# Patient Record
Sex: FEMALE | Race: WHITE | NOT HISPANIC OR LATINO | Employment: OTHER | URBAN - METROPOLITAN AREA
[De-identification: names, ages, dates, MRNs, and addresses within clinical notes are randomized per-mention and may not be internally consistent; named-entity substitution may affect disease eponyms.]

---

## 2017-04-05 ENCOUNTER — HISTORICAL (OUTPATIENT)
Dept: RADIOLOGY | Facility: HOSPITAL | Age: 47
End: 2017-04-05

## 2017-06-13 ENCOUNTER — HISTORICAL (OUTPATIENT)
Dept: LAB | Facility: HOSPITAL | Age: 47
End: 2017-06-13

## 2017-06-13 LAB
ABS NEUT (OLG): 4.51 X10(3)/MCL (ref 2.1–9.2)
BASOPHILS # BLD AUTO: 0 X10(3)/MCL (ref 0–0.2)
BASOPHILS NFR BLD AUTO: 1 %
EOSINOPHIL # BLD AUTO: 0.2 X10(3)/MCL (ref 0–0.9)
EOSINOPHIL NFR BLD AUTO: 4 %
ERYTHROCYTE [DISTWIDTH] IN BLOOD BY AUTOMATED COUNT: 13.1 % (ref 11.5–17)
HCT VFR BLD AUTO: 38 % (ref 37–47)
HGB BLD-MCNC: 12.8 GM/DL (ref 12–16)
LYMPHOCYTES # BLD AUTO: 1.6 X10(3)/MCL (ref 0.6–4.6)
LYMPHOCYTES NFR BLD AUTO: 23 %
MCH RBC QN AUTO: 30 PG (ref 27–31)
MCHC RBC AUTO-ENTMCNC: 33.7 GM/DL (ref 33–36)
MCV RBC AUTO: 89 FL (ref 80–94)
MONOCYTES # BLD AUTO: 0.4 X10(3)/MCL (ref 0.1–1.3)
MONOCYTES NFR BLD AUTO: 6 %
NEUTROPHILS # BLD AUTO: 4.51 X10(3)/MCL (ref 2.1–9.2)
NEUTROPHILS NFR BLD AUTO: 66 %
PLATELET # BLD AUTO: 241 X10(3)/MCL (ref 130–400)
PMV BLD AUTO: 9.9 FL (ref 9.4–12.4)
RBC # BLD AUTO: 4.27 X10(6)/MCL (ref 4.2–5.4)
WBC # SPEC AUTO: 6.8 X10(3)/MCL (ref 4.5–11.5)

## 2017-06-14 ENCOUNTER — HISTORICAL (OUTPATIENT)
Dept: ADMINISTRATIVE | Facility: HOSPITAL | Age: 47
End: 2017-06-14

## 2020-07-02 ENCOUNTER — HISTORICAL (OUTPATIENT)
Dept: RADIOLOGY | Facility: HOSPITAL | Age: 50
End: 2020-07-02

## 2021-08-27 ENCOUNTER — HISTORICAL (OUTPATIENT)
Dept: LAB | Facility: HOSPITAL | Age: 51
End: 2021-08-27

## 2021-08-27 LAB
BUN SERPL-MCNC: 10 MG/DL (ref 7–18.7)
CREAT SERPL-MCNC: 0.74 MG/DL (ref 0.55–1.02)

## 2022-01-31 ENCOUNTER — HISTORICAL (OUTPATIENT)
Dept: LAB | Facility: HOSPITAL | Age: 52
End: 2022-01-31

## 2022-01-31 LAB
AMPHET UR QL SCN: NEGATIVE
BARBITURATE SCN PRESENT UR: NEGATIVE
BENZODIAZ UR QL SCN: NEGATIVE
BUN SERPL-MCNC: 13 MG/DL (ref 7–18.7)
CANNABINOIDS UR QL SCN: NEGATIVE
COCAINE UR QL SCN: NEGATIVE
CREAT SERPL-MCNC: 0.71 MG/DL (ref 0.55–1.02)
CRP SERPL-MCNC: 0.83 MG/L
DEPRECATED CALCIDIOL+CALCIFEROL SERPL-MC: 19.6 NG/ML (ref 30–80)
ERYTHROCYTE [SEDIMENTATION RATE] IN BLOOD: 18 MM/H (ref 0–20)
FENTANYL UR QL SCN: NEGATIVE
FOLATE SERPL-MCNC: 12.5 NG/ML (ref 7–31.4)
HIV 1+2 AB+HIV1 P24 AG SERPL QL IA: 0.05
HIV 1+2 AB+HIV1 P24 AG SERPL QL IA: NONREACTIVE
MDMA UR QL SCN: NEGATIVE
METHADONE UR QL SCN: NEGATIVE
OPIATES UR QL SCN: NEGATIVE
PCP UR QL: NEGATIVE
PH UR STRIP.AUTO: 6 [PH]
VIT B12 SERPL-MCNC: 362 PG/ML (ref 213–816)

## 2022-04-30 NOTE — OP NOTE
DATE OF SURGERY:    06/14/2017    SURGEON:  Soren Alvarado MD  ASSISTANT:  Dominick Forte MD    PREOPERATIVE DIAGNOSIS:  Bilateral breast hypertrophy.    POSTOPERATIVE DIAGNOSIS:  Bilateral breast hypertrophy.    PROCEDURE:  Bilateral breast reductions.    INDICATION FOR PROCEDURE:  The patient is a 47-year-old female who presents with bilateral bra grooving, rashes, intertrigo, heaviness of her breasts.  She presents for bilateral breast reductions.    ANESTHESIA:  General.    COMPLICATIONS:  None.    PROCEDURE IN DETAIL:  The patient was endotracheally intubated, prepped and draped in the usual sterile fashion.  First, a 10 cm pedicle was outlined on the bilateral breasts.  We first turned our attention to the left breast.  A breast tourniquet was used, and the pedicle was de-epithelialized using a 10 blade scalpel.  A 42 mm cookie cutter was used to cut out the nipple.  After this was completed, breast tourniquet was released.  Following the Mendieta pattern markings, a 10 blade scalpel was used to incise the skin.  We raised the superior flaps using Bovie cautery all the way down to the pectoralis major muscle.  I then removed the medial, superior, and lateral segments using Bovie cautery.  This was sent as specimen.  This weighed approximately 500 g.  After this was completed and the breast tissue was removed, we then turned our attention the right breast.  In a similar fashion, a 42 mm cookie cutter was used to cut out the nipple.  A 10 cm pedicle was de-epithelialized using a 10 blade scalpel.  A 10 blade was then used to incise the Wise pattern markings, and then Bovie electrocautery was used to raise superior flaps.  Medial, lateral, and superior segments were then removed.  This was sent as specimen.  The right side was approximately 460 g.  After this was completed, we irrigated out the operative beds with sterile saline solution on both sides and created hemostasis using Bovie cautery.  We then tacked the  breast skin back together and set the patient up.  The symmetry was excellent.  We then laid her back down.  We closed the deep tissue using interrupted 0 Vicryl pops on the left and the right.  The skin was then closed using a running 2-0 Stratafix suture.  The nipple was then brought up through the superior flaps using a 42 mm cookie cutter bilaterally using a 15 blade scalpel, and Bovie cautery was used to remove the superior flap and bring the nipple through the hole after this was completed, the 0 Vicryl pops were used to inset the nipples as well as the vertical segments.  The skin around the nipples and the vertical segments was closed using a running 3-0     Stratafix suture.  Dermabond and Prineo were applied with no complications.  I was scrubbed and present throughout the procedure.        ______________________________  MD SEJAL Field/JARROD  DD:  06/14/2017  Time:  10:55AM  DT:  06/14/2017  Time:  10:12PM  Job #:  [id number]

## 2022-10-25 DIAGNOSIS — C50.912 INVASIVE DUCTAL CARCINOMA OF BREAST, FEMALE, LEFT: Primary | ICD-10-CM

## 2022-10-28 ENCOUNTER — DOCUMENTATION ONLY (OUTPATIENT)
Dept: HEMATOLOGY/ONCOLOGY | Facility: CLINIC | Age: 52
End: 2022-10-28
Payer: COMMERCIAL

## 2022-10-30 PROBLEM — C50.112 MALIGNANT NEOPLASM OF CENTRAL PORTION OF LEFT BREAST IN FEMALE, ESTROGEN RECEPTOR POSITIVE: Status: ACTIVE | Noted: 2022-10-30

## 2022-10-30 PROBLEM — Z17.0 MALIGNANT NEOPLASM OF CENTRAL PORTION OF LEFT BREAST IN FEMALE, ESTROGEN RECEPTOR POSITIVE: Status: ACTIVE | Noted: 2022-10-30

## 2022-10-30 NOTE — PROGRESS NOTES
Referring physician: Self-referral  Reason for referral: Breast Cancer    Subjective:       Patient ID: Nita Dejesus is a 52 y.o. female.    Left Breast Cancer Stage IA(T2N0M0) Triple Positive--22  Biopsy/Surgery/pathology:   1. Excisional biopsy left breast mass/left breast cyst done 22--invasive ductal carcinoma, Grade 3, measures 3.0cm, a 0.6cm region of cauterized carcinoma is present at the inked superior-deep margin, cyst with reactive changes, suggestive of previous lumpectomy site, fluid left breast cyst with rare atypical cells present, suspicious for carcinoma, ER 95%, WV 27%, Her2 3+ by IHC, positive, Ki67 51%.   2. Left SLN biopsies done 22--3 benign lymph nodes.   Imagin. Bilateral diagnostic MMG done at [facility] 22--left inferior breast large, minimally complex cystic lesion at 6:00 measures 4.4X3.5X4.2cm, corresponding to the palpable area, appears benign. Routine screening MMG recommended.   2. MRI breasts bilateral at [facility] 22--post-surgical seroma at 12:00 position of left breast s/p recent excisional biopsy, no definite suspicious enhancing masses or areas of nonmass enhancement of left breast, and no suspicious areas in right breast, BIRADS 6.   3. CT C/A/P w/ contrast 22 at [facility]--no thoracic metastatic disease, fatty infiltration of liver, small to moderate-sized hiatal hernia, no metastatic disease.   4. NM Bone scan whole body done 22 at [facility]--activity in cervical and lumbar spines likely degenerative, no anatomic correlate seen on CT scan in lumbar spines, suggest correlation with C-spine Xrays, increase tracer w/n both feet, likely degnerative change, physiologic activity of urinary tract.  5. CT A/P w/ contrast 22 at [facility]--no acute abdominopelvic pathology or change compared to recent CT from 22, fairly large amount of stool in colon may reflect constipation, large hiatal hernia, diffuse hepatic steatosis, post-cholecystectomy, post  hysterectomy, mild thoracic and lumbar degenerative disease.     ECHO:  8/18/22--EF 55-65%.    Genetic testing: [employer] done 7/21/22 negative for any pathogenic variants.     Treatment history:  Left breast excisional biopsy done 7/13/22  Left SLN biopsies done 8/8/22.    Current treatment plan:  TCHP X 6 cycles started 8/23/22  Cycle 3 delayed due to thrombocytopenia, given on 10/25/22 dose reduction, IV fluids/Zofran/Neulasta on day 2  Cycle 4 planned for 11/15/22  2. Re-excision planned  3. Maintenance HP X 11 cycles if no residual disease  4. +/- XRT  5. Adjuvant OS + AI X 10 years (DAISY for endometriosis, ovaries remain)    Chief Complaint: Back Pain (NPO.)    HPI  51 yo female with h/o fibromyalgia, IBS, IBD, PUD, migraines, active smoker and known recently diagnosed breast cancer has self-referred for a second opinion. Her history dates back to earlier this year when she felt a palpable mass in left breast. She underwent a MMG at [facility] in 2/2022 and lesion appeared benign. But lesion continued to grow so it was recommended for excisional biopsy. Patient was referred to Dr. Kelly Jain and underwent excisional biopsy on 7/13/22. Pathology showed Grade 3 IDCA, measuring 3.0cm with margin positive, ER, GA and Her2 all positive. She then underwent SLN biopsies and returned with 3 benign lymph nodes. She was referred to medical oncology and was seen by Dr. Gonzalez at [facility]. Treatment recommended with chemotherapy TCHP with plans for repeat excision to be done at a later date. Patient received cycle 1 TCHP on 8/23/22 with neulasta. She was hospitalized from 8/25/22--8/27/22 at [facility] with N/V, dehydration and gastritis. She underwent cycle 2 TCHP on 9/28/22 and she was having problems with nose and sinus bleeding. Unsure if any dose reductions. She was due for cycle 3 on 10/18/22 but platelets noted to be 48K so treatment was delayed. According to the notes patient became very upset with the PA at [facility] and has made  decision to change doctors and transfer her care here. Patient presents for initial clinic visit. Patient did receive cycle 3 of treatment at [facility] on 10/25/22 with dose reduction, IV fluids, Zofran and neulasta given on day 2. She tolerated treatment much better. She c/o some mouth soreness and feels like she is drinking andres when she drinks water, also has dry watery eyes and is using lubricant eye drops and she has ongoing diarrhea.  She is otherwise doing okay. She has not been receiving any steroids with her treatment as she tells me that the steroids flare up her fibromyalgia and have the opposite effect on her. She is fatigued and according to her labs she has had progressive anemia. Patient still has her ovaries remaining and reports that as of a year ago, they were still functioning. But she is not aware if she had any hormone levels checked prior to receiving chemotherapy. We discussed options including Zoladex and BSO following radiation therapy but she would like to think about it. She has a plastic surgeon Dr. Alvarado who did a reduction on her breasts several years ago and he will be evaluating her after her breast surgery is done. We discussed mastectomy vs. Lumpectomy + radiation and she is leaning toward lumpectomy at this time.     GYN history with menarche age 11, 19yo first live birth, DAISY  done for endometriosis 2012, ovaries not removed, +progesterone cream X 10 years. FH with father prostate cancer, brother HL, MGM cervical ca and uterine ca, PGM with multiple myeloma, maternal cousin with liver cancer, cousin who  age 6 from leukemia.      Past Medical History:   Diagnosis Date    Allergy     Anemia     Anxiety     Clotting disorder     GERD (gastroesophageal reflux disease)     Neuromuscular disorder       Past Surgical History:   Procedure Laterality Date    CHOLECYSTECTOMY      HERNIA REPAIR      HYSTERECTOMY      lymphectomy Left     REDUCTION OF BOTH BREASTS  2018    TUBAL  "LIGATION Bilateral 1993     Family History   Problem Relation Age of Onset    Hypertension Mother     Lupus Mother     Hypoparathyroidism Mother     Diabetes Father     Heart disease Father     Prostate cancer Father     Kidney disease Father     Hodgkin's lymphoma Brother     Pancreatitis Brother      Social History     Socioeconomic History    Marital status:    Tobacco Use    Smoking status: Former     Types: Cigarettes    Smokeless tobacco: Never   Substance and Sexual Activity    Alcohol use: Not Currently    Drug use: Never       Review of patient's allergies indicates:   Allergen Reactions    Corticosteroids (glucocorticoids) Hives, Other (See Comments) and Swelling     Patient states "Severe Inflammation"      Ketorolac Diarrhea, Nausea And Vomiting, Other (See Comments) and Swelling     Migraines      Penicillins Anaphylaxis, Diarrhea, Hives, Nausea And Vomiting and Swelling    Tramadol Diarrhea, Itching, Nausea And Vomiting, Rash and Swelling    Latex Other (See Comments)     Skin blisters      Macrolide antibiotics Hives and Rash    Adhesive Other (See Comments)     Blisters, skin tears    Anticoag citrate phos dextrose     Aspartame     Estrogens     Sucralose Other (See Comments)    Amitriptyline Anxiety and Other (See Comments)     Severe      Aspirin Nausea Only and Other (See Comments)    Eszopiclone Anxiety and Itching    Heparin analogues Other (See Comments) and Palpitations    Topiramate Anxiety, Other (See Comments) and Palpitations     Shaking        Current Outpatient Medications on File Prior to Visit   Medication Sig Dispense Refill    butalbital-acetaminophen-caffeine -40 mg (FIORICET, ESGIC) -40 mg per tablet Take by mouth.      clonazePAM (KLONOPIN) 0.5 MG tablet Take 0.5 mg by mouth every 8 (eight) hours as needed.      diclofenac (VOLTAREN) 75 MG EC tablet       diphenhydrAMINE (BENADRYL) 50 MG capsule Take 50 mg by mouth every 6 (six) hours as needed.      famotidine " (PEPCID) 20 MG tablet Take 20 mg by mouth 2 (two) times daily.      gabapentin (NEURONTIN) 300 MG capsule 3 (three) times daily.      gabapentin (NEURONTIN) 600 MG tablet Take 600 mg by mouth 3 (three) times daily.      omeprazole (PRILOSEC) 40 MG capsule Take 40 mg by mouth once daily.      ondansetron (ZOFRAN-ODT) 4 MG TbDL Take 4 mg by mouth every 8 (eight) hours as needed.      tiZANidine (ZANAFLEX) 4 MG tablet Take 12 mg by mouth 3 (three) times daily.       No current facility-administered medications on file prior to visit.      Review of Systems   Constitutional:  Negative for appetite change and unexpected weight change.   HENT:  Positive for mouth sores.    Eyes:  Positive for visual disturbance.   Respiratory:  Negative for cough and shortness of breath.    Cardiovascular:  Negative for chest pain.   Gastrointestinal:  Positive for abdominal pain and diarrhea.   Genitourinary:  Positive for frequency.   Musculoskeletal:  Positive for back pain.   Integumentary:  Negative for rash.   Neurological:  Positive for headaches.   Hematological:  Negative for adenopathy.   Psychiatric/Behavioral:  The patient is not nervous/anxious.             Vitals:    10/31/22 1405   BP: 110/75   Pulse: 109   Resp: 14   Temp: 98.2 °F (36.8 °C)   SpO2: 98%   Weight: 83.7 kg (184 lb 8 oz)   Height: 5' (1.524 m)      Physical Exam  Constitutional:       Appearance: Normal appearance.   HENT:      Head: Normocephalic.      Comments: +alopecia wearing a wig     Nose: Nose normal.      Mouth/Throat:      Mouth: Mucous membranes are moist.   Eyes:      Extraocular Movements: Extraocular movements intact.      Conjunctiva/sclera: Conjunctivae normal.   Cardiovascular:      Rate and Rhythm: Normal rate and regular rhythm.   Pulmonary:      Effort: Pulmonary effort is normal.      Breath sounds: Normal breath sounds.   Chest:      Comments: Right chest wall mediport in place, left breast with inferior lumpectomy scar, well healed along  previous scar from her reduction. Left axillary incision from LN biopsy also healed well. No masses palpable  Abdominal:      General: Bowel sounds are normal. There is no distension.      Palpations: Abdomen is soft.      Tenderness: There is no abdominal tenderness.   Musculoskeletal:         General: Normal range of motion.   Skin:     General: Skin is warm.   Neurological:      General: No focal deficit present.      Mental Status: She is alert and oriented to person, place, and time.   Psychiatric:         Mood and Affect: Mood normal.         Judgment: Judgment normal.       Lab Results   Component Value Date    WBC 2.4 (L) 10/31/2022    HGB 8.5 (L) 10/31/2022    HCT 27.3 (L) 10/31/2022    MCV 97.8 (H) 10/31/2022     10/31/2022         Assessment:       1. Anemia, unspecified type    2. Malignant neoplasm of central portion of left breast in female, estrogen receptor positive    3. Invasive ductal carcinoma of breast, female, left         Plan:       Patient with Stage IA Left breast cancer triple positive s/p excisional biopsy of a cystic lesion that found cancer incidentally diagnosed 7/13/22. Tumor measured 3.0cm and SLN biopsies negative, Grade 3, strongly ER and [place] positive and Her2 positive with high Ki67. There was a positive margin on the excisional biopsy.  Treatment with chemotherapy neoadjuvant started at [facility] with Dr. Gonzalez.    Patient has completed 3 cycles as of last week on 10/25/22 and she has had multiple problems.  Tolerated cycle 3 better last week with dose reduction and IV fluids on day 2 with Zofran.  She does not have any steroids due to h/o allergy causing inflammation.   Plan is to complete another 3 cycles followed by surgery--likely completion lumpectomy then adjuvant radiation.    She had DAISY in 2012 for endometriosis but still has ovaries that remain functional according to patient though I do not have any recent Estradiol and/or FSH levels.   Plan will be for OS vs. BSO + AI  X 5 years likely followed by completion of AI to complete 10 years.     Labs today show WBC 2.4, , Hgb lower 8.5g/dL and platelets 147.  Patient has had progressive anemia, checking iron levels and B12 today.   Discussed with her she will likely need a blood transfusion at some point.  Fever precautions given, will send prescription for prophylactic antibiotics.     Schedule labs only next week.  Labs on 11/14/22, next treatment on 11/15/22, add Emend premed for nausea, with IV fluids/Zofran/neulasta on day 2 11/16/22.  F/u in 3 weeks labs and toxicity check visit.    Add Lomotil for diarrhea and add MMW for mouth soreness.  Continue eye lubricants QID.  Due for repeat ECHO, will order for next week.     All questions answered at this time.    I have reviewed records from patient's cancer diagnosis including biopsy/operative reports, pathology reports/special testing, imaging studies, clinician visit notes, previous labs, and also previous treatments and dosing.   Total time spent reviewing records, current NCCN guidelines, as well as face-to-face interaction with patient and family was >90 minutes.         Treatment Plan Information   OP BREAST DOCETAXEL CARBOPLATIN TRASTUZUMAB PERTUZUMAB (TCHP) Q3W   Flory Vargas MD   Upcoming Treatment Dates - OP BREAST DOCETAXEL CARBOPLATIN TRASTUZUMAB PERTUZUMAB (TCHP) Q3W    11/15/2022       Chemotherapy       pertuzumab (PERJETA) 420 mg in sodium chloride 0.9% 264 mL infusion       trastuzumab-dkst (OGIVRI) 502 mg in sodium chloride 0.9% 250 mL chemo infusion       DOCEtaxel (TAXOTERE) 63.75 mg/m2 = 120 mg in sodium chloride 0.9% 250 mL chemo infusion       CARBOplatin (PARAPLATIN) 665 mg in sodium chloride 0.9% 500 mL chemo infusion       Antiemetics       aprepitant (CINVANTI) injection 130 mg       palonosetron injection 0.25 mg       diphenhydrAMINE injection 12.5 mg  11/16/2022       Growth Factor       pegfilgrastim-cbqv injection 6 mg  12/6/2022        Chemotherapy       pertuzumab (PERJETA) 420 mg in sodium chloride 0.9% 264 mL infusion       trastuzumab-dkst (OGIVRI) 502 mg in sodium chloride 0.9% 250 mL chemo infusion       DOCEtaxel (TAXOTERE) 63.75 mg/m2 = 120 mg in sodium chloride 0.9% 250 mL chemo infusion       CARBOplatin (PARAPLATIN) 665 mg in sodium chloride 0.9% 500 mL chemo infusion       Antiemetics       palonosetron injection 0.25 mg       diphenhydrAMINE injection 12.5 mg       aprepitant (CINVANTI) injection 130 mg  12/7/2022       Growth Factor       pegfilgrastim-cbqv injection 6 mg

## 2022-10-31 ENCOUNTER — TELEPHONE (OUTPATIENT)
Dept: HEMATOLOGY/ONCOLOGY | Facility: CLINIC | Age: 52
End: 2022-10-31

## 2022-10-31 ENCOUNTER — OFFICE VISIT (OUTPATIENT)
Dept: HEMATOLOGY/ONCOLOGY | Facility: CLINIC | Age: 52
End: 2022-10-31
Payer: COMMERCIAL

## 2022-10-31 ENCOUNTER — CLINICAL SUPPORT (OUTPATIENT)
Dept: HEMATOLOGY/ONCOLOGY | Facility: CLINIC | Age: 52
End: 2022-10-31
Payer: COMMERCIAL

## 2022-10-31 VITALS
WEIGHT: 184.5 LBS | TEMPERATURE: 98 F | RESPIRATION RATE: 14 BRPM | DIASTOLIC BLOOD PRESSURE: 75 MMHG | HEART RATE: 109 BPM | SYSTOLIC BLOOD PRESSURE: 110 MMHG | BODY MASS INDEX: 36.22 KG/M2 | OXYGEN SATURATION: 98 % | HEIGHT: 60 IN

## 2022-10-31 DIAGNOSIS — C50.912 INVASIVE DUCTAL CARCINOMA OF BREAST, FEMALE, LEFT: ICD-10-CM

## 2022-10-31 DIAGNOSIS — C50.112 MALIGNANT NEOPLASM OF CENTRAL PORTION OF LEFT BREAST IN FEMALE, ESTROGEN RECEPTOR POSITIVE: ICD-10-CM

## 2022-10-31 DIAGNOSIS — D64.9 ANEMIA, UNSPECIFIED TYPE: Primary | ICD-10-CM

## 2022-10-31 DIAGNOSIS — Z17.0 MALIGNANT NEOPLASM OF CENTRAL PORTION OF LEFT BREAST IN FEMALE, ESTROGEN RECEPTOR POSITIVE: ICD-10-CM

## 2022-10-31 LAB
ABS NEUT CALC (OHS): 0.48 X10(3)/MCL (ref 2.1–9.2)
ALBUMIN SERPL-MCNC: 3.8 GM/DL (ref 3.5–5)
ALBUMIN/GLOB SERPL: 1.3 RATIO (ref 1.1–2)
ALP SERPL-CCNC: 121 UNIT/L (ref 40–150)
ALT SERPL-CCNC: 29 UNIT/L (ref 0–55)
AST SERPL-CCNC: 20 UNIT/L (ref 5–34)
BILIRUBIN DIRECT+TOT PNL SERPL-MCNC: 0.3 MG/DL
BUN SERPL-MCNC: 15.7 MG/DL (ref 9.8–20.1)
CALCIUM SERPL-MCNC: 9.7 MG/DL (ref 8.4–10.2)
CHLORIDE SERPL-SCNC: 107 MMOL/L (ref 98–107)
CO2 SERPL-SCNC: 26 MMOL/L (ref 22–29)
CREAT SERPL-MCNC: 0.72 MG/DL (ref 0.55–1.02)
ERYTHROCYTE [DISTWIDTH] IN BLOOD BY AUTOMATED COUNT: 17.6 % (ref 11.5–17)
FERRITIN SERPL-MCNC: 458.13 NG/ML (ref 4.63–204)
GFR SERPLBLD CREATININE-BSD FMLA CKD-EPI: >60 MLS/MIN/1.73/M2
GIANT PLATELETS # (OHS): 1 MCL
GLOBULIN SER-MCNC: 2.9 GM/DL (ref 2.4–3.5)
GLUCOSE SERPL-MCNC: 85 MG/DL (ref 74–100)
HCT VFR BLD AUTO: 27.3 % (ref 37–47)
HGB BLD-MCNC: 8.5 GM/DL (ref 12–16)
IMM GRANULOCYTES # BLD AUTO: 0.01 X10(3)/MCL (ref 0–0.04)
IMM GRANULOCYTES NFR BLD AUTO: 0.4 %
LYMPH ABN # BLD MANUAL: 4 %
LYMPHOCYTES NFR BLD MANUAL: 1.63 X10(3)/MCL
LYMPHOCYTES NFR BLD MANUAL: 68 % (ref 13–40)
MCH RBC QN AUTO: 30.5 PG (ref 27–31)
MCHC RBC AUTO-ENTMCNC: 31.1 MG/DL (ref 33–36)
MCV RBC AUTO: 97.8 FL (ref 80–94)
MONOCYTES NFR BLD MANUAL: 0.19 X10(3)/MCL (ref 0.1–1.3)
MONOCYTES NFR BLD MANUAL: 8 % (ref 2–11)
NEUTROPHILS NFR BLD MANUAL: 20 % (ref 47–80)
PLATELET # BLD AUTO: 147 X10(3)/MCL (ref 130–400)
PLATELET # BLD EST: ADEQUATE 10*3/UL
PMV BLD AUTO: 9.6 FL (ref 7.4–10.4)
POTASSIUM SERPL-SCNC: 4 MMOL/L (ref 3.5–5.1)
PROT SERPL-MCNC: 6.7 GM/DL (ref 6.4–8.3)
RBC # BLD AUTO: 2.79 X10(6)/MCL (ref 4.2–5.4)
RBC MORPH BLD: NORMAL
SMUDGE CELL (OLG): SLIGHT
SODIUM SERPL-SCNC: 140 MMOL/L (ref 136–145)
VIT B12 SERPL-MCNC: 1325 PG/ML (ref 213–816)
WBC # SPEC AUTO: 2.4 X10(3)/MCL (ref 4.5–11.5)

## 2022-10-31 PROCEDURE — 99205 PR OFFICE/OUTPT VISIT, NEW, LEVL V, 60-74 MIN: ICD-10-PCS | Mod: S$GLB,,, | Performed by: INTERNAL MEDICINE

## 2022-10-31 PROCEDURE — 80053 COMPREHEN METABOLIC PANEL: CPT | Performed by: INTERNAL MEDICINE

## 2022-10-31 PROCEDURE — 1160F RVW MEDS BY RX/DR IN RCRD: CPT | Mod: CPTII,S$GLB,, | Performed by: INTERNAL MEDICINE

## 2022-10-31 PROCEDURE — 82607 VITAMIN B-12: CPT | Performed by: INTERNAL MEDICINE

## 2022-10-31 PROCEDURE — 1159F MED LIST DOCD IN RCRD: CPT | Mod: CPTII,S$GLB,, | Performed by: INTERNAL MEDICINE

## 2022-10-31 PROCEDURE — 99999 PR PBB SHADOW E&M-EST. PATIENT-LVL V: CPT | Mod: PBBFAC,,, | Performed by: INTERNAL MEDICINE

## 2022-10-31 PROCEDURE — 85027 COMPLETE CBC AUTOMATED: CPT | Performed by: INTERNAL MEDICINE

## 2022-10-31 PROCEDURE — 99205 OFFICE O/P NEW HI 60 MIN: CPT | Mod: S$GLB,,, | Performed by: INTERNAL MEDICINE

## 2022-10-31 PROCEDURE — 99999 PR PBB SHADOW E&M-EST. PATIENT-LVL II: CPT | Mod: PBBFAC,,,

## 2022-10-31 PROCEDURE — 3074F PR MOST RECENT SYSTOLIC BLOOD PRESSURE < 130 MM HG: ICD-10-PCS | Mod: CPTII,S$GLB,, | Performed by: INTERNAL MEDICINE

## 2022-10-31 PROCEDURE — 1159F PR MEDICATION LIST DOCUMENTED IN MEDICAL RECORD: ICD-10-PCS | Mod: CPTII,S$GLB,, | Performed by: INTERNAL MEDICINE

## 2022-10-31 PROCEDURE — 99999 PR PBB SHADOW E&M-EST. PATIENT-LVL II: ICD-10-PCS | Mod: PBBFAC,,,

## 2022-10-31 PROCEDURE — 3078F PR MOST RECENT DIASTOLIC BLOOD PRESSURE < 80 MM HG: ICD-10-PCS | Mod: CPTII,S$GLB,, | Performed by: INTERNAL MEDICINE

## 2022-10-31 PROCEDURE — 3074F SYST BP LT 130 MM HG: CPT | Mod: CPTII,S$GLB,, | Performed by: INTERNAL MEDICINE

## 2022-10-31 PROCEDURE — 1160F PR REVIEW ALL MEDS BY PRESCRIBER/CLIN PHARMACIST DOCUMENTED: ICD-10-PCS | Mod: CPTII,S$GLB,, | Performed by: INTERNAL MEDICINE

## 2022-10-31 PROCEDURE — 3078F DIAST BP <80 MM HG: CPT | Mod: CPTII,S$GLB,, | Performed by: INTERNAL MEDICINE

## 2022-10-31 PROCEDURE — 99999 PR PBB SHADOW E&M-EST. PATIENT-LVL V: ICD-10-PCS | Mod: PBBFAC,,, | Performed by: INTERNAL MEDICINE

## 2022-10-31 PROCEDURE — 82728 ASSAY OF FERRITIN: CPT | Performed by: INTERNAL MEDICINE

## 2022-10-31 PROCEDURE — 36415 COLL VENOUS BLD VENIPUNCTURE: CPT | Performed by: INTERNAL MEDICINE

## 2022-10-31 RX ORDER — OMEPRAZOLE 40 MG/1
40 CAPSULE, DELAYED RELEASE ORAL DAILY
COMMUNITY
Start: 2022-06-17 | End: 2022-12-27 | Stop reason: SDUPTHER

## 2022-10-31 RX ORDER — PALONOSETRON 0.05 MG/ML
0.25 INJECTION, SOLUTION INTRAVENOUS ONCE
Status: CANCELLED
Start: 2022-11-29

## 2022-10-31 RX ORDER — HEPARIN 100 UNIT/ML
500 SYRINGE INTRAVENOUS
Status: CANCELLED | OUTPATIENT
Start: 2022-11-29

## 2022-10-31 RX ORDER — LEVOFLOXACIN 500 MG/1
500 TABLET, FILM COATED ORAL DAILY
Qty: 7 TABLET | Refills: 0 | Status: SHIPPED | OUTPATIENT
Start: 2022-10-31 | End: 2022-11-07

## 2022-10-31 RX ORDER — DIPHENHYDRAMINE HYDROCHLORIDE 50 MG/ML
50 INJECTION INTRAMUSCULAR; INTRAVENOUS ONCE AS NEEDED
Status: CANCELLED | OUTPATIENT
Start: 2022-11-29

## 2022-10-31 RX ORDER — DIPHENHYDRAMINE HYDROCHLORIDE 50 MG/ML
12.5 INJECTION INTRAMUSCULAR; INTRAVENOUS
Status: CANCELLED
Start: 2022-11-29

## 2022-10-31 RX ORDER — ONDANSETRON 4 MG/1
4 TABLET, ORALLY DISINTEGRATING ORAL EVERY 8 HOURS PRN
COMMUNITY
Start: 2022-08-04

## 2022-10-31 RX ORDER — GABAPENTIN 300 MG/1
300 CAPSULE ORAL 3 TIMES DAILY
COMMUNITY
Start: 2022-09-16

## 2022-10-31 RX ORDER — GABAPENTIN 600 MG/1
600 TABLET ORAL 3 TIMES DAILY
COMMUNITY
Start: 2022-08-15

## 2022-10-31 RX ORDER — DIPHENHYDRAMINE HCL 50 MG
50 CAPSULE ORAL DAILY PRN
COMMUNITY

## 2022-10-31 RX ORDER — DICLOFENAC SODIUM 75 MG/1
75 TABLET, DELAYED RELEASE ORAL NIGHTLY
COMMUNITY
Start: 2022-06-02

## 2022-10-31 RX ORDER — EPINEPHRINE 0.3 MG/.3ML
0.3 INJECTION SUBCUTANEOUS ONCE AS NEEDED
Status: CANCELLED | OUTPATIENT
Start: 2022-11-29

## 2022-10-31 RX ORDER — SODIUM CHLORIDE 0.9 % (FLUSH) 0.9 %
10 SYRINGE (ML) INJECTION
Status: CANCELLED | OUTPATIENT
Start: 2022-11-29

## 2022-10-31 RX ORDER — TIZANIDINE 4 MG/1
4 TABLET ORAL 3 TIMES DAILY
COMMUNITY
Start: 2022-08-14

## 2022-10-31 RX ORDER — CLONAZEPAM 0.5 MG/1
0.5 TABLET ORAL EVERY 8 HOURS PRN
COMMUNITY
Start: 2022-08-05

## 2022-10-31 RX ORDER — BUTALBITAL, ACETAMINOPHEN AND CAFFEINE 50; 325; 40 MG/1; MG/1; MG/1
1 TABLET ORAL DAILY PRN
COMMUNITY
Start: 2022-10-18

## 2022-10-31 RX ORDER — DIPHENOXYLATE HYDROCHLORIDE AND ATROPINE SULFATE 2.5; .025 MG/1; MG/1
1 TABLET ORAL 4 TIMES DAILY PRN
Qty: 30 TABLET | Refills: 1 | Status: ON HOLD | OUTPATIENT
Start: 2022-10-31 | End: 2023-09-27 | Stop reason: HOSPADM

## 2022-10-31 RX ORDER — FAMOTIDINE 20 MG/1
20 TABLET, FILM COATED ORAL 2 TIMES DAILY
COMMUNITY
Start: 2022-10-05 | End: 2022-12-27

## 2022-10-31 NOTE — PROGRESS NOTES
Patient received voice mail about Rx and will pick it up hopefully before 5:00 and will start it today.

## 2022-11-07 ENCOUNTER — LAB VISIT (OUTPATIENT)
Dept: LAB | Facility: HOSPITAL | Age: 52
End: 2022-11-07
Attending: INTERNAL MEDICINE
Payer: COMMERCIAL

## 2022-11-07 DIAGNOSIS — Z17.0 MALIGNANT NEOPLASM OF CENTRAL PORTION OF LEFT BREAST IN FEMALE, ESTROGEN RECEPTOR POSITIVE: ICD-10-CM

## 2022-11-07 DIAGNOSIS — C50.912 INVASIVE DUCTAL CARCINOMA OF BREAST, FEMALE, LEFT: ICD-10-CM

## 2022-11-07 DIAGNOSIS — C50.112 MALIGNANT NEOPLASM OF CENTRAL PORTION OF LEFT BREAST IN FEMALE, ESTROGEN RECEPTOR POSITIVE: ICD-10-CM

## 2022-11-07 LAB
ALBUMIN SERPL-MCNC: 3.6 GM/DL (ref 3.5–5)
ALBUMIN/GLOB SERPL: 1.6 RATIO (ref 1.1–2)
ALP SERPL-CCNC: 104 UNIT/L (ref 40–150)
ALT SERPL-CCNC: 21 UNIT/L (ref 0–55)
AST SERPL-CCNC: 17 UNIT/L (ref 5–34)
BASOPHILS # BLD AUTO: 0.03 X10(3)/MCL (ref 0–0.2)
BASOPHILS NFR BLD AUTO: 0.6 %
BILIRUBIN DIRECT+TOT PNL SERPL-MCNC: 0.3 MG/DL
BUN SERPL-MCNC: 13 MG/DL (ref 9.8–20.1)
CALCIUM SERPL-MCNC: 9.6 MG/DL (ref 8.4–10.2)
CHLORIDE SERPL-SCNC: 106 MMOL/L (ref 98–107)
CO2 SERPL-SCNC: 26 MMOL/L (ref 22–29)
CREAT SERPL-MCNC: 0.8 MG/DL (ref 0.55–1.02)
EOSINOPHIL # BLD AUTO: 0 X10(3)/MCL (ref 0–0.9)
EOSINOPHIL NFR BLD AUTO: 0 %
ERYTHROCYTE [DISTWIDTH] IN BLOOD BY AUTOMATED COUNT: 19 % (ref 11.5–17)
GFR SERPLBLD CREATININE-BSD FMLA CKD-EPI: >60 MLS/MIN/1.73/M2
GLOBULIN SER-MCNC: 2.2 GM/DL (ref 2.4–3.5)
GLUCOSE SERPL-MCNC: 136 MG/DL (ref 74–100)
HCT VFR BLD AUTO: 26.2 % (ref 37–47)
HGB BLD-MCNC: 8.2 GM/DL (ref 12–16)
IMM GRANULOCYTES # BLD AUTO: 0.06 X10(3)/MCL (ref 0–0.04)
IMM GRANULOCYTES NFR BLD AUTO: 1.3 %
IRON SATN MFR SERPL: 42 % (ref 20–50)
IRON SERPL-MCNC: 110 UG/DL (ref 50–170)
LYMPHOCYTES # BLD AUTO: 1.48 X10(3)/MCL (ref 0.6–4.6)
LYMPHOCYTES NFR BLD AUTO: 30.9 %
MCH RBC QN AUTO: 31.4 PG (ref 27–31)
MCHC RBC AUTO-ENTMCNC: 31.3 MG/DL (ref 33–36)
MCV RBC AUTO: 100.4 FL (ref 80–94)
MONOCYTES # BLD AUTO: 0.4 X10(3)/MCL (ref 0.1–1.3)
MONOCYTES NFR BLD AUTO: 8.4 %
NEUTROPHILS # BLD AUTO: 2.8 X10(3)/MCL (ref 2.1–9.2)
NEUTROPHILS NFR BLD AUTO: 58.8 %
PLATELET # BLD AUTO: 76 X10(3)/MCL (ref 130–400)
PMV BLD AUTO: 9.9 FL (ref 7.4–10.4)
POTASSIUM SERPL-SCNC: 4.2 MMOL/L (ref 3.5–5.1)
PROT SERPL-MCNC: 5.8 GM/DL (ref 6.4–8.3)
RBC # BLD AUTO: 2.61 X10(6)/MCL (ref 4.2–5.4)
SODIUM SERPL-SCNC: 139 MMOL/L (ref 136–145)
TIBC SERPL-MCNC: 155 UG/DL (ref 70–310)
TIBC SERPL-MCNC: 265 UG/DL (ref 250–450)
TRANSFERRIN SERPL-MCNC: 235 MG/DL (ref 180–382)
WBC # SPEC AUTO: 4.8 X10(3)/MCL (ref 4.5–11.5)

## 2022-11-07 PROCEDURE — 85025 COMPLETE CBC W/AUTO DIFF WBC: CPT

## 2022-11-07 PROCEDURE — 80053 COMPREHEN METABOLIC PANEL: CPT

## 2022-11-07 PROCEDURE — 83540 ASSAY OF IRON: CPT

## 2022-11-07 PROCEDURE — 36415 COLL VENOUS BLD VENIPUNCTURE: CPT

## 2022-11-14 ENCOUNTER — TELEPHONE (OUTPATIENT)
Dept: HEMATOLOGY/ONCOLOGY | Facility: CLINIC | Age: 52
End: 2022-11-14
Payer: COMMERCIAL

## 2022-11-14 ENCOUNTER — LAB VISIT (OUTPATIENT)
Dept: LAB | Facility: HOSPITAL | Age: 52
End: 2022-11-14
Attending: INTERNAL MEDICINE
Payer: COMMERCIAL

## 2022-11-14 DIAGNOSIS — Z17.0 MALIGNANT NEOPLASM OF CENTRAL PORTION OF LEFT BREAST IN FEMALE, ESTROGEN RECEPTOR POSITIVE: ICD-10-CM

## 2022-11-14 DIAGNOSIS — C50.912 INVASIVE DUCTAL CARCINOMA OF BREAST, FEMALE, LEFT: ICD-10-CM

## 2022-11-14 DIAGNOSIS — C50.112 MALIGNANT NEOPLASM OF CENTRAL PORTION OF LEFT BREAST IN FEMALE, ESTROGEN RECEPTOR POSITIVE: ICD-10-CM

## 2022-11-14 LAB
ALBUMIN SERPL-MCNC: 3.7 GM/DL (ref 3.5–5)
ALBUMIN/GLOB SERPL: 1.5 RATIO (ref 1.1–2)
ALP SERPL-CCNC: 118 UNIT/L (ref 40–150)
ALT SERPL-CCNC: 24 UNIT/L (ref 0–55)
AST SERPL-CCNC: 17 UNIT/L (ref 5–34)
BASOPHILS # BLD AUTO: 0.01 X10(3)/MCL (ref 0–0.2)
BASOPHILS NFR BLD AUTO: 0.2 %
BILIRUBIN DIRECT+TOT PNL SERPL-MCNC: 0.9 MG/DL
BUN SERPL-MCNC: 14.7 MG/DL (ref 9.8–20.1)
CALCIUM SERPL-MCNC: 9.7 MG/DL (ref 8.4–10.2)
CHLORIDE SERPL-SCNC: 108 MMOL/L (ref 98–107)
CO2 SERPL-SCNC: 27 MMOL/L (ref 22–29)
CREAT SERPL-MCNC: 0.83 MG/DL (ref 0.55–1.02)
EOSINOPHIL # BLD AUTO: 0 X10(3)/MCL (ref 0–0.9)
EOSINOPHIL NFR BLD AUTO: 0 %
ERYTHROCYTE [DISTWIDTH] IN BLOOD BY AUTOMATED COUNT: 20.7 % (ref 11.5–17)
GFR SERPLBLD CREATININE-BSD FMLA CKD-EPI: >60 MLS/MIN/1.73/M2
GLOBULIN SER-MCNC: 2.5 GM/DL (ref 2.4–3.5)
GLUCOSE SERPL-MCNC: 113 MG/DL (ref 74–100)
HCT VFR BLD AUTO: 26.3 % (ref 37–47)
HGB BLD-MCNC: 8.3 GM/DL (ref 12–16)
IMM GRANULOCYTES # BLD AUTO: 0.01 X10(3)/MCL (ref 0–0.04)
IMM GRANULOCYTES NFR BLD AUTO: 0.2 %
LYMPHOCYTES # BLD AUTO: 1.32 X10(3)/MCL (ref 0.6–4.6)
LYMPHOCYTES NFR BLD AUTO: 27.3 %
MCH RBC QN AUTO: 32.4 PG (ref 27–31)
MCHC RBC AUTO-ENTMCNC: 31.6 MG/DL (ref 33–36)
MCV RBC AUTO: 102.7 FL (ref 80–94)
MONOCYTES # BLD AUTO: 0.42 X10(3)/MCL (ref 0.1–1.3)
MONOCYTES NFR BLD AUTO: 8.7 %
NEUTROPHILS # BLD AUTO: 3.1 X10(3)/MCL (ref 2.1–9.2)
NEUTROPHILS NFR BLD AUTO: 63.6 %
PLATELET # BLD AUTO: 51 X10(3)/MCL (ref 130–400)
PMV BLD AUTO: 11.4 FL (ref 7.4–10.4)
POTASSIUM SERPL-SCNC: 4.3 MMOL/L (ref 3.5–5.1)
PROT SERPL-MCNC: 6.2 GM/DL (ref 6.4–8.3)
RBC # BLD AUTO: 2.56 X10(6)/MCL (ref 4.2–5.4)
SODIUM SERPL-SCNC: 140 MMOL/L (ref 136–145)
WBC # SPEC AUTO: 4.8 X10(3)/MCL (ref 4.5–11.5)

## 2022-11-14 PROCEDURE — 85025 COMPLETE CBC W/AUTO DIFF WBC: CPT

## 2022-11-14 PROCEDURE — 36415 COLL VENOUS BLD VENIPUNCTURE: CPT

## 2022-11-14 PROCEDURE — 80053 COMPREHEN METABOLIC PANEL: CPT

## 2022-11-14 NOTE — PROGRESS NOTES
Labs today revealed platelet count of 51,000. Patient without any s/s of bleeding. Unable to proceed with Cycle 4 of treatment tomorrow. This was discussed with patient today. She mentions having plans out of town next week so she will not be able to treat at that time. Cycle 4 delayed to 11/29/22. Labs will be on 11/28/22. Dr. Vargas does still want to keep her appointment and labs next week.

## 2022-11-17 ENCOUNTER — HOSPITAL ENCOUNTER (OUTPATIENT)
Dept: CARDIOLOGY | Facility: HOSPITAL | Age: 52
Discharge: HOME | End: 2022-11-17
Attending: INTERNAL MEDICINE
Payer: COMMERCIAL

## 2022-11-17 VITALS — BODY MASS INDEX: 36.22 KG/M2 | WEIGHT: 184.5 LBS | HEIGHT: 60 IN

## 2022-11-17 DIAGNOSIS — C50.912 INVASIVE DUCTAL CARCINOMA OF BREAST, FEMALE, LEFT: ICD-10-CM

## 2022-11-17 DIAGNOSIS — C50.112 MALIGNANT NEOPLASM OF CENTRAL PORTION OF LEFT BREAST IN FEMALE, ESTROGEN RECEPTOR POSITIVE: ICD-10-CM

## 2022-11-17 DIAGNOSIS — Z17.0 MALIGNANT NEOPLASM OF CENTRAL PORTION OF LEFT BREAST IN FEMALE, ESTROGEN RECEPTOR POSITIVE: ICD-10-CM

## 2022-11-17 PROCEDURE — 93306 TTE W/DOPPLER COMPLETE: CPT | Mod: 26,,, | Performed by: INTERNAL MEDICINE

## 2022-11-17 PROCEDURE — 93306 TTE W/DOPPLER COMPLETE: CPT

## 2022-11-17 PROCEDURE — 93306 ECHO (CUPID ONLY): ICD-10-PCS | Mod: 26,,, | Performed by: INTERNAL MEDICINE

## 2022-11-18 LAB
AV INDEX (PROSTH): 0.59
AV MEAN GRADIENT: 3 MMHG
AV PEAK GRADIENT: 5 MMHG
AV VALVE AREA: 1.86 CM2
AV VELOCITY RATIO: 0.61
BSA FOR ECHO PROCEDURE: 1.88 M2
CV ECHO LV RWT: 0.66 CM
DOP CALC AO PEAK VEL: 1.13 M/S
DOP CALC AO VTI: 21.1 CM
DOP CALC LVOT AREA: 3.1 CM2
DOP CALC LVOT DIAMETER: 2 CM
DOP CALC LVOT PEAK VEL: 0.69 M/S
DOP CALC LVOT STROKE VOLUME: 39.25 CM3
DOP CALC MV VTI: 16.8 CM
DOP CALCLVOT PEAK VEL VTI: 12.5 CM
E WAVE DECELERATION TIME: 172 MSEC
E/A RATIO: 0.79
E/E' RATIO: 4.64 M/S
ECHO LV POSTERIOR WALL: 1.45 CM (ref 0.6–1.1)
EJECTION FRACTION: 60 %
FRACTIONAL SHORTENING: 38 % (ref 28–44)
INTERVENTRICULAR SEPTUM: 0.92 CM (ref 0.6–1.1)
LEFT ATRIUM SIZE: 3.7 CM
LEFT ATRIUM VOLUME INDEX MOD: 9.9 ML/M2
LEFT ATRIUM VOLUME MOD: 17.8 CM3
LEFT INTERNAL DIMENSION IN SYSTOLE: 2.72 CM (ref 2.1–4)
LEFT VENTRICLE DIASTOLIC VOLUME INDEX: 49.22 ML/M2
LEFT VENTRICLE DIASTOLIC VOLUME: 88.6 ML
LEFT VENTRICLE MASS INDEX: 105 G/M2
LEFT VENTRICLE SYSTOLIC VOLUME INDEX: 15.3 ML/M2
LEFT VENTRICLE SYSTOLIC VOLUME: 27.5 ML
LEFT VENTRICULAR INTERNAL DIMENSION IN DIASTOLE: 4.42 CM (ref 3.5–6)
LEFT VENTRICULAR MASS: 189.21 G
LV LATERAL E/E' RATIO: 4.83 M/S
LV SEPTAL E/E' RATIO: 4.46 M/S
LVOT MG: 1 MMHG
LVOT MV: 0.47 CM/S
MV MEAN GRADIENT: 2 MMHG
MV PEAK A VEL: 0.73 M/S
MV PEAK E VEL: 0.58 M/S
MV PEAK GRADIENT: 3 MMHG
MV STENOSIS PRESSURE HALF TIME: 72 MS
MV VALVE AREA BY CONTINUITY EQUATION: 2.34 CM2
MV VALVE AREA P 1/2 METHOD: 3.06 CM2
PISA TR MAX VEL: 2.3 M/S
PV PEAK VELOCITY: 0.91 CM/S
RA PRESSURE: 3 MMHG
RIGHT VENTRICULAR END-DIASTOLIC DIMENSION: 2.49 CM
TDI LATERAL: 0.12 M/S
TDI SEPTAL: 0.13 M/S
TDI: 0.13 M/S
TR MAX PG: 21 MMHG
TRICUSPID ANNULAR PLANE SYSTOLIC EXCURSION: 1.92 CM
TV REST PULMONARY ARTERY PRESSURE: 24 MMHG

## 2022-11-18 NOTE — PROGRESS NOTES
Subjective:       Patient ID: Nita Dejesus is a 52 y.o. female.    Previous Oncologist: Dr. Katlyn Gonzalez at [facility]  Surgeon: Dr. Kelly Santos    Left Breast Cancer Stage IA(T2N0M0) Triple Positive--22  Biopsy/Surgery/pathology:   1. Excisional biopsy left breast mass/left breast cyst done 22--invasive ductal carcinoma, Grade 3, measures 3.0cm, a 0.6cm region of cauterized carcinoma is present at the inked superior-deep margin, cyst with reactive changes, suggestive of previous lumpectomy site, fluid left breast cyst with rare atypical cells present, suspicious for carcinoma, ER 95%, OR 27%, Her2 3+ by IHC, positive, Ki67 51%.   2. Left SLN biopsies done 22--3 benign lymph nodes.   Imagin. Bilateral diagnostic MMG done at [facility] 22--left inferior breast large, minimally complex cystic lesion at 6:00 measures 4.4X3.5X4.2cm, corresponding to the palpable area, appears benign. Routine screening MMG recommended.   2. MRI breasts bilateral at [facility] 22--post-surgical seroma at 12:00 position of left breast s/p recent excisional biopsy, no definite suspicious enhancing masses or areas of nonmass enhancement of left breast, and no suspicious areas in right breast, BIRADS 6.   3. CT C/A/P w/ contrast 22 at [facility]--no thoracic metastatic disease, fatty infiltration of liver, small to moderate-sized hiatal hernia, no metastatic disease.   4. NM Bone scan whole body done 22 at [facility]--activity in cervical and lumbar spines likely degenerative, no anatomic correlate seen on CT scan in lumbar spines, suggest correlation with C-spine Xrays, increase tracer w/n both feet, likely degnerative change, physiologic activity of urinary tract.  5. CT A/P w/ contrast 22 at [facility]--no acute abdominopelvic pathology or change compared to recent CT from 22, fairly large amount of stool in colon may reflect constipation, large hiatal hernia, diffuse hepatic steatosis, post-cholecystectomy, post  hysterectomy, mild thoracic and lumbar degenerative disease.     ECHO:  08/18/22--EF 55-65%.  11/17/22--EF 60%.    Genetic testing: [employer] done 7/21/22 negative for any pathogenic variants.     Treatment history:  Left breast excisional biopsy done 7/13/22  Left SLN biopsies done 8/8/22.    Current treatment plan:  TCHP X 6 cycles started 8/23/22  Cycle 3 delayed due to thrombocytopenia, given on 10/25/22--15% dose reduction, IV fluids/Zofran/Neulasta on day 2  Cycle 4 planned for 11/29/22--plan for another 20% dose reduction in chemotherapy (total 35%)  2. Re-excision planned  3. Maintenance HP X 11 cycles if no residual disease  4. +/- XRT  5. Adjuvant OS + AI X 10 years (DAISY for endometriosis, ovaries remain)    Chief Complaint: Diarrhea, Fatigue, and Epistaxis (Pt reports that everything that she does makes her incredibly weak and tired.)    HPI  Patient presents for follow-up of breast cancer. She was due for cycle 4 last week but was delayed due to thrombocytopenia. Platelets remain too low today to proceed with treatment but they are improved. She continues with multiple problems including ongoing fatigue, likely from anemia. She did have an episode of epistaxis that resolved with compression but did bleed for an hour. No other bleeding. She also has ongoing diarrhea with the treatment. Her  is with her today.       Past Medical History:   Diagnosis Date    Allergy     Anemia     Anxiety     Clotting disorder     GERD (gastroesophageal reflux disease)     Neuromuscular disorder       Past Surgical History:   Procedure Laterality Date    CHOLECYSTECTOMY      HERNIA REPAIR      HYSTERECTOMY      lymphectomy Left     REDUCTION OF BOTH BREASTS  2018    TUBAL LIGATION Bilateral 1993     Family History   Problem Relation Age of Onset    Hypertension Mother     Lupus Mother     Hypoparathyroidism Mother     Diabetes Father     Heart disease Father     Prostate cancer Father     Kidney disease Father      "Hodgkin's lymphoma Brother     Pancreatitis Brother      Social History     Socioeconomic History    Marital status:    Tobacco Use    Smoking status: Every Day     Packs/day: 0.50     Types: Cigarettes    Smokeless tobacco: Never   Substance and Sexual Activity    Alcohol use: Not Currently    Drug use: Never       Review of patient's allergies indicates:   Allergen Reactions    Corticosteroids (glucocorticoids) Hives, Other (See Comments) and Swelling     Patient states "Severe Inflammation"      Ketorolac Diarrhea, Nausea And Vomiting, Other (See Comments) and Swelling     Migraines      Penicillins Anaphylaxis, Diarrhea, Hives, Nausea And Vomiting and Swelling    Tramadol Diarrhea, Itching, Nausea And Vomiting, Rash and Swelling    Latex Other (See Comments)     Skin blisters      Macrolide antibiotics Hives and Rash    Adhesive Other (See Comments)     Blisters, skin tears    Anticoag citrate phos dextrose     Aspartame     Estrogens     Sucralose Other (See Comments)    Amitriptyline Anxiety and Other (See Comments)     Severe      Aspirin Nausea Only and Other (See Comments)    Eszopiclone Anxiety and Itching    Heparin analogues Other (See Comments) and Palpitations    Topiramate Anxiety, Other (See Comments) and Palpitations     Shaking        Current Outpatient Medications on File Prior to Visit   Medication Sig Dispense Refill    (Magic mouthwash) 1:1:1 diphenhydrAMINE(Benadryl) 12.5mg/5ml liq, aluminum & magnesium hydroxide-simethicone (Maalox), LIDOcaine viscous 2% Swish and spit 10 mLs every 4 (four) hours as needed. for mouth sores 500 mL 1    butalbital-acetaminophen-caffeine -40 mg (FIORICET, ESGIC) -40 mg per tablet Take by mouth.      clonazePAM (KLONOPIN) 0.5 MG tablet Take 0.5 mg by mouth every 8 (eight) hours as needed.      diclofenac (VOLTAREN) 75 MG EC tablet       diphenhydrAMINE (BENADRYL) 50 MG capsule Take 50 mg by mouth every 6 (six) hours as needed.      " diphenoxylate-atropine 2.5-0.025 mg (LOMOTIL) 2.5-0.025 mg per tablet Take 1 tablet by mouth 4 (four) times daily as needed for Diarrhea. 30 tablet 1    famotidine (PEPCID) 20 MG tablet Take 20 mg by mouth 2 (two) times daily.      gabapentin (NEURONTIN) 300 MG capsule 3 (three) times daily.      gabapentin (NEURONTIN) 600 MG tablet Take 600 mg by mouth 3 (three) times daily.      omeprazole (PRILOSEC) 40 MG capsule Take 40 mg by mouth once daily.      ondansetron (ZOFRAN-ODT) 4 MG TbDL Take 4 mg by mouth every 8 (eight) hours as needed.      tiZANidine (ZANAFLEX) 4 MG tablet Take 12 mg by mouth 3 (three) times daily.       No current facility-administered medications on file prior to visit.      Review of Systems   Constitutional:  Positive for fatigue. Negative for appetite change and unexpected weight change.   HENT:  Negative for mouth sores.    Eyes:  Negative for visual disturbance.   Respiratory:  Negative for cough and shortness of breath.    Cardiovascular:  Negative for chest pain.   Gastrointestinal:  Positive for diarrhea. Negative for abdominal pain.   Genitourinary:  Negative for frequency.   Musculoskeletal:  Positive for back pain.   Integumentary:  Negative for rash.   Neurological:  Positive for weakness. Negative for headaches.   Hematological:  Negative for adenopathy.   Psychiatric/Behavioral:  The patient is not nervous/anxious.             Vitals:    11/21/22 1003   BP: 118/79   Pulse: 88   Resp: 14   Temp: 97.9 °F (36.6 °C)   SpO2: 99%   Weight: 82.6 kg (182 lb)   Height: 5' (1.524 m)        Physical Exam  Constitutional:       Appearance: Normal appearance.   HENT:      Head: Normocephalic.      Comments: +alopecia wearing a wig     Nose: Nose normal.      Mouth/Throat:      Mouth: Mucous membranes are moist.   Eyes:      Extraocular Movements: Extraocular movements intact.      Conjunctiva/sclera: Conjunctivae normal.   Cardiovascular:      Rate and Rhythm: Normal rate and regular rhythm.    Pulmonary:      Effort: Pulmonary effort is normal.      Breath sounds: Normal breath sounds.   Chest:      Comments: Right chest wall mediport in place, left breast with inferior lumpectomy scar, well healed along previous scar from her reduction. Left axillary incision from LN biopsy also healed well. No masses palpable  Abdominal:      General: Bowel sounds are normal. There is no distension.      Palpations: Abdomen is soft.      Tenderness: There is no abdominal tenderness.   Musculoskeletal:         General: Normal range of motion.   Skin:     General: Skin is warm.   Neurological:      General: No focal deficit present.      Mental Status: She is alert and oriented to person, place, and time.   Psychiatric:         Mood and Affect: Mood normal.         Judgment: Judgment normal.       Lab Results   Component Value Date    WBC 3.6 (L) 11/21/2022    HGB 8.7 (L) 11/21/2022    HCT 27.4 (L) 11/21/2022    .4 (H) 11/21/2022    PLT 90 (L) 11/21/2022         Assessment:       1. Malignant neoplasm of central portion of left breast in female, estrogen receptor positive        Plan:       Patient with Stage IA Left breast cancer triple positive s/p excisional biopsy of a cystic lesion that found cancer incidentally diagnosed 7/13/22. Tumor measured 3.0cm and SLN biopsies negative, Grade 3, strongly ER and [place] positive and Her2 positive with high Ki67. There was a positive margin on the excisional biopsy.  Treatment with chemotherapy neoadjuvant started at [facility] with Dr. Gonzalez.    Patient received 2 cycles at [facility] then changed care to [facility]. She was having multiple side effects.   She does not have any steroids due to h/o allergy causing inflammation.     Patient is s/p cycle 3  given 10/25/22. IV fluids added on day 2 with neulasta and she tolerated okay.  But, she has had delay due to ongoing thrombocytopenia.  Platelets still too low for treatment today. Anemia improved. WBC slightly low, but ANC normal.    Check  labs next week and proceed with cycle 4.   Will reduce chemotherapy Carboplatin/Taxotere by another 20% to try to avoid further delays.   RTC 2 weeks labs and toxicity check.  Will need to continue with weekly labs. Will likely need blood transfusion when Hgb <8.  Repeat ECHO done recent with EF 60%. Next due in 2/2022.    She had DAISY in 2012 for endometriosis but still has ovaries that remain functional according to patient though I do not have any recent Estradiol and/or FSH levels.   Plan will be for OS vs. BSO + AI X 5 years likely followed by completion of AI to complete 10 years.       Continue Lomotil for diarrhea and MMW for mouth soreness.  Continue eye lubricants QID.      All questions answered at this time.    Flory Vargas MD      Treatment Plan Information   OP BREAST DOCETAXEL CARBOPLATIN TRASTUZUMAB PERTUZUMAB (TCHP) Q3W   Flory Vargas MD   Upcoming Treatment Dates - OP BREAST DOCETAXEL CARBOPLATIN TRASTUZUMAB PERTUZUMAB (TCHP) Q3W    11/29/2022       Chemotherapy       pertuzumab (PERJETA) 420 mg in sodium chloride 0.9% 264 mL infusion       trastuzumab-dkst (OGIVRI) 502 mg in sodium chloride 0.9% 250 mL chemo infusion       DOCEtaxel (TAXOTERE) 63.75 mg/m2 = 120 mg in sodium chloride 0.9% 250 mL chemo infusion       CARBOplatin (PARAPLATIN) 665 mg in sodium chloride 0.9% 500 mL chemo infusion       Antiemetics       aprepitant (CINVANTI) injection 130 mg       palonosetron injection 0.25 mg       diphenhydrAMINE injection 12.5 mg  11/30/2022       Antiemetics       ondansetron (ZOFRAN) 8 mg in sodium chloride 0.9% 50 mL IVPB       Growth Factor       pegfilgrastim-cbqv (UDENYCA) injection 6 mg  12/20/2022       Chemotherapy       pertuzumab (PERJETA) 420 mg in sodium chloride 0.9% 264 mL infusion       trastuzumab-dkst (OGIVRI) 502 mg in sodium chloride 0.9% 250 mL chemo infusion       DOCEtaxel (TAXOTERE) 63.75 mg/m2 = 120 mg in sodium chloride 0.9% 250 mL chemo infusion       CARBOplatin  (PARAPLATIN) 665 mg in sodium chloride 0.9% 500 mL chemo infusion       Antiemetics       palonosetron injection 0.25 mg       diphenhydrAMINE injection 12.5 mg       aprepitant (CINVANTI) injection 130 mg  12/21/2022       Antiemetics       ondansetron (ZOFRAN) 8 mg in sodium chloride 0.9% 50 mL IVPB       Growth Factor       pegfilgrastim-cbqv (UDENYCA) injection 6 mg

## 2022-11-21 ENCOUNTER — OFFICE VISIT (OUTPATIENT)
Dept: HEMATOLOGY/ONCOLOGY | Facility: CLINIC | Age: 52
End: 2022-11-21
Payer: COMMERCIAL

## 2022-11-21 ENCOUNTER — LAB VISIT (OUTPATIENT)
Dept: LAB | Facility: HOSPITAL | Age: 52
End: 2022-11-21
Attending: INTERNAL MEDICINE
Payer: COMMERCIAL

## 2022-11-21 VITALS
DIASTOLIC BLOOD PRESSURE: 79 MMHG | WEIGHT: 182 LBS | HEART RATE: 88 BPM | TEMPERATURE: 98 F | HEIGHT: 60 IN | RESPIRATION RATE: 14 BRPM | OXYGEN SATURATION: 99 % | SYSTOLIC BLOOD PRESSURE: 118 MMHG | BODY MASS INDEX: 35.73 KG/M2

## 2022-11-21 DIAGNOSIS — C50.112 MALIGNANT NEOPLASM OF CENTRAL PORTION OF LEFT BREAST IN FEMALE, ESTROGEN RECEPTOR POSITIVE: Primary | ICD-10-CM

## 2022-11-21 DIAGNOSIS — Z17.0 MALIGNANT NEOPLASM OF CENTRAL PORTION OF LEFT BREAST IN FEMALE, ESTROGEN RECEPTOR POSITIVE: ICD-10-CM

## 2022-11-21 DIAGNOSIS — C50.912 INVASIVE DUCTAL CARCINOMA OF BREAST, FEMALE, LEFT: ICD-10-CM

## 2022-11-21 DIAGNOSIS — Z17.0 MALIGNANT NEOPLASM OF CENTRAL PORTION OF LEFT BREAST IN FEMALE, ESTROGEN RECEPTOR POSITIVE: Primary | ICD-10-CM

## 2022-11-21 DIAGNOSIS — C50.112 MALIGNANT NEOPLASM OF CENTRAL PORTION OF LEFT BREAST IN FEMALE, ESTROGEN RECEPTOR POSITIVE: ICD-10-CM

## 2022-11-21 LAB
ALBUMIN SERPL-MCNC: 3.7 GM/DL (ref 3.5–5)
ALBUMIN/GLOB SERPL: 1.5 RATIO (ref 1.1–2)
ALP SERPL-CCNC: 117 UNIT/L (ref 40–150)
ALT SERPL-CCNC: 16 UNIT/L (ref 0–55)
AST SERPL-CCNC: 16 UNIT/L (ref 5–34)
BASOPHILS # BLD AUTO: 0.01 X10(3)/MCL (ref 0–0.2)
BASOPHILS NFR BLD AUTO: 0.3 %
BILIRUBIN DIRECT+TOT PNL SERPL-MCNC: 0.6 MG/DL
BUN SERPL-MCNC: 12.7 MG/DL (ref 9.8–20.1)
CALCIUM SERPL-MCNC: 10 MG/DL (ref 8.4–10.2)
CHLORIDE SERPL-SCNC: 107 MMOL/L (ref 98–107)
CO2 SERPL-SCNC: 28 MMOL/L (ref 22–29)
CREAT SERPL-MCNC: 0.86 MG/DL (ref 0.55–1.02)
EOSINOPHIL # BLD AUTO: 0.06 X10(3)/MCL (ref 0–0.9)
EOSINOPHIL NFR BLD AUTO: 1.7 %
ERYTHROCYTE [DISTWIDTH] IN BLOOD BY AUTOMATED COUNT: 20.9 % (ref 11.5–17)
GFR SERPLBLD CREATININE-BSD FMLA CKD-EPI: >60 MLS/MIN/1.73/M2
GLOBULIN SER-MCNC: 2.5 GM/DL (ref 2.4–3.5)
GLUCOSE SERPL-MCNC: 117 MG/DL (ref 74–100)
HCT VFR BLD AUTO: 27.4 % (ref 37–47)
HGB BLD-MCNC: 8.7 GM/DL (ref 12–16)
IMM GRANULOCYTES # BLD AUTO: 0.01 X10(3)/MCL (ref 0–0.04)
IMM GRANULOCYTES NFR BLD AUTO: 0.3 %
LYMPHOCYTES # BLD AUTO: 1.13 X10(3)/MCL (ref 0.6–4.6)
LYMPHOCYTES NFR BLD AUTO: 31.3 %
MCH RBC QN AUTO: 33.5 PG (ref 27–31)
MCHC RBC AUTO-ENTMCNC: 31.8 MG/DL (ref 33–36)
MCV RBC AUTO: 105.4 FL (ref 80–94)
MONOCYTES # BLD AUTO: 0.26 X10(3)/MCL (ref 0.1–1.3)
MONOCYTES NFR BLD AUTO: 7.2 %
NEUTROPHILS # BLD AUTO: 2.1 X10(3)/MCL (ref 2.1–9.2)
NEUTROPHILS NFR BLD AUTO: 59.2 %
PLATELET # BLD AUTO: 90 X10(3)/MCL (ref 130–400)
PMV BLD AUTO: 10.9 FL (ref 7.4–10.4)
POTASSIUM SERPL-SCNC: 4.3 MMOL/L (ref 3.5–5.1)
PROT SERPL-MCNC: 6.2 GM/DL (ref 6.4–8.3)
RBC # BLD AUTO: 2.6 X10(6)/MCL (ref 4.2–5.4)
SODIUM SERPL-SCNC: 141 MMOL/L (ref 136–145)
WBC # SPEC AUTO: 3.6 X10(3)/MCL (ref 4.5–11.5)

## 2022-11-21 PROCEDURE — 99999 PR PBB SHADOW E&M-EST. PATIENT-LVL IV: ICD-10-PCS | Mod: PBBFAC,,, | Performed by: INTERNAL MEDICINE

## 2022-11-21 PROCEDURE — 99215 OFFICE O/P EST HI 40 MIN: CPT | Mod: S$GLB,,, | Performed by: INTERNAL MEDICINE

## 2022-11-21 PROCEDURE — 1159F PR MEDICATION LIST DOCUMENTED IN MEDICAL RECORD: ICD-10-PCS | Mod: CPTII,S$GLB,, | Performed by: INTERNAL MEDICINE

## 2022-11-21 PROCEDURE — 80053 COMPREHEN METABOLIC PANEL: CPT

## 2022-11-21 PROCEDURE — 3074F PR MOST RECENT SYSTOLIC BLOOD PRESSURE < 130 MM HG: ICD-10-PCS | Mod: CPTII,S$GLB,, | Performed by: INTERNAL MEDICINE

## 2022-11-21 PROCEDURE — 99999 PR PBB SHADOW E&M-EST. PATIENT-LVL IV: CPT | Mod: PBBFAC,,, | Performed by: INTERNAL MEDICINE

## 2022-11-21 PROCEDURE — 1160F RVW MEDS BY RX/DR IN RCRD: CPT | Mod: CPTII,S$GLB,, | Performed by: INTERNAL MEDICINE

## 2022-11-21 PROCEDURE — 1160F PR REVIEW ALL MEDS BY PRESCRIBER/CLIN PHARMACIST DOCUMENTED: ICD-10-PCS | Mod: CPTII,S$GLB,, | Performed by: INTERNAL MEDICINE

## 2022-11-21 PROCEDURE — 36415 COLL VENOUS BLD VENIPUNCTURE: CPT

## 2022-11-21 PROCEDURE — 3078F PR MOST RECENT DIASTOLIC BLOOD PRESSURE < 80 MM HG: ICD-10-PCS | Mod: CPTII,S$GLB,, | Performed by: INTERNAL MEDICINE

## 2022-11-21 PROCEDURE — 3078F DIAST BP <80 MM HG: CPT | Mod: CPTII,S$GLB,, | Performed by: INTERNAL MEDICINE

## 2022-11-21 PROCEDURE — 99215 PR OFFICE/OUTPT VISIT, EST, LEVL V, 40-54 MIN: ICD-10-PCS | Mod: S$GLB,,, | Performed by: INTERNAL MEDICINE

## 2022-11-21 PROCEDURE — 1159F MED LIST DOCD IN RCRD: CPT | Mod: CPTII,S$GLB,, | Performed by: INTERNAL MEDICINE

## 2022-11-21 PROCEDURE — 3008F BODY MASS INDEX DOCD: CPT | Mod: CPTII,S$GLB,, | Performed by: INTERNAL MEDICINE

## 2022-11-21 PROCEDURE — 3008F PR BODY MASS INDEX (BMI) DOCUMENTED: ICD-10-PCS | Mod: CPTII,S$GLB,, | Performed by: INTERNAL MEDICINE

## 2022-11-21 PROCEDURE — 85025 COMPLETE CBC W/AUTO DIFF WBC: CPT

## 2022-11-21 PROCEDURE — 3074F SYST BP LT 130 MM HG: CPT | Mod: CPTII,S$GLB,, | Performed by: INTERNAL MEDICINE

## 2022-11-25 PROBLEM — L40.9 PSORIASIS: Status: ACTIVE | Noted: 2022-11-25

## 2022-11-25 PROBLEM — C50.912 HER2-POSITIVE CARCINOMA OF LEFT BREAST: Status: ACTIVE | Noted: 2022-07-28

## 2022-11-25 PROBLEM — Z80.41 FAMILY HISTORY OF OVARIAN CANCER: Status: ACTIVE | Noted: 2022-07-11

## 2022-11-25 PROBLEM — K25.9 GASTRIC ULCER: Status: ACTIVE | Noted: 2022-11-25

## 2022-11-25 PROBLEM — E66.9 OBESITY: Status: ACTIVE | Noted: 2022-11-25

## 2022-11-25 PROBLEM — M79.7 FIBROMYOSITIS: Status: ACTIVE | Noted: 2022-11-25

## 2022-11-25 PROBLEM — G43.909 MIGRAINE HEADACHE: Status: ACTIVE | Noted: 2022-11-25

## 2022-11-25 PROBLEM — Z17.31 HER2-POSITIVE CARCINOMA OF LEFT BREAST: Status: ACTIVE | Noted: 2022-07-28

## 2022-11-25 NOTE — PROGRESS NOTES
Subjective:       Patient ID: Nita Dejesus is a 52 y.o. female.    Previous Oncologist: Dr. Katlyn Gonzalez at [facility]  Surgeon: Dr. Kelly Santos    Left Breast Cancer Stage IA(T2N0M0) Triple Positive--22  Biopsy/Surgery/pathology:   1. Excisional biopsy left breast mass/left breast cyst done 22--invasive ductal carcinoma, Grade 3, measures 3.0cm, a 0.6cm region of cauterized carcinoma is present at the inked superior-deep margin, cyst with reactive changes, suggestive of previous lumpectomy site, fluid left breast cyst with rare atypical cells present, suspicious for carcinoma, ER 95%, ME 27%, Her2 3+ by IHC, positive, Ki67 51%.   2. Left SLN biopsies done 22--3 benign lymph nodes.   Imagin. Bilateral diagnostic MMG done at [facility] 22--left inferior breast large, minimally complex cystic lesion at 6:00 measures 4.4X3.5X4.2cm, corresponding to the palpable area, appears benign. Routine screening MMG recommended.   2. MRI breasts bilateral at [facility] 22--post-surgical seroma at 12:00 position of left breast s/p recent excisional biopsy, no definite suspicious enhancing masses or areas of nonmass enhancement of left breast, and no suspicious areas in right breast, BIRADS 6.   3. CT C/A/P w/ contrast 22 at [facility]--no thoracic metastatic disease, fatty infiltration of liver, small to moderate-sized hiatal hernia, no metastatic disease.   4. NM Bone scan whole body done 22 at [facility]--activity in cervical and lumbar spines likely degenerative, no anatomic correlate seen on CT scan in lumbar spines, suggest correlation with C-spine Xrays, increase tracer w/n both feet, likely degnerative change, physiologic activity of urinary tract.  5. CT A/P w/ contrast 22 at [facility]--no acute abdominopelvic pathology or change compared to recent CT from 22, fairly large amount of stool in colon may reflect constipation, large hiatal hernia, diffuse hepatic steatosis, post-cholecystectomy, post  hysterectomy, mild thoracic and lumbar degenerative disease.     ECHO:  08/18/22--EF 55-65%.  11/17/22--EF 60%.    Genetic testing: [employer] done 7/21/22 negative for any pathogenic variants.     Treatment history:  Left breast excisional biopsy done 7/13/22  Left SLN biopsies done 8/8/22.    Current treatment plan:  TCHP X 6 cycles started 8/23/22  Cycle 3 delayed due to thrombocytopenia, given on 10/25/22--15% dose reduction, IV fluids/Zofran/Neulasta on day 2.Cycle 4 also delayed due to thrombocytopenia, given on 11/29/22-- another 20% dose reduction in chemotherapy (total 35%).  Cycle 5 planned for 12/20/22. Continue IV fluids on day 2 and day 4.   2. Re-excision planned  3. Maintenance HP X 11 cycles if no residual disease  4. +/- XRT  5. Adjuvant OS + AI X 10 years (DAISY for endometriosis, ovaries remain)    Chief Complaint: Other Misc (Pt reports that she feels better since she got fluids. Also states that the pain in her legs is a 9/10 at night.) and Peripheral Neuropathy    HPI  Patient presents for follow-up of breast cancer. She is doing okay today. Cycle 4 delayed for several weeks due to thrombocytopenia. It was given last week. She called the clinic on Friday with complaints of extreme fatigue. Labs were stable and she was given IV fluids which have helped. She continues with multiple problems including ongoing fatigue, epistaxis that resolves with compression, eye twitching, ongoing diarrhea, and heartburn. Also complains of aches/pains from the Neulasta. No other problems reported today.     Past Medical History:   Diagnosis Date    Allergy     Anemia     Anxiety     Clotting disorder     GERD (gastroesophageal reflux disease)     Neuromuscular disorder       Past Surgical History:   Procedure Laterality Date    CHOLECYSTECTOMY      HERNIA REPAIR      HYSTERECTOMY      lymphectomy Left     REDUCTION OF BOTH BREASTS  2018    TUBAL LIGATION Bilateral 1993     Family History   Problem Relation Age of  "Onset    Hypertension Mother     Lupus Mother     Hypoparathyroidism Mother     Diabetes Father     Heart disease Father     Prostate cancer Father     Kidney disease Father     Hodgkin's lymphoma Brother     Pancreatitis Brother      Social History     Socioeconomic History    Marital status:    Tobacco Use    Smoking status: Former     Packs/day: 0.50     Types: Cigarettes     Quit date: 2022     Years since quittin.0    Smokeless tobacco: Never   Substance and Sexual Activity    Alcohol use: Not Currently    Drug use: Never       Review of patient's allergies indicates:   Allergen Reactions    Corticosteroids (glucocorticoids) Hives, Other (See Comments) and Swelling     Patient states "Severe Inflammation"      Ketorolac Diarrhea, Nausea And Vomiting, Other (See Comments) and Swelling     Migraines      Peanut Anaphylaxis    Penicillins Anaphylaxis, Diarrhea, Hives, Nausea And Vomiting and Swelling    Shiitake mushroom (lentinus edodes) Anaphylaxis    Tramadol Diarrhea, Itching, Nausea And Vomiting, Rash and Swelling    Latex Other (See Comments)     Skin blisters      Macrolide antibiotics Hives and Rash    Adhesive Other (See Comments)     Blisters, skin tears; CANNOT USE PAPER TAPE    Anticoag citrate phos dextrose     Aspartame     Estrogens     Sucralose Other (See Comments)    Amitriptyline Anxiety and Other (See Comments)     Severe      Aspirin Nausea Only and Other (See Comments)    Eszopiclone Anxiety and Itching    Heparin analogues Other (See Comments) and Palpitations    Topiramate Anxiety, Other (See Comments) and Palpitations     Shaking        Current Outpatient Medications on File Prior to Visit   Medication Sig Dispense Refill    (Magic mouthwash) 1:1:1 diphenhydrAMINE(Benadryl) 12.5mg/5ml liq, aluminum & magnesium hydroxide-simethicone (Maalox), LIDOcaine viscous 2% Swish and spit 10 mLs every 4 (four) hours as needed. for mouth sores 500 mL 1    " butalbital-acetaminophen-caffeine -40 mg (FIORICET, ESGIC) -40 mg per tablet Take by mouth.      clonazePAM (KLONOPIN) 0.5 MG tablet Take 0.5 mg by mouth every 8 (eight) hours as needed.      diclofenac (VOLTAREN) 75 MG EC tablet       diphenhydrAMINE (BENADRYL) 50 MG capsule Take 50 mg by mouth every 6 (six) hours as needed.      diphenoxylate-atropine 2.5-0.025 mg (LOMOTIL) 2.5-0.025 mg per tablet Take 1 tablet by mouth 4 (four) times daily as needed for Diarrhea. 30 tablet 1    famotidine (PEPCID) 20 MG tablet Take 20 mg by mouth 2 (two) times daily.      gabapentin (NEURONTIN) 300 MG capsule 3 (three) times daily.      gabapentin (NEURONTIN) 600 MG tablet Take 600 mg by mouth 3 (three) times daily.      omeprazole (PRILOSEC) 40 MG capsule Take 40 mg by mouth once daily.      ondansetron (ZOFRAN-ODT) 4 MG TbDL Take 4 mg by mouth every 8 (eight) hours as needed.      tiZANidine (ZANAFLEX) 4 MG tablet Take 12 mg by mouth 3 (three) times daily.       No current facility-administered medications on file prior to visit.      Review of Systems   Constitutional:  Positive for fatigue. Negative for appetite change and unexpected weight change.   HENT:  Negative for mouth sores.    Eyes:  Positive for visual disturbance.   Respiratory:  Positive for shortness of breath. Negative for cough.    Gastrointestinal:  Positive for abdominal pain and diarrhea.   Genitourinary:  Negative for frequency.   Musculoskeletal:  Negative for back pain.   Integumentary:  Negative for rash.   Neurological:  Positive for headaches.   Hematological:  Negative for adenopathy.   Psychiatric/Behavioral:  The patient is not nervous/anxious.        Vitals:    12/05/22 0852   BP: 111/76   Pulse: 80   Resp: 14   Temp: 98.2 °F (36.8 °C)   SpO2: 99%   Weight: 81.5 kg (179 lb 11.2 oz)   Height: 5' (1.524 m)     Physical Exam  Constitutional:       Appearance: Normal appearance.   HENT:      Head: Normocephalic.      Comments: +alopecia  wearing a wig     Nose: Nose normal.      Mouth/Throat:      Mouth: Mucous membranes are moist.   Eyes:      Extraocular Movements: Extraocular movements intact.      Conjunctiva/sclera: Conjunctivae normal.   Cardiovascular:      Rate and Rhythm: Normal rate and regular rhythm.   Pulmonary:      Effort: Pulmonary effort is normal.      Breath sounds: Normal breath sounds.   Chest:      Comments: Right chest wall mediport in place, left breast with inferior lumpectomy scar, well healed along previous scar from her reduction. Left axillary incision from LN biopsy also healed well. No masses palpable  Abdominal:      General: Bowel sounds are normal. There is no distension.      Palpations: Abdomen is soft.      Tenderness: There is no abdominal tenderness.   Musculoskeletal:         General: Normal range of motion.   Skin:     General: Skin is warm.   Neurological:      General: No focal deficit present.      Mental Status: She is alert and oriented to person, place, and time.   Psychiatric:         Mood and Affect: Mood normal.         Judgment: Judgment normal.     Lab Visit on 12/02/2022   Component Date Value    Sodium Level 12/02/2022 136     Potassium Level 12/02/2022 4.5     Chloride 12/02/2022 105     Carbon Dioxide 12/02/2022 24     Glucose Level 12/02/2022 121 (H)     Blood Urea Nitrogen 12/02/2022 20.6 (H)     Creatinine 12/02/2022 0.86     Calcium Level Total 12/02/2022 10.2     Protein Total 12/02/2022 6.6     Albumin Level 12/02/2022 3.9     Globulin 12/02/2022 2.7     Albumin/Globulin Ratio 12/02/2022 1.4     Bilirubin Total 12/02/2022 1.1     Alkaline Phosphatase 12/02/2022 141     Alanine Aminotransferase 12/02/2022 73 (H)     Aspartate Aminotransfera* 12/02/2022 61 (H)     eGFR 12/02/2022 >60     WBC 12/02/2022 5.9     RBC 12/02/2022 3.02 (L)     Hgb 12/02/2022 10.2 (L)     Hct 12/02/2022 31.9 (L)     MCV 12/02/2022 105.6 (H)     MCH 12/02/2022 33.8 (H)     MCHC 12/02/2022 32.0 (L)     RDW  12/02/2022 18.1 (H)     Platelet 12/02/2022 82 (L)     MPV 12/02/2022 10.3     Neut % 12/02/2022 73.2     Lymph % 12/02/2022 13.6     Mono % 12/02/2022 1.4     Eos % 12/02/2022 0.7     Basophil % 12/02/2022 0.0     Lymph # 12/02/2022 0.80     Neut # 12/02/2022 4.3     Mono # 12/02/2022 0.08 (L)     Eos # 12/02/2022 0.04     Baso # 12/02/2022 0.00     IG# 12/02/2022 0.65 (H)     IG% 12/02/2022 11.1           Assessment:       1. Malignant neoplasm of central portion of left breast in female, estrogen receptor positive    2. Anemia due to antineoplastic chemotherapy    3. Leukopenia due to antineoplastic chemotherapy    4. HER2-positive carcinoma of left breast        Plan:       Patient with Stage IA Left breast cancer triple positive s/p excisional biopsy of a cystic lesion that found cancer incidentally diagnosed 7/13/22. Tumor measured 3.0cm and SLN biopsies negative, Grade 3, strongly ER and NM positive and Her2 positive with high Ki67. There was a positive margin on the excisional biopsy.  Treatment with chemotherapy neoadjuvant started at [facility] with Dr. Gonzalez.    Patient received 2 cycles at [facility] then changed care to [facility]. She was having multiple side effects.   She does not have any steroids due to h/o allergy causing inflammation.     Completed cycle 3  given 10/25/22. IV fluids added on day 2 with neulasta and she tolerated okay.  But, she has had delays due to ongoing thrombocytopenia.  Cycle 4 was given on 11/29/22. Carboplatin/Taxotere reduced by another 20% to avoid further delays.   She did contact clinic with extreme fatigue on Friday. Given additional IV fluids which have helped.   CBC showed anemia and thrombocytopenia. CMP with mildly elevated LFTs, otherwise stable.   Plan to repeat labs today to get back on track with weekly labs on Mondays.   Will likely need blood transfusion if Hgb <8.  Plan to add platelet antibodies to labs drawn today.   RTC for Cycle 5 on 12/20/22. Continue IV fluids on  12/21/22 and 12/23/22.   Follow-up in 3 weeks with labs for toxicity check.   Repeat ECHO done recent with EF 60%. Next due in 2/2022.    She had DAISY in 2012 for endometriosis but still has ovaries that remain functional according to patient though I do not have any recent Estradiol and/or FSH levels.   Plan will be for OS vs. BSO + AI X 5 years likely followed by completion of AI to complete 10 years.     Continue Lomotil for diarrhea and MMW for mouth soreness.  Continue eye lubricants QID.  Will try Norco 5/325mg for bone pain from the Neulasta. Prescription sent to pharmacy.   All questions answered at this time.        MAYELA Abebe

## 2022-11-28 ENCOUNTER — LAB VISIT (OUTPATIENT)
Dept: LAB | Facility: HOSPITAL | Age: 52
End: 2022-11-28
Attending: INTERNAL MEDICINE
Payer: COMMERCIAL

## 2022-11-28 DIAGNOSIS — C50.112 MALIGNANT NEOPLASM OF CENTRAL PORTION OF LEFT BREAST IN FEMALE, ESTROGEN RECEPTOR POSITIVE: ICD-10-CM

## 2022-11-28 DIAGNOSIS — Z17.0 MALIGNANT NEOPLASM OF CENTRAL PORTION OF LEFT BREAST IN FEMALE, ESTROGEN RECEPTOR POSITIVE: ICD-10-CM

## 2022-11-28 LAB
ALBUMIN SERPL-MCNC: 3.6 GM/DL (ref 3.5–5)
ALBUMIN/GLOB SERPL: 1.5 RATIO (ref 1.1–2)
ALP SERPL-CCNC: 113 UNIT/L (ref 40–150)
ALT SERPL-CCNC: 19 UNIT/L (ref 0–55)
AST SERPL-CCNC: 18 UNIT/L (ref 5–34)
BASOPHILS # BLD AUTO: 0.01 X10(3)/MCL (ref 0–0.2)
BASOPHILS NFR BLD AUTO: 0.3 %
BILIRUBIN DIRECT+TOT PNL SERPL-MCNC: 0.5 MG/DL
BUN SERPL-MCNC: 13.8 MG/DL (ref 9.8–20.1)
CALCIUM SERPL-MCNC: 10 MG/DL (ref 8.4–10.2)
CHLORIDE SERPL-SCNC: 109 MMOL/L (ref 98–107)
CO2 SERPL-SCNC: 26 MMOL/L (ref 22–29)
CREAT SERPL-MCNC: 0.76 MG/DL (ref 0.55–1.02)
EOSINOPHIL # BLD AUTO: 0.19 X10(3)/MCL (ref 0–0.9)
EOSINOPHIL NFR BLD AUTO: 5.9 %
ERYTHROCYTE [DISTWIDTH] IN BLOOD BY AUTOMATED COUNT: 20.2 % (ref 11.5–17)
GFR SERPLBLD CREATININE-BSD FMLA CKD-EPI: >60 MLS/MIN/1.73/M2
GLOBULIN SER-MCNC: 2.4 GM/DL (ref 2.4–3.5)
GLUCOSE SERPL-MCNC: 123 MG/DL (ref 74–100)
HCT VFR BLD AUTO: 28.1 % (ref 37–47)
HGB BLD-MCNC: 8.7 GM/DL (ref 12–16)
IMM GRANULOCYTES # BLD AUTO: 0 X10(3)/MCL (ref 0–0.04)
IMM GRANULOCYTES NFR BLD AUTO: 0 %
LYMPHOCYTES # BLD AUTO: 1.17 X10(3)/MCL (ref 0.6–4.6)
LYMPHOCYTES NFR BLD AUTO: 36.4 %
MCH RBC QN AUTO: 33.5 PG (ref 27–31)
MCHC RBC AUTO-ENTMCNC: 31 MG/DL (ref 33–36)
MCV RBC AUTO: 108.1 FL (ref 80–94)
MONOCYTES # BLD AUTO: 0.2 X10(3)/MCL (ref 0.1–1.3)
MONOCYTES NFR BLD AUTO: 6.2 %
NEUTROPHILS # BLD AUTO: 1.6 X10(3)/MCL (ref 2.1–9.2)
NEUTROPHILS NFR BLD AUTO: 51.2 %
PLATELET # BLD AUTO: 102 X10(3)/MCL (ref 130–400)
PMV BLD AUTO: 9.8 FL (ref 7.4–10.4)
POTASSIUM SERPL-SCNC: 4.6 MMOL/L (ref 3.5–5.1)
PROT SERPL-MCNC: 6 GM/DL (ref 6.4–8.3)
RBC # BLD AUTO: 2.6 X10(6)/MCL (ref 4.2–5.4)
SODIUM SERPL-SCNC: 140 MMOL/L (ref 136–145)
WBC # SPEC AUTO: 3.2 X10(3)/MCL (ref 4.5–11.5)

## 2022-11-28 PROCEDURE — 36415 COLL VENOUS BLD VENIPUNCTURE: CPT

## 2022-11-28 PROCEDURE — 85025 COMPLETE CBC W/AUTO DIFF WBC: CPT

## 2022-11-28 PROCEDURE — 80053 COMPREHEN METABOLIC PANEL: CPT

## 2022-11-29 ENCOUNTER — INFUSION (OUTPATIENT)
Dept: INFUSION THERAPY | Facility: HOSPITAL | Age: 52
End: 2022-11-29
Attending: FAMILY MEDICINE
Payer: COMMERCIAL

## 2022-11-29 VITALS
WEIGHT: 181.88 LBS | DIASTOLIC BLOOD PRESSURE: 74 MMHG | SYSTOLIC BLOOD PRESSURE: 116 MMHG | BODY MASS INDEX: 35.71 KG/M2 | TEMPERATURE: 98 F | RESPIRATION RATE: 16 BRPM | OXYGEN SATURATION: 98 % | HEART RATE: 81 BPM | HEIGHT: 60 IN

## 2022-11-29 DIAGNOSIS — Z17.0 MALIGNANT NEOPLASM OF CENTRAL PORTION OF LEFT BREAST IN FEMALE, ESTROGEN RECEPTOR POSITIVE: Primary | ICD-10-CM

## 2022-11-29 DIAGNOSIS — C50.112 MALIGNANT NEOPLASM OF CENTRAL PORTION OF LEFT BREAST IN FEMALE, ESTROGEN RECEPTOR POSITIVE: Primary | ICD-10-CM

## 2022-11-29 PROCEDURE — 25000003 PHARM REV CODE 250: Performed by: INTERNAL MEDICINE

## 2022-11-29 PROCEDURE — 96417 CHEMO IV INFUS EACH ADDL SEQ: CPT

## 2022-11-29 PROCEDURE — A4216 STERILE WATER/SALINE, 10 ML: HCPCS | Performed by: INTERNAL MEDICINE

## 2022-11-29 PROCEDURE — 96413 CHEMO IV INFUSION 1 HR: CPT

## 2022-11-29 PROCEDURE — 63600175 PHARM REV CODE 636 W HCPCS: Performed by: INTERNAL MEDICINE

## 2022-11-29 PROCEDURE — 96375 TX/PRO/DX INJ NEW DRUG ADDON: CPT

## 2022-11-29 RX ORDER — SODIUM CHLORIDE 0.9 % (FLUSH) 0.9 %
10 SYRINGE (ML) INJECTION
Status: DISCONTINUED | OUTPATIENT
Start: 2022-11-29 | End: 2022-11-29 | Stop reason: HOSPADM

## 2022-11-29 RX ORDER — DIPHENHYDRAMINE HYDROCHLORIDE 50 MG/ML
50 INJECTION INTRAMUSCULAR; INTRAVENOUS ONCE AS NEEDED
Status: DISCONTINUED | OUTPATIENT
Start: 2022-11-29 | End: 2022-11-29 | Stop reason: HOSPADM

## 2022-11-29 RX ORDER — DIPHENHYDRAMINE HYDROCHLORIDE 50 MG/ML
12.5 INJECTION INTRAMUSCULAR; INTRAVENOUS
Status: COMPLETED | OUTPATIENT
Start: 2022-11-29 | End: 2022-11-29

## 2022-11-29 RX ORDER — PALONOSETRON 0.05 MG/ML
0.25 INJECTION, SOLUTION INTRAVENOUS ONCE
Status: COMPLETED | OUTPATIENT
Start: 2022-11-29 | End: 2022-11-29

## 2022-11-29 RX ORDER — HEPARIN 100 UNIT/ML
500 SYRINGE INTRAVENOUS
Status: DISCONTINUED | OUTPATIENT
Start: 2022-11-29 | End: 2022-11-29 | Stop reason: HOSPADM

## 2022-11-29 RX ORDER — EPINEPHRINE 0.3 MG/.3ML
0.3 INJECTION SUBCUTANEOUS ONCE AS NEEDED
Status: DISCONTINUED | OUTPATIENT
Start: 2022-11-29 | End: 2022-11-29 | Stop reason: HOSPADM

## 2022-11-29 RX ADMIN — Medication 10 ML: at 01:11

## 2022-11-29 RX ADMIN — HEPARIN 500 UNITS: 100 SYRINGE at 01:11

## 2022-11-29 RX ADMIN — APREPITANT 130 MG: 130 INJECTION, EMULSION INTRAVENOUS at 11:11

## 2022-11-29 RX ADMIN — DIPHENHYDRAMINE HYDROCHLORIDE 12.5 MG: 50 INJECTION, SOLUTION INTRAMUSCULAR; INTRAVENOUS at 09:11

## 2022-11-29 RX ADMIN — CARBOPLATIN 500 MG: 10 INJECTION, SOLUTION INTRAVENOUS at 12:11

## 2022-11-29 RX ADMIN — PALONOSETRON 0.25 MG: 0.25 INJECTION, SOLUTION INTRAVENOUS at 09:11

## 2022-11-29 RX ADMIN — PERTUZUMAB 420 MG: 30 INJECTION, SOLUTION, CONCENTRATE INTRAVENOUS at 09:11

## 2022-11-29 RX ADMIN — DOCETAXEL 90 MG: 20 INJECTION, SOLUTION, CONCENTRATE INTRAVENOUS at 11:11

## 2022-11-29 RX ADMIN — SODIUM CHLORIDE: 9 INJECTION, SOLUTION INTRAVENOUS at 09:11

## 2022-11-29 RX ADMIN — TRASTUZUMAB 502 MG: 420 INJECTION, POWDER, LYOPHILIZED, FOR SOLUTION INTRAVENOUS at 10:11

## 2022-11-30 ENCOUNTER — INFUSION (OUTPATIENT)
Dept: INFUSION THERAPY | Facility: HOSPITAL | Age: 52
End: 2022-11-30
Attending: FAMILY MEDICINE
Payer: COMMERCIAL

## 2022-11-30 VITALS
SYSTOLIC BLOOD PRESSURE: 134 MMHG | TEMPERATURE: 98 F | OXYGEN SATURATION: 98 % | DIASTOLIC BLOOD PRESSURE: 85 MMHG | HEART RATE: 79 BPM | RESPIRATION RATE: 18 BRPM

## 2022-11-30 DIAGNOSIS — C50.112 MALIGNANT NEOPLASM OF CENTRAL PORTION OF LEFT BREAST IN FEMALE, ESTROGEN RECEPTOR POSITIVE: Primary | ICD-10-CM

## 2022-11-30 DIAGNOSIS — Z17.0 MALIGNANT NEOPLASM OF CENTRAL PORTION OF LEFT BREAST IN FEMALE, ESTROGEN RECEPTOR POSITIVE: Primary | ICD-10-CM

## 2022-11-30 PROCEDURE — 96372 THER/PROPH/DIAG INJ SC/IM: CPT | Mod: 59

## 2022-11-30 PROCEDURE — 96360 HYDRATION IV INFUSION INIT: CPT

## 2022-11-30 PROCEDURE — 63600175 PHARM REV CODE 636 W HCPCS: Mod: TB | Performed by: INTERNAL MEDICINE

## 2022-11-30 PROCEDURE — 25000003 PHARM REV CODE 250: Performed by: INTERNAL MEDICINE

## 2022-11-30 PROCEDURE — 96375 TX/PRO/DX INJ NEW DRUG ADDON: CPT

## 2022-11-30 RX ORDER — ONDANSETRON 2 MG/ML
8 INJECTION INTRAMUSCULAR; INTRAVENOUS ONCE
Status: COMPLETED | OUTPATIENT
Start: 2022-11-30 | End: 2022-11-30

## 2022-11-30 RX ADMIN — PEGFILGRASTIM-CBQV 6 MG: 6 INJECTION, SOLUTION SUBCUTANEOUS at 01:11

## 2022-11-30 RX ADMIN — ONDANSETRON 8 MG: 2 INJECTION INTRAMUSCULAR; INTRAVENOUS at 01:11

## 2022-11-30 RX ADMIN — SODIUM CHLORIDE 1000 ML: 9 INJECTION, SOLUTION INTRAVENOUS at 01:11

## 2022-11-30 NOTE — PLAN OF CARE
Problem: Adult Inpatient Plan of Care  Goal: Plan of Care Review  Outcome: Met  Flowsheets (Taken 11/30/2022 1450)  Plan of Care Reviewed With: patient  Goal: Absence of Hospital-Acquired Illness or Injury  Outcome: Met  Intervention: Identify and Manage Fall Risk  Flowsheets (Taken 11/30/2022 1450)  Safety Promotion/Fall Prevention:   assistive device/personal item within reach   Fall Risk reviewed with patient/family   in recliner, wheels locked     Pt tolerated C4D2 well. Next appt reviewed; pt denied questions or further needs at the time of discharge.

## 2022-12-02 ENCOUNTER — INFUSION (OUTPATIENT)
Dept: INFUSION THERAPY | Facility: HOSPITAL | Age: 52
End: 2022-12-02
Attending: INTERNAL MEDICINE
Payer: COMMERCIAL

## 2022-12-02 ENCOUNTER — TELEPHONE (OUTPATIENT)
Dept: HEMATOLOGY/ONCOLOGY | Facility: CLINIC | Age: 52
End: 2022-12-02
Payer: COMMERCIAL

## 2022-12-02 ENCOUNTER — LAB VISIT (OUTPATIENT)
Dept: LAB | Facility: HOSPITAL | Age: 52
End: 2022-12-02
Attending: INTERNAL MEDICINE
Payer: COMMERCIAL

## 2022-12-02 VITALS
HEART RATE: 105 BPM | DIASTOLIC BLOOD PRESSURE: 79 MMHG | TEMPERATURE: 98 F | SYSTOLIC BLOOD PRESSURE: 118 MMHG | RESPIRATION RATE: 18 BRPM

## 2022-12-02 DIAGNOSIS — C50.112 MALIGNANT NEOPLASM OF CENTRAL PORTION OF LEFT BREAST IN FEMALE, ESTROGEN RECEPTOR POSITIVE: ICD-10-CM

## 2022-12-02 DIAGNOSIS — C50.112 MALIGNANT NEOPLASM OF CENTRAL PORTION OF LEFT BREAST IN FEMALE, ESTROGEN RECEPTOR POSITIVE: Primary | ICD-10-CM

## 2022-12-02 DIAGNOSIS — Z17.0 MALIGNANT NEOPLASM OF CENTRAL PORTION OF LEFT BREAST IN FEMALE, ESTROGEN RECEPTOR POSITIVE: ICD-10-CM

## 2022-12-02 DIAGNOSIS — Z17.0 MALIGNANT NEOPLASM OF CENTRAL PORTION OF LEFT BREAST IN FEMALE, ESTROGEN RECEPTOR POSITIVE: Primary | ICD-10-CM

## 2022-12-02 LAB
ALBUMIN SERPL-MCNC: 3.9 GM/DL (ref 3.5–5)
ALBUMIN/GLOB SERPL: 1.4 RATIO (ref 1.1–2)
ALP SERPL-CCNC: 141 UNIT/L (ref 40–150)
ALT SERPL-CCNC: 73 UNIT/L (ref 0–55)
AST SERPL-CCNC: 61 UNIT/L (ref 5–34)
BASOPHILS # BLD AUTO: 0 X10(3)/MCL (ref 0–0.2)
BASOPHILS NFR BLD AUTO: 0 %
BILIRUBIN DIRECT+TOT PNL SERPL-MCNC: 1.1 MG/DL
BUN SERPL-MCNC: 20.6 MG/DL (ref 9.8–20.1)
CALCIUM SERPL-MCNC: 10.2 MG/DL (ref 8.4–10.2)
CHLORIDE SERPL-SCNC: 105 MMOL/L (ref 98–107)
CO2 SERPL-SCNC: 24 MMOL/L (ref 22–29)
CREAT SERPL-MCNC: 0.86 MG/DL (ref 0.55–1.02)
EOSINOPHIL # BLD AUTO: 0.04 X10(3)/MCL (ref 0–0.9)
EOSINOPHIL NFR BLD AUTO: 0.7 %
ERYTHROCYTE [DISTWIDTH] IN BLOOD BY AUTOMATED COUNT: 18.1 % (ref 11.5–17)
GFR SERPLBLD CREATININE-BSD FMLA CKD-EPI: >60 MLS/MIN/1.73/M2
GLOBULIN SER-MCNC: 2.7 GM/DL (ref 2.4–3.5)
GLUCOSE SERPL-MCNC: 121 MG/DL (ref 74–100)
HCT VFR BLD AUTO: 31.9 % (ref 37–47)
HGB BLD-MCNC: 10.2 GM/DL (ref 12–16)
IMM GRANULOCYTES # BLD AUTO: 0.65 X10(3)/MCL (ref 0–0.04)
IMM GRANULOCYTES NFR BLD AUTO: 11.1 %
LYMPHOCYTES # BLD AUTO: 0.8 X10(3)/MCL (ref 0.6–4.6)
LYMPHOCYTES NFR BLD AUTO: 13.6 %
MCH RBC QN AUTO: 33.8 PG (ref 27–31)
MCHC RBC AUTO-ENTMCNC: 32 MG/DL (ref 33–36)
MCV RBC AUTO: 105.6 FL (ref 80–94)
MONOCYTES # BLD AUTO: 0.08 X10(3)/MCL (ref 0.1–1.3)
MONOCYTES NFR BLD AUTO: 1.4 %
NEUTROPHILS # BLD AUTO: 4.3 X10(3)/MCL (ref 2.1–9.2)
NEUTROPHILS NFR BLD AUTO: 73.2 %
PLATELET # BLD AUTO: 82 X10(3)/MCL (ref 130–400)
PMV BLD AUTO: 10.3 FL (ref 7.4–10.4)
POTASSIUM SERPL-SCNC: 4.5 MMOL/L (ref 3.5–5.1)
PROT SERPL-MCNC: 6.6 GM/DL (ref 6.4–8.3)
RBC # BLD AUTO: 3.02 X10(6)/MCL (ref 4.2–5.4)
SODIUM SERPL-SCNC: 136 MMOL/L (ref 136–145)
WBC # SPEC AUTO: 5.9 X10(3)/MCL (ref 4.5–11.5)

## 2022-12-02 PROCEDURE — 85025 COMPLETE CBC W/AUTO DIFF WBC: CPT

## 2022-12-02 PROCEDURE — 80053 COMPREHEN METABOLIC PANEL: CPT

## 2022-12-02 PROCEDURE — 25000003 PHARM REV CODE 250: Performed by: NURSE PRACTITIONER

## 2022-12-02 PROCEDURE — 36415 COLL VENOUS BLD VENIPUNCTURE: CPT

## 2022-12-02 PROCEDURE — 96360 HYDRATION IV INFUSION INIT: CPT

## 2022-12-02 RX ORDER — HEPARIN 100 UNIT/ML
500 SYRINGE INTRAVENOUS
Status: CANCELLED | OUTPATIENT
Start: 2022-12-02

## 2022-12-02 RX ORDER — HEPARIN 100 UNIT/ML
500 SYRINGE INTRAVENOUS
Status: CANCELLED | OUTPATIENT
Start: 2022-12-23

## 2022-12-02 RX ORDER — SODIUM CHLORIDE 0.9 % (FLUSH) 0.9 %
10 SYRINGE (ML) INJECTION
Status: DISCONTINUED | OUTPATIENT
Start: 2022-12-02 | End: 2022-12-02 | Stop reason: HOSPADM

## 2022-12-02 RX ORDER — SODIUM CHLORIDE 0.9 % (FLUSH) 0.9 %
10 SYRINGE (ML) INJECTION
Status: CANCELLED | OUTPATIENT
Start: 2022-12-02

## 2022-12-02 RX ORDER — HEPARIN 100 UNIT/ML
500 SYRINGE INTRAVENOUS
Status: DISCONTINUED | OUTPATIENT
Start: 2022-12-02 | End: 2022-12-02 | Stop reason: HOSPADM

## 2022-12-02 RX ORDER — SODIUM CHLORIDE 0.9 % (FLUSH) 0.9 %
10 SYRINGE (ML) INJECTION
Status: CANCELLED | OUTPATIENT
Start: 2022-12-23

## 2022-12-02 RX ADMIN — SODIUM CHLORIDE 1000 ML: 9 INJECTION, SOLUTION INTRAVENOUS at 12:12

## 2022-12-02 NOTE — TELEPHONE ENCOUNTER
Pt states that they will leave [facility] now and come in for labs and IV fluids. She states that she has not been able to swallow solids since Tuesday. She is drinking protein shakes, juice and water and is urinating regularly so she doesn't think she is dehydrated. Although, she did say that the protein shakes are giving her diarrhea.   
She wants to do the labs scheduled for Monday today Judith. This ok?   
no difficulties

## 2022-12-02 NOTE — TELEPHONE ENCOUNTER
If she cannot swallow then she needs to go to the ER. If she wants to come in for labs and IV fluids she will need to come now.

## 2022-12-02 NOTE — TELEPHONE ENCOUNTER
Pt called and reports that she has extreme fatigue, gets dizzy just to walk to the restroom, has more neuropathy and cannot swallow solids. Pt states that she thinks she should have labs before Monday. Please advise.

## 2022-12-05 ENCOUNTER — TELEPHONE (OUTPATIENT)
Dept: HEMATOLOGY/ONCOLOGY | Facility: CLINIC | Age: 52
End: 2022-12-05

## 2022-12-05 ENCOUNTER — OFFICE VISIT (OUTPATIENT)
Dept: HEMATOLOGY/ONCOLOGY | Facility: CLINIC | Age: 52
End: 2022-12-05
Payer: COMMERCIAL

## 2022-12-05 VITALS
SYSTOLIC BLOOD PRESSURE: 111 MMHG | RESPIRATION RATE: 14 BRPM | WEIGHT: 179.69 LBS | TEMPERATURE: 98 F | HEART RATE: 80 BPM | OXYGEN SATURATION: 99 % | BODY MASS INDEX: 35.28 KG/M2 | HEIGHT: 60 IN | DIASTOLIC BLOOD PRESSURE: 76 MMHG

## 2022-12-05 DIAGNOSIS — T45.1X5A ANEMIA DUE TO ANTINEOPLASTIC CHEMOTHERAPY: ICD-10-CM

## 2022-12-05 DIAGNOSIS — D70.1 LEUKOPENIA DUE TO ANTINEOPLASTIC CHEMOTHERAPY: ICD-10-CM

## 2022-12-05 DIAGNOSIS — T45.1X5A LEUKOPENIA DUE TO ANTINEOPLASTIC CHEMOTHERAPY: ICD-10-CM

## 2022-12-05 DIAGNOSIS — C50.912 HER2-POSITIVE CARCINOMA OF LEFT BREAST: ICD-10-CM

## 2022-12-05 DIAGNOSIS — D64.81 ANEMIA DUE TO ANTINEOPLASTIC CHEMOTHERAPY: ICD-10-CM

## 2022-12-05 DIAGNOSIS — C50.112 MALIGNANT NEOPLASM OF CENTRAL PORTION OF LEFT BREAST IN FEMALE, ESTROGEN RECEPTOR POSITIVE: Primary | ICD-10-CM

## 2022-12-05 DIAGNOSIS — G89.3 CANCER RELATED PAIN: ICD-10-CM

## 2022-12-05 DIAGNOSIS — D69.6 THROMBOCYTOPENIA: ICD-10-CM

## 2022-12-05 DIAGNOSIS — Z17.0 MALIGNANT NEOPLASM OF CENTRAL PORTION OF LEFT BREAST IN FEMALE, ESTROGEN RECEPTOR POSITIVE: Primary | ICD-10-CM

## 2022-12-05 PROBLEM — M25.50 JOINT PAIN: Status: ACTIVE | Noted: 2022-12-05

## 2022-12-05 PROBLEM — M79.7 FIBROMYALGIA: Status: ACTIVE | Noted: 2022-12-05

## 2022-12-05 PROBLEM — R52 GENERALIZED PAIN: Status: ACTIVE | Noted: 2022-12-05

## 2022-12-05 PROBLEM — R23.1 LIVEDO RETICULARIS: Status: ACTIVE | Noted: 2022-12-05

## 2022-12-05 PROBLEM — M54.30 SCIATICA: Status: ACTIVE | Noted: 2022-12-05

## 2022-12-05 PROBLEM — Z72.0 TOBACCO USE: Status: ACTIVE | Noted: 2022-12-05

## 2022-12-05 PROBLEM — G43.701 CHRONIC MIGRAINE WITHOUT AURA, WITH STATUS MIGRAINOSUS: Status: ACTIVE | Noted: 2022-12-05

## 2022-12-05 PROBLEM — M54.17 LUMBOSACRAL RADICULOPATHY: Status: ACTIVE | Noted: 2022-12-05

## 2022-12-05 PROBLEM — R76.8 ELEVATED ANTINUCLEAR ANTIBODY (ANA) LEVEL: Status: ACTIVE | Noted: 2022-12-05

## 2022-12-05 LAB
ABS NEUT CALC (OHS): 0.97 X10(3)/MCL (ref 2.1–9.2)
ALBUMIN SERPL-MCNC: 3.9 GM/DL (ref 3.5–5)
ALBUMIN/GLOB SERPL: 1.6 RATIO (ref 1.1–2)
ALP SERPL-CCNC: 121 UNIT/L (ref 40–150)
ALT SERPL-CCNC: 41 UNIT/L (ref 0–55)
ANISOCYTOSIS BLD QL SMEAR: SLIGHT
AST SERPL-CCNC: 20 UNIT/L (ref 5–34)
BILIRUBIN DIRECT+TOT PNL SERPL-MCNC: 0.9 MG/DL
BUN SERPL-MCNC: 14.2 MG/DL (ref 9.8–20.1)
CALCIUM SERPL-MCNC: 10.2 MG/DL (ref 8.4–10.2)
CHLORIDE SERPL-SCNC: 106 MMOL/L (ref 98–107)
CO2 SERPL-SCNC: 28 MMOL/L (ref 22–29)
CREAT SERPL-MCNC: 0.82 MG/DL (ref 0.55–1.02)
EOSINOPHIL NFR BLD MANUAL: 0.03 X10(3)/MCL (ref 0–0.9)
EOSINOPHIL NFR BLD MANUAL: 1 % (ref 0–8)
ERYTHROCYTE [DISTWIDTH] IN BLOOD BY AUTOMATED COUNT: 17.1 % (ref 11.5–17)
GFR SERPLBLD CREATININE-BSD FMLA CKD-EPI: >60 MLS/MIN/1.73/M2
GLOBULIN SER-MCNC: 2.4 GM/DL (ref 2.4–3.5)
GLUCOSE SERPL-MCNC: 110 MG/DL (ref 74–100)
HCT VFR BLD AUTO: 26.6 % (ref 37–47)
HGB BLD-MCNC: 8.6 GM/DL (ref 12–16)
IMM GRANULOCYTES # BLD AUTO: 0 X10(3)/MCL (ref 0–0.04)
IMM GRANULOCYTES NFR BLD AUTO: 0 %
LYMPH ABN # BLD MANUAL: 1 %
LYMPHOCYTES NFR BLD MANUAL: 1.35 X10(3)/MCL
LYMPHOCYTES NFR BLD MANUAL: 54 % (ref 13–40)
MACROCYTES BLD QL SMEAR: ABNORMAL
MCH RBC QN AUTO: 34.7 PG (ref 27–31)
MCHC RBC AUTO-ENTMCNC: 32.3 MG/DL (ref 33–36)
MCV RBC AUTO: 107.3 FL (ref 80–94)
MONOCYTES NFR BLD MANUAL: 0.12 X10(3)/MCL (ref 0.1–1.3)
MONOCYTES NFR BLD MANUAL: 5 % (ref 2–11)
NEUTROPHILS NFR BLD MANUAL: 39 % (ref 47–80)
OVALOCYTES (OLG): ABNORMAL
PLATELET # BLD AUTO: 53 X10(3)/MCL (ref 130–400)
PLATELET # BLD EST: ABNORMAL 10*3/UL
PMV BLD AUTO: 10.7 FL (ref 7.4–10.4)
POIKILOCYTOSIS BLD QL SMEAR: ABNORMAL
POTASSIUM SERPL-SCNC: 4.3 MMOL/L (ref 3.5–5.1)
PROT SERPL-MCNC: 6.3 GM/DL (ref 6.4–8.3)
RBC # BLD AUTO: 2.48 X10(6)/MCL (ref 4.2–5.4)
RBC MORPH BLD: ABNORMAL
SODIUM SERPL-SCNC: 143 MMOL/L (ref 136–145)
TEAR DROP CELL (OLG): ABNORMAL
WBC # SPEC AUTO: 2.5 X10(3)/MCL (ref 4.5–11.5)

## 2022-12-05 PROCEDURE — 3074F PR MOST RECENT SYSTOLIC BLOOD PRESSURE < 130 MM HG: ICD-10-PCS | Mod: CPTII,S$GLB,, | Performed by: NURSE PRACTITIONER

## 2022-12-05 PROCEDURE — 1160F RVW MEDS BY RX/DR IN RCRD: CPT | Mod: CPTII,S$GLB,, | Performed by: NURSE PRACTITIONER

## 2022-12-05 PROCEDURE — 1159F PR MEDICATION LIST DOCUMENTED IN MEDICAL RECORD: ICD-10-PCS | Mod: CPTII,S$GLB,, | Performed by: NURSE PRACTITIONER

## 2022-12-05 PROCEDURE — 3008F BODY MASS INDEX DOCD: CPT | Mod: CPTII,S$GLB,, | Performed by: NURSE PRACTITIONER

## 2022-12-05 PROCEDURE — 86022 PLATELET ANTIBODIES: CPT | Performed by: NURSE PRACTITIONER

## 2022-12-05 PROCEDURE — 86023 IMMUNOGLOBULIN ASSAY: CPT | Performed by: NURSE PRACTITIONER

## 2022-12-05 PROCEDURE — 1159F MED LIST DOCD IN RCRD: CPT | Mod: CPTII,S$GLB,, | Performed by: NURSE PRACTITIONER

## 2022-12-05 PROCEDURE — 36415 COLL VENOUS BLD VENIPUNCTURE: CPT | Performed by: NURSE PRACTITIONER

## 2022-12-05 PROCEDURE — 3078F PR MOST RECENT DIASTOLIC BLOOD PRESSURE < 80 MM HG: ICD-10-PCS | Mod: CPTII,S$GLB,, | Performed by: NURSE PRACTITIONER

## 2022-12-05 PROCEDURE — 3074F SYST BP LT 130 MM HG: CPT | Mod: CPTII,S$GLB,, | Performed by: NURSE PRACTITIONER

## 2022-12-05 PROCEDURE — 99999 PR PBB SHADOW E&M-EST. PATIENT-LVL V: CPT | Mod: PBBFAC,,, | Performed by: NURSE PRACTITIONER

## 2022-12-05 PROCEDURE — 1160F PR REVIEW ALL MEDS BY PRESCRIBER/CLIN PHARMACIST DOCUMENTED: ICD-10-PCS | Mod: CPTII,S$GLB,, | Performed by: NURSE PRACTITIONER

## 2022-12-05 PROCEDURE — 3078F DIAST BP <80 MM HG: CPT | Mod: CPTII,S$GLB,, | Performed by: NURSE PRACTITIONER

## 2022-12-05 PROCEDURE — 30000890 PLT. AUTOANTIBODY, CELL BOUND (DIRECT): Performed by: NURSE PRACTITIONER

## 2022-12-05 PROCEDURE — 3008F PR BODY MASS INDEX (BMI) DOCUMENTED: ICD-10-PCS | Mod: CPTII,S$GLB,, | Performed by: NURSE PRACTITIONER

## 2022-12-05 PROCEDURE — 80053 COMPREHEN METABOLIC PANEL: CPT | Performed by: NURSE PRACTITIONER

## 2022-12-05 PROCEDURE — 99215 PR OFFICE/OUTPT VISIT, EST, LEVL V, 40-54 MIN: ICD-10-PCS | Mod: S$GLB,,, | Performed by: NURSE PRACTITIONER

## 2022-12-05 PROCEDURE — 99215 OFFICE O/P EST HI 40 MIN: CPT | Mod: S$GLB,,, | Performed by: NURSE PRACTITIONER

## 2022-12-05 PROCEDURE — 85007 BL SMEAR W/DIFF WBC COUNT: CPT | Performed by: NURSE PRACTITIONER

## 2022-12-05 PROCEDURE — 99999 PR PBB SHADOW E&M-EST. PATIENT-LVL V: ICD-10-PCS | Mod: PBBFAC,,, | Performed by: NURSE PRACTITIONER

## 2022-12-05 RX ORDER — HYDROCODONE BITARTRATE AND ACETAMINOPHEN 5; 325 MG/1; MG/1
1 TABLET ORAL EVERY 6 HOURS PRN
Qty: 60 TABLET | Refills: 0 | Status: SHIPPED | OUTPATIENT
Start: 2022-12-05 | End: 2024-02-22

## 2022-12-07 LAB
DX: NORMAL
FACTORS AFFECT RESULT IMP: NO
FACTORS AFFECT RESULT IMP: NO
HEMATOLOGIST REVIEW: NORMAL
HLA-A+B+C AB SERPL QL IA: NEGATIVE
PLAT GP IA/IIA AB SER QL IA: NEGATIVE
PLAT GP IA/IIA AB SER QL IA: NEGATIVE
PLAT GP IB/IX AB SER QL IA: NEGATIVE
PLAT GP IIB/IIIA AB SER QL IA: NEGATIVE
PLAT GP IIB/IIIA AB SER QL IA: NEGATIVE
PLAT GP IV AB SERPL QL IA: NEGATIVE
PLATELET # BLD: NORMAL 10*3/UL
PLATELET AB SER QL: NEGATIVE

## 2022-12-12 ENCOUNTER — LAB VISIT (OUTPATIENT)
Dept: LAB | Facility: HOSPITAL | Age: 52
End: 2022-12-12
Attending: INTERNAL MEDICINE
Payer: COMMERCIAL

## 2022-12-12 DIAGNOSIS — C50.912 HER2-POSITIVE CARCINOMA OF LEFT BREAST: ICD-10-CM

## 2022-12-12 DIAGNOSIS — T45.1X5A LEUKOPENIA DUE TO ANTINEOPLASTIC CHEMOTHERAPY: ICD-10-CM

## 2022-12-12 DIAGNOSIS — D70.1 LEUKOPENIA DUE TO ANTINEOPLASTIC CHEMOTHERAPY: ICD-10-CM

## 2022-12-12 DIAGNOSIS — D69.6 THROMBOCYTOPENIA: ICD-10-CM

## 2022-12-12 DIAGNOSIS — Z17.0 MALIGNANT NEOPLASM OF CENTRAL PORTION OF LEFT BREAST IN FEMALE, ESTROGEN RECEPTOR POSITIVE: ICD-10-CM

## 2022-12-12 DIAGNOSIS — T45.1X5A ANEMIA DUE TO ANTINEOPLASTIC CHEMOTHERAPY: ICD-10-CM

## 2022-12-12 DIAGNOSIS — D64.81 ANEMIA DUE TO ANTINEOPLASTIC CHEMOTHERAPY: ICD-10-CM

## 2022-12-12 DIAGNOSIS — C50.112 MALIGNANT NEOPLASM OF CENTRAL PORTION OF LEFT BREAST IN FEMALE, ESTROGEN RECEPTOR POSITIVE: ICD-10-CM

## 2022-12-12 LAB
ALBUMIN SERPL-MCNC: 3.7 GM/DL (ref 3.5–5)
ALBUMIN/GLOB SERPL: 1.6 RATIO (ref 1.1–2)
ALP SERPL-CCNC: 110 UNIT/L (ref 40–150)
ALT SERPL-CCNC: 21 UNIT/L (ref 0–55)
AST SERPL-CCNC: 16 UNIT/L (ref 5–34)
BASOPHILS # BLD AUTO: 0.01 X10(3)/MCL (ref 0–0.2)
BASOPHILS NFR BLD AUTO: 0.3 %
BEAKER SEE SCANNED REPORT: NORMAL
BILIRUBIN DIRECT+TOT PNL SERPL-MCNC: 0.5 MG/DL
BUN SERPL-MCNC: 15.1 MG/DL (ref 9.8–20.1)
CALCIUM SERPL-MCNC: 9.8 MG/DL (ref 8.4–10.2)
CHLORIDE SERPL-SCNC: 109 MMOL/L (ref 98–107)
CO2 SERPL-SCNC: 29 MMOL/L (ref 22–29)
CREAT SERPL-MCNC: 0.81 MG/DL (ref 0.55–1.02)
EOSINOPHIL # BLD AUTO: 0 X10(3)/MCL (ref 0–0.9)
EOSINOPHIL NFR BLD AUTO: 0 %
ERYTHROCYTE [DISTWIDTH] IN BLOOD BY AUTOMATED COUNT: 17.4 % (ref 11.5–17)
GFR SERPLBLD CREATININE-BSD FMLA CKD-EPI: >60 MLS/MIN/1.73/M2
GLOBULIN SER-MCNC: 2.3 GM/DL (ref 2.4–3.5)
GLUCOSE SERPL-MCNC: 123 MG/DL (ref 74–100)
HCT VFR BLD AUTO: 26.3 % (ref 37–47)
HGB BLD-MCNC: 8.2 GM/DL (ref 12–16)
IMM GRANULOCYTES # BLD AUTO: 0.02 X10(3)/MCL (ref 0–0.04)
IMM GRANULOCYTES NFR BLD AUTO: 0.6 %
LYMPHOCYTES # BLD AUTO: 1.48 X10(3)/MCL (ref 0.6–4.6)
LYMPHOCYTES NFR BLD AUTO: 43.1 %
MCH RBC QN AUTO: 34.7 PG (ref 27–31)
MCHC RBC AUTO-ENTMCNC: 31.2 MG/DL (ref 33–36)
MCV RBC AUTO: 111.4 FL (ref 80–94)
MONOCYTES # BLD AUTO: 0.26 X10(3)/MCL (ref 0.1–1.3)
MONOCYTES NFR BLD AUTO: 7.6 %
NEUTROPHILS # BLD AUTO: 1.7 X10(3)/MCL (ref 2.1–9.2)
NEUTROPHILS NFR BLD AUTO: 48.4 %
PLATELET # BLD AUTO: 54 X10(3)/MCL (ref 130–400)
PMV BLD AUTO: 10 FL (ref 7.4–10.4)
POTASSIUM SERPL-SCNC: 4.2 MMOL/L (ref 3.5–5.1)
PROT SERPL-MCNC: 6 GM/DL (ref 6.4–8.3)
RBC # BLD AUTO: 2.36 X10(6)/MCL (ref 4.2–5.4)
SODIUM SERPL-SCNC: 141 MMOL/L (ref 136–145)
WBC # SPEC AUTO: 3.4 X10(3)/MCL (ref 4.5–11.5)

## 2022-12-12 PROCEDURE — 80053 COMPREHEN METABOLIC PANEL: CPT

## 2022-12-12 PROCEDURE — 36415 COLL VENOUS BLD VENIPUNCTURE: CPT

## 2022-12-12 PROCEDURE — 85025 COMPLETE CBC W/AUTO DIFF WBC: CPT

## 2022-12-19 ENCOUNTER — LAB VISIT (OUTPATIENT)
Dept: LAB | Facility: HOSPITAL | Age: 52
End: 2022-12-19
Attending: INTERNAL MEDICINE
Payer: COMMERCIAL

## 2022-12-19 ENCOUNTER — INFUSION (OUTPATIENT)
Dept: INFUSION THERAPY | Facility: HOSPITAL | Age: 52
End: 2022-12-19
Attending: INTERNAL MEDICINE
Payer: COMMERCIAL

## 2022-12-19 VITALS
HEART RATE: 86 BPM | HEIGHT: 60 IN | SYSTOLIC BLOOD PRESSURE: 117 MMHG | RESPIRATION RATE: 16 BRPM | DIASTOLIC BLOOD PRESSURE: 78 MMHG | TEMPERATURE: 98 F | BODY MASS INDEX: 36.08 KG/M2 | OXYGEN SATURATION: 98 % | WEIGHT: 183.81 LBS

## 2022-12-19 DIAGNOSIS — D64.81 ANEMIA DUE TO ANTINEOPLASTIC CHEMOTHERAPY: ICD-10-CM

## 2022-12-19 DIAGNOSIS — Z17.0 MALIGNANT NEOPLASM OF CENTRAL PORTION OF LEFT BREAST IN FEMALE, ESTROGEN RECEPTOR POSITIVE: ICD-10-CM

## 2022-12-19 DIAGNOSIS — C50.912 HER2-POSITIVE CARCINOMA OF LEFT BREAST: ICD-10-CM

## 2022-12-19 DIAGNOSIS — D70.1 LEUKOPENIA DUE TO ANTINEOPLASTIC CHEMOTHERAPY: ICD-10-CM

## 2022-12-19 DIAGNOSIS — D64.81 ANEMIA DUE TO ANTINEOPLASTIC CHEMOTHERAPY: Primary | ICD-10-CM

## 2022-12-19 DIAGNOSIS — D69.6 THROMBOCYTOPENIA: ICD-10-CM

## 2022-12-19 DIAGNOSIS — T45.1X5A LEUKOPENIA DUE TO ANTINEOPLASTIC CHEMOTHERAPY: ICD-10-CM

## 2022-12-19 DIAGNOSIS — T45.1X5A ANEMIA DUE TO ANTINEOPLASTIC CHEMOTHERAPY: Primary | ICD-10-CM

## 2022-12-19 DIAGNOSIS — T45.1X5A ANEMIA DUE TO ANTINEOPLASTIC CHEMOTHERAPY: ICD-10-CM

## 2022-12-19 DIAGNOSIS — C50.112 MALIGNANT NEOPLASM OF CENTRAL PORTION OF LEFT BREAST IN FEMALE, ESTROGEN RECEPTOR POSITIVE: ICD-10-CM

## 2022-12-19 LAB
ABO + RH BLD: NORMAL
ABO + RH BLD: NORMAL
ALBUMIN SERPL-MCNC: 3.3 G/DL (ref 3.5–5)
ALBUMIN/GLOB SERPL: 1.5 RATIO (ref 1.1–2)
ALP SERPL-CCNC: 104 UNIT/L (ref 40–150)
ALT SERPL-CCNC: 16 UNIT/L (ref 0–55)
AST SERPL-CCNC: 12 UNIT/L (ref 5–34)
BASOPHILS # BLD AUTO: 0.01 X10(3)/MCL (ref 0–0.2)
BASOPHILS NFR BLD AUTO: 0.3 %
BILIRUBIN DIRECT+TOT PNL SERPL-MCNC: 0.6 MG/DL
BLD PROD TYP BPU: NORMAL
BLD PROD TYP BPU: NORMAL
BLOOD UNIT EXPIRATION DATE: NORMAL
BLOOD UNIT EXPIRATION DATE: NORMAL
BLOOD UNIT TYPE CODE: 6200
BLOOD UNIT TYPE CODE: 6200
BUN SERPL-MCNC: 13 MG/DL (ref 9.8–20.1)
CALCIUM SERPL-MCNC: 9.5 MG/DL (ref 8.4–10.2)
CHLORIDE SERPL-SCNC: 109 MMOL/L (ref 98–107)
CO2 SERPL-SCNC: 29 MMOL/L (ref 22–29)
CREAT SERPL-MCNC: 0.76 MG/DL (ref 0.55–1.02)
CROSSMATCH INTERPRETATION: NORMAL
CROSSMATCH INTERPRETATION: NORMAL
DISPENSE STATUS: NORMAL
DISPENSE STATUS: NORMAL
EOSINOPHIL # BLD AUTO: 0 X10(3)/MCL (ref 0–0.9)
EOSINOPHIL NFR BLD AUTO: 0 %
ERYTHROCYTE [DISTWIDTH] IN BLOOD BY AUTOMATED COUNT: 17.6 % (ref 11–14.5)
GFR SERPLBLD CREATININE-BSD FMLA CKD-EPI: >60 MLS/MIN/1.73/M2
GLOBULIN SER-MCNC: 2.2 GM/DL (ref 2.4–3.5)
GLUCOSE SERPL-MCNC: 111 MG/DL (ref 74–100)
GROUP & RH: NORMAL
HCT VFR BLD AUTO: 24.5 % (ref 37–47)
HGB BLD-MCNC: 7.6 GM/DL (ref 12–16)
IMM GRANULOCYTES # BLD AUTO: 0 X10(3)/MCL (ref 0–0.04)
IMM GRANULOCYTES NFR BLD AUTO: 0 %
INDIRECT COOMBS GEL: NORMAL
LYMPHOCYTES # BLD AUTO: 1.57 X10(3)/MCL (ref 0.6–4.6)
LYMPHOCYTES NFR BLD AUTO: 49.8 %
MCH RBC QN AUTO: 34.7 PG
MCHC RBC AUTO-ENTMCNC: 31 MG/DL (ref 33–36)
MCV RBC AUTO: 111.9 FL (ref 80–94)
MONOCYTES # BLD AUTO: 0.22 X10(3)/MCL (ref 0.1–1.3)
MONOCYTES NFR BLD AUTO: 7 %
NEUTROPHILS # BLD AUTO: 1.35 X10(3)/MCL (ref 2.1–9.2)
NEUTROPHILS NFR BLD AUTO: 42.9 %
PLATELET # BLD AUTO: 50 X10(3)/MCL (ref 140–371)
PMV BLD AUTO: 10.1 FL (ref 9.4–12.4)
POTASSIUM SERPL-SCNC: 4 MMOL/L (ref 3.5–5.1)
PROT SERPL-MCNC: 5.5 GM/DL (ref 6.4–8.3)
RBC # BLD AUTO: 2.19 X10(6)/MCL (ref 4.2–5.4)
SODIUM SERPL-SCNC: 140 MMOL/L (ref 136–145)
UNIT NUMBER: NORMAL
UNIT NUMBER: NORMAL
WBC # SPEC AUTO: 3.2 X10(3)/MCL (ref 4.5–11.5)

## 2022-12-19 PROCEDURE — 86901 BLOOD TYPING SEROLOGIC RH(D): CPT | Performed by: NURSE PRACTITIONER

## 2022-12-19 PROCEDURE — 86923 COMPATIBILITY TEST ELECTRIC: CPT | Performed by: NURSE PRACTITIONER

## 2022-12-19 PROCEDURE — 63600175 PHARM REV CODE 636 W HCPCS: Performed by: NURSE PRACTITIONER

## 2022-12-19 PROCEDURE — 36415 COLL VENOUS BLD VENIPUNCTURE: CPT

## 2022-12-19 PROCEDURE — 36430 TRANSFUSION BLD/BLD COMPNT: CPT

## 2022-12-19 PROCEDURE — 80053 COMPREHEN METABOLIC PANEL: CPT

## 2022-12-19 PROCEDURE — 85025 COMPLETE CBC W/AUTO DIFF WBC: CPT

## 2022-12-19 PROCEDURE — 25000003 PHARM REV CODE 250: Performed by: NURSE PRACTITIONER

## 2022-12-19 PROCEDURE — P9016 RBC LEUKOCYTES REDUCED: HCPCS | Performed by: NURSE PRACTITIONER

## 2022-12-19 PROCEDURE — 96374 THER/PROPH/DIAG INJ IV PUSH: CPT

## 2022-12-19 RX ORDER — ACETAMINOPHEN 325 MG/1
650 TABLET ORAL
Status: COMPLETED | OUTPATIENT
Start: 2022-12-19 | End: 2022-12-19

## 2022-12-19 RX ORDER — HYDROCODONE BITARTRATE AND ACETAMINOPHEN 500; 5 MG/1; MG/1
TABLET ORAL ONCE
Status: CANCELLED | OUTPATIENT
Start: 2022-12-19 | End: 2022-12-19

## 2022-12-19 RX ORDER — DIPHENHYDRAMINE HYDROCHLORIDE 50 MG/ML
25 INJECTION INTRAMUSCULAR; INTRAVENOUS
Status: COMPLETED | OUTPATIENT
Start: 2022-12-19 | End: 2022-12-19

## 2022-12-19 RX ORDER — DIPHENHYDRAMINE HYDROCHLORIDE 50 MG/ML
25 INJECTION INTRAMUSCULAR; INTRAVENOUS
Status: CANCELLED | OUTPATIENT
Start: 2022-12-19

## 2022-12-19 RX ORDER — HYDROCODONE BITARTRATE AND ACETAMINOPHEN 500; 5 MG/1; MG/1
TABLET ORAL ONCE
Status: DISCONTINUED | OUTPATIENT
Start: 2022-12-19 | End: 2022-12-19 | Stop reason: HOSPADM

## 2022-12-19 RX ORDER — ACETAMINOPHEN 325 MG/1
650 TABLET ORAL
Status: CANCELLED | OUTPATIENT
Start: 2022-12-19

## 2022-12-19 RX ADMIN — DIPHENHYDRAMINE HYDROCHLORIDE 25 MG: 50 INJECTION, SOLUTION INTRAMUSCULAR; INTRAVENOUS at 11:12

## 2022-12-19 RX ADMIN — ACETAMINOPHEN 650 MG: 325 TABLET, FILM COATED ORAL at 11:12

## 2022-12-19 NOTE — PROGRESS NOTES
Subjective:       Patient ID: Nita Dejesus is a 52 y.o. female.    Previous Oncologist: Dr. Katlyn Gonzalez at [facility]  Surgeon: Dr. Kelly Santos    Left Breast Cancer Stage IA(T2N0M0) Triple Positive--22  Biopsy/Surgery/pathology:   1. Excisional biopsy left breast mass/left breast cyst done 22--invasive ductal carcinoma, Grade 3, measures 3.0cm, a 0.6cm region of cauterized carcinoma is present at the inked superior-deep margin, cyst with reactive changes, suggestive of previous lumpectomy site, fluid left breast cyst with rare atypical cells present, suspicious for carcinoma, ER 95%, CT 27%, Her2 3+ by IHC, positive, Ki67 51%.   2. Left SLN biopsies done 22--3 benign lymph nodes.   Imagin. Bilateral diagnostic MMG done at [facility] 22--left inferior breast large, minimally complex cystic lesion at 6:00 measures 4.4X3.5X4.2cm, corresponding to the palpable area, appears benign. Routine screening MMG recommended.   2. MRI breasts bilateral at [facility] 22--post-surgical seroma at 12:00 position of left breast s/p recent excisional biopsy, no definite suspicious enhancing masses or areas of nonmass enhancement of left breast, and no suspicious areas in right breast, BIRADS 6.   3. CT C/A/P w/ contrast 22 at [facility]--no thoracic metastatic disease, fatty infiltration of liver, small to moderate-sized hiatal hernia, no metastatic disease.   4. NM Bone scan whole body done 22 at [facility]--activity in cervical and lumbar spines likely degenerative, no anatomic correlate seen on CT scan in lumbar spines, suggest correlation with C-spine Xrays, increase tracer w/n both feet, likely degnerative change, physiologic activity of urinary tract.  5. CT A/P w/ contrast 22 at [facility]--no acute abdominopelvic pathology or change compared to recent CT from 22, fairly large amount of stool in colon may reflect constipation, large hiatal hernia, diffuse hepatic steatosis, post-cholecystectomy, post  hysterectomy, mild thoracic and lumbar degenerative disease.     ECHO:  08/18/22--EF 55-65%.  11/17/22--EF 60%.    Genetic testing: [employer] done 7/21/22 negative for any pathogenic variants.     Treatment history:  Left breast excisional biopsy done 7/13/22  Left SLN biopsies done 8/8/22.    Current treatment plan:  TCHP X 6 cycles started 8/23/22  Cycle 3 delayed due to thrombocytopenia, given on 10/25/22--15% dose reduction, IV fluids/Zofran/Neulasta on day 2.Cycle 4 also delayed due to thrombocytopenia, given on 11/29/22-- another 20% dose reduction in chemotherapy (total 35%).  Cycle 5 delayed to 1/3/23. Continue IV fluids on day 2 and day 4.   2. Re-excision planned  3. Maintenance HP X 11 cycles if no residual disease  4. +/- XRT  5. Adjuvant OS + AI X 10 years (DAISY for endometriosis, ovaries remain)    Chief Complaint: Follow-up (Hiatal hernia problems)      HPI  Patient presents for follow-up of breast cancer. She is doing okay today. Cycle 5 has been delayed due to thrombocytopenia. She received PRBC transfusion last week because of worsening anemia. CBC today shows improved anemia. Platelets are better at 81,000 but remain too low for treatment. Additional workup was drawn today that is still pending. She has family coming in from [place] and wants to delay Cycle 5 to next week. She continues with multiple problems including ongoing fatigue, epistaxis that resolves with compression, eye twitching, ongoing diarrhea, and heartburn. Also complains of aches/pains from the Neulasta. No other problems reported today.     Past Medical History:   Diagnosis Date    Allergy     Anemia     Anxiety     Clotting disorder     GERD (gastroesophageal reflux disease)     Neuromuscular disorder       Past Surgical History:   Procedure Laterality Date    CHOLECYSTECTOMY      HERNIA REPAIR      HYSTERECTOMY      lymphectomy Left     REDUCTION OF BOTH BREASTS  2018    TUBAL LIGATION Bilateral 1993     Family History   Problem  "Relation Age of Onset    Hypertension Mother     Lupus Mother     Hypoparathyroidism Mother     Diabetes Father     Heart disease Father     Prostate cancer Father     Kidney disease Father     Hodgkin's lymphoma Brother     Pancreatitis Brother      Social History     Socioeconomic History    Marital status:    Tobacco Use    Smoking status: Former     Packs/day: 0.50     Types: Cigarettes     Quit date: 2022     Years since quittin.0    Smokeless tobacco: Never   Substance and Sexual Activity    Alcohol use: Not Currently    Drug use: Never       Review of patient's allergies indicates:   Allergen Reactions    Corticosteroids (glucocorticoids) Hives, Other (See Comments) and Swelling     Patient states "Severe Inflammation"      Ketorolac Diarrhea, Nausea And Vomiting, Other (See Comments) and Swelling     Migraines      Peanut Anaphylaxis    Penicillins Anaphylaxis, Diarrhea, Hives, Nausea And Vomiting and Swelling    Shiitake mushroom (lentinus edodes) Anaphylaxis    Tramadol Diarrhea, Itching, Nausea And Vomiting, Rash and Swelling    Latex Other (See Comments)     Skin blisters      Macrolide antibiotics Hives and Rash    Adhesive Other (See Comments)     Blisters, skin tears; CANNOT USE PAPER TAPE    Anticoag citrate phos dextrose     Aspartame     Egg     Erythromycin      Other reaction(s): Unknown    Estrogens     Other omega-3s      Anticoagulant   Patient states "low blood pressure"    Pork derived (porcine)     Sucralose Other (See Comments)    Amitriptyline Anxiety and Other (See Comments)     Severe    Other reaction(s): Unknown    Aspirin Nausea Only and Other (See Comments)    Eszopiclone Anxiety and Itching    Heparin analogues Other (See Comments) and Palpitations    Topiramate Anxiety, Other (See Comments) and Palpitations     Shaking        Current Outpatient Medications on File Prior to Visit   Medication Sig Dispense Refill    (Magic mouthwash) 1:1:1 diphenhydrAMINE(Benadryl) " 12.5mg/5ml liq, aluminum & magnesium hydroxide-simethicone (Maalox), LIDOcaine viscous 2% Swish and spit 10 mLs every 4 (four) hours as needed. for mouth sores 500 mL 1    butalbital-acetaminophen-caffeine -40 mg (FIORICET, ESGIC) -40 mg per tablet Take by mouth.      clonazePAM (KLONOPIN) 0.5 MG tablet Take 0.5 mg by mouth every 8 (eight) hours as needed.      diclofenac (VOLTAREN) 75 MG EC tablet       diphenhydrAMINE (BENADRYL) 50 MG capsule Take 50 mg by mouth every 6 (six) hours as needed.      diphenoxylate-atropine 2.5-0.025 mg (LOMOTIL) 2.5-0.025 mg per tablet Take 1 tablet by mouth 4 (four) times daily as needed for Diarrhea. 30 tablet 1    gabapentin (NEURONTIN) 300 MG capsule 3 (three) times daily.      gabapentin (NEURONTIN) 600 MG tablet Take 600 mg by mouth 3 (three) times daily.      HYDROcodone-acetaminophen (NORCO) 5-325 mg per tablet Take 1 tablet by mouth every 6 (six) hours as needed for Pain. 60 tablet 0    ondansetron (ZOFRAN-ODT) 4 MG TbDL Take 4 mg by mouth every 8 (eight) hours as needed.      tiZANidine (ZANAFLEX) 4 MG tablet Take 12 mg by mouth 3 (three) times daily.      [DISCONTINUED] famotidine (PEPCID) 20 MG tablet Take 20 mg by mouth 2 (two) times daily.      [DISCONTINUED] omeprazole (PRILOSEC) 40 MG capsule Take 40 mg by mouth once daily.       No current facility-administered medications on file prior to visit.      Review of Systems   Constitutional:  Positive for fatigue. Negative for appetite change and unexpected weight change.   HENT:  Negative for mouth sores.    Eyes:  Positive for visual disturbance.   Respiratory:  Positive for shortness of breath. Negative for cough.    Gastrointestinal:  Positive for abdominal pain and diarrhea.   Genitourinary:  Negative for frequency.   Musculoskeletal:  Negative for back pain.        Bone pains/aches from Neulasta   Integumentary:  Negative for rash.   Neurological:  Positive for headaches.   Hematological:  Negative for  adenopathy.   Psychiatric/Behavioral:  The patient is not nervous/anxious.        Vitals:    12/27/22 1050   BP: (!) 133/91   Pulse: 105   Temp: 98.3 °F (36.8 °C)   SpO2: 99%   Weight: 80.7 kg (178 lb)   Height: 5' (1.524 m)     Physical Exam  Constitutional:       Appearance: Normal appearance.   HENT:      Head: Normocephalic.      Comments: +alopecia wearing a wig     Nose: Nose normal.      Mouth/Throat:      Mouth: Mucous membranes are moist.   Eyes:      Extraocular Movements: Extraocular movements intact.      Conjunctiva/sclera: Conjunctivae normal.   Cardiovascular:      Rate and Rhythm: Normal rate and regular rhythm.   Pulmonary:      Effort: Pulmonary effort is normal.      Breath sounds: Normal breath sounds.   Chest:      Comments: Right chest wall mediport in place, left breast with inferior lumpectomy scar, well healed along previous scar from her reduction. Left axillary incision from LN biopsy also healed well. No masses palpable  Abdominal:      General: Bowel sounds are normal. There is no distension.      Palpations: Abdomen is soft.      Tenderness: There is no abdominal tenderness.   Musculoskeletal:         General: Normal range of motion.   Skin:     General: Skin is warm.   Neurological:      General: No focal deficit present.      Mental Status: She is alert and oriented to person, place, and time.   Psychiatric:         Mood and Affect: Mood normal.         Judgment: Judgment normal.     Lab Visit on 12/27/2022   Component Date Value    WBC 12/27/2022 3.1 (L)     RBC 12/27/2022 3.23 (L)     Hgb 12/27/2022 11.1 (L)     Hct 12/27/2022 34.7 (L)     MCV 12/27/2022 107.4 (H)     MCH 12/27/2022 34.4     MCHC 12/27/2022 32.0 (L)     RDW 12/27/2022 16.4 (H)     Platelet 12/27/2022 81 (L)     MPV 12/27/2022 10.3     Neut % 12/27/2022 39.5     Lymph % 12/27/2022 50.7     Mono % 12/27/2022 6.9     Eos % 12/27/2022 2.6     Basophil % 12/27/2022 0.3     Lymph # 12/27/2022 1.55     Neut # 12/27/2022  1.21 (L)     Mono # 12/27/2022 0.21     Eos # 12/27/2022 0.08     Baso # 12/27/2022 0.01     IG# 12/27/2022 0.00     IG% 12/27/2022 0.0           Assessment:       1. Malignant neoplasm of central portion of left breast in female, estrogen receptor positive    2. HER2-positive carcinoma of left breast    3. Anemia due to antineoplastic chemotherapy    4. Leukopenia due to antineoplastic chemotherapy        Plan:       Patient with Stage IA Left breast cancer triple positive s/p excisional biopsy of a cystic lesion that found cancer incidentally diagnosed 7/13/22. Tumor measured 3.0cm and SLN biopsies negative, Grade 3, strongly ER and ID positive and Her2 positive with high Ki67. There was a positive margin on the excisional biopsy.  Treatment with chemotherapy neoadjuvant started at [facility] with Dr. Gonzalez.    Patient received 2 cycles at [facility] then changed care to [facility]. She was having multiple side effects.   She does not have any steroids due to h/o allergy causing inflammation.     Completed cycle 3  given 10/25/22. IV fluids added on day 2 with neulasta and she tolerated okay.  But, she has had delays due to ongoing thrombocytopenia.  Cycle 4 was given on 11/29/22. Carboplatin/Taxotere reduced by another 20% to avoid further delays.   Platelet antibodies negative.   Cycle 5 has been delayed due to thrombocytopenia.   Received 2 units of PRBC on 12/19/22 due to worsening anemia.   She has family coming in from [place] and wants to delay Cycle 5 to 1/3/23.   CBC today shows improved anemia. Platelets are 81,000. Mild neutropenia as well with ANC 1200. CMP pending. Due to the significant cytopenias, Dr. Vargas wanted additional workup done which was collected today - LDH, retic, haptoglobin, himanshu, peripheral smear, fibrinogen and PT/INR.   Dr. Vargas is concerned she will tolerate additional chemotherapy and plans to contact Dr. Santos to see if she needs to proceed with re-excision now. Will await recommendations.    RTC for labs on 1/3/23, Cycle 5 on 1/4/23. Continue IV fluids on 1/4/23 and 1/6/23.   Continue weekly labs.   Follow-up in 2 weeks with labs for toxicity check.   Repeat ECHO done recent with EF 60%. Next due in 2/2023.    She had DAISY in 2012 for endometriosis but still has ovaries that remain functional according to patient though I do not have any recent Estradiol and/or FSH levels.   Plan will be for OS vs. BSO + AI X 5 years likely followed by completion of AI to complete 10 years.     Continue Lomotil for diarrhea and MMW for mouth soreness.  Continue eye lubricants QID.  Continue Norco 5/325mg for bone pain from the Neulasta.   All questions answered at this time.        MACARIO AbebePC

## 2022-12-21 ENCOUNTER — TELEPHONE (OUTPATIENT)
Dept: HEMATOLOGY/ONCOLOGY | Facility: CLINIC | Age: 52
End: 2022-12-21
Payer: COMMERCIAL

## 2022-12-21 NOTE — TELEPHONE ENCOUNTER
Instructed patient. She voiced understanding. Stated that she started hydrating this afternoon after speaking with me and took a norco, just in case the pain would return. She has been able to push fluids. I suggested that she try eating a meal also, since she has not had anything to eat since a.m. She voiced understanding.

## 2022-12-21 NOTE — TELEPHONE ENCOUNTER
Patient c/o sharp, shooting pain that started this a.m. It originates under left rib and radiates straight down abdomen. States it is very transient and lasts only a second when it comes, but is very painful. Denies any N+V or other GI issues other than one bout of diarrhea this morning. States she has not had anything to eat since then. No relieving or exacerbating factors. Did receive 2 units of blood this past Monday. Please advise.

## 2022-12-21 NOTE — TELEPHONE ENCOUNTER
Not sure. She has not had chemo in 5 weeks. Should not be the blood transfusion that is causing this. If it is severe where she cannot eat or drink anything she will need to go to the ER.

## 2022-12-27 ENCOUNTER — OFFICE VISIT (OUTPATIENT)
Dept: HEMATOLOGY/ONCOLOGY | Facility: CLINIC | Age: 52
End: 2022-12-27
Payer: COMMERCIAL

## 2022-12-27 ENCOUNTER — TELEPHONE (OUTPATIENT)
Dept: HEMATOLOGY/ONCOLOGY | Facility: CLINIC | Age: 52
End: 2022-12-27

## 2022-12-27 VITALS
HEIGHT: 60 IN | TEMPERATURE: 98 F | OXYGEN SATURATION: 99 % | WEIGHT: 178 LBS | BODY MASS INDEX: 34.95 KG/M2 | DIASTOLIC BLOOD PRESSURE: 91 MMHG | HEART RATE: 105 BPM | SYSTOLIC BLOOD PRESSURE: 133 MMHG

## 2022-12-27 DIAGNOSIS — C50.912 HER2-POSITIVE CARCINOMA OF LEFT BREAST: ICD-10-CM

## 2022-12-27 DIAGNOSIS — T45.1X5A ANEMIA DUE TO ANTINEOPLASTIC CHEMOTHERAPY: ICD-10-CM

## 2022-12-27 DIAGNOSIS — D70.1 LEUKOPENIA DUE TO ANTINEOPLASTIC CHEMOTHERAPY: ICD-10-CM

## 2022-12-27 DIAGNOSIS — Z17.0 MALIGNANT NEOPLASM OF CENTRAL PORTION OF LEFT BREAST IN FEMALE, ESTROGEN RECEPTOR POSITIVE: Primary | ICD-10-CM

## 2022-12-27 DIAGNOSIS — D64.81 ANEMIA DUE TO ANTINEOPLASTIC CHEMOTHERAPY: ICD-10-CM

## 2022-12-27 DIAGNOSIS — C50.112 MALIGNANT NEOPLASM OF CENTRAL PORTION OF LEFT BREAST IN FEMALE, ESTROGEN RECEPTOR POSITIVE: Primary | ICD-10-CM

## 2022-12-27 DIAGNOSIS — T45.1X5A LEUKOPENIA DUE TO ANTINEOPLASTIC CHEMOTHERAPY: ICD-10-CM

## 2022-12-27 PROCEDURE — 3075F PR MOST RECENT SYSTOLIC BLOOD PRESS GE 130-139MM HG: ICD-10-PCS | Mod: CPTII,S$GLB,, | Performed by: NURSE PRACTITIONER

## 2022-12-27 PROCEDURE — 3008F PR BODY MASS INDEX (BMI) DOCUMENTED: ICD-10-PCS | Mod: CPTII,S$GLB,, | Performed by: NURSE PRACTITIONER

## 2022-12-27 PROCEDURE — 1159F PR MEDICATION LIST DOCUMENTED IN MEDICAL RECORD: ICD-10-PCS | Mod: CPTII,S$GLB,, | Performed by: NURSE PRACTITIONER

## 2022-12-27 PROCEDURE — 99999 PR PBB SHADOW E&M-EST. PATIENT-LVL IV: CPT | Mod: PBBFAC,,, | Performed by: NURSE PRACTITIONER

## 2022-12-27 PROCEDURE — 3080F DIAST BP >= 90 MM HG: CPT | Mod: CPTII,S$GLB,, | Performed by: NURSE PRACTITIONER

## 2022-12-27 PROCEDURE — 3080F PR MOST RECENT DIASTOLIC BLOOD PRESSURE >= 90 MM HG: ICD-10-PCS | Mod: CPTII,S$GLB,, | Performed by: NURSE PRACTITIONER

## 2022-12-27 PROCEDURE — 1159F MED LIST DOCD IN RCRD: CPT | Mod: CPTII,S$GLB,, | Performed by: NURSE PRACTITIONER

## 2022-12-27 PROCEDURE — 1160F PR REVIEW ALL MEDS BY PRESCRIBER/CLIN PHARMACIST DOCUMENTED: ICD-10-PCS | Mod: CPTII,S$GLB,, | Performed by: NURSE PRACTITIONER

## 2022-12-27 PROCEDURE — 3008F BODY MASS INDEX DOCD: CPT | Mod: CPTII,S$GLB,, | Performed by: NURSE PRACTITIONER

## 2022-12-27 PROCEDURE — 1160F RVW MEDS BY RX/DR IN RCRD: CPT | Mod: CPTII,S$GLB,, | Performed by: NURSE PRACTITIONER

## 2022-12-27 PROCEDURE — 3075F SYST BP GE 130 - 139MM HG: CPT | Mod: CPTII,S$GLB,, | Performed by: NURSE PRACTITIONER

## 2022-12-27 PROCEDURE — 99215 PR OFFICE/OUTPT VISIT, EST, LEVL V, 40-54 MIN: ICD-10-PCS | Mod: S$GLB,,, | Performed by: NURSE PRACTITIONER

## 2022-12-27 PROCEDURE — 99215 OFFICE O/P EST HI 40 MIN: CPT | Mod: S$GLB,,, | Performed by: NURSE PRACTITIONER

## 2022-12-27 PROCEDURE — 99999 PR PBB SHADOW E&M-EST. PATIENT-LVL IV: ICD-10-PCS | Mod: PBBFAC,,, | Performed by: NURSE PRACTITIONER

## 2022-12-27 RX ORDER — HEPARIN 100 UNIT/ML
500 SYRINGE INTRAVENOUS
Status: CANCELLED | OUTPATIENT
Start: 2023-01-03

## 2022-12-27 RX ORDER — DIPHENHYDRAMINE HYDROCHLORIDE 50 MG/ML
50 INJECTION INTRAMUSCULAR; INTRAVENOUS ONCE AS NEEDED
Status: CANCELLED | OUTPATIENT
Start: 2023-01-03

## 2022-12-27 RX ORDER — EPINEPHRINE 0.3 MG/.3ML
0.3 INJECTION SUBCUTANEOUS ONCE AS NEEDED
Status: CANCELLED | OUTPATIENT
Start: 2023-01-03

## 2022-12-27 RX ORDER — OMEPRAZOLE 40 MG/1
40 CAPSULE, DELAYED RELEASE ORAL DAILY
Qty: 30 CAPSULE | Refills: 6 | Status: SHIPPED | OUTPATIENT
Start: 2022-12-27

## 2022-12-27 RX ORDER — PALONOSETRON 0.05 MG/ML
0.25 INJECTION, SOLUTION INTRAVENOUS ONCE
Status: CANCELLED
Start: 2023-01-03 | End: 2023-01-03

## 2022-12-27 RX ORDER — SODIUM CHLORIDE 0.9 % (FLUSH) 0.9 %
10 SYRINGE (ML) INJECTION
Status: CANCELLED | OUTPATIENT
Start: 2023-01-03

## 2022-12-27 RX ORDER — DIPHENHYDRAMINE HYDROCHLORIDE 50 MG/ML
12.5 INJECTION INTRAMUSCULAR; INTRAVENOUS
Status: CANCELLED
Start: 2023-01-03

## 2023-01-03 ENCOUNTER — LAB VISIT (OUTPATIENT)
Dept: LAB | Facility: HOSPITAL | Age: 53
End: 2023-01-03
Attending: INTERNAL MEDICINE
Payer: COMMERCIAL

## 2023-01-03 DIAGNOSIS — T45.1X5A ANEMIA DUE TO ANTINEOPLASTIC CHEMOTHERAPY: ICD-10-CM

## 2023-01-03 DIAGNOSIS — D69.6 THROMBOCYTOPENIA: ICD-10-CM

## 2023-01-03 DIAGNOSIS — D64.81 ANEMIA DUE TO ANTINEOPLASTIC CHEMOTHERAPY: ICD-10-CM

## 2023-01-03 DIAGNOSIS — C50.112 MALIGNANT NEOPLASM OF CENTRAL PORTION OF LEFT BREAST IN FEMALE, ESTROGEN RECEPTOR POSITIVE: ICD-10-CM

## 2023-01-03 DIAGNOSIS — Z17.0 MALIGNANT NEOPLASM OF CENTRAL PORTION OF LEFT BREAST IN FEMALE, ESTROGEN RECEPTOR POSITIVE: ICD-10-CM

## 2023-01-03 DIAGNOSIS — C50.912 HER2-POSITIVE CARCINOMA OF LEFT BREAST: ICD-10-CM

## 2023-01-03 DIAGNOSIS — T45.1X5A LEUKOPENIA DUE TO ANTINEOPLASTIC CHEMOTHERAPY: ICD-10-CM

## 2023-01-03 DIAGNOSIS — D70.1 LEUKOPENIA DUE TO ANTINEOPLASTIC CHEMOTHERAPY: ICD-10-CM

## 2023-01-03 LAB
ALBUMIN SERPL-MCNC: 3.5 G/DL (ref 3.5–5)
ALBUMIN/GLOB SERPL: 1.3 RATIO (ref 1.1–2)
ALP SERPL-CCNC: 112 UNIT/L (ref 40–150)
ALT SERPL-CCNC: 16 UNIT/L (ref 0–55)
AST SERPL-CCNC: 13 UNIT/L (ref 5–34)
BASOPHILS # BLD AUTO: 0.01 X10(3)/MCL (ref 0–0.2)
BASOPHILS NFR BLD AUTO: 0.3 %
BILIRUBIN DIRECT+TOT PNL SERPL-MCNC: 0.5 MG/DL
BUN SERPL-MCNC: 14.9 MG/DL (ref 9.8–20.1)
CALCIUM SERPL-MCNC: 9.7 MG/DL (ref 8.4–10.2)
CHLORIDE SERPL-SCNC: 109 MMOL/L (ref 98–107)
CO2 SERPL-SCNC: 29 MMOL/L (ref 22–29)
CREAT SERPL-MCNC: 0.82 MG/DL (ref 0.55–1.02)
EOSINOPHIL # BLD AUTO: 0.15 X10(3)/MCL (ref 0–0.9)
EOSINOPHIL NFR BLD AUTO: 4.1 %
ERYTHROCYTE [DISTWIDTH] IN BLOOD BY AUTOMATED COUNT: 16.4 % (ref 11–14.5)
GFR SERPLBLD CREATININE-BSD FMLA CKD-EPI: 87 MLS/MIN/1.73/M2
GLOBULIN SER-MCNC: 2.6 GM/DL (ref 2.4–3.5)
GLUCOSE SERPL-MCNC: 115 MG/DL (ref 74–100)
HCT VFR BLD AUTO: 33.4 % (ref 37–47)
HGB BLD-MCNC: 10.4 GM/DL (ref 12–16)
IMM GRANULOCYTES # BLD AUTO: 0.01 X10(3)/MCL (ref 0–0.04)
IMM GRANULOCYTES NFR BLD AUTO: 0.3 %
LYMPHOCYTES # BLD AUTO: 1.81 X10(3)/MCL (ref 0.6–4.6)
LYMPHOCYTES NFR BLD AUTO: 48.9 %
MCH RBC QN AUTO: 34 PG
MCHC RBC AUTO-ENTMCNC: 31.1 MG/DL (ref 33–36)
MCV RBC AUTO: 109.2 FL (ref 80–94)
MONOCYTES # BLD AUTO: 0.29 X10(3)/MCL (ref 0.1–1.3)
MONOCYTES NFR BLD AUTO: 7.8 %
NEUTROPHILS # BLD AUTO: 1.43 X10(3)/MCL (ref 2.1–9.2)
NEUTROPHILS NFR BLD AUTO: 38.6 %
PLATELET # BLD AUTO: 132 X10(3)/MCL (ref 140–371)
PMV BLD AUTO: 8.9 FL (ref 9.4–12.4)
POTASSIUM SERPL-SCNC: 4.4 MMOL/L (ref 3.5–5.1)
PROT SERPL-MCNC: 6.1 GM/DL (ref 6.4–8.3)
RBC # BLD AUTO: 3.06 X10(6)/MCL (ref 4.2–5.4)
SODIUM SERPL-SCNC: 140 MMOL/L (ref 136–145)
WBC # SPEC AUTO: 3.7 X10(3)/MCL (ref 4.5–11.5)

## 2023-01-03 PROCEDURE — 85025 COMPLETE CBC W/AUTO DIFF WBC: CPT

## 2023-01-03 PROCEDURE — 36415 COLL VENOUS BLD VENIPUNCTURE: CPT

## 2023-01-03 PROCEDURE — 80053 COMPREHEN METABOLIC PANEL: CPT

## 2023-01-04 ENCOUNTER — INFUSION (OUTPATIENT)
Dept: INFUSION THERAPY | Facility: HOSPITAL | Age: 53
End: 2023-01-04
Attending: FAMILY MEDICINE
Payer: COMMERCIAL

## 2023-01-04 VITALS
TEMPERATURE: 98 F | RESPIRATION RATE: 18 BRPM | SYSTOLIC BLOOD PRESSURE: 122 MMHG | DIASTOLIC BLOOD PRESSURE: 84 MMHG | HEART RATE: 77 BPM | OXYGEN SATURATION: 98 %

## 2023-01-04 DIAGNOSIS — C50.112 MALIGNANT NEOPLASM OF CENTRAL PORTION OF LEFT BREAST IN FEMALE, ESTROGEN RECEPTOR POSITIVE: Primary | ICD-10-CM

## 2023-01-04 DIAGNOSIS — Z17.0 MALIGNANT NEOPLASM OF CENTRAL PORTION OF LEFT BREAST IN FEMALE, ESTROGEN RECEPTOR POSITIVE: Primary | ICD-10-CM

## 2023-01-04 PROCEDURE — 96413 CHEMO IV INFUSION 1 HR: CPT

## 2023-01-04 PROCEDURE — 96375 TX/PRO/DX INJ NEW DRUG ADDON: CPT

## 2023-01-04 PROCEDURE — 63600175 PHARM REV CODE 636 W HCPCS: Mod: TB | Performed by: NURSE PRACTITIONER

## 2023-01-04 PROCEDURE — 25000003 PHARM REV CODE 250: Performed by: NURSE PRACTITIONER

## 2023-01-04 PROCEDURE — 96415 CHEMO IV INFUSION ADDL HR: CPT

## 2023-01-04 RX ORDER — DIPHENHYDRAMINE HYDROCHLORIDE 50 MG/ML
12.5 INJECTION INTRAMUSCULAR; INTRAVENOUS
Status: COMPLETED | OUTPATIENT
Start: 2023-01-04 | End: 2023-01-04

## 2023-01-04 RX ORDER — ONDANSETRON 2 MG/ML
8 INJECTION INTRAMUSCULAR; INTRAVENOUS
Status: CANCELLED
Start: 2023-01-05

## 2023-01-04 RX ORDER — SODIUM CHLORIDE 0.9 % (FLUSH) 0.9 %
10 SYRINGE (ML) INJECTION
Status: DISCONTINUED | OUTPATIENT
Start: 2023-01-04 | End: 2023-01-04 | Stop reason: HOSPADM

## 2023-01-04 RX ORDER — HEPARIN 100 UNIT/ML
500 SYRINGE INTRAVENOUS
Status: DISCONTINUED | OUTPATIENT
Start: 2023-01-04 | End: 2023-01-04 | Stop reason: HOSPADM

## 2023-01-04 RX ORDER — DIPHENHYDRAMINE HYDROCHLORIDE 50 MG/ML
50 INJECTION INTRAMUSCULAR; INTRAVENOUS ONCE AS NEEDED
Status: DISCONTINUED | OUTPATIENT
Start: 2023-01-04 | End: 2023-01-04 | Stop reason: HOSPADM

## 2023-01-04 RX ORDER — PALONOSETRON 0.05 MG/ML
0.25 INJECTION, SOLUTION INTRAVENOUS ONCE
Status: COMPLETED | OUTPATIENT
Start: 2023-01-04 | End: 2023-01-04

## 2023-01-04 RX ORDER — EPINEPHRINE 0.3 MG/.3ML
0.3 INJECTION SUBCUTANEOUS ONCE AS NEEDED
Status: DISCONTINUED | OUTPATIENT
Start: 2023-01-04 | End: 2023-01-04 | Stop reason: HOSPADM

## 2023-01-04 RX ADMIN — APREPITANT 130 MG: 130 INJECTION, EMULSION INTRAVENOUS at 11:01

## 2023-01-04 RX ADMIN — TRASTUZUMAB 484 MG: 420 INJECTION, POWDER, LYOPHILIZED, FOR SOLUTION INTRAVENOUS at 11:01

## 2023-01-04 RX ADMIN — PALONOSETRON HYDROCHLORIDE 0.25 MG: 0.25 INJECTION, SOLUTION INTRAVENOUS at 11:01

## 2023-01-04 RX ADMIN — DIPHENHYDRAMINE HYDROCHLORIDE 12.5 MG: 50 INJECTION, SOLUTION INTRAMUSCULAR; INTRAVENOUS at 11:01

## 2023-01-04 RX ADMIN — DOCETAXEL 90 MG: 20 INJECTION, SOLUTION, CONCENTRATE INTRAVENOUS at 01:01

## 2023-01-04 RX ADMIN — PERTUZUMAB 420 MG: 30 INJECTION, SOLUTION, CONCENTRATE INTRAVENOUS at 12:01

## 2023-01-04 RX ADMIN — CARBOPLATIN 500 MG: 10 INJECTION, SOLUTION INTRAVENOUS at 02:01

## 2023-01-04 NOTE — PROGRESS NOTES
Subjective:       Patient ID: Nita Dejesus is a 52 y.o. female.    Previous Oncologist: Dr. Katlyn Gonzalez at [facility]  Surgeon: Dr. Kelly Santos    Left Breast Cancer Stage IA(T2N0M0) Triple Positive--22  Biopsy/Surgery/pathology:   1. Excisional biopsy left breast mass/left breast cyst done 22--invasive ductal carcinoma, Grade 3, measures 3.0cm, a 0.6cm region of cauterized carcinoma is present at the inked superior-deep margin, cyst with reactive changes, suggestive of previous lumpectomy site, fluid left breast cyst with rare atypical cells present, suspicious for carcinoma, ER 95%, HI 27%, Her2 3+ by IHC, positive, Ki67 51%.   2. Left SLN biopsies done 22--3 benign lymph nodes.   Imagin. Bilateral diagnostic MMG done at [facility] 22--left inferior breast large, minimally complex cystic lesion at 6:00 measures 4.4X3.5X4.2cm, corresponding to the palpable area, appears benign. Routine screening MMG recommended.   2. MRI breasts bilateral at [facility] 22--post-surgical seroma at 12:00 position of left breast s/p recent excisional biopsy, no definite suspicious enhancing masses or areas of nonmass enhancement of left breast, and no suspicious areas in right breast, BIRADS 6.   3. CT C/A/P w/ contrast 22 at [facility]--no thoracic metastatic disease, fatty infiltration of liver, small to moderate-sized hiatal hernia, no metastatic disease.   4. NM Bone scan whole body done 22 at [facility]--activity in cervical and lumbar spines likely degenerative, no anatomic correlate seen on CT scan in lumbar spines, suggest correlation with C-spine Xrays, increase tracer w/n both feet, likely degnerative change, physiologic activity of urinary tract.  5. CT A/P w/ contrast 22 at [facility]--no acute abdominopelvic pathology or change compared to recent CT from 22, fairly large amount of stool in colon may reflect constipation, large hiatal hernia, diffuse hepatic steatosis, post-cholecystectomy, post  hysterectomy, mild thoracic and lumbar degenerative disease.     ECHO:  08/18/22--EF 55-65%.  11/17/22--EF 60%.    Genetic testing: [employer] done 7/21/22 negative for any pathogenic variants.     Treatment history:  Left breast excisional biopsy done 7/13/22  Left SLN biopsies done 8/8/22.  2 units PRBC 12/19/22.  TCHP X 5 cycles only 8/23/22--1/4/23 (multiple treatment delays for thrombocytopenia, despite dose reductions, multiple side effects).    Current treatment plan:  1. Cycle 6 potentially planned for 1/25/23 but if more delays, plan to proceed with surgery  2. Maintenance HP X 11-13 cycles if no residual disease  3. +/- XRT  4. Adjuvant OS + AI X 10 years (DAISY for endometriosis, ovaries remain)    Chief Complaint: Fatigue, Diarrhea, Peripheral Neuropathy, Joint Pain, Bone Pain, and Insomnia      HPI  Patient presents for follow-up of breast cancer. She finally was able to receive cycle 5 of chemotherapy last week after delays due to thrombocytopenia. She reports multiple side effects again including nausea, diarrhea, fatigue, neuropathy, joint pain, bone pain and insomnia. She is otherwise doing okay. She has an upcoming appointment with Dr. Kelly Santos on 1/25/23. Patient did receive a blood transfusion on 12/19/22 with improvement in anemia.     Past Medical History:   Diagnosis Date    Allergy     Anemia     Anxiety     Clotting disorder     GERD (gastroesophageal reflux disease)     Neuromuscular disorder       Past Surgical History:   Procedure Laterality Date    CHOLECYSTECTOMY      HERNIA REPAIR      HYSTERECTOMY      lymphectomy Left     REDUCTION OF BOTH BREASTS  2018    TUBAL LIGATION Bilateral 1993     Family History   Problem Relation Age of Onset    Hypertension Mother     Lupus Mother     Hypoparathyroidism Mother     Diabetes Father     Heart disease Father     Prostate cancer Father     Kidney disease Father     Hodgkin's lymphoma Brother     Pancreatitis Brother      Social History  "    Socioeconomic History    Marital status:    Tobacco Use    Smoking status: Former     Packs/day: 0.50     Types: Cigarettes     Quit date: 2022     Years since quittin.1    Smokeless tobacco: Never   Substance and Sexual Activity    Alcohol use: Not Currently    Drug use: Never       Review of patient's allergies indicates:   Allergen Reactions    Corticosteroids (glucocorticoids) Hives, Other (See Comments) and Swelling     Patient states "Severe Inflammation"      Ketorolac Diarrhea, Nausea And Vomiting, Other (See Comments) and Swelling     Migraines      Peanut Anaphylaxis    Penicillins Anaphylaxis, Diarrhea, Hives, Nausea And Vomiting and Swelling    Shiitake mushroom (lentinus edodes) Anaphylaxis    Tramadol Diarrhea, Itching, Nausea And Vomiting, Rash and Swelling    Latex Other (See Comments)     Skin blisters      Macrolide antibiotics Hives and Rash    Adhesive Other (See Comments)     Blisters, skin tears; CANNOT USE PAPER TAPE    Anticoag citrate phos dextrose     Aspartame     Egg     Erythromycin      Other reaction(s): Unknown    Estrogens     Other omega-3s      Anticoagulant   Patient states "low blood pressure"    Pork derived (porcine)     Sucralose Other (See Comments)    Amitriptyline Anxiety and Other (See Comments)     Severe    Other reaction(s): Unknown    Aspirin Nausea Only and Other (See Comments)    Eszopiclone Anxiety and Itching    Heparin analogues Other (See Comments) and Palpitations    Topiramate Anxiety, Other (See Comments) and Palpitations     Shaking        Current Outpatient Medications on File Prior to Visit   Medication Sig Dispense Refill    (Magic mouthwash) 1:1:1 diphenhydrAMINE(Benadryl) 12.5mg/5ml liq, aluminum & magnesium hydroxide-simethicone (Maalox), LIDOcaine viscous 2% Swish and spit 10 mLs every 4 (four) hours as needed. for mouth sores 500 mL 1    butalbital-acetaminophen-caffeine -40 mg (FIORICET, ESGIC) -40 mg per tablet Take " by mouth.      clonazePAM (KLONOPIN) 0.5 MG tablet Take 0.5 mg by mouth every 8 (eight) hours as needed.      diclofenac (VOLTAREN) 75 MG EC tablet       diphenhydrAMINE (BENADRYL) 50 MG capsule Take 50 mg by mouth every 6 (six) hours as needed.      diphenoxylate-atropine 2.5-0.025 mg (LOMOTIL) 2.5-0.025 mg per tablet Take 1 tablet by mouth 4 (four) times daily as needed for Diarrhea. 30 tablet 1    gabapentin (NEURONTIN) 300 MG capsule 3 (three) times daily.      gabapentin (NEURONTIN) 600 MG tablet Take 600 mg by mouth 3 (three) times daily.      HYDROcodone-acetaminophen (NORCO) 5-325 mg per tablet Take 1 tablet by mouth every 6 (six) hours as needed for Pain. 60 tablet 0    omeprazole (PRILOSEC) 40 MG capsule Take 1 capsule (40 mg total) by mouth once daily. 30 capsule 6    tiZANidine (ZANAFLEX) 4 MG tablet Take 12 mg by mouth 3 (three) times daily.      ondansetron (ZOFRAN-ODT) 4 MG TbDL Take 4 mg by mouth every 8 (eight) hours as needed.       No current facility-administered medications on file prior to visit.      Review of Systems   Constitutional:  Positive for fatigue. Negative for appetite change and unexpected weight change.   HENT:  Negative for mouth sores.    Respiratory:  Negative for cough.    Gastrointestinal:  Positive for abdominal pain, diarrhea and nausea.   Genitourinary:  Negative for frequency.   Musculoskeletal:  Positive for arthralgias. Negative for back pain.        Bone pains/aches from Neulasta   Integumentary:  Negative for rash.   Neurological:  Positive for headaches.   Hematological:  Negative for adenopathy.   Psychiatric/Behavioral:  The patient is not nervous/anxious.        Vitals:    01/09/23 1042   BP: 124/86   Pulse: 105   Resp: 14   Temp: 98.4 °F (36.9 °C)   SpO2: 97%   Weight: 81 kg (178 lb 9.6 oz)   Height: 5' (1.524 m)       Physical Exam  Constitutional:       Appearance: Normal appearance.   HENT:      Head: Normocephalic.      Comments: +alopecia wearing a wig      Nose: Nose normal.      Mouth/Throat:      Mouth: Mucous membranes are moist.   Eyes:      Extraocular Movements: Extraocular movements intact.      Conjunctiva/sclera: Conjunctivae normal.   Cardiovascular:      Rate and Rhythm: Normal rate and regular rhythm.   Pulmonary:      Effort: Pulmonary effort is normal.      Breath sounds: Normal breath sounds.   Chest:      Comments: Right chest wall mediport in place, left breast with inferior lumpectomy scar, well healed along previous scar from her reduction. Left axillary incision from LN biopsy also healed well. No masses palpable  Abdominal:      General: Bowel sounds are normal. There is no distension.      Palpations: Abdomen is soft.      Tenderness: There is no abdominal tenderness.   Musculoskeletal:         General: Normal range of motion.   Skin:     General: Skin is warm.   Neurological:      General: No focal deficit present.      Mental Status: She is alert and oriented to person, place, and time.   Psychiatric:         Mood and Affect: Mood normal.         Judgment: Judgment normal.     Lab Visit on 01/09/2023   Component Date Value    Sodium Level 01/09/2023 140     Potassium Level 01/09/2023 4.3     Chloride 01/09/2023 107     Carbon Dioxide 01/09/2023 28     Glucose Level 01/09/2023 136 (H)     Blood Urea Nitrogen 01/09/2023 17.5     Creatinine 01/09/2023 0.85     Calcium Level Total 01/09/2023 10.2     Protein Total 01/09/2023 6.5     Albumin Level 01/09/2023 3.8     Globulin 01/09/2023 2.7     Albumin/Globulin Ratio 01/09/2023 1.4     Bilirubin Total 01/09/2023 0.8     Alkaline Phosphatase 01/09/2023 135     Alanine Aminotransferase 01/09/2023 42     Aspartate Aminotransfera* 01/09/2023 26     eGFR 01/09/2023 84     WBC 01/09/2023 3.8 (L)     RBC 01/09/2023 3.09 (L)     Hgb 01/09/2023 10.9 (L)     Hct 01/09/2023 33.1 (L)     MCV 01/09/2023 107.1 (H)     MCH 01/09/2023 35.3     MCHC 01/09/2023 32.9 (L)     RDW 01/09/2023 15.7 (H)     Platelet  01/09/2023 117 (L)     MPV 01/09/2023 9.5     Neut % 01/09/2023 60.7     Lymph % 01/09/2023 31.4     Mono % 01/09/2023 4.7     Eos % 01/09/2023 1.1     Basophil % 01/09/2023 0.3     Lymph # 01/09/2023 1.19     Neut # 01/09/2023 2.30     Mono # 01/09/2023 0.18     Eos # 01/09/2023 0.04     Baso # 01/09/2023 0.01     IG# 01/09/2023 0.07 (H)     IG% 01/09/2023 1.8    Lab Visit on 01/03/2023   Component Date Value    Sodium Level 01/03/2023 140     Potassium Level 01/03/2023 4.4     Chloride 01/03/2023 109 (H)     Carbon Dioxide 01/03/2023 29     Glucose Level 01/03/2023 115 (H)     Blood Urea Nitrogen 01/03/2023 14.9     Creatinine 01/03/2023 0.82     Calcium Level Total 01/03/2023 9.7     Protein Total 01/03/2023 6.1 (L)     Albumin Level 01/03/2023 3.5     Globulin 01/03/2023 2.6     Albumin/Globulin Ratio 01/03/2023 1.3     Bilirubin Total 01/03/2023 0.5     Alkaline Phosphatase 01/03/2023 112     Alanine Aminotransferase 01/03/2023 16     Aspartate Aminotransfera* 01/03/2023 13     eGFR 01/03/2023 87     WBC 01/03/2023 3.7 (L)     RBC 01/03/2023 3.06 (L)     Hgb 01/03/2023 10.4 (L)     Hct 01/03/2023 33.4 (L)     MCV 01/03/2023 109.2 (H)     MCH 01/03/2023 34.0     MCHC 01/03/2023 31.1 (L)     RDW 01/03/2023 16.4 (H)     Platelet 01/03/2023 132 (L)     MPV 01/03/2023 8.9 (L)     Neut % 01/03/2023 38.6     Lymph % 01/03/2023 48.9     Mono % 01/03/2023 7.8     Eos % 01/03/2023 4.1     Basophil % 01/03/2023 0.3     Lymph # 01/03/2023 1.81     Neut # 01/03/2023 1.43 (L)     Mono # 01/03/2023 0.29     Eos # 01/03/2023 0.15     Baso # 01/03/2023 0.01     IG# 01/03/2023 0.01     IG% 01/03/2023 0.3           Assessment:       1. Malignant neoplasm of central portion of left breast in female, estrogen receptor positive      Plan:       Patient with Stage IA Left breast cancer triple positive s/p excisional biopsy of a cystic lesion that found cancer incidentally diagnosed 7/13/22. Tumor measured 3.0cm and SLN biopsies  negative, Grade 3, strongly ER and AR positive and Her2 positive with high Ki67. There was a positive margin on the excisional biopsy.  Treatment with chemotherapy neoadjuvant started at [facility] with Dr. Gonzalez.    Patient received 2 cycles at [facility] then changed care to [facility]. She was having multiple side effects.   She does not have any steroids due to h/o allergy causing inflammation.     Completed cycle 3  given 10/25/22. IV fluids added on day 2 with neulasta and she tolerated okay.  But, she has had delays due to ongoing thrombocytopenia.  Cycle 4 was given on 11/29/22. Carboplatin/Taxotere reduced by another 20% to avoid further delays.   Platelet antibodies negative.   Received 2 units of PRBC on 12/19/22 due to worsening anemia.   Cycle 5 has delayed due to thrombocytopenia, finally given on 1/4/23.    Patient continues to tolerate treatment poorly with multiple side effects.  Labs today show mild thrombocytopenia platelets 117K, mild anemia stable and mild leukopenia. Will follow-up CMP.   Continue weekly labs.  Plan will likely be to proceed with surgery and to hold cycle 6 if she has significant thrombocytopenia again with further delays.  Will set up treatment cycle 6 on 1/25/23 pending her labs done on 1/24/23. If she is not able to have treatment, plan to go ahead with surgery.  She is meeting with Dr. Santos on 1/24/23.    She had DAISY in 2012 for endometriosis but still has ovaries that remain functional.   8/4/22 estradiol level 11, FSH 44, LH 38. Plan will be for OS vs. BSO + AI X 5 years likely followed by completion of AI to complete 10 years.     Continue Lomotil for diarrhea and MMW for mouth soreness.  Continue eye lubricants QID.  Continue Norco 5/325mg for bone pain from the Neulasta.   All questions answered at this time.        Flory Vargas MD

## 2023-01-04 NOTE — PLAN OF CARE
Infusions given, tolerated well, discharged home. Mediport left accessed per pts request for tomorrows treatment.

## 2023-01-05 ENCOUNTER — INFUSION (OUTPATIENT)
Dept: INFUSION THERAPY | Facility: HOSPITAL | Age: 53
End: 2023-01-05
Attending: FAMILY MEDICINE
Payer: COMMERCIAL

## 2023-01-05 VITALS
DIASTOLIC BLOOD PRESSURE: 77 MMHG | OXYGEN SATURATION: 97 % | SYSTOLIC BLOOD PRESSURE: 121 MMHG | TEMPERATURE: 98 F | RESPIRATION RATE: 18 BRPM | HEART RATE: 87 BPM

## 2023-01-05 DIAGNOSIS — Z17.0 MALIGNANT NEOPLASM OF CENTRAL PORTION OF LEFT BREAST IN FEMALE, ESTROGEN RECEPTOR POSITIVE: Primary | ICD-10-CM

## 2023-01-05 DIAGNOSIS — C50.112 MALIGNANT NEOPLASM OF CENTRAL PORTION OF LEFT BREAST IN FEMALE, ESTROGEN RECEPTOR POSITIVE: Primary | ICD-10-CM

## 2023-01-05 PROCEDURE — 25000003 PHARM REV CODE 250: Performed by: NURSE PRACTITIONER

## 2023-01-05 PROCEDURE — 63600175 PHARM REV CODE 636 W HCPCS: Performed by: INTERNAL MEDICINE

## 2023-01-05 PROCEDURE — 63600175 PHARM REV CODE 636 W HCPCS: Mod: TB | Performed by: NURSE PRACTITIONER

## 2023-01-05 PROCEDURE — 96376 TX/PRO/DX INJ SAME DRUG ADON: CPT

## 2023-01-05 PROCEDURE — 96360 HYDRATION IV INFUSION INIT: CPT

## 2023-01-05 PROCEDURE — 96372 THER/PROPH/DIAG INJ SC/IM: CPT | Mod: 59

## 2023-01-05 RX ORDER — ONDANSETRON 2 MG/ML
8 INJECTION INTRAMUSCULAR; INTRAVENOUS
Status: COMPLETED | OUTPATIENT
Start: 2023-01-05 | End: 2023-01-05

## 2023-01-05 RX ADMIN — SODIUM CHLORIDE 1000 ML: 9 INJECTION, SOLUTION INTRAVENOUS at 01:01

## 2023-01-05 RX ADMIN — PEGFILGRASTIM-CBQV 6 MG: 6 INJECTION, SOLUTION SUBCUTANEOUS at 02:01

## 2023-01-05 RX ADMIN — ONDANSETRON 8 MG: 2 INJECTION INTRAMUSCULAR; INTRAVENOUS at 02:01

## 2023-01-06 ENCOUNTER — INFUSION (OUTPATIENT)
Dept: INFUSION THERAPY | Facility: HOSPITAL | Age: 53
End: 2023-01-06
Attending: FAMILY MEDICINE
Payer: COMMERCIAL

## 2023-01-06 DIAGNOSIS — C50.112 MALIGNANT NEOPLASM OF CENTRAL PORTION OF LEFT BREAST IN FEMALE, ESTROGEN RECEPTOR POSITIVE: Primary | ICD-10-CM

## 2023-01-06 DIAGNOSIS — Z17.0 MALIGNANT NEOPLASM OF CENTRAL PORTION OF LEFT BREAST IN FEMALE, ESTROGEN RECEPTOR POSITIVE: Primary | ICD-10-CM

## 2023-01-06 PROCEDURE — 96361 HYDRATE IV INFUSION ADD-ON: CPT

## 2023-01-06 PROCEDURE — 96360 HYDRATION IV INFUSION INIT: CPT

## 2023-01-06 PROCEDURE — 63600175 PHARM REV CODE 636 W HCPCS: Performed by: NURSE PRACTITIONER

## 2023-01-06 PROCEDURE — 25000003 PHARM REV CODE 250: Performed by: NURSE PRACTITIONER

## 2023-01-06 RX ORDER — HEPARIN 100 UNIT/ML
500 SYRINGE INTRAVENOUS
Status: CANCELLED | OUTPATIENT
Start: 2023-01-13

## 2023-01-06 RX ORDER — SODIUM CHLORIDE 0.9 % (FLUSH) 0.9 %
10 SYRINGE (ML) INJECTION
Status: CANCELLED | OUTPATIENT
Start: 2023-01-13

## 2023-01-06 RX ORDER — HEPARIN 100 UNIT/ML
500 SYRINGE INTRAVENOUS
Status: DISCONTINUED | OUTPATIENT
Start: 2023-01-06 | End: 2023-01-06 | Stop reason: HOSPADM

## 2023-01-06 RX ORDER — ONDANSETRON 2 MG/ML
8 INJECTION INTRAMUSCULAR; INTRAVENOUS
Status: COMPLETED | OUTPATIENT
Start: 2023-01-06 | End: 2023-01-06

## 2023-01-06 RX ORDER — SODIUM CHLORIDE 0.9 % (FLUSH) 0.9 %
10 SYRINGE (ML) INJECTION
Status: DISCONTINUED | OUTPATIENT
Start: 2023-01-06 | End: 2023-01-06 | Stop reason: HOSPADM

## 2023-01-06 RX ADMIN — SODIUM CHLORIDE 1000 ML: 9 INJECTION, SOLUTION INTRAVENOUS at 10:01

## 2023-01-06 RX ADMIN — ONDANSETRON 8 MG: 2 INJECTION INTRAMUSCULAR; INTRAVENOUS at 10:01

## 2023-01-06 NOTE — PLAN OF CARE
Have You Had Botox Before?: has had botox Infusion given, tolerated well, discharged home   When Was Your Last Botox Treatment?: 02/22/22.

## 2023-01-09 ENCOUNTER — LAB VISIT (OUTPATIENT)
Dept: LAB | Facility: HOSPITAL | Age: 53
End: 2023-01-09
Attending: INTERNAL MEDICINE
Payer: COMMERCIAL

## 2023-01-09 ENCOUNTER — TELEPHONE (OUTPATIENT)
Dept: HEMATOLOGY/ONCOLOGY | Facility: CLINIC | Age: 53
End: 2023-01-09

## 2023-01-09 ENCOUNTER — OFFICE VISIT (OUTPATIENT)
Dept: HEMATOLOGY/ONCOLOGY | Facility: CLINIC | Age: 53
End: 2023-01-09
Payer: COMMERCIAL

## 2023-01-09 VITALS
DIASTOLIC BLOOD PRESSURE: 86 MMHG | RESPIRATION RATE: 14 BRPM | HEIGHT: 60 IN | SYSTOLIC BLOOD PRESSURE: 124 MMHG | TEMPERATURE: 98 F | WEIGHT: 178.63 LBS | HEART RATE: 105 BPM | BODY MASS INDEX: 35.07 KG/M2 | OXYGEN SATURATION: 97 %

## 2023-01-09 DIAGNOSIS — D69.6 THROMBOCYTOPENIA: ICD-10-CM

## 2023-01-09 DIAGNOSIS — Z17.0 MALIGNANT NEOPLASM OF CENTRAL PORTION OF LEFT BREAST IN FEMALE, ESTROGEN RECEPTOR POSITIVE: ICD-10-CM

## 2023-01-09 DIAGNOSIS — D64.81 ANEMIA DUE TO ANTINEOPLASTIC CHEMOTHERAPY: ICD-10-CM

## 2023-01-09 DIAGNOSIS — C50.912 HER2-POSITIVE CARCINOMA OF LEFT BREAST: ICD-10-CM

## 2023-01-09 DIAGNOSIS — C50.112 MALIGNANT NEOPLASM OF CENTRAL PORTION OF LEFT BREAST IN FEMALE, ESTROGEN RECEPTOR POSITIVE: ICD-10-CM

## 2023-01-09 DIAGNOSIS — Z17.0 MALIGNANT NEOPLASM OF CENTRAL PORTION OF LEFT BREAST IN FEMALE, ESTROGEN RECEPTOR POSITIVE: Primary | ICD-10-CM

## 2023-01-09 DIAGNOSIS — T45.1X5A ANEMIA DUE TO ANTINEOPLASTIC CHEMOTHERAPY: ICD-10-CM

## 2023-01-09 DIAGNOSIS — D70.1 LEUKOPENIA DUE TO ANTINEOPLASTIC CHEMOTHERAPY: ICD-10-CM

## 2023-01-09 DIAGNOSIS — C50.112 MALIGNANT NEOPLASM OF CENTRAL PORTION OF LEFT BREAST IN FEMALE, ESTROGEN RECEPTOR POSITIVE: Primary | ICD-10-CM

## 2023-01-09 DIAGNOSIS — T45.1X5A LEUKOPENIA DUE TO ANTINEOPLASTIC CHEMOTHERAPY: ICD-10-CM

## 2023-01-09 LAB
ALBUMIN SERPL-MCNC: 3.8 G/DL (ref 3.5–5)
ALBUMIN/GLOB SERPL: 1.4 RATIO (ref 1.1–2)
ALP SERPL-CCNC: 135 UNIT/L (ref 40–150)
ALT SERPL-CCNC: 42 UNIT/L (ref 0–55)
AST SERPL-CCNC: 26 UNIT/L (ref 5–34)
BASOPHILS # BLD AUTO: 0.01 X10(3)/MCL (ref 0–0.2)
BASOPHILS NFR BLD AUTO: 0.3 %
BILIRUBIN DIRECT+TOT PNL SERPL-MCNC: 0.8 MG/DL
BUN SERPL-MCNC: 17.5 MG/DL (ref 9.8–20.1)
CALCIUM SERPL-MCNC: 10.2 MG/DL (ref 8.4–10.2)
CHLORIDE SERPL-SCNC: 107 MMOL/L (ref 98–107)
CO2 SERPL-SCNC: 28 MMOL/L (ref 22–29)
CREAT SERPL-MCNC: 0.85 MG/DL (ref 0.55–1.02)
EOSINOPHIL # BLD AUTO: 0.04 X10(3)/MCL (ref 0–0.9)
EOSINOPHIL NFR BLD AUTO: 1.1 %
ERYTHROCYTE [DISTWIDTH] IN BLOOD BY AUTOMATED COUNT: 15.7 % (ref 11–14.5)
GFR SERPLBLD CREATININE-BSD FMLA CKD-EPI: 84 MLS/MIN/1.73/M2
GLOBULIN SER-MCNC: 2.7 GM/DL (ref 2.4–3.5)
GLUCOSE SERPL-MCNC: 136 MG/DL (ref 74–100)
HCT VFR BLD AUTO: 33.1 % (ref 37–47)
HGB BLD-MCNC: 10.9 GM/DL (ref 12–16)
IMM GRANULOCYTES # BLD AUTO: 0.07 X10(3)/MCL (ref 0–0.04)
IMM GRANULOCYTES NFR BLD AUTO: 1.8 %
LYMPHOCYTES # BLD AUTO: 1.19 X10(3)/MCL (ref 0.6–4.6)
LYMPHOCYTES NFR BLD AUTO: 31.4 %
MCH RBC QN AUTO: 35.3 PG
MCHC RBC AUTO-ENTMCNC: 32.9 MG/DL (ref 33–36)
MCV RBC AUTO: 107.1 FL (ref 80–94)
MONOCYTES # BLD AUTO: 0.18 X10(3)/MCL (ref 0.1–1.3)
MONOCYTES NFR BLD AUTO: 4.7 %
NEUTROPHILS # BLD AUTO: 2.3 X10(3)/MCL (ref 2.1–9.2)
NEUTROPHILS NFR BLD AUTO: 60.7 %
PLATELET # BLD AUTO: 117 X10(3)/MCL (ref 140–371)
PMV BLD AUTO: 9.5 FL (ref 9.4–12.4)
POTASSIUM SERPL-SCNC: 4.3 MMOL/L (ref 3.5–5.1)
PROT SERPL-MCNC: 6.5 GM/DL (ref 6.4–8.3)
RBC # BLD AUTO: 3.09 X10(6)/MCL (ref 4.2–5.4)
SODIUM SERPL-SCNC: 140 MMOL/L (ref 136–145)
WBC # SPEC AUTO: 3.8 X10(3)/MCL (ref 4.5–11.5)

## 2023-01-09 PROCEDURE — 1160F PR REVIEW ALL MEDS BY PRESCRIBER/CLIN PHARMACIST DOCUMENTED: ICD-10-PCS | Mod: CPTII,S$GLB,, | Performed by: INTERNAL MEDICINE

## 2023-01-09 PROCEDURE — 99215 PR OFFICE/OUTPT VISIT, EST, LEVL V, 40-54 MIN: ICD-10-PCS | Mod: S$GLB,,, | Performed by: INTERNAL MEDICINE

## 2023-01-09 PROCEDURE — 3008F BODY MASS INDEX DOCD: CPT | Mod: CPTII,S$GLB,, | Performed by: INTERNAL MEDICINE

## 2023-01-09 PROCEDURE — 36415 COLL VENOUS BLD VENIPUNCTURE: CPT

## 2023-01-09 PROCEDURE — 3079F PR MOST RECENT DIASTOLIC BLOOD PRESSURE 80-89 MM HG: ICD-10-PCS | Mod: CPTII,S$GLB,, | Performed by: INTERNAL MEDICINE

## 2023-01-09 PROCEDURE — 99999 PR PBB SHADOW E&M-EST. PATIENT-LVL IV: CPT | Mod: PBBFAC,,, | Performed by: INTERNAL MEDICINE

## 2023-01-09 PROCEDURE — 99215 OFFICE O/P EST HI 40 MIN: CPT | Mod: S$GLB,,, | Performed by: INTERNAL MEDICINE

## 2023-01-09 PROCEDURE — 3074F SYST BP LT 130 MM HG: CPT | Mod: CPTII,S$GLB,, | Performed by: INTERNAL MEDICINE

## 2023-01-09 PROCEDURE — 3079F DIAST BP 80-89 MM HG: CPT | Mod: CPTII,S$GLB,, | Performed by: INTERNAL MEDICINE

## 2023-01-09 PROCEDURE — 3074F PR MOST RECENT SYSTOLIC BLOOD PRESSURE < 130 MM HG: ICD-10-PCS | Mod: CPTII,S$GLB,, | Performed by: INTERNAL MEDICINE

## 2023-01-09 PROCEDURE — 99999 PR PBB SHADOW E&M-EST. PATIENT-LVL IV: ICD-10-PCS | Mod: PBBFAC,,, | Performed by: INTERNAL MEDICINE

## 2023-01-09 PROCEDURE — 1159F PR MEDICATION LIST DOCUMENTED IN MEDICAL RECORD: ICD-10-PCS | Mod: CPTII,S$GLB,, | Performed by: INTERNAL MEDICINE

## 2023-01-09 PROCEDURE — 1159F MED LIST DOCD IN RCRD: CPT | Mod: CPTII,S$GLB,, | Performed by: INTERNAL MEDICINE

## 2023-01-09 PROCEDURE — 3008F PR BODY MASS INDEX (BMI) DOCUMENTED: ICD-10-PCS | Mod: CPTII,S$GLB,, | Performed by: INTERNAL MEDICINE

## 2023-01-09 PROCEDURE — 80053 COMPREHEN METABOLIC PANEL: CPT

## 2023-01-09 PROCEDURE — 85025 COMPLETE CBC W/AUTO DIFF WBC: CPT

## 2023-01-09 PROCEDURE — 1160F RVW MEDS BY RX/DR IN RCRD: CPT | Mod: CPTII,S$GLB,, | Performed by: INTERNAL MEDICINE

## 2023-01-13 ENCOUNTER — TELEPHONE (OUTPATIENT)
Dept: HEMATOLOGY/ONCOLOGY | Facility: CLINIC | Age: 53
End: 2023-01-13
Payer: COMMERCIAL

## 2023-01-13 NOTE — TELEPHONE ENCOUNTER
They will be able to see them but I don't know how it works with releasing them. She will probably need to call for a lab appointment and ask about the specifics.

## 2023-01-13 NOTE — TELEPHONE ENCOUNTER
I called pt to let her know that she can do labs in [place] and that [facility] can see them. Pt voiced understanding.

## 2023-01-13 NOTE — TELEPHONE ENCOUNTER
Pt called and states that she will not be in [place] next week and would like to do her labs at [facility]. Do you know if they are able to see her lab orders?

## 2023-01-17 ENCOUNTER — LAB VISIT (OUTPATIENT)
Dept: LAB | Facility: HOSPITAL | Age: 53
End: 2023-01-17
Attending: INTERNAL MEDICINE
Payer: COMMERCIAL

## 2023-01-17 DIAGNOSIS — Z17.0 MALIGNANT NEOPLASM OF CENTRAL PORTION OF LEFT BREAST IN FEMALE, ESTROGEN RECEPTOR POSITIVE: ICD-10-CM

## 2023-01-17 DIAGNOSIS — C50.112 MALIGNANT NEOPLASM OF CENTRAL PORTION OF LEFT BREAST IN FEMALE, ESTROGEN RECEPTOR POSITIVE: ICD-10-CM

## 2023-01-17 LAB
ALBUMIN SERPL BCP-MCNC: 3.9 G/DL (ref 3.5–5.2)
ALP SERPL-CCNC: 99 U/L (ref 55–135)
ALT SERPL W/O P-5'-P-CCNC: 24 U/L (ref 10–44)
ANION GAP SERPL CALC-SCNC: 6 MMOL/L (ref 8–16)
AST SERPL-CCNC: 19 U/L (ref 10–40)
BASOPHILS # BLD AUTO: 0.01 K/UL (ref 0–0.2)
BASOPHILS NFR BLD: 0.2 % (ref 0–1.9)
BILIRUB SERPL-MCNC: 0.3 MG/DL (ref 0.1–1)
BUN SERPL-MCNC: 20 MG/DL (ref 6–20)
CALCIUM SERPL-MCNC: 9.8 MG/DL (ref 8.7–10.5)
CHLORIDE SERPL-SCNC: 107 MMOL/L (ref 95–110)
CO2 SERPL-SCNC: 27 MMOL/L (ref 23–29)
CREAT SERPL-MCNC: 0.6 MG/DL (ref 0.5–1.4)
DIFFERENTIAL METHOD: ABNORMAL
EOSINOPHIL # BLD AUTO: 0 K/UL (ref 0–0.5)
EOSINOPHIL NFR BLD: 0 % (ref 0–8)
ERYTHROCYTE [DISTWIDTH] IN BLOOD BY AUTOMATED COUNT: 16.7 % (ref 11.5–14.5)
EST. GFR  (NO RACE VARIABLE): >60 ML/MIN/1.73 M^2
GLUCOSE SERPL-MCNC: 114 MG/DL (ref 70–110)
HCT VFR BLD AUTO: 30.7 % (ref 37–48.5)
HGB BLD-MCNC: 9.9 G/DL (ref 12–16)
IMM GRANULOCYTES # BLD AUTO: 0.07 K/UL (ref 0–0.04)
IMM GRANULOCYTES NFR BLD AUTO: 1.2 % (ref 0–0.5)
LYMPHOCYTES # BLD AUTO: 1.7 K/UL (ref 1–4.8)
LYMPHOCYTES NFR BLD: 29.5 % (ref 18–48)
MCH RBC QN AUTO: 34.7 PG (ref 27–31)
MCHC RBC AUTO-ENTMCNC: 32.2 G/DL (ref 32–36)
MCV RBC AUTO: 108 FL (ref 82–98)
MONOCYTES # BLD AUTO: 0.5 K/UL (ref 0.3–1)
MONOCYTES NFR BLD: 7.8 % (ref 4–15)
NEUTROPHILS # BLD AUTO: 3.6 K/UL (ref 1.8–7.7)
NEUTROPHILS NFR BLD: 61.3 % (ref 38–73)
NRBC BLD-RTO: 0 /100 WBC
PLATELET # BLD AUTO: 103 K/UL (ref 150–450)
PMV BLD AUTO: 10.5 FL (ref 9.2–12.9)
POTASSIUM SERPL-SCNC: 4 MMOL/L (ref 3.5–5.1)
PROT SERPL-MCNC: 7 G/DL (ref 6–8.4)
RBC # BLD AUTO: 2.85 M/UL (ref 4–5.4)
SODIUM SERPL-SCNC: 140 MMOL/L (ref 136–145)
WBC # BLD AUTO: 5.79 K/UL (ref 3.9–12.7)

## 2023-01-17 PROCEDURE — 85025 COMPLETE CBC W/AUTO DIFF WBC: CPT | Performed by: INTERNAL MEDICINE

## 2023-01-17 PROCEDURE — 80053 COMPREHEN METABOLIC PANEL: CPT | Performed by: INTERNAL MEDICINE

## 2023-01-17 PROCEDURE — 36415 COLL VENOUS BLD VENIPUNCTURE: CPT | Performed by: INTERNAL MEDICINE

## 2023-01-24 ENCOUNTER — LAB VISIT (OUTPATIENT)
Dept: LAB | Facility: HOSPITAL | Age: 53
End: 2023-01-24
Attending: INTERNAL MEDICINE
Payer: COMMERCIAL

## 2023-01-24 DIAGNOSIS — Z17.0 MALIGNANT NEOPLASM OF CENTRAL PORTION OF LEFT BREAST IN FEMALE, ESTROGEN RECEPTOR POSITIVE: ICD-10-CM

## 2023-01-24 DIAGNOSIS — C50.112 MALIGNANT NEOPLASM OF CENTRAL PORTION OF LEFT BREAST IN FEMALE, ESTROGEN RECEPTOR POSITIVE: ICD-10-CM

## 2023-01-24 LAB
ALBUMIN SERPL-MCNC: 3.8 G/DL (ref 3.5–5)
ALBUMIN/GLOB SERPL: 1.5 RATIO (ref 1.1–2)
ALP SERPL-CCNC: 122 UNIT/L (ref 40–150)
ALT SERPL-CCNC: 21 UNIT/L (ref 0–55)
AST SERPL-CCNC: 16 UNIT/L (ref 5–34)
BASOPHILS # BLD AUTO: 0.01 X10(3)/MCL (ref 0–0.2)
BASOPHILS NFR BLD AUTO: 0.2 %
BILIRUBIN DIRECT+TOT PNL SERPL-MCNC: 0.5 MG/DL
BUN SERPL-MCNC: 16.4 MG/DL (ref 9.8–20.1)
CALCIUM SERPL-MCNC: 10.3 MG/DL (ref 8.4–10.2)
CHLORIDE SERPL-SCNC: 108 MMOL/L (ref 98–107)
CO2 SERPL-SCNC: 25 MMOL/L (ref 22–29)
CREAT SERPL-MCNC: 0.83 MG/DL (ref 0.55–1.02)
EOSINOPHIL # BLD AUTO: 0 X10(3)/MCL (ref 0–0.9)
EOSINOPHIL NFR BLD AUTO: 0 %
ERYTHROCYTE [DISTWIDTH] IN BLOOD BY AUTOMATED COUNT: 17 % (ref 11.5–17)
GFR SERPLBLD CREATININE-BSD FMLA CKD-EPI: >60 MLS/MIN/1.73/M2
GLOBULIN SER-MCNC: 2.6 GM/DL (ref 2.4–3.5)
GLUCOSE SERPL-MCNC: 153 MG/DL (ref 74–100)
HCT VFR BLD AUTO: 32.5 % (ref 37–47)
HGB BLD-MCNC: 10.3 GM/DL (ref 12–16)
IMM GRANULOCYTES # BLD AUTO: 0.01 X10(3)/MCL (ref 0–0.04)
IMM GRANULOCYTES NFR BLD AUTO: 0.2 %
LYMPHOCYTES # BLD AUTO: 1.72 X10(3)/MCL (ref 0.6–4.6)
LYMPHOCYTES NFR BLD AUTO: 29.5 %
MCH RBC QN AUTO: 34.4 PG
MCHC RBC AUTO-ENTMCNC: 31.7 MG/DL (ref 33–36)
MCV RBC AUTO: 108.7 FL (ref 80–94)
MONOCYTES # BLD AUTO: 0.47 X10(3)/MCL (ref 0.1–1.3)
MONOCYTES NFR BLD AUTO: 8 %
NEUTROPHILS # BLD AUTO: 3.63 X10(3)/MCL (ref 2.1–9.2)
NEUTROPHILS NFR BLD AUTO: 62.1 %
PLATELET # BLD AUTO: 93 X10(3)/MCL (ref 130–400)
PMV BLD AUTO: 9.6 FL (ref 7.4–10.4)
POTASSIUM SERPL-SCNC: 5 MMOL/L (ref 3.5–5.1)
PROT SERPL-MCNC: 6.4 GM/DL (ref 6.4–8.3)
RBC # BLD AUTO: 2.99 X10(6)/MCL (ref 4.2–5.4)
SODIUM SERPL-SCNC: 141 MMOL/L (ref 136–145)
WBC # SPEC AUTO: 5.8 X10(3)/MCL (ref 4.5–11.5)

## 2023-01-24 PROCEDURE — 85025 COMPLETE CBC W/AUTO DIFF WBC: CPT

## 2023-01-24 PROCEDURE — 36415 COLL VENOUS BLD VENIPUNCTURE: CPT

## 2023-01-24 PROCEDURE — 80053 COMPREHEN METABOLIC PANEL: CPT

## 2023-01-24 RX ORDER — DIPHENHYDRAMINE HYDROCHLORIDE 50 MG/ML
12.5 INJECTION INTRAMUSCULAR; INTRAVENOUS
Status: CANCELLED
Start: 2023-01-25

## 2023-01-24 RX ORDER — EPINEPHRINE 0.3 MG/.3ML
0.3 INJECTION SUBCUTANEOUS ONCE AS NEEDED
Status: CANCELLED | OUTPATIENT
Start: 2023-01-25

## 2023-01-24 RX ORDER — ONDANSETRON 2 MG/ML
8 INJECTION INTRAMUSCULAR; INTRAVENOUS
Status: CANCELLED
Start: 2023-01-26

## 2023-01-24 RX ORDER — PALONOSETRON 0.05 MG/ML
0.25 INJECTION, SOLUTION INTRAVENOUS ONCE
Status: CANCELLED
Start: 2023-01-25 | End: 2023-01-25

## 2023-01-24 RX ORDER — HEPARIN 100 UNIT/ML
500 SYRINGE INTRAVENOUS
Status: CANCELLED | OUTPATIENT
Start: 2023-01-25

## 2023-01-24 RX ORDER — SODIUM CHLORIDE 0.9 % (FLUSH) 0.9 %
10 SYRINGE (ML) INJECTION
Status: CANCELLED | OUTPATIENT
Start: 2023-01-25

## 2023-01-24 RX ORDER — DIPHENHYDRAMINE HYDROCHLORIDE 50 MG/ML
50 INJECTION INTRAMUSCULAR; INTRAVENOUS ONCE AS NEEDED
Status: CANCELLED | OUTPATIENT
Start: 2023-01-25

## 2023-01-25 ENCOUNTER — HOSPITAL ENCOUNTER (EMERGENCY)
Facility: HOSPITAL | Age: 53
Discharge: LEFT BEFORE DISCHARGE | End: 2023-01-25
Payer: COMMERCIAL

## 2023-01-25 ENCOUNTER — INFUSION (OUTPATIENT)
Dept: INFUSION THERAPY | Facility: HOSPITAL | Age: 53
End: 2023-01-25
Attending: FAMILY MEDICINE
Payer: COMMERCIAL

## 2023-01-25 VITALS
SYSTOLIC BLOOD PRESSURE: 135 MMHG | TEMPERATURE: 98 F | DIASTOLIC BLOOD PRESSURE: 88 MMHG | OXYGEN SATURATION: 98 % | HEART RATE: 77 BPM | RESPIRATION RATE: 18 BRPM

## 2023-01-25 VITALS
RESPIRATION RATE: 18 BRPM | HEART RATE: 87 BPM | SYSTOLIC BLOOD PRESSURE: 121 MMHG | DIASTOLIC BLOOD PRESSURE: 81 MMHG | TEMPERATURE: 98 F | OXYGEN SATURATION: 97 %

## 2023-01-25 DIAGNOSIS — T45.1X5A ANEMIA DUE TO ANTINEOPLASTIC CHEMOTHERAPY: ICD-10-CM

## 2023-01-25 DIAGNOSIS — R07.9 CHEST PAIN: ICD-10-CM

## 2023-01-25 DIAGNOSIS — Z17.0 MALIGNANT NEOPLASM OF CENTRAL PORTION OF LEFT BREAST IN FEMALE, ESTROGEN RECEPTOR POSITIVE: Primary | ICD-10-CM

## 2023-01-25 DIAGNOSIS — C50.112 MALIGNANT NEOPLASM OF CENTRAL PORTION OF LEFT BREAST IN FEMALE, ESTROGEN RECEPTOR POSITIVE: Primary | ICD-10-CM

## 2023-01-25 DIAGNOSIS — D64.81 ANEMIA DUE TO ANTINEOPLASTIC CHEMOTHERAPY: ICD-10-CM

## 2023-01-25 LAB
ALBUMIN SERPL-MCNC: 3.3 G/DL (ref 3.5–5)
ALBUMIN/GLOB SERPL: 1.1 RATIO (ref 1.1–2)
ALP SERPL-CCNC: 113 UNIT/L (ref 40–150)
ALT SERPL-CCNC: 20 UNIT/L (ref 0–55)
AST SERPL-CCNC: 15 UNIT/L (ref 5–34)
BASOPHILS # BLD AUTO: 0 X10(3)/MCL (ref 0–0.2)
BASOPHILS NFR BLD AUTO: 0 %
BILIRUBIN DIRECT+TOT PNL SERPL-MCNC: 0.4 MG/DL
BUN SERPL-MCNC: 13.3 MG/DL (ref 9.8–20.1)
CALCIUM SERPL-MCNC: 9.1 MG/DL (ref 8.4–10.2)
CHLORIDE SERPL-SCNC: 112 MMOL/L (ref 98–107)
CO2 SERPL-SCNC: 22 MMOL/L (ref 22–29)
CREAT SERPL-MCNC: 0.86 MG/DL (ref 0.55–1.02)
EOSINOPHIL # BLD AUTO: 0 X10(3)/MCL (ref 0–0.9)
EOSINOPHIL NFR BLD AUTO: 0 %
ERYTHROCYTE [DISTWIDTH] IN BLOOD BY AUTOMATED COUNT: 17.2 % (ref 11.5–17)
GFR SERPLBLD CREATININE-BSD FMLA CKD-EPI: >60 MLS/MIN/1.73/M2
GLOBULIN SER-MCNC: 3 GM/DL (ref 2.4–3.5)
GLUCOSE SERPL-MCNC: 84 MG/DL (ref 74–100)
HCT VFR BLD AUTO: 29.2 % (ref 37–47)
HGB BLD-MCNC: 9.6 GM/DL (ref 12–16)
IMM GRANULOCYTES # BLD AUTO: 0.01 X10(3)/MCL (ref 0–0.04)
IMM GRANULOCYTES NFR BLD AUTO: 0.3 %
LIPASE SERPL-CCNC: 39 U/L
LYMPHOCYTES # BLD AUTO: 1.48 X10(3)/MCL (ref 0.6–4.6)
LYMPHOCYTES NFR BLD AUTO: 46.4 %
MCH RBC QN AUTO: 35.2 PG
MCHC RBC AUTO-ENTMCNC: 32.9 MG/DL (ref 33–36)
MCV RBC AUTO: 107 FL (ref 80–94)
MONOCYTES # BLD AUTO: 0.22 X10(3)/MCL (ref 0.1–1.3)
MONOCYTES NFR BLD AUTO: 6.9 %
NEUTROPHILS # BLD AUTO: 1.48 X10(3)/MCL (ref 2.1–9.2)
NEUTROPHILS NFR BLD AUTO: 46.4 %
NRBC BLD AUTO-RTO: 0 %
PLATELET # BLD AUTO: 81 X10(3)/MCL (ref 130–400)
PMV BLD AUTO: 9.4 FL (ref 7.4–10.4)
POTASSIUM SERPL-SCNC: 4.4 MMOL/L (ref 3.5–5.1)
PROT SERPL-MCNC: 6.3 GM/DL (ref 6.4–8.3)
RBC # BLD AUTO: 2.73 X10(6)/MCL (ref 4.2–5.4)
SODIUM SERPL-SCNC: 139 MMOL/L (ref 136–145)
TROPONIN I SERPL-MCNC: <0.01 NG/ML (ref 0–0.04)
WBC # SPEC AUTO: 3.2 X10(3)/MCL (ref 4.5–11.5)

## 2023-01-25 PROCEDURE — 84484 ASSAY OF TROPONIN QUANT: CPT | Performed by: NURSE PRACTITIONER

## 2023-01-25 PROCEDURE — 80053 COMPREHEN METABOLIC PANEL: CPT | Performed by: NURSE PRACTITIONER

## 2023-01-25 PROCEDURE — 85025 COMPLETE CBC W/AUTO DIFF WBC: CPT | Performed by: NURSE PRACTITIONER

## 2023-01-25 PROCEDURE — 63600175 PHARM REV CODE 636 W HCPCS: Performed by: NURSE PRACTITIONER

## 2023-01-25 PROCEDURE — 99285 EMERGENCY DEPT VISIT HI MDM: CPT | Mod: 25

## 2023-01-25 PROCEDURE — 93010 ELECTROCARDIOGRAM REPORT: CPT | Mod: ,,, | Performed by: INTERNAL MEDICINE

## 2023-01-25 PROCEDURE — 93010 EKG 12-LEAD: ICD-10-PCS | Mod: ,,, | Performed by: INTERNAL MEDICINE

## 2023-01-25 PROCEDURE — 93005 ELECTROCARDIOGRAM TRACING: CPT

## 2023-01-25 PROCEDURE — 25000003 PHARM REV CODE 250: Performed by: NURSE PRACTITIONER

## 2023-01-25 PROCEDURE — 96413 CHEMO IV INFUSION 1 HR: CPT

## 2023-01-25 PROCEDURE — 83690 ASSAY OF LIPASE: CPT | Performed by: NURSE PRACTITIONER

## 2023-01-25 PROCEDURE — 96415 CHEMO IV INFUSION ADDL HR: CPT

## 2023-01-25 PROCEDURE — 96375 TX/PRO/DX INJ NEW DRUG ADDON: CPT

## 2023-01-25 RX ORDER — KETOROLAC TROMETHAMINE 30 MG/ML
30 INJECTION, SOLUTION INTRAMUSCULAR; INTRAVENOUS ONCE
Status: COMPLETED | OUTPATIENT
Start: 2023-01-25 | End: 2023-01-25

## 2023-01-25 RX ORDER — DIPHENHYDRAMINE HYDROCHLORIDE 50 MG/ML
50 INJECTION INTRAMUSCULAR; INTRAVENOUS ONCE AS NEEDED
Status: DISCONTINUED | OUTPATIENT
Start: 2023-01-25 | End: 2023-01-25 | Stop reason: HOSPADM

## 2023-01-25 RX ORDER — PALONOSETRON 0.05 MG/ML
0.25 INJECTION, SOLUTION INTRAVENOUS ONCE
Status: COMPLETED | OUTPATIENT
Start: 2023-01-25 | End: 2023-01-25

## 2023-01-25 RX ORDER — DIPHENHYDRAMINE HYDROCHLORIDE 50 MG/ML
12.5 INJECTION INTRAMUSCULAR; INTRAVENOUS
Status: COMPLETED | OUTPATIENT
Start: 2023-01-25 | End: 2023-01-25

## 2023-01-25 RX ORDER — HEPARIN 100 UNIT/ML
500 SYRINGE INTRAVENOUS
Status: DISCONTINUED | OUTPATIENT
Start: 2023-01-25 | End: 2023-01-25 | Stop reason: HOSPADM

## 2023-01-25 RX ORDER — SODIUM CHLORIDE 9 MG/ML
500 INJECTION, SOLUTION INTRAVENOUS ONCE
Status: COMPLETED | OUTPATIENT
Start: 2023-01-25 | End: 2023-01-25

## 2023-01-25 RX ORDER — SODIUM CHLORIDE 0.9 % (FLUSH) 0.9 %
10 SYRINGE (ML) INJECTION
Status: DISCONTINUED | OUTPATIENT
Start: 2023-01-25 | End: 2023-01-25 | Stop reason: HOSPADM

## 2023-01-25 RX ORDER — EPINEPHRINE 0.3 MG/.3ML
0.3 INJECTION SUBCUTANEOUS ONCE AS NEEDED
Status: DISCONTINUED | OUTPATIENT
Start: 2023-01-25 | End: 2023-01-25 | Stop reason: HOSPADM

## 2023-01-25 RX ADMIN — KETOROLAC TROMETHAMINE 30 MG: 30 INJECTION, SOLUTION INTRAMUSCULAR; INTRAVENOUS at 12:01

## 2023-01-25 RX ADMIN — PERTUZUMAB 420 MG: 30 INJECTION, SOLUTION, CONCENTRATE INTRAVENOUS at 09:01

## 2023-01-25 RX ADMIN — PALONOSETRON 0.25 MG: 0.25 INJECTION, SOLUTION INTRAVENOUS at 09:01

## 2023-01-25 RX ADMIN — DIPHENHYDRAMINE HYDROCHLORIDE 12.5 MG: 50 INJECTION, SOLUTION INTRAMUSCULAR; INTRAVENOUS at 09:01

## 2023-01-25 RX ADMIN — CARBOPLATIN 500 MG: 10 INJECTION, SOLUTION INTRAVENOUS at 11:01

## 2023-01-25 RX ADMIN — TRASTUZUMAB 484 MG: 420 INJECTION, POWDER, LYOPHILIZED, FOR SOLUTION INTRAVENOUS at 10:01

## 2023-01-25 RX ADMIN — APREPITANT 130 MG: 130 INJECTION, EMULSION INTRAVENOUS at 09:01

## 2023-01-25 RX ADMIN — SODIUM CHLORIDE 500 ML: 9 INJECTION, SOLUTION INTRAVENOUS at 12:01

## 2023-01-25 RX ADMIN — DOCETAXEL 90 MG: 20 INJECTION INTRAVENOUS at 10:01

## 2023-01-25 NOTE — FIRST PROVIDER EVALUATION
Medical screening examination initiated.  I have conducted a focused provider triage encounter, findings are as follows:    Brief history of present illness:  Patient states that she was finishing her chemotherapy infusion this morning when she began with chest pain. Hx. Of breast CA.     There were no vitals filed for this visit.    Pertinent physical exam:  Awake, alert, ambulatory    Brief workup plan:  labs, EKG    Preliminary workup initiated; this workup will be continued and followed by the physician or advanced practice provider that is assigned to the patient when roomed.

## 2023-01-25 NOTE — PLAN OF CARE
Problem: Adult Inpatient Plan of Care  Goal: Plan of Care Review  Outcome: Met  Flowsheets (Taken 1/25/2023 1319)  Plan of Care Reviewed With: patient  Goal: Absence of Hospital-Acquired Illness or Injury  Outcome: Met  Intervention: Identify and Manage Fall Risk  Flowsheets (Taken 1/25/2023 1319)  Safety Promotion/Fall Prevention:   assistive device/personal item within reach   Fall Risk reviewed with patient/family   in recliner, wheels locked     Pt tolerated C6D1 moderately well; complaint of chest tightness/pain began during post-hydration administration. Pt instructed to go directly to ED for evaluation of chest pain by Dr. Vargas. Next appt reviewed. Pt denied questions or further needs at the time of discharge.

## 2023-01-26 ENCOUNTER — INFUSION (OUTPATIENT)
Dept: INFUSION THERAPY | Facility: HOSPITAL | Age: 53
End: 2023-01-26
Attending: FAMILY MEDICINE
Payer: COMMERCIAL

## 2023-01-26 VITALS
HEART RATE: 99 BPM | SYSTOLIC BLOOD PRESSURE: 110 MMHG | TEMPERATURE: 99 F | RESPIRATION RATE: 18 BRPM | OXYGEN SATURATION: 98 % | DIASTOLIC BLOOD PRESSURE: 72 MMHG

## 2023-01-26 DIAGNOSIS — Z17.0 MALIGNANT NEOPLASM OF CENTRAL PORTION OF LEFT BREAST IN FEMALE, ESTROGEN RECEPTOR POSITIVE: Primary | ICD-10-CM

## 2023-01-26 DIAGNOSIS — C50.112 MALIGNANT NEOPLASM OF CENTRAL PORTION OF LEFT BREAST IN FEMALE, ESTROGEN RECEPTOR POSITIVE: Primary | ICD-10-CM

## 2023-01-26 PROCEDURE — 96375 TX/PRO/DX INJ NEW DRUG ADDON: CPT

## 2023-01-26 PROCEDURE — 25000003 PHARM REV CODE 250: Performed by: NURSE PRACTITIONER

## 2023-01-26 PROCEDURE — 63600175 PHARM REV CODE 636 W HCPCS: Performed by: NURSE PRACTITIONER

## 2023-01-26 PROCEDURE — 96360 HYDRATION IV INFUSION INIT: CPT

## 2023-01-26 PROCEDURE — 96372 THER/PROPH/DIAG INJ SC/IM: CPT | Mod: 59

## 2023-01-26 RX ORDER — ONDANSETRON 2 MG/ML
8 INJECTION INTRAMUSCULAR; INTRAVENOUS
Status: COMPLETED | OUTPATIENT
Start: 2023-01-26 | End: 2023-01-26

## 2023-01-26 RX ADMIN — SODIUM CHLORIDE 1000 ML: 9 INJECTION, SOLUTION INTRAVENOUS at 12:01

## 2023-01-26 RX ADMIN — PEGFILGRASTIM-CBQV 6 MG: 6 INJECTION, SOLUTION SUBCUTANEOUS at 12:01

## 2023-01-26 RX ADMIN — ONDANSETRON 8 MG: 2 INJECTION INTRAMUSCULAR; INTRAVENOUS at 12:01

## 2023-01-26 NOTE — NURSING
Hollis needle removed from mediport. Site without signs and symptoms of complications. Dressing applied.

## 2023-01-27 ENCOUNTER — INFUSION (OUTPATIENT)
Dept: INFUSION THERAPY | Facility: HOSPITAL | Age: 53
End: 2023-01-27
Attending: FAMILY MEDICINE
Payer: COMMERCIAL

## 2023-01-27 DIAGNOSIS — Z17.0 MALIGNANT NEOPLASM OF CENTRAL PORTION OF LEFT BREAST IN FEMALE, ESTROGEN RECEPTOR POSITIVE: Primary | ICD-10-CM

## 2023-01-27 DIAGNOSIS — C50.112 MALIGNANT NEOPLASM OF CENTRAL PORTION OF LEFT BREAST IN FEMALE, ESTROGEN RECEPTOR POSITIVE: Primary | ICD-10-CM

## 2023-01-27 PROCEDURE — 96375 TX/PRO/DX INJ NEW DRUG ADDON: CPT

## 2023-01-27 PROCEDURE — 96360 HYDRATION IV INFUSION INIT: CPT

## 2023-01-27 PROCEDURE — 63600175 PHARM REV CODE 636 W HCPCS: Performed by: NURSE PRACTITIONER

## 2023-01-27 PROCEDURE — 96361 HYDRATE IV INFUSION ADD-ON: CPT

## 2023-01-27 PROCEDURE — 25000003 PHARM REV CODE 250: Performed by: NURSE PRACTITIONER

## 2023-01-27 RX ORDER — SODIUM CHLORIDE 0.9 % (FLUSH) 0.9 %
10 SYRINGE (ML) INJECTION
Status: CANCELLED | OUTPATIENT
Start: 2023-02-17

## 2023-01-27 RX ORDER — HEPARIN 100 UNIT/ML
500 SYRINGE INTRAVENOUS
Status: CANCELLED | OUTPATIENT
Start: 2023-02-17

## 2023-01-27 RX ORDER — ONDANSETRON 2 MG/ML
8 INJECTION INTRAMUSCULAR; INTRAVENOUS ONCE
Status: COMPLETED | OUTPATIENT
Start: 2023-01-27 | End: 2023-01-27

## 2023-01-27 RX ORDER — SODIUM CHLORIDE 0.9 % (FLUSH) 0.9 %
10 SYRINGE (ML) INJECTION
Status: DISCONTINUED | OUTPATIENT
Start: 2023-01-27 | End: 2023-01-27 | Stop reason: HOSPADM

## 2023-01-27 RX ORDER — HEPARIN 100 UNIT/ML
500 SYRINGE INTRAVENOUS
Status: DISCONTINUED | OUTPATIENT
Start: 2023-01-27 | End: 2023-01-27 | Stop reason: HOSPADM

## 2023-01-27 RX ADMIN — SODIUM CHLORIDE 1000 ML: 9 INJECTION, SOLUTION INTRAVENOUS at 10:01

## 2023-01-27 RX ADMIN — ONDANSETRON 8 MG: 2 INJECTION INTRAMUSCULAR; INTRAVENOUS at 10:01

## 2023-01-31 ENCOUNTER — TELEPHONE (OUTPATIENT)
Dept: HEMATOLOGY/ONCOLOGY | Facility: CLINIC | Age: 53
End: 2023-01-31
Payer: COMMERCIAL

## 2023-01-31 NOTE — TELEPHONE ENCOUNTER
Patient requesting referral to Dr. Josh Lopez for hiatal hernia repair. States that Dr. Kurtz found it when she was hospitalized back in August, but she has not seen him since. Please advise if okay.

## 2023-01-31 NOTE — TELEPHONE ENCOUNTER
I explained to patient that she could have this done after her breast surgery. She stated that the soonest Dr. Lopez could see her would be May. She is requesting that she be referred now that way she could see him at that time.

## 2023-01-31 NOTE — TELEPHONE ENCOUNTER
Can she wait until after her breast surgery? She has upcoming surgery with Dr. Kelly Santos in March. He will not do any surgery in the near future for this reason. We can always make the referral at a later time.

## 2023-02-02 ENCOUNTER — TELEPHONE (OUTPATIENT)
Dept: HEMATOLOGY/ONCOLOGY | Facility: CLINIC | Age: 53
End: 2023-02-02

## 2023-02-02 ENCOUNTER — LAB VISIT (OUTPATIENT)
Dept: LAB | Facility: HOSPITAL | Age: 53
End: 2023-02-02
Attending: INTERNAL MEDICINE
Payer: COMMERCIAL

## 2023-02-02 ENCOUNTER — OFFICE VISIT (OUTPATIENT)
Dept: HEMATOLOGY/ONCOLOGY | Facility: CLINIC | Age: 53
End: 2023-02-02
Payer: COMMERCIAL

## 2023-02-02 VITALS
TEMPERATURE: 98 F | DIASTOLIC BLOOD PRESSURE: 86 MMHG | HEART RATE: 100 BPM | BODY MASS INDEX: 34.75 KG/M2 | WEIGHT: 177 LBS | HEIGHT: 60 IN | SYSTOLIC BLOOD PRESSURE: 119 MMHG | OXYGEN SATURATION: 99 % | RESPIRATION RATE: 14 BRPM

## 2023-02-02 DIAGNOSIS — C50.112 MALIGNANT NEOPLASM OF CENTRAL PORTION OF LEFT BREAST IN FEMALE, ESTROGEN RECEPTOR POSITIVE: ICD-10-CM

## 2023-02-02 DIAGNOSIS — Z17.0 MALIGNANT NEOPLASM OF CENTRAL PORTION OF LEFT BREAST IN FEMALE, ESTROGEN RECEPTOR POSITIVE: ICD-10-CM

## 2023-02-02 DIAGNOSIS — Z17.0 MALIGNANT NEOPLASM OF CENTRAL PORTION OF LEFT BREAST IN FEMALE, ESTROGEN RECEPTOR POSITIVE: Primary | ICD-10-CM

## 2023-02-02 DIAGNOSIS — C50.112 MALIGNANT NEOPLASM OF CENTRAL PORTION OF LEFT BREAST IN FEMALE, ESTROGEN RECEPTOR POSITIVE: Primary | ICD-10-CM

## 2023-02-02 LAB
ALBUMIN SERPL-MCNC: 3.7 G/DL (ref 3.5–5)
ALBUMIN/GLOB SERPL: 1.5 RATIO (ref 1.1–2)
ALP SERPL-CCNC: 128 UNIT/L (ref 40–150)
ALT SERPL-CCNC: 19 UNIT/L (ref 0–55)
AST SERPL-CCNC: 12 UNIT/L (ref 5–34)
BASOPHILS # BLD AUTO: 0.01 X10(3)/MCL (ref 0–0.2)
BASOPHILS NFR BLD AUTO: 0.2 %
BILIRUBIN DIRECT+TOT PNL SERPL-MCNC: 0.5 MG/DL
BUN SERPL-MCNC: 14 MG/DL (ref 9.8–20.1)
CALCIUM SERPL-MCNC: 10.1 MG/DL (ref 8.4–10.2)
CHLORIDE SERPL-SCNC: 105 MMOL/L (ref 98–107)
CO2 SERPL-SCNC: 28 MMOL/L (ref 22–29)
CREAT SERPL-MCNC: 0.77 MG/DL (ref 0.55–1.02)
EOSINOPHIL # BLD AUTO: 0.02 X10(3)/MCL (ref 0–0.9)
EOSINOPHIL NFR BLD AUTO: 0.3 %
ERYTHROCYTE [DISTWIDTH] IN BLOOD BY AUTOMATED COUNT: 15.8 % (ref 11.5–17)
GFR SERPLBLD CREATININE-BSD FMLA CKD-EPI: >60 MLS/MIN/1.73/M2
GLOBULIN SER-MCNC: 2.5 GM/DL (ref 2.4–3.5)
GLUCOSE SERPL-MCNC: 131 MG/DL (ref 74–100)
HCT VFR BLD AUTO: 27.7 % (ref 37–47)
HGB BLD-MCNC: 8.9 GM/DL (ref 12–16)
IMM GRANULOCYTES # BLD AUTO: 0.05 X10(3)/MCL (ref 0–0.04)
IMM GRANULOCYTES NFR BLD AUTO: 0.8 %
LYMPHOCYTES # BLD AUTO: 1.88 X10(3)/MCL (ref 0.6–4.6)
LYMPHOCYTES NFR BLD AUTO: 31.8 %
MCH RBC QN AUTO: 35.2 PG
MCHC RBC AUTO-ENTMCNC: 32.1 MG/DL (ref 33–36)
MCV RBC AUTO: 109.5 FL (ref 80–94)
MONOCYTES # BLD AUTO: 0.77 X10(3)/MCL (ref 0.1–1.3)
MONOCYTES NFR BLD AUTO: 13 %
NEUTROPHILS # BLD AUTO: 3.18 X10(3)/MCL (ref 2.1–9.2)
NEUTROPHILS NFR BLD AUTO: 53.9 %
PLATELET # BLD AUTO: 39 X10(3)/MCL (ref 130–400)
PMV BLD AUTO: 11.5 FL (ref 7.4–10.4)
POTASSIUM SERPL-SCNC: 4.2 MMOL/L (ref 3.5–5.1)
PROT SERPL-MCNC: 6.2 GM/DL (ref 6.4–8.3)
RBC # BLD AUTO: 2.53 X10(6)/MCL (ref 4.2–5.4)
SODIUM SERPL-SCNC: 141 MMOL/L (ref 136–145)
WBC # SPEC AUTO: 5.9 X10(3)/MCL (ref 4.5–11.5)

## 2023-02-02 PROCEDURE — 3079F DIAST BP 80-89 MM HG: CPT | Mod: CPTII,S$GLB,, | Performed by: INTERNAL MEDICINE

## 2023-02-02 PROCEDURE — 1159F PR MEDICATION LIST DOCUMENTED IN MEDICAL RECORD: ICD-10-PCS | Mod: CPTII,S$GLB,, | Performed by: INTERNAL MEDICINE

## 2023-02-02 PROCEDURE — 99215 OFFICE O/P EST HI 40 MIN: CPT | Mod: S$GLB,,, | Performed by: INTERNAL MEDICINE

## 2023-02-02 PROCEDURE — 3074F SYST BP LT 130 MM HG: CPT | Mod: CPTII,S$GLB,, | Performed by: INTERNAL MEDICINE

## 2023-02-02 PROCEDURE — 80053 COMPREHEN METABOLIC PANEL: CPT

## 2023-02-02 PROCEDURE — 85025 COMPLETE CBC W/AUTO DIFF WBC: CPT

## 2023-02-02 PROCEDURE — 1159F MED LIST DOCD IN RCRD: CPT | Mod: CPTII,S$GLB,, | Performed by: INTERNAL MEDICINE

## 2023-02-02 PROCEDURE — 99215 PR OFFICE/OUTPT VISIT, EST, LEVL V, 40-54 MIN: ICD-10-PCS | Mod: S$GLB,,, | Performed by: INTERNAL MEDICINE

## 2023-02-02 PROCEDURE — 3008F BODY MASS INDEX DOCD: CPT | Mod: CPTII,S$GLB,, | Performed by: INTERNAL MEDICINE

## 2023-02-02 PROCEDURE — 3079F PR MOST RECENT DIASTOLIC BLOOD PRESSURE 80-89 MM HG: ICD-10-PCS | Mod: CPTII,S$GLB,, | Performed by: INTERNAL MEDICINE

## 2023-02-02 PROCEDURE — 1160F RVW MEDS BY RX/DR IN RCRD: CPT | Mod: CPTII,S$GLB,, | Performed by: INTERNAL MEDICINE

## 2023-02-02 PROCEDURE — 99999 PR PBB SHADOW E&M-EST. PATIENT-LVL IV: CPT | Mod: PBBFAC,,, | Performed by: INTERNAL MEDICINE

## 2023-02-02 PROCEDURE — 99999 PR PBB SHADOW E&M-EST. PATIENT-LVL IV: ICD-10-PCS | Mod: PBBFAC,,, | Performed by: INTERNAL MEDICINE

## 2023-02-02 PROCEDURE — 3074F PR MOST RECENT SYSTOLIC BLOOD PRESSURE < 130 MM HG: ICD-10-PCS | Mod: CPTII,S$GLB,, | Performed by: INTERNAL MEDICINE

## 2023-02-02 PROCEDURE — 3008F PR BODY MASS INDEX (BMI) DOCUMENTED: ICD-10-PCS | Mod: CPTII,S$GLB,, | Performed by: INTERNAL MEDICINE

## 2023-02-02 PROCEDURE — 1160F PR REVIEW ALL MEDS BY PRESCRIBER/CLIN PHARMACIST DOCUMENTED: ICD-10-PCS | Mod: CPTII,S$GLB,, | Performed by: INTERNAL MEDICINE

## 2023-02-02 PROCEDURE — 36415 COLL VENOUS BLD VENIPUNCTURE: CPT

## 2023-02-02 NOTE — PROGRESS NOTES
Subjective:       Patient ID: Nita Dejesus is a 52 y.o. female.    Previous Oncologist: Dr. Katlyn Gonzalez at [facility]  Surgeon: Dr. Kelly Santos    Left Breast Cancer Stage IA(T2N0M0) Triple Positive--22  Biopsy/Surgery/pathology:   1. Excisional biopsy left breast mass/left breast cyst done 22--invasive ductal carcinoma, Grade 3, measures 3.0cm, a 0.6cm region of cauterized carcinoma is present at the inked superior-deep margin, cyst with reactive changes, suggestive of previous lumpectomy site, fluid left breast cyst with rare atypical cells present, suspicious for carcinoma, ER 95%, NY 27%, Her2 3+ by IHC, positive, Ki67 51%.   2. Left SLN biopsies done 22--3 benign lymph nodes.   Im[place]. [place] [facility] [place] inferior breast large, minimally complex cystic lesion at 6:00 measures 4.4X3.5X4.2cm, corresponding to the palpable area, appears benign. Routine screening MMG recommended.   2. MRI breasts bilateral at [facility] 22--post-surgical seroma at 12:00 position of left breast s/p recent excisional biopsy, no definite suspicious enhancing masses or areas of nonmass enhancement of left breast, and no suspicious areas in right breast, BIRADS 6.   3. CT C/A/P w/ contrast 22 at [facility]--no thoracic metastatic disease, fatty infiltration of liver, small to moderate-sized hiatal hernia, no metastatic disease.   4. NM Bone scan whole body done 22 at [facility]--activity in cervical and lumbar spines likely degenerative, no anatomic correlate seen on CT scan in lumbar spines, suggest correlation with C-spine Xrays, increase tracer w/n both feet, likely degnerative change, physiologic activity of urinary tract.  5. CT A/P w/ contrast 22 at [facility]--no acute abdominopelvic pathology or change compared to recent CT from 22, fairly large amount of stool in colon may reflect constipation, large hiatal hernia, diffuse hepatic steatosis, post-cholecystectomy, post  hysterectomy, mild thoracic and lumbar degenerative disease.     ECHO:  08/18/22--EF 55-65%.  11/17/22--EF 60%.    Genetic testing: [employer] done 7/21/22 negative for any pathogenic variants.     Treatment history:  Left breast excisional biopsy done 7/13/22  Left SLN biopsies done 8/8/22.  2 units PRBC 12/19/22.  TCHP X 6 cycles completed only 8/23/22--1/25/23 (multiple treatment delays for thrombocytopenia, despite dose reductions, multiple side effects).    Current treatment plan:  Surgery  Maintenance HP X 11 cycles if no residual disease  3. +/- XRT  4. Adjuvant OS + AI X 10 years (DAISY for endometriosis, ovaries remain)    Chief Complaint: Other Misc (Pt reports that she has a rash under her abdomen that burns, itches and smells infected. She states that she is putting Bactroban spray on it and it has helped./ Duration of rash is 4 days. Pt states that she also has a migraine.), Constipation, and Diarrhea      HPI  Patient presents for follow-up of breast cancer. She received cycle 6 of chemotherapy last week. Reports ongoing diarrhea, which she is controlling with imodium. She has a rash on lower abdomen which has some skin breakdown, and she has been putting Aquaphor which did not help and Bactroban spray and it is better. She is otherwise doing well. Her surgery is scheduled for 3/1/23 with Dr. Santos. She is planning a lumpectomy but then discussed bilateral mastectomies and reconstruction down the road. Her platelets are low today 39K.    Past Medical History:   Diagnosis Date    Allergy     Anemia     Anxiety     Cancer     Clotting disorder     GERD (gastroesophageal reflux disease)     Neuromuscular disorder       Past Surgical History:   Procedure Laterality Date    CHOLECYSTECTOMY      HERNIA REPAIR      HYSTERECTOMY      lymphectomy Left     REDUCTION OF BOTH BREASTS  2018    TUBAL LIGATION Bilateral 1993     Family History   Problem Relation Age of Onset    Hypertension Mother     Lupus Mother      "Hypoparathyroidism Mother     Diabetes Father     Heart disease Father     Prostate cancer Father     Kidney disease Father     Hodgkin's lymphoma Brother     Pancreatitis Brother      Social History     Socioeconomic History    Marital status:    Tobacco Use    Smoking status: Former     Packs/day: 0.50     Types: Cigarettes     Quit date: 2022     Years since quittin.1    Smokeless tobacco: Never   Substance and Sexual Activity    Alcohol use: Not Currently    Drug use: Never       Review of patient's allergies indicates:   Allergen Reactions    Corticosteroids (glucocorticoids) Hives, Other (See Comments) and Swelling     Patient states "Severe Inflammation"      Ketorolac Diarrhea, Nausea And Vomiting, Other (See Comments) and Swelling     Migraines      Peanut Anaphylaxis    Penicillins Anaphylaxis, Diarrhea, Hives, Nausea And Vomiting and Swelling    Shiitake mushroom (lentinus edodes) Anaphylaxis    Tramadol Diarrhea, Itching, Nausea And Vomiting, Rash and Swelling    Latex Other (See Comments)     Skin blisters      Macrolide antibiotics Hives and Rash    Adhesive Other (See Comments)     Blisters, skin tears; CANNOT USE PAPER TAPE    Anticoag citrate phos dextrose     Aspartame     Egg     Erythromycin      Other reaction(s): Unknown    Estrogens     Other omega-3s      Anticoagulant   Patient states "low blood pressure"    Pork derived (porcine)     Sucralose Other (See Comments)    Amitriptyline Anxiety and Other (See Comments)     Severe    Other reaction(s): Unknown    Aspirin Nausea Only and Other (See Comments)    Eszopiclone Anxiety and Itching    Heparin analogues Other (See Comments) and Palpitations    Topiramate Anxiety, Other (See Comments) and Palpitations     Shaking        Current Outpatient Medications on File Prior to Visit   Medication Sig Dispense Refill    (Magic mouthwash) 1:1:1 diphenhydrAMINE(Benadryl) 12.5mg/5ml liq, aluminum & magnesium hydroxide-simethicone " (Maalox), LIDOcaine viscous 2% Swish and spit 10 mLs every 4 (four) hours as needed. for mouth sores 500 mL 1    butalbital-acetaminophen-caffeine -40 mg (FIORICET, ESGIC) -40 mg per tablet Take by mouth.      clonazePAM (KLONOPIN) 0.5 MG tablet Take 0.5 mg by mouth every 8 (eight) hours as needed.      diclofenac (VOLTAREN) 75 MG EC tablet       diphenhydrAMINE (BENADRYL) 50 MG capsule Take 50 mg by mouth every 6 (six) hours as needed.      gabapentin (NEURONTIN) 300 MG capsule 3 (three) times daily.      gabapentin (NEURONTIN) 600 MG tablet Take 600 mg by mouth 3 (three) times daily.      HYDROcodone-acetaminophen (NORCO) 5-325 mg per tablet Take 1 tablet by mouth every 6 (six) hours as needed for Pain. 60 tablet 0    omeprazole (PRILOSEC) 40 MG capsule Take 1 capsule (40 mg total) by mouth once daily. 30 capsule 6    tiZANidine (ZANAFLEX) 4 MG tablet Take 12 mg by mouth 3 (three) times daily.      diphenoxylate-atropine 2.5-0.025 mg (LOMOTIL) 2.5-0.025 mg per tablet Take 1 tablet by mouth 4 (four) times daily as needed for Diarrhea. (Patient not taking: Reported on 2/2/2023) 30 tablet 1    ondansetron (ZOFRAN-ODT) 4 MG TbDL Take 4 mg by mouth every 8 (eight) hours as needed.       No current facility-administered medications on file prior to visit.      Review of Systems   Constitutional:  Positive for fatigue. Negative for appetite change and unexpected weight change.   HENT:  Negative for mouth sores.    Respiratory:  Negative for cough.    Gastrointestinal:  Positive for abdominal pain, diarrhea and nausea.   Genitourinary:  Negative for frequency.   Musculoskeletal:  Positive for arthralgias. Negative for back pain.        Bone pains/aches from Neulasta   Integumentary:  Negative for rash.   Neurological:  Positive for headaches.   Hematological:  Negative for adenopathy.   Psychiatric/Behavioral:  The patient is not nervous/anxious.        Vitals:    02/02/23 0850   BP: 119/86   Pulse: 100    Resp: 14   Temp: 98.4 °F (36.9 °C)   SpO2: 99%   Weight: 80.3 kg (177 lb)   Height: 5' (1.524 m)       Physical Exam  Constitutional:       Appearance: Normal appearance.   HENT:      Head: Normocephalic.      Comments: +alopecia wearing a wig     Nose: Nose normal.      Mouth/Throat:      Mouth: Mucous membranes are moist.   Eyes:      Extraocular Movements: Extraocular movements intact.      Conjunctiva/sclera: Conjunctivae normal.   Cardiovascular:      Rate and Rhythm: Normal rate and regular rhythm.   Pulmonary:      Effort: Pulmonary effort is normal.      Breath sounds: Normal breath sounds.   Chest:      Comments: Right chest wall mediport in place, left breast with inferior lumpectomy scar, well healed along previous scar from her reduction. Left axillary incision from LN biopsy also healed well. No masses palpable  Abdominal:      General: Bowel sounds are normal. There is no distension.      Palpations: Abdomen is soft.      Tenderness: There is no abdominal tenderness.   Musculoskeletal:         General: Normal range of motion.   Skin:     General: Skin is warm.      Comments: Very mild rash to lower abdomen with minimal skin breakdown on the left   Neurological:      General: No focal deficit present.      Mental Status: She is alert and oriented to person, place, and time.   Psychiatric:         Mood and Affect: Mood normal.         Judgment: Judgment normal.     Lab Visit on 02/02/2023   Component Date Value    WBC 02/02/2023 5.9     RBC 02/02/2023 2.53 (L)     Hgb 02/02/2023 8.9 (L)     Hct 02/02/2023 27.7 (L)     MCV 02/02/2023 109.5 (H)     MCH 02/02/2023 35.2     MCHC 02/02/2023 32.1 (L)     RDW 02/02/2023 15.8     Platelet 02/02/2023 39 (LL)     MPV 02/02/2023 11.5 (H)     Neut % 02/02/2023 53.9     Lymph % 02/02/2023 31.8     Mono % 02/02/2023 13.0     Eos % 02/02/2023 0.3     Basophil % 02/02/2023 0.2     Lymph # 02/02/2023 1.88     Neut # 02/02/2023 3.18     Mono # 02/02/2023 0.77     Eos #  02/02/2023 0.02     Baso # 02/02/2023 0.01     IG# 02/02/2023 0.05 (H)     IG% 02/02/2023 0.8           Assessment:       1. Malignant neoplasm of central portion of left breast in female, estrogen receptor positive        Plan:       Patient with Stage IA Left breast cancer triple positive s/p excisional biopsy of a cystic lesion that found cancer incidentally diagnosed 7/13/22. Tumor measured 3.0cm and SLN biopsies negative, Grade 3, strongly ER and WI positive and Her2 positive with high Ki67. There was a positive margin on the excisional biopsy.  Treatment with chemotherapy neoadjuvant started at [facility] with Dr. Gonzlaez.    Patient received 2 cycles at [facility] then changed care to [facility]. She was having multiple side effects.   She does not have any steroids due to h/o allergy causing inflammation.     Completed cycle 3  given 10/25/22. IV fluids added on day 2 with neulasta and she tolerated okay.  But, she has had delays due to ongoing thrombocytopenia.  Cycle 4 was given on 11/29/22. Carboplatin/Taxotere reduced by another 20% to avoid further delays.   Platelet antibodies negative.   Received 2 units of PRBC on 12/19/22 due to worsening anemia.   Cycle 5 was delayed due to thrombocytopenia, finally given on 1/4/23.  She was able to receive final cycle 6 on 1/25/23.    She continues with multiple side effects. She has ongoing diarrhea.  Labs today with Grade 3 thrombocytopenia platelets 39K. Anemia also worse, but WBC is ok.       Surgery is planned for 3/1/23.  Will discuss with her surgeon since her ultimate plan is for bilateral mastectomies, I would recommend that she have that now, as we could be able to omit radiation, since she was node negative.   Will check labs weekly X 3.  RTC 3 weeks after her surgery for EOV.    Will plan to complete maintenance HP X 11 cycles pending final pathology from surgery.     She had DAISY in 2012 for endometriosis but still has ovaries that remain functional.   8/4/22 estradiol  level 11, FSH 44, LH 38.   Plan will be for OS vs. BSO + AI X 5 years likely followed by completion of AI to complete 10 years.     Continue Imodium for diarrhea.   Continue eye lubricants QID.  Continue Norco 5/325mg for bone pain from the Neulasta.   All questions answered at this time.        Flory Vargas MD

## 2023-02-06 ENCOUNTER — LAB VISIT (OUTPATIENT)
Dept: LAB | Facility: HOSPITAL | Age: 53
End: 2023-02-06
Attending: INTERNAL MEDICINE
Payer: COMMERCIAL

## 2023-02-06 DIAGNOSIS — C50.112 MALIGNANT NEOPLASM OF CENTRAL PORTION OF LEFT BREAST IN FEMALE, ESTROGEN RECEPTOR POSITIVE: ICD-10-CM

## 2023-02-06 DIAGNOSIS — C50.912 HER2-POSITIVE CARCINOMA OF LEFT BREAST: ICD-10-CM

## 2023-02-06 DIAGNOSIS — D70.1 LEUKOPENIA DUE TO ANTINEOPLASTIC CHEMOTHERAPY: ICD-10-CM

## 2023-02-06 DIAGNOSIS — D64.81 ANEMIA DUE TO ANTINEOPLASTIC CHEMOTHERAPY: ICD-10-CM

## 2023-02-06 DIAGNOSIS — D69.6 THROMBOCYTOPENIA: ICD-10-CM

## 2023-02-06 DIAGNOSIS — Z17.0 MALIGNANT NEOPLASM OF CENTRAL PORTION OF LEFT BREAST IN FEMALE, ESTROGEN RECEPTOR POSITIVE: ICD-10-CM

## 2023-02-06 DIAGNOSIS — T45.1X5A ANEMIA DUE TO ANTINEOPLASTIC CHEMOTHERAPY: ICD-10-CM

## 2023-02-06 DIAGNOSIS — T45.1X5A LEUKOPENIA DUE TO ANTINEOPLASTIC CHEMOTHERAPY: ICD-10-CM

## 2023-02-06 LAB
ALBUMIN SERPL-MCNC: 3.5 G/DL (ref 3.5–5)
ALBUMIN/GLOB SERPL: 1.3 RATIO (ref 1.1–2)
ALP SERPL-CCNC: 118 UNIT/L (ref 40–150)
ALT SERPL-CCNC: 24 UNIT/L (ref 0–55)
AST SERPL-CCNC: 16 UNIT/L (ref 5–34)
BASOPHILS # BLD AUTO: 0.01 X10(3)/MCL (ref 0–0.2)
BASOPHILS NFR BLD AUTO: 0.2 %
BILIRUBIN DIRECT+TOT PNL SERPL-MCNC: 0.4 MG/DL
BUN SERPL-MCNC: 14.4 MG/DL (ref 9.8–20.1)
CALCIUM SERPL-MCNC: 9.4 MG/DL (ref 8.4–10.2)
CHLORIDE SERPL-SCNC: 107 MMOL/L (ref 98–107)
CO2 SERPL-SCNC: 28 MMOL/L (ref 22–29)
CREAT SERPL-MCNC: 0.76 MG/DL (ref 0.55–1.02)
EOSINOPHIL # BLD AUTO: 0 X10(3)/MCL (ref 0–0.9)
EOSINOPHIL NFR BLD AUTO: 0 %
ERYTHROCYTE [DISTWIDTH] IN BLOOD BY AUTOMATED COUNT: 16.4 % (ref 11.5–17)
GFR SERPLBLD CREATININE-BSD FMLA CKD-EPI: >60 MLS/MIN/1.73/M2
GLOBULIN SER-MCNC: 2.6 GM/DL (ref 2.4–3.5)
GLUCOSE SERPL-MCNC: 115 MG/DL (ref 74–100)
HCT VFR BLD AUTO: 27.5 % (ref 37–47)
HGB BLD-MCNC: 8.8 GM/DL (ref 12–16)
IMM GRANULOCYTES # BLD AUTO: 0.02 X10(3)/MCL (ref 0–0.04)
IMM GRANULOCYTES NFR BLD AUTO: 0.4 %
LYMPHOCYTES # BLD AUTO: 1.37 X10(3)/MCL (ref 0.6–4.6)
LYMPHOCYTES NFR BLD AUTO: 30.6 %
MCH RBC QN AUTO: 35.3 PG
MCHC RBC AUTO-ENTMCNC: 32 MG/DL (ref 33–36)
MCV RBC AUTO: 110.4 FL (ref 80–94)
MONOCYTES # BLD AUTO: 0.3 X10(3)/MCL (ref 0.1–1.3)
MONOCYTES NFR BLD AUTO: 6.7 %
NEUTROPHILS # BLD AUTO: 2.77 X10(3)/MCL (ref 2.1–9.2)
NEUTROPHILS NFR BLD AUTO: 62.1 %
PLATELET # BLD AUTO: 38 X10(3)/MCL (ref 130–400)
PMV BLD AUTO: 10.7 FL (ref 7.4–10.4)
POTASSIUM SERPL-SCNC: 4.1 MMOL/L (ref 3.5–5.1)
PROT SERPL-MCNC: 6.1 GM/DL (ref 6.4–8.3)
RBC # BLD AUTO: 2.49 X10(6)/MCL (ref 4.2–5.4)
SODIUM SERPL-SCNC: 141 MMOL/L (ref 136–145)
WBC # SPEC AUTO: 4.5 X10(3)/MCL (ref 4.5–11.5)

## 2023-02-06 PROCEDURE — 36415 COLL VENOUS BLD VENIPUNCTURE: CPT

## 2023-02-06 PROCEDURE — 80053 COMPREHEN METABOLIC PANEL: CPT

## 2023-02-06 PROCEDURE — 85025 COMPLETE CBC W/AUTO DIFF WBC: CPT

## 2023-02-13 ENCOUNTER — LAB VISIT (OUTPATIENT)
Dept: LAB | Facility: HOSPITAL | Age: 53
End: 2023-02-13
Attending: FAMILY MEDICINE
Payer: COMMERCIAL

## 2023-02-13 ENCOUNTER — TELEPHONE (OUTPATIENT)
Dept: HEMATOLOGY/ONCOLOGY | Facility: CLINIC | Age: 53
End: 2023-02-13
Payer: COMMERCIAL

## 2023-02-13 DIAGNOSIS — C50.112 MALIGNANT NEOPLASM OF CENTRAL PORTION OF LEFT BREAST IN FEMALE, ESTROGEN RECEPTOR POSITIVE: ICD-10-CM

## 2023-02-13 DIAGNOSIS — C50.112 MALIGNANT NEOPLASM OF CENTRAL PORTION OF LEFT BREAST IN FEMALE, ESTROGEN RECEPTOR POSITIVE: Primary | ICD-10-CM

## 2023-02-13 DIAGNOSIS — Z17.0 MALIGNANT NEOPLASM OF CENTRAL PORTION OF LEFT BREAST IN FEMALE, ESTROGEN RECEPTOR POSITIVE: ICD-10-CM

## 2023-02-13 DIAGNOSIS — Z17.0 MALIGNANT NEOPLASM OF CENTRAL PORTION OF LEFT BREAST IN FEMALE, ESTROGEN RECEPTOR POSITIVE: Primary | ICD-10-CM

## 2023-02-13 LAB
ALBUMIN SERPL-MCNC: 3.5 G/DL (ref 3.5–5)
ALBUMIN/GLOB SERPL: 1.5 RATIO (ref 1.1–2)
ALP SERPL-CCNC: 109 UNIT/L (ref 40–150)
ALT SERPL-CCNC: 17 UNIT/L (ref 0–55)
AST SERPL-CCNC: 11 UNIT/L (ref 5–34)
BASOPHILS # BLD AUTO: 0 X10(3)/MCL (ref 0–0.2)
BASOPHILS NFR BLD AUTO: 0 %
BILIRUBIN DIRECT+TOT PNL SERPL-MCNC: 0.9 MG/DL
BUN SERPL-MCNC: 15.4 MG/DL (ref 9.8–20.1)
CALCIUM SERPL-MCNC: 9.7 MG/DL (ref 8.4–10.2)
CHLORIDE SERPL-SCNC: 106 MMOL/L (ref 98–107)
CO2 SERPL-SCNC: 26 MMOL/L (ref 22–29)
CREAT SERPL-MCNC: 0.75 MG/DL (ref 0.55–1.02)
EOSINOPHIL # BLD AUTO: 0.01 X10(3)/MCL (ref 0–0.9)
EOSINOPHIL NFR BLD AUTO: 0.3 %
ERYTHROCYTE [DISTWIDTH] IN BLOOD BY AUTOMATED COUNT: 16.6 % (ref 11.5–17)
GFR SERPLBLD CREATININE-BSD FMLA CKD-EPI: >60 MLS/MIN/1.73/M2
GLOBULIN SER-MCNC: 2.4 GM/DL (ref 2.4–3.5)
GLUCOSE SERPL-MCNC: 105 MG/DL (ref 74–100)
HCT VFR BLD AUTO: 25.4 % (ref 37–47)
HGB BLD-MCNC: 8.1 GM/DL (ref 12–16)
IMM GRANULOCYTES # BLD AUTO: 0 X10(3)/MCL (ref 0–0.04)
IMM GRANULOCYTES NFR BLD AUTO: 0 %
LYMPHOCYTES # BLD AUTO: 1.46 X10(3)/MCL (ref 0.6–4.6)
LYMPHOCYTES NFR BLD AUTO: 42.1 %
MCH RBC QN AUTO: 34.9 PG
MCHC RBC AUTO-ENTMCNC: 31.9 MG/DL (ref 33–36)
MCV RBC AUTO: 109.5 FL (ref 80–94)
MONOCYTES # BLD AUTO: 0.21 X10(3)/MCL (ref 0.1–1.3)
MONOCYTES NFR BLD AUTO: 6.1 %
NEUTROPHILS # BLD AUTO: 1.79 X10(3)/MCL (ref 2.1–9.2)
NEUTROPHILS NFR BLD AUTO: 51.5 %
PLATELET # BLD AUTO: 28 X10(3)/MCL (ref 130–400)
PMV BLD AUTO: 11.9 FL (ref 7.4–10.4)
POTASSIUM SERPL-SCNC: 4 MMOL/L (ref 3.5–5.1)
PROT SERPL-MCNC: 5.9 GM/DL (ref 6.4–8.3)
RBC # BLD AUTO: 2.32 X10(6)/MCL (ref 4.2–5.4)
SODIUM SERPL-SCNC: 141 MMOL/L (ref 136–145)
WBC # SPEC AUTO: 3.5 X10(3)/MCL (ref 4.5–11.5)

## 2023-02-13 PROCEDURE — 36415 COLL VENOUS BLD VENIPUNCTURE: CPT

## 2023-02-13 PROCEDURE — 85025 COMPLETE CBC W/AUTO DIFF WBC: CPT

## 2023-02-13 PROCEDURE — 80053 COMPREHEN METABOLIC PANEL: CPT

## 2023-02-16 ENCOUNTER — LAB VISIT (OUTPATIENT)
Dept: LAB | Facility: HOSPITAL | Age: 53
End: 2023-02-16
Attending: INTERNAL MEDICINE
Payer: COMMERCIAL

## 2023-02-16 ENCOUNTER — INFUSION (OUTPATIENT)
Dept: INFUSION THERAPY | Facility: HOSPITAL | Age: 53
End: 2023-02-16
Attending: INTERNAL MEDICINE
Payer: COMMERCIAL

## 2023-02-16 VITALS
HEART RATE: 83 BPM | WEIGHT: 177 LBS | BODY MASS INDEX: 34.75 KG/M2 | HEIGHT: 60 IN | OXYGEN SATURATION: 98 % | TEMPERATURE: 98 F | SYSTOLIC BLOOD PRESSURE: 139 MMHG | DIASTOLIC BLOOD PRESSURE: 78 MMHG | RESPIRATION RATE: 18 BRPM

## 2023-02-16 DIAGNOSIS — Z17.0 MALIGNANT NEOPLASM OF CENTRAL PORTION OF LEFT BREAST IN FEMALE, ESTROGEN RECEPTOR POSITIVE: Primary | ICD-10-CM

## 2023-02-16 DIAGNOSIS — C50.112 MALIGNANT NEOPLASM OF CENTRAL PORTION OF LEFT BREAST IN FEMALE, ESTROGEN RECEPTOR POSITIVE: Primary | ICD-10-CM

## 2023-02-16 DIAGNOSIS — Z17.0 MALIGNANT NEOPLASM OF CENTRAL PORTION OF LEFT BREAST IN FEMALE, ESTROGEN RECEPTOR POSITIVE: ICD-10-CM

## 2023-02-16 DIAGNOSIS — C50.112 MALIGNANT NEOPLASM OF CENTRAL PORTION OF LEFT BREAST IN FEMALE, ESTROGEN RECEPTOR POSITIVE: ICD-10-CM

## 2023-02-16 LAB
ABO + RH BLD: NORMAL
ABO + RH BLD: NORMAL
BASOPHILS # BLD AUTO: 0.01 X10(3)/MCL (ref 0–0.2)
BASOPHILS NFR BLD AUTO: 0.3 %
BLD PROD TYP BPU: NORMAL
BLD PROD TYP BPU: NORMAL
BLOOD UNIT EXPIRATION DATE: NORMAL
BLOOD UNIT EXPIRATION DATE: NORMAL
BLOOD UNIT TYPE CODE: 6200
BLOOD UNIT TYPE CODE: 6200
CROSSMATCH INTERPRETATION: NORMAL
CROSSMATCH INTERPRETATION: NORMAL
DISPENSE STATUS: NORMAL
DISPENSE STATUS: NORMAL
EOSINOPHIL # BLD AUTO: 0.02 X10(3)/MCL (ref 0–0.9)
EOSINOPHIL NFR BLD AUTO: 0.6 %
ERYTHROCYTE [DISTWIDTH] IN BLOOD BY AUTOMATED COUNT: 16.5 % (ref 11.5–17)
GROUP & RH: NORMAL
HCT VFR BLD AUTO: 23.8 % (ref 37–47)
HGB BLD-MCNC: 7.6 GM/DL (ref 12–16)
IMM GRANULOCYTES # BLD AUTO: 0 X10(3)/MCL (ref 0–0.04)
IMM GRANULOCYTES NFR BLD AUTO: 0 %
INDIRECT COOMBS GEL: NORMAL
LYMPHOCYTES # BLD AUTO: 1.46 X10(3)/MCL (ref 0.6–4.6)
LYMPHOCYTES NFR BLD AUTO: 42.2 %
MCH RBC QN AUTO: 35.3 PG
MCHC RBC AUTO-ENTMCNC: 31.9 MG/DL (ref 33–36)
MCV RBC AUTO: 110.7 FL (ref 80–94)
MONOCYTES # BLD AUTO: 0.19 X10(3)/MCL (ref 0.1–1.3)
MONOCYTES NFR BLD AUTO: 5.5 %
NEUTROPHILS # BLD AUTO: 1.78 X10(3)/MCL (ref 2.1–9.2)
NEUTROPHILS NFR BLD AUTO: 51.4 %
PLATELET # BLD AUTO: 27 X10(3)/MCL (ref 130–400)
PMV BLD AUTO: 12.7 FL (ref 7.4–10.4)
RBC # BLD AUTO: 2.15 X10(6)/MCL (ref 4.2–5.4)
UNIT NUMBER: NORMAL
UNIT NUMBER: NORMAL
WBC # SPEC AUTO: 3.5 X10(3)/MCL (ref 4.5–11.5)

## 2023-02-16 PROCEDURE — 36430 TRANSFUSION BLD/BLD COMPNT: CPT

## 2023-02-16 PROCEDURE — 96374 THER/PROPH/DIAG INJ IV PUSH: CPT

## 2023-02-16 PROCEDURE — 36415 COLL VENOUS BLD VENIPUNCTURE: CPT | Performed by: NURSE PRACTITIONER

## 2023-02-16 PROCEDURE — 63600175 PHARM REV CODE 636 W HCPCS: Performed by: NURSE PRACTITIONER

## 2023-02-16 PROCEDURE — P9016 RBC LEUKOCYTES REDUCED: HCPCS | Performed by: NURSE PRACTITIONER

## 2023-02-16 PROCEDURE — 85025 COMPLETE CBC W/AUTO DIFF WBC: CPT

## 2023-02-16 PROCEDURE — 86900 BLOOD TYPING SEROLOGIC ABO: CPT | Performed by: NURSE PRACTITIONER

## 2023-02-16 PROCEDURE — 36415 COLL VENOUS BLD VENIPUNCTURE: CPT

## 2023-02-16 PROCEDURE — 86923 COMPATIBILITY TEST ELECTRIC: CPT | Performed by: NURSE PRACTITIONER

## 2023-02-16 RX ORDER — DIPHENHYDRAMINE HYDROCHLORIDE 50 MG/ML
25 INJECTION INTRAMUSCULAR; INTRAVENOUS
Status: CANCELLED | OUTPATIENT
Start: 2023-02-16

## 2023-02-16 RX ORDER — HYDROCODONE BITARTRATE AND ACETAMINOPHEN 500; 5 MG/1; MG/1
TABLET ORAL ONCE
Status: DISCONTINUED | OUTPATIENT
Start: 2023-02-16 | End: 2023-02-16 | Stop reason: HOSPADM

## 2023-02-16 RX ORDER — ACETAMINOPHEN 325 MG/1
650 TABLET ORAL
Status: CANCELLED | OUTPATIENT
Start: 2023-02-16

## 2023-02-16 RX ORDER — ACETAMINOPHEN 325 MG/1
650 TABLET ORAL
Status: DISCONTINUED | OUTPATIENT
Start: 2023-02-16 | End: 2023-02-16 | Stop reason: HOSPADM

## 2023-02-16 RX ORDER — DIPHENHYDRAMINE HYDROCHLORIDE 50 MG/ML
25 INJECTION INTRAMUSCULAR; INTRAVENOUS
Status: COMPLETED | OUTPATIENT
Start: 2023-02-16 | End: 2023-02-16

## 2023-02-16 RX ORDER — HYDROCODONE BITARTRATE AND ACETAMINOPHEN 500; 5 MG/1; MG/1
TABLET ORAL ONCE
Status: CANCELLED | OUTPATIENT
Start: 2023-02-16 | End: 2023-02-16

## 2023-02-16 RX ADMIN — DIPHENHYDRAMINE HYDROCHLORIDE 25 MG: 50 INJECTION INTRAMUSCULAR; INTRAVENOUS at 09:02

## 2023-02-17 ENCOUNTER — INFUSION (OUTPATIENT)
Dept: INFUSION THERAPY | Facility: HOSPITAL | Age: 53
End: 2023-02-17
Attending: FAMILY MEDICINE
Payer: COMMERCIAL

## 2023-02-17 VITALS
RESPIRATION RATE: 20 BRPM | DIASTOLIC BLOOD PRESSURE: 92 MMHG | WEIGHT: 177.94 LBS | BODY MASS INDEX: 34.93 KG/M2 | SYSTOLIC BLOOD PRESSURE: 138 MMHG | HEIGHT: 60 IN | HEART RATE: 98 BPM

## 2023-02-17 DIAGNOSIS — C50.112 MALIGNANT NEOPLASM OF CENTRAL PORTION OF LEFT BREAST IN FEMALE, ESTROGEN RECEPTOR POSITIVE: Primary | ICD-10-CM

## 2023-02-17 DIAGNOSIS — Z17.0 MALIGNANT NEOPLASM OF CENTRAL PORTION OF LEFT BREAST IN FEMALE, ESTROGEN RECEPTOR POSITIVE: Primary | ICD-10-CM

## 2023-02-17 PROCEDURE — 25000003 PHARM REV CODE 250: Performed by: NURSE PRACTITIONER

## 2023-02-17 PROCEDURE — 96360 HYDRATION IV INFUSION INIT: CPT

## 2023-02-17 PROCEDURE — 96361 HYDRATE IV INFUSION ADD-ON: CPT

## 2023-02-17 RX ORDER — SODIUM CHLORIDE 0.9 % (FLUSH) 0.9 %
10 SYRINGE (ML) INJECTION
Status: DISCONTINUED | OUTPATIENT
Start: 2023-02-17 | End: 2023-02-17 | Stop reason: HOSPADM

## 2023-02-17 RX ORDER — HEPARIN 100 UNIT/ML
500 SYRINGE INTRAVENOUS
Status: CANCELLED | OUTPATIENT
Start: 2023-03-10

## 2023-02-17 RX ORDER — SODIUM CHLORIDE 0.9 % (FLUSH) 0.9 %
10 SYRINGE (ML) INJECTION
Status: CANCELLED | OUTPATIENT
Start: 2023-03-10

## 2023-02-17 RX ORDER — HEPARIN 100 UNIT/ML
500 SYRINGE INTRAVENOUS
Status: DISCONTINUED | OUTPATIENT
Start: 2023-02-17 | End: 2023-02-17 | Stop reason: HOSPADM

## 2023-02-17 RX ADMIN — SODIUM CHLORIDE 1000 ML: 9 INJECTION, SOLUTION INTRAVENOUS at 11:02

## 2023-02-20 ENCOUNTER — LAB VISIT (OUTPATIENT)
Dept: LAB | Facility: HOSPITAL | Age: 53
End: 2023-02-20
Attending: INTERNAL MEDICINE
Payer: COMMERCIAL

## 2023-02-20 DIAGNOSIS — T45.1X5A ANEMIA DUE TO ANTINEOPLASTIC CHEMOTHERAPY: ICD-10-CM

## 2023-02-20 DIAGNOSIS — C50.112 MALIGNANT NEOPLASM OF CENTRAL PORTION OF LEFT BREAST IN FEMALE, ESTROGEN RECEPTOR POSITIVE: ICD-10-CM

## 2023-02-20 DIAGNOSIS — D64.81 ANEMIA DUE TO ANTINEOPLASTIC CHEMOTHERAPY: ICD-10-CM

## 2023-02-20 DIAGNOSIS — Z17.0 MALIGNANT NEOPLASM OF CENTRAL PORTION OF LEFT BREAST IN FEMALE, ESTROGEN RECEPTOR POSITIVE: ICD-10-CM

## 2023-02-20 DIAGNOSIS — C50.112 MALIGNANT NEOPLASM OF CENTRAL PORTION OF LEFT BREAST IN FEMALE, ESTROGEN RECEPTOR POSITIVE: Primary | ICD-10-CM

## 2023-02-20 DIAGNOSIS — C50.912 HER2-POSITIVE CARCINOMA OF LEFT BREAST: ICD-10-CM

## 2023-02-20 DIAGNOSIS — D69.6 THROMBOCYTOPENIA: ICD-10-CM

## 2023-02-20 DIAGNOSIS — Z17.0 MALIGNANT NEOPLASM OF CENTRAL PORTION OF LEFT BREAST IN FEMALE, ESTROGEN RECEPTOR POSITIVE: Primary | ICD-10-CM

## 2023-02-20 LAB
ALBUMIN SERPL-MCNC: 3.6 G/DL (ref 3.5–5)
ALBUMIN/GLOB SERPL: 1.3 RATIO (ref 1.1–2)
ALP SERPL-CCNC: 130 UNIT/L (ref 40–150)
ALT SERPL-CCNC: 16 UNIT/L (ref 0–55)
AST SERPL-CCNC: 12 UNIT/L (ref 5–34)
BASOPHILS # BLD AUTO: 0 X10(3)/MCL (ref 0–0.2)
BASOPHILS NFR BLD AUTO: 0 %
BILIRUBIN DIRECT+TOT PNL SERPL-MCNC: 0.6 MG/DL
BUN SERPL-MCNC: 18.5 MG/DL (ref 9.8–20.1)
CALCIUM SERPL-MCNC: 9.8 MG/DL (ref 8.4–10.2)
CHLORIDE SERPL-SCNC: 109 MMOL/L (ref 98–107)
CO2 SERPL-SCNC: 27 MMOL/L (ref 22–29)
CREAT SERPL-MCNC: 0.72 MG/DL (ref 0.55–1.02)
EOSINOPHIL # BLD AUTO: 0.07 X10(3)/MCL (ref 0–0.9)
EOSINOPHIL NFR BLD AUTO: 2 %
ERYTHROCYTE [DISTWIDTH] IN BLOOD BY AUTOMATED COUNT: 19.1 % (ref 11.5–17)
GFR SERPLBLD CREATININE-BSD FMLA CKD-EPI: >60 MLS/MIN/1.73/M2
GLOBULIN SER-MCNC: 2.7 GM/DL (ref 2.4–3.5)
GLUCOSE SERPL-MCNC: 101 MG/DL (ref 74–100)
HCT VFR BLD AUTO: 31.1 % (ref 37–47)
HGB BLD-MCNC: 10.1 G/DL (ref 12–16)
IMM GRANULOCYTES # BLD AUTO: 0 X10(3)/MCL (ref 0–0.04)
IMM GRANULOCYTES NFR BLD AUTO: 0 %
LYMPHOCYTES # BLD AUTO: 1.54 X10(3)/MCL (ref 0.6–4.6)
LYMPHOCYTES NFR BLD AUTO: 43.6 %
MCH RBC QN AUTO: 33.4 PG
MCHC RBC AUTO-ENTMCNC: 32.5 G/DL (ref 33–36)
MCV RBC AUTO: 103 FL (ref 80–94)
MONOCYTES # BLD AUTO: 0.18 X10(3)/MCL (ref 0.1–1.3)
MONOCYTES NFR BLD AUTO: 5.1 %
NEUTROPHILS # BLD AUTO: 1.74 X10(3)/MCL (ref 2.1–9.2)
NEUTROPHILS NFR BLD AUTO: 49.3 %
PLATELET # BLD AUTO: 23 X10(3)/MCL (ref 130–400)
PMV BLD AUTO: 10.4 FL (ref 7.4–10.4)
POTASSIUM SERPL-SCNC: 4 MMOL/L (ref 3.5–5.1)
PROT SERPL-MCNC: 6.3 GM/DL (ref 6.4–8.3)
RBC # BLD AUTO: 3.02 X10(6)/MCL (ref 4.2–5.4)
SODIUM SERPL-SCNC: 142 MMOL/L (ref 136–145)
WBC # SPEC AUTO: 3.5 X10(3)/MCL (ref 4.5–11.5)

## 2023-02-20 PROCEDURE — 85025 COMPLETE CBC W/AUTO DIFF WBC: CPT

## 2023-02-20 PROCEDURE — 36415 COLL VENOUS BLD VENIPUNCTURE: CPT

## 2023-02-20 PROCEDURE — 80053 COMPREHEN METABOLIC PANEL: CPT

## 2023-02-27 DIAGNOSIS — C50.912 HER2-POSITIVE CARCINOMA OF LEFT BREAST: ICD-10-CM

## 2023-02-27 DIAGNOSIS — T45.1X5A ANEMIA DUE TO ANTINEOPLASTIC CHEMOTHERAPY: ICD-10-CM

## 2023-02-27 DIAGNOSIS — Z17.0 MALIGNANT NEOPLASM OF CENTRAL PORTION OF LEFT BREAST IN FEMALE, ESTROGEN RECEPTOR POSITIVE: Primary | ICD-10-CM

## 2023-02-27 DIAGNOSIS — D64.81 ANEMIA DUE TO ANTINEOPLASTIC CHEMOTHERAPY: ICD-10-CM

## 2023-02-27 DIAGNOSIS — D69.6 THROMBOCYTOPENIA: ICD-10-CM

## 2023-02-27 DIAGNOSIS — D70.1 LEUKOPENIA DUE TO ANTINEOPLASTIC CHEMOTHERAPY: ICD-10-CM

## 2023-02-27 DIAGNOSIS — C50.112 MALIGNANT NEOPLASM OF CENTRAL PORTION OF LEFT BREAST IN FEMALE, ESTROGEN RECEPTOR POSITIVE: Primary | ICD-10-CM

## 2023-02-27 DIAGNOSIS — T45.1X5A LEUKOPENIA DUE TO ANTINEOPLASTIC CHEMOTHERAPY: ICD-10-CM

## 2023-02-28 ENCOUNTER — LAB VISIT (OUTPATIENT)
Dept: LAB | Facility: HOSPITAL | Age: 53
End: 2023-02-28
Attending: FAMILY MEDICINE
Payer: COMMERCIAL

## 2023-02-28 DIAGNOSIS — T45.1X5A LEUKOPENIA DUE TO ANTINEOPLASTIC CHEMOTHERAPY: ICD-10-CM

## 2023-02-28 DIAGNOSIS — D69.6 THROMBOCYTOPENIA: ICD-10-CM

## 2023-02-28 DIAGNOSIS — T45.1X5A ANEMIA DUE TO ANTINEOPLASTIC CHEMOTHERAPY: ICD-10-CM

## 2023-02-28 DIAGNOSIS — D70.1 LEUKOPENIA DUE TO ANTINEOPLASTIC CHEMOTHERAPY: ICD-10-CM

## 2023-02-28 DIAGNOSIS — Z17.0 MALIGNANT NEOPLASM OF CENTRAL PORTION OF LEFT BREAST IN FEMALE, ESTROGEN RECEPTOR POSITIVE: ICD-10-CM

## 2023-02-28 DIAGNOSIS — C50.912 HER2-POSITIVE CARCINOMA OF LEFT BREAST: ICD-10-CM

## 2023-02-28 DIAGNOSIS — D64.81 ANEMIA DUE TO ANTINEOPLASTIC CHEMOTHERAPY: ICD-10-CM

## 2023-02-28 DIAGNOSIS — C50.112 MALIGNANT NEOPLASM OF CENTRAL PORTION OF LEFT BREAST IN FEMALE, ESTROGEN RECEPTOR POSITIVE: ICD-10-CM

## 2023-02-28 LAB
ALBUMIN SERPL-MCNC: 3.8 G/DL (ref 3.5–5)
ALBUMIN/GLOB SERPL: 1.5 RATIO (ref 1.1–2)
ALP SERPL-CCNC: 128 UNIT/L (ref 40–150)
ALT SERPL-CCNC: 14 UNIT/L (ref 0–55)
AST SERPL-CCNC: 11 UNIT/L (ref 5–34)
BASOPHILS # BLD AUTO: 0 X10(3)/MCL (ref 0–0.2)
BASOPHILS NFR BLD AUTO: 0 %
BILIRUBIN DIRECT+TOT PNL SERPL-MCNC: 0.7 MG/DL
BUN SERPL-MCNC: 14.6 MG/DL (ref 9.8–20.1)
CALCIUM SERPL-MCNC: 9.9 MG/DL (ref 8.4–10.2)
CHLORIDE SERPL-SCNC: 107 MMOL/L (ref 98–107)
CO2 SERPL-SCNC: 28 MMOL/L (ref 22–29)
CREAT SERPL-MCNC: 0.83 MG/DL (ref 0.55–1.02)
EOSINOPHIL # BLD AUTO: 0.08 X10(3)/MCL (ref 0–0.9)
EOSINOPHIL NFR BLD AUTO: 2.5 %
ERYTHROCYTE [DISTWIDTH] IN BLOOD BY AUTOMATED COUNT: 19.7 % (ref 11.5–17)
GFR SERPLBLD CREATININE-BSD FMLA CKD-EPI: >60 MLS/MIN/1.73/M2
GLOBULIN SER-MCNC: 2.6 GM/DL (ref 2.4–3.5)
GLUCOSE SERPL-MCNC: 122 MG/DL (ref 74–100)
HCT VFR BLD AUTO: 28.2 % (ref 37–47)
HGB BLD-MCNC: 9.3 G/DL (ref 12–16)
IMM GRANULOCYTES # BLD AUTO: 0.01 X10(3)/MCL (ref 0–0.04)
IMM GRANULOCYTES NFR BLD AUTO: 0.3 %
LYMPHOCYTES # BLD AUTO: 1.8 X10(3)/MCL (ref 0.6–4.6)
LYMPHOCYTES NFR BLD AUTO: 57.1 %
MCH RBC QN AUTO: 33.7 PG
MCHC RBC AUTO-ENTMCNC: 33 G/DL (ref 33–36)
MCV RBC AUTO: 102.2 FL (ref 80–94)
MONOCYTES # BLD AUTO: 0.14 X10(3)/MCL (ref 0.1–1.3)
MONOCYTES NFR BLD AUTO: 4.4 %
NEUTROPHILS # BLD AUTO: 1.12 X10(3)/MCL (ref 2.1–9.2)
NEUTROPHILS NFR BLD AUTO: 35.7 %
PLATELET # BLD AUTO: 50 X10(3)/MCL (ref 130–400)
PMV BLD AUTO: 9.3 FL (ref 7.4–10.4)
POTASSIUM SERPL-SCNC: 4.1 MMOL/L (ref 3.5–5.1)
PROT SERPL-MCNC: 6.4 GM/DL (ref 6.4–8.3)
RBC # BLD AUTO: 2.76 X10(6)/MCL (ref 4.2–5.4)
SODIUM SERPL-SCNC: 141 MMOL/L (ref 136–145)
WBC # SPEC AUTO: 3.2 X10(3)/MCL (ref 4.5–11.5)

## 2023-02-28 PROCEDURE — 85025 COMPLETE CBC W/AUTO DIFF WBC: CPT

## 2023-02-28 PROCEDURE — 36415 COLL VENOUS BLD VENIPUNCTURE: CPT

## 2023-02-28 PROCEDURE — 80053 COMPREHEN METABOLIC PANEL: CPT

## 2023-03-01 DIAGNOSIS — Z17.0 MALIGNANT NEOPLASM OF CENTRAL PORTION OF LEFT BREAST IN FEMALE, ESTROGEN RECEPTOR POSITIVE: Primary | ICD-10-CM

## 2023-03-01 DIAGNOSIS — C50.112 MALIGNANT NEOPLASM OF CENTRAL PORTION OF LEFT BREAST IN FEMALE, ESTROGEN RECEPTOR POSITIVE: Primary | ICD-10-CM

## 2023-03-06 ENCOUNTER — LAB VISIT (OUTPATIENT)
Dept: LAB | Facility: HOSPITAL | Age: 53
End: 2023-03-06
Attending: INTERNAL MEDICINE
Payer: COMMERCIAL

## 2023-03-06 DIAGNOSIS — C50.112 MALIGNANT NEOPLASM OF CENTRAL PORTION OF LEFT BREAST IN FEMALE, ESTROGEN RECEPTOR POSITIVE: ICD-10-CM

## 2023-03-06 DIAGNOSIS — Z17.0 MALIGNANT NEOPLASM OF CENTRAL PORTION OF LEFT BREAST IN FEMALE, ESTROGEN RECEPTOR POSITIVE: ICD-10-CM

## 2023-03-06 LAB
ALBUMIN SERPL-MCNC: 3.6 G/DL (ref 3.5–5)
ALBUMIN/GLOB SERPL: 1.4 RATIO (ref 1.1–2)
ALP SERPL-CCNC: 122 UNIT/L (ref 40–150)
ALT SERPL-CCNC: 18 UNIT/L (ref 0–55)
AST SERPL-CCNC: 14 UNIT/L (ref 5–34)
BASOPHILS # BLD AUTO: 0 X10(3)/MCL (ref 0–0.2)
BASOPHILS NFR BLD AUTO: 0 %
BILIRUBIN DIRECT+TOT PNL SERPL-MCNC: 0.5 MG/DL
BUN SERPL-MCNC: 13.4 MG/DL (ref 9.8–20.1)
CALCIUM SERPL-MCNC: 9.7 MG/DL (ref 8.4–10.2)
CHLORIDE SERPL-SCNC: 112 MMOL/L (ref 98–107)
CO2 SERPL-SCNC: 28 MMOL/L (ref 22–29)
CREAT SERPL-MCNC: 0.78 MG/DL (ref 0.55–1.02)
EOSINOPHIL # BLD AUTO: 0.06 X10(3)/MCL (ref 0–0.9)
EOSINOPHIL NFR BLD AUTO: 2.1 %
ERYTHROCYTE [DISTWIDTH] IN BLOOD BY AUTOMATED COUNT: 21.4 % (ref 11.5–17)
GFR SERPLBLD CREATININE-BSD FMLA CKD-EPI: >60 MLS/MIN/1.73/M2
GLOBULIN SER-MCNC: 2.5 GM/DL (ref 2.4–3.5)
GLUCOSE SERPL-MCNC: 129 MG/DL (ref 74–100)
HCT VFR BLD AUTO: 27.4 % (ref 37–47)
HGB BLD-MCNC: 8.8 G/DL (ref 12–16)
IMM GRANULOCYTES # BLD AUTO: 0.01 X10(3)/MCL (ref 0–0.04)
IMM GRANULOCYTES NFR BLD AUTO: 0.3 %
LYMPHOCYTES # BLD AUTO: 1.64 X10(3)/MCL (ref 0.6–4.6)
LYMPHOCYTES NFR BLD AUTO: 56.9 %
MCH RBC QN AUTO: 34.2 PG
MCHC RBC AUTO-ENTMCNC: 32.1 G/DL (ref 33–36)
MCV RBC AUTO: 106.6 FL (ref 80–94)
MONOCYTES # BLD AUTO: 0.19 X10(3)/MCL (ref 0.1–1.3)
MONOCYTES NFR BLD AUTO: 6.6 %
NEUTROPHILS # BLD AUTO: 0.98 X10(3)/MCL (ref 2.1–9.2)
NEUTROPHILS NFR BLD AUTO: 34.1 %
PLATELET # BLD AUTO: 88 X10(3)/MCL (ref 130–400)
PMV BLD AUTO: 9.4 FL (ref 7.4–10.4)
POTASSIUM SERPL-SCNC: 4.7 MMOL/L (ref 3.5–5.1)
PROT SERPL-MCNC: 6.1 GM/DL (ref 6.4–8.3)
RBC # BLD AUTO: 2.57 X10(6)/MCL (ref 4.2–5.4)
SODIUM SERPL-SCNC: 146 MMOL/L (ref 136–145)
WBC # SPEC AUTO: 2.9 X10(3)/MCL (ref 4.5–11.5)

## 2023-03-06 PROCEDURE — 85025 COMPLETE CBC W/AUTO DIFF WBC: CPT

## 2023-03-06 PROCEDURE — 80053 COMPREHEN METABOLIC PANEL: CPT

## 2023-03-06 PROCEDURE — 36415 COLL VENOUS BLD VENIPUNCTURE: CPT

## 2023-03-13 ENCOUNTER — LAB VISIT (OUTPATIENT)
Dept: LAB | Facility: HOSPITAL | Age: 53
End: 2023-03-13
Attending: INTERNAL MEDICINE
Payer: COMMERCIAL

## 2023-03-13 DIAGNOSIS — C50.112 MALIGNANT NEOPLASM OF CENTRAL PORTION OF LEFT BREAST IN FEMALE, ESTROGEN RECEPTOR POSITIVE: ICD-10-CM

## 2023-03-13 DIAGNOSIS — Z17.0 MALIGNANT NEOPLASM OF CENTRAL PORTION OF LEFT BREAST IN FEMALE, ESTROGEN RECEPTOR POSITIVE: ICD-10-CM

## 2023-03-13 LAB
ALBUMIN SERPL-MCNC: 3.6 G/DL (ref 3.5–5)
ALBUMIN/GLOB SERPL: 1.3 RATIO (ref 1.1–2)
ALP SERPL-CCNC: 119 UNIT/L (ref 40–150)
ALT SERPL-CCNC: 26 UNIT/L (ref 0–55)
AST SERPL-CCNC: 16 UNIT/L (ref 5–34)
BASOPHILS # BLD AUTO: 0.01 X10(3)/MCL (ref 0–0.2)
BASOPHILS NFR BLD AUTO: 0.3 %
BILIRUBIN DIRECT+TOT PNL SERPL-MCNC: 0.7 MG/DL
BUN SERPL-MCNC: 17.7 MG/DL (ref 9.8–20.1)
CALCIUM SERPL-MCNC: 10.3 MG/DL (ref 8.4–10.2)
CHLORIDE SERPL-SCNC: 108 MMOL/L (ref 98–107)
CO2 SERPL-SCNC: 28 MMOL/L (ref 22–29)
CREAT SERPL-MCNC: 0.83 MG/DL (ref 0.55–1.02)
EOSINOPHIL # BLD AUTO: 0.11 X10(3)/MCL (ref 0–0.9)
EOSINOPHIL NFR BLD AUTO: 3.1 %
ERYTHROCYTE [DISTWIDTH] IN BLOOD BY AUTOMATED COUNT: 21.9 % (ref 11.5–17)
GFR SERPLBLD CREATININE-BSD FMLA CKD-EPI: >60 MLS/MIN/1.73/M2
GLOBULIN SER-MCNC: 2.7 GM/DL (ref 2.4–3.5)
GLUCOSE SERPL-MCNC: 130 MG/DL (ref 74–100)
HCT VFR BLD AUTO: 30.3 % (ref 37–47)
HGB BLD-MCNC: 9.6 G/DL (ref 12–16)
IMM GRANULOCYTES # BLD AUTO: 0.01 X10(3)/MCL (ref 0–0.04)
IMM GRANULOCYTES NFR BLD AUTO: 0.3 %
LYMPHOCYTES # BLD AUTO: 1.48 X10(3)/MCL (ref 0.6–4.6)
LYMPHOCYTES NFR BLD AUTO: 42.3 %
MCH RBC QN AUTO: 34.2 PG
MCHC RBC AUTO-ENTMCNC: 31.7 G/DL (ref 33–36)
MCV RBC AUTO: 107.8 FL (ref 80–94)
MONOCYTES # BLD AUTO: 0.31 X10(3)/MCL (ref 0.1–1.3)
MONOCYTES NFR BLD AUTO: 8.9 %
NEUTROPHILS # BLD AUTO: 1.58 X10(3)/MCL (ref 2.1–9.2)
NEUTROPHILS NFR BLD AUTO: 45.1 %
PLATELET # BLD AUTO: 150 X10(3)/MCL (ref 130–400)
PMV BLD AUTO: 9.3 FL (ref 7.4–10.4)
POTASSIUM SERPL-SCNC: 4.4 MMOL/L (ref 3.5–5.1)
PROT SERPL-MCNC: 6.3 GM/DL (ref 6.4–8.3)
RBC # BLD AUTO: 2.81 X10(6)/MCL (ref 4.2–5.4)
SODIUM SERPL-SCNC: 140 MMOL/L (ref 136–145)
WBC # SPEC AUTO: 3.5 X10(3)/MCL (ref 4.5–11.5)

## 2023-03-13 PROCEDURE — 36415 COLL VENOUS BLD VENIPUNCTURE: CPT

## 2023-03-13 PROCEDURE — 80053 COMPREHEN METABOLIC PANEL: CPT

## 2023-03-13 PROCEDURE — 85025 COMPLETE CBC W/AUTO DIFF WBC: CPT

## 2023-03-13 NOTE — PROGRESS NOTES
Subjective:       Patient ID: Nita Dejesus is a 52 y.o. female.    Previous Oncologist: Dr. Katlyn Gonzalez at [facility]  Surgeon: Dr. Kelly Santos    Left Breast Cancer Stage IA(T2N0M0) Triple Positive--22  Biopsy/Surgery/pathology:   1. Excisional biopsy left breast mass/left breast cyst done 22--invasive ductal carcinoma, Grade 3, measures 3.0cm, a 0.6cm region of cauterized carcinoma is present at the inked superior-deep margin, cyst with reactive changes, suggestive of previous lumpectomy site, fluid left breast cyst with rare atypical cells present, suspicious for carcinoma, ER 95%, OH 27%, Her2 3+ by IHC, positive, Ki67 51%.   2. Left SLN biopsies done 22--3 benign lymph nodes.   3. Re-excision lumpectomy done 3/8/23--no residual invasive malignancy identified.   [place]. [place] [facility] [place] inferior breast large, minimally complex cystic lesion at 6:00 measures 4.4X3.5X4.2cm, corresponding to the palpable area, appears benign. Routine screening MMG recommended.   2. MRI breasts bilateral at [facility] 22--post-surgical seroma at 12:00 position of left breast s/p recent excisional biopsy, no definite suspicious enhancing masses or areas of nonmass enhancement of left breast, and no suspicious areas in right breast, BIRADS 6.   3. CT C/A/P w/ contrast 22 at [facility]--no thoracic metastatic disease, fatty infiltration of liver, small to moderate-sized hiatal hernia, no metastatic disease.   4. NM Bone scan whole body done 22 at [facility]--activity in cervical and lumbar spines likely degenerative, no anatomic correlate seen on CT scan in lumbar spines, suggest correlation with C-spine Xrays, increase tracer w/n both feet, likely degnerative change, physiologic activity of urinary tract.  5. CT A/P w/ contrast 22 at [facility]--no acute abdominopelvic pathology or change compared to recent CT from 22, fairly large amount of stool in colon may reflect  constipation, large hiatal hernia, diffuse hepatic steatosis, post-cholecystectomy, post hysterectomy, mild thoracic and lumbar degenerative disease.   6. Bilateral diagnostic MMG/Left breast US 2/28/23--No suspicious masses, microcalcifications, or other abnormalities are seen  within the right or left breast. Postsurgical changes are noted at the  6:00 position of the left breast at site of prior excisional biopsy with positive margins.     ECHO:  08/18/22--EF 55-65%.  11/17/22--EF 60%.    Genetic testing: [employer] done 7/21/22 negative for any pathogenic variants.     Treatment history:  Left breast excisional biopsy done 7/13/22  Left SLN biopsies done 8/8/22.  2 units PRBC 12/19/22.  TCHP X 6 cycles completed only 8/23/22--1/25/23 (multiple treatment delays for thrombocytopenia, despite dose reductions, multiple side effects).  Re-excision lumpectomy done 3/8/23--no residual disease.     Current treatment plan:  Maintenance HP X 11 cycles to start 3/29/23  2.  Adjuvant radiation to start 4/6/23  3. Adjuvant OS Zoladex monthly + Femara to start 3/29/23 (DAISY for endometriosis, ovaries remain)    Chief Complaint: Other Misc (Pt reports that she just did a MMG and has a lump on her R breast since her MMG. Reports that it is very tender.)      HPI  Patient presents for follow-up of breast cancer. She finally was able to undergo surgery on 3/8/23 when her platelets were 80K. She is doing well since surgery. Labs show that her counts continue to improve and she is feeling better. She c/o some tenderness to right medial breast since she had the MMG and also feels a lump. She is otherwise doing well. Discussed restarting maintenance Perjeta/Herceptin next week as well as Zoladex/Femara.     Past Medical History:   Diagnosis Date    Allergy     Anemia     Anxiety     Cancer     Clotting disorder     GERD (gastroesophageal reflux disease)     Neuromuscular disorder       Past Surgical History:   Procedure Laterality Date  "   CHOLECYSTECTOMY      HERNIA REPAIR      HYSTERECTOMY      lymphectomy Left     REDUCTION OF BOTH BREASTS  2018    TUBAL LIGATION Bilateral 1993     Family History   Problem Relation Age of Onset    Hypertension Mother     Lupus Mother     Hypoparathyroidism Mother     Diabetes Father     Heart disease Father     Prostate cancer Father     Kidney disease Father     Hodgkin's lymphoma Brother     Pancreatitis Brother      Social History     Socioeconomic History    Marital status:    Tobacco Use    Smoking status: Every Day     Packs/day: 0.50     Types: Cigarettes     Last attempt to quit: 2022     Years since quittin.3    Smokeless tobacco: Never   Substance and Sexual Activity    Alcohol use: Not Currently    Drug use: Never       Review of patient's allergies indicates:   Allergen Reactions    Corticosteroids (glucocorticoids) Hives, Other (See Comments) and Swelling     Patient states "Severe Inflammation"      Ketorolac Diarrhea, Nausea And Vomiting, Other (See Comments) and Swelling     Migraines      Peanut Anaphylaxis    Penicillins Anaphylaxis, Diarrhea, Hives, Nausea And Vomiting and Swelling    Shiitake mushroom (lentinus edodes) Anaphylaxis    Tramadol Diarrhea, Itching, Nausea And Vomiting, Rash and Swelling    Latex Other (See Comments)     Skin blisters      Macrolide antibiotics Hives and Rash    Adhesive Other (See Comments)     Blisters, skin tears; CANNOT USE PAPER TAPE    Anticoag citrate phos dextrose     Aspartame     Egg     Erythromycin      Other reaction(s): Unknown    Estrogens     Other omega-3s      Anticoagulant   Patient states "low blood pressure"    Pork derived (porcine)     Sucralose Other (See Comments)    Amitriptyline Anxiety and Other (See Comments)     Severe    Other reaction(s): Unknown    Aspirin Nausea Only and Other (See Comments)    Eszopiclone Anxiety and Itching    Heparin analogues Other (See Comments) and Palpitations    Topiramate Anxiety, Other " (See Comments) and Palpitations     Shaking        Current Outpatient Medications on File Prior to Visit   Medication Sig Dispense Refill    (Magic mouthwash) 1:1:1 diphenhydrAMINE(Benadryl) 12.5mg/5ml liq, aluminum & magnesium hydroxide-simethicone (Maalox), LIDOcaine viscous 2% Swish and spit 10 mLs every 4 (four) hours as needed. for mouth sores 500 mL 1    butalbital-acetaminophen-caffeine -40 mg (FIORICET, ESGIC) -40 mg per tablet Take by mouth.      clonazePAM (KLONOPIN) 0.5 MG tablet Take 0.5 mg by mouth every 8 (eight) hours as needed.      diclofenac (VOLTAREN) 75 MG EC tablet       diphenhydrAMINE (BENADRYL) 50 MG capsule Take 50 mg by mouth every 6 (six) hours as needed.      diphenoxylate-atropine 2.5-0.025 mg (LOMOTIL) 2.5-0.025 mg per tablet Take 1 tablet by mouth 4 (four) times daily as needed for Diarrhea. 30 tablet 1    gabapentin (NEURONTIN) 300 MG capsule 3 (three) times daily.      gabapentin (NEURONTIN) 600 MG tablet Take 600 mg by mouth 3 (three) times daily.      HYDROcodone-acetaminophen (NORCO) 5-325 mg per tablet Take 1 tablet by mouth every 6 (six) hours as needed for Pain. 60 tablet 0    omeprazole (PRILOSEC) 40 MG capsule Take 1 capsule (40 mg total) by mouth once daily. 30 capsule 6    tiZANidine (ZANAFLEX) 4 MG tablet Take 12 mg by mouth 3 (three) times daily.      ondansetron (ZOFRAN-ODT) 4 MG TbDL Take 4 mg by mouth every 8 (eight) hours as needed.       No current facility-administered medications on file prior to visit.      Review of Systems   Constitutional:  Negative for appetite change and unexpected weight change.   HENT:  Negative for mouth sores.    Respiratory:  Negative for cough.    Gastrointestinal:  Negative for abdominal pain and nausea.   Genitourinary:  Negative for frequency.   Musculoskeletal:  Positive for arthralgias. Negative for back pain.        Bone pains/aches from Neulasta   Integumentary:  Positive for breast tenderness. Negative for rash.    Neurological:  Negative for headaches.   Hematological:  Negative for adenopathy.   Psychiatric/Behavioral:  The patient is not nervous/anxious.    Breast: Positive for tenderness.      Vitals:    03/23/23 1105   BP: 122/79   Pulse: 80   Resp: 14   Temp: 98.4 °F (36.9 °C)   SpO2: 100%   Weight: 81.6 kg (179 lb 12.8 oz)   Height: 5' (1.524 m)     Physical Exam  Constitutional:       Appearance: Normal appearance.   HENT:      Head: Normocephalic.      Comments: +alopecia wearing a wig     Nose: Nose normal.      Mouth/Throat:      Mouth: Mucous membranes are moist.   Eyes:      Extraocular Movements: Extraocular movements intact.      Conjunctiva/sclera: Conjunctivae normal.   Cardiovascular:      Rate and Rhythm: Normal rate and regular rhythm.   Pulmonary:      Effort: Pulmonary effort is normal.      Breath sounds: Normal breath sounds.   Chest:      Comments: Right chest wall mediport in place, left breast with inferior lumpectomy scar, well healed. Left axillary incision from LN biopsy also healed well. No masses palpable. Right breast with medial lower quadrant tenderness, there is some fullnes here but no mass, suspect just glandular tissue  Abdominal:      General: Bowel sounds are normal. There is no distension.      Palpations: Abdomen is soft.      Tenderness: There is no abdominal tenderness.   Musculoskeletal:         General: Normal range of motion.   Skin:     General: Skin is warm.   Neurological:      General: No focal deficit present.      Mental Status: She is alert and oriented to person, place, and time.   Psychiatric:         Mood and Affect: Mood normal.         Judgment: Judgment normal.     Lab Visit on 03/23/2023   Component Date Value    WBC 03/23/2023 4.8     RBC 03/23/2023 3.06 (L)     Hgb 03/23/2023 10.7 (L)     Hct 03/23/2023 33.6 (L)     MCV 03/23/2023 109.8 (H)     MCH 03/23/2023 35.0     MCHC 03/23/2023 31.8 (L)     RDW 03/23/2023 20.9 (H)     Platelet 03/23/2023 217     MPV  03/23/2023 9.0     Neut % 03/23/2023 40.6     Lymph % 03/23/2023 46.3     Mono % 03/23/2023 7.3     Eos % 03/23/2023 4.8     Basophil % 03/23/2023 0.6     Lymph # 03/23/2023 2.23     Neut # 03/23/2023 1.96 (L)     Mono # 03/23/2023 0.35     Eos # 03/23/2023 0.23     Baso # 03/23/2023 0.03     IG# 03/23/2023 0.02     IG% 03/23/2023 0.4           Assessment:       1. Malignant neoplasm of central portion of left breast in female, estrogen receptor positive      Plan:       Patient with Stage IA Left breast cancer triple positive s/p excisional biopsy of a cystic lesion that found cancer incidentally diagnosed 7/13/22. Tumor measured 3.0cm and SLN biopsies negative, Grade 3, strongly ER and HI positive and Her2 positive with high Ki67. There was a positive margin on the excisional biopsy.  Treatment with chemotherapy neoadjuvant started at [facility] with Dr. Gonzalez.    Patient received 2 cycles at [facility] then changed care to [facility]. She was having multiple side effects.   She does not have any steroids due to h/o allergy causing inflammation.   Patient had multiple delays due to multiple side effects, thrombocytopenia, requiring dose reduction, but finally completed 6 cycles on 1/25/23.  Surgery was delayed and finally done on 3/8/23. Patient had re-excision lumpectomy and there was no residual invasive malignancy.     Labs today with resolved thrombocytopenia, much improved anemia and WBC normal. CMP with mildly elevated calcium, otherwise good.   Plan to resume treatment with maintenance HP X 11 cycles since there was no residual disease.  Will repeat ECHO ASAP.  Recommend also OS + AI X 5 years followed by completion of AI to complete 10 years.   She had DAISY in 2012 for endometriosis but still had ovaries remaining functional prior to starting chemotherapy.   8/4/22 estradiol level 11, FSH 44, LH 38.     Resume treatment cycle 1 (cycle 7) next week on 3/29/23. No labs since her labs are much better.  Will also start Zoladex  once monthly on 3/29/23 and patient will start Femara.    I discussed most common side effects of aromatase inhibitor treatment including, but not limited to hot flashes, vaginal dryness, arthralgias and bone thinning.  Patient given information from [employer] on Femara and on Zoladex.     She is already on vitamin D replacement dose for low level. Will obtain recent labs done by her PCP which also included an estradiol level according to patient.   Will hold off on starting calcium due to mildly elevated level. Check PTH on RTC.     Patient starting adjuvant radiation on 4/6/23 with Dr. Norton. Plan is for 4 weeks of radiation.  Will need to order bone density after radiation is complete.    RTC 4/18/23 T/D visit with labs prior to maintenance cycle 2.   Will obtain right breast US for tenderness. She had a recent right breast MMG at [facility] that was benign on 2/28/23.   Patient is requesting to move all her breast imaging to [facility].     All questions answered at this time.        Flory Vargas MD

## 2023-03-20 ENCOUNTER — APPOINTMENT (OUTPATIENT)
Dept: RADIATION THERAPY | Facility: HOSPITAL | Age: 53
End: 2023-03-20
Attending: RADIOLOGY
Payer: COMMERCIAL

## 2023-03-23 ENCOUNTER — OFFICE VISIT (OUTPATIENT)
Dept: HEMATOLOGY/ONCOLOGY | Facility: CLINIC | Age: 53
End: 2023-03-23
Payer: COMMERCIAL

## 2023-03-23 VITALS
RESPIRATION RATE: 14 BRPM | SYSTOLIC BLOOD PRESSURE: 122 MMHG | DIASTOLIC BLOOD PRESSURE: 79 MMHG | TEMPERATURE: 98 F | OXYGEN SATURATION: 100 % | BODY MASS INDEX: 35.3 KG/M2 | HEIGHT: 60 IN | WEIGHT: 179.81 LBS | HEART RATE: 80 BPM

## 2023-03-23 DIAGNOSIS — Z17.0 MALIGNANT NEOPLASM OF CENTRAL PORTION OF LEFT BREAST IN FEMALE, ESTROGEN RECEPTOR POSITIVE: Primary | ICD-10-CM

## 2023-03-23 DIAGNOSIS — C50.112 MALIGNANT NEOPLASM OF CENTRAL PORTION OF LEFT BREAST IN FEMALE, ESTROGEN RECEPTOR POSITIVE: Primary | ICD-10-CM

## 2023-03-23 PROCEDURE — 3074F PR MOST RECENT SYSTOLIC BLOOD PRESSURE < 130 MM HG: ICD-10-PCS | Mod: CPTII,S$GLB,, | Performed by: INTERNAL MEDICINE

## 2023-03-23 PROCEDURE — 99999 PR PBB SHADOW E&M-EST. PATIENT-LVL V: CPT | Mod: PBBFAC,,, | Performed by: INTERNAL MEDICINE

## 2023-03-23 PROCEDURE — 3008F PR BODY MASS INDEX (BMI) DOCUMENTED: ICD-10-PCS | Mod: CPTII,S$GLB,, | Performed by: INTERNAL MEDICINE

## 2023-03-23 PROCEDURE — 1160F PR REVIEW ALL MEDS BY PRESCRIBER/CLIN PHARMACIST DOCUMENTED: ICD-10-PCS | Mod: CPTII,S$GLB,, | Performed by: INTERNAL MEDICINE

## 2023-03-23 PROCEDURE — 3078F PR MOST RECENT DIASTOLIC BLOOD PRESSURE < 80 MM HG: ICD-10-PCS | Mod: CPTII,S$GLB,, | Performed by: INTERNAL MEDICINE

## 2023-03-23 PROCEDURE — 99215 PR OFFICE/OUTPT VISIT, EST, LEVL V, 40-54 MIN: ICD-10-PCS | Mod: S$GLB,,, | Performed by: INTERNAL MEDICINE

## 2023-03-23 PROCEDURE — 3008F BODY MASS INDEX DOCD: CPT | Mod: CPTII,S$GLB,, | Performed by: INTERNAL MEDICINE

## 2023-03-23 PROCEDURE — 99215 OFFICE O/P EST HI 40 MIN: CPT | Mod: S$GLB,,, | Performed by: INTERNAL MEDICINE

## 2023-03-23 PROCEDURE — 99999 PR PBB SHADOW E&M-EST. PATIENT-LVL V: ICD-10-PCS | Mod: PBBFAC,,, | Performed by: INTERNAL MEDICINE

## 2023-03-23 PROCEDURE — 1160F RVW MEDS BY RX/DR IN RCRD: CPT | Mod: CPTII,S$GLB,, | Performed by: INTERNAL MEDICINE

## 2023-03-23 PROCEDURE — 3078F DIAST BP <80 MM HG: CPT | Mod: CPTII,S$GLB,, | Performed by: INTERNAL MEDICINE

## 2023-03-23 PROCEDURE — 3074F SYST BP LT 130 MM HG: CPT | Mod: CPTII,S$GLB,, | Performed by: INTERNAL MEDICINE

## 2023-03-23 PROCEDURE — 1159F MED LIST DOCD IN RCRD: CPT | Mod: CPTII,S$GLB,, | Performed by: INTERNAL MEDICINE

## 2023-03-23 PROCEDURE — 1159F PR MEDICATION LIST DOCUMENTED IN MEDICAL RECORD: ICD-10-PCS | Mod: CPTII,S$GLB,, | Performed by: INTERNAL MEDICINE

## 2023-03-23 RX ORDER — HEPARIN 100 UNIT/ML
500 SYRINGE INTRAVENOUS
Status: CANCELLED | OUTPATIENT
Start: 2023-03-29

## 2023-03-23 RX ORDER — LETROZOLE 2.5 MG/1
2.5 TABLET, FILM COATED ORAL DAILY
Qty: 30 TABLET | Refills: 3 | Status: SHIPPED | OUTPATIENT
Start: 2023-03-23 | End: 2023-07-31

## 2023-03-23 RX ORDER — DIPHENHYDRAMINE HYDROCHLORIDE 50 MG/ML
50 INJECTION INTRAMUSCULAR; INTRAVENOUS ONCE AS NEEDED
Status: CANCELLED | OUTPATIENT
Start: 2023-03-29

## 2023-03-23 RX ORDER — EPINEPHRINE 0.3 MG/.3ML
0.3 INJECTION SUBCUTANEOUS ONCE AS NEEDED
Status: CANCELLED | OUTPATIENT
Start: 2023-03-29

## 2023-03-23 RX ORDER — SODIUM CHLORIDE 0.9 % (FLUSH) 0.9 %
10 SYRINGE (ML) INJECTION
Status: CANCELLED | OUTPATIENT
Start: 2023-03-29

## 2023-03-23 RX ORDER — DIPHENHYDRAMINE HYDROCHLORIDE 50 MG/ML
12.5 INJECTION INTRAMUSCULAR; INTRAVENOUS
Status: CANCELLED
Start: 2023-03-29

## 2023-03-27 ENCOUNTER — HOSPITAL ENCOUNTER (OUTPATIENT)
Dept: CARDIOLOGY | Facility: HOSPITAL | Age: 53
Discharge: HOME | End: 2023-03-27
Attending: INTERNAL MEDICINE
Payer: COMMERCIAL

## 2023-03-27 DIAGNOSIS — C50.112 MALIGNANT NEOPLASM OF CENTRAL PORTION OF LEFT BREAST IN FEMALE, ESTROGEN RECEPTOR POSITIVE: ICD-10-CM

## 2023-03-27 DIAGNOSIS — Z17.0 MALIGNANT NEOPLASM OF CENTRAL PORTION OF LEFT BREAST IN FEMALE, ESTROGEN RECEPTOR POSITIVE: ICD-10-CM

## 2023-03-27 LAB
AV INDEX (PROSTH): 0.8
AV MEAN GRADIENT: 3 MMHG
AV PEAK GRADIENT: 5 MMHG
AV VALVE AREA: 2.5 CM2
AV VELOCITY RATIO: 0.81
CV ECHO LV RWT: 0.38 CM
DOP CALC AO PEAK VEL: 1.08 M/S
DOP CALC AO VTI: 23.5 CM
DOP CALC LVOT AREA: 3.1 CM2
DOP CALC LVOT DIAMETER: 2 CM
DOP CALC LVOT PEAK VEL: 0.88 M/S
DOP CALC LVOT STROKE VOLUME: 58.72 CM3
DOP CALC MV VTI: 18.2 CM
DOP CALCLVOT PEAK VEL VTI: 18.7 CM
E WAVE DECELERATION TIME: 161 MSEC
E/A RATIO: 1.07
E/E' RATIO: 5.65 M/S
ECHO LV POSTERIOR WALL: 0.84 CM (ref 0.6–1.1)
EJECTION FRACTION: 57 %
FRACTIONAL SHORTENING: 44 % (ref 28–44)
INTERVENTRICULAR SEPTUM: 0.84 CM (ref 0.6–1.1)
LEFT ATRIUM SIZE: 3 CM
LEFT INTERNAL DIMENSION IN SYSTOLE: 2.47 CM (ref 2.1–4)
LEFT VENTRICLE DIASTOLIC VOLUME: 88.2 ML
LEFT VENTRICLE SYSTOLIC VOLUME: 21.7 ML
LEFT VENTRICULAR INTERNAL DIMENSION IN DIASTOLE: 4.41 CM (ref 3.5–6)
LEFT VENTRICULAR MASS: 117.16 G
LV LATERAL E/E' RATIO: 5 M/S
LV SEPTAL E/E' RATIO: 6.5 M/S
LVOT MG: 2 MMHG
LVOT MV: 0.57 CM/S
MV MEAN GRADIENT: 2 MMHG
MV PEAK A VEL: 0.61 M/S
MV PEAK E VEL: 0.65 M/S
MV PEAK GRADIENT: 3 MMHG
MV STENOSIS PRESSURE HALF TIME: 49 MS
MV VALVE AREA BY CONTINUITY EQUATION: 3.23 CM2
MV VALVE AREA P 1/2 METHOD: 4.49 CM2
PISA TR MAX VEL: 2.4 M/S
PV PEAK VELOCITY: 0.9 CM/S
RA PRESSURE: 3 MMHG
TDI LATERAL: 0.13 M/S
TDI SEPTAL: 0.1 M/S
TDI: 0.12 M/S
TR MAX PG: 23 MMHG
TRICUSPID ANNULAR PLANE SYSTOLIC EXCURSION: 2.67 CM
TV REST PULMONARY ARTERY PRESSURE: 26 MMHG

## 2023-03-27 PROCEDURE — 93356 MYOCRD STRAIN IMG SPCKL TRCK: CPT | Mod: ,,, | Performed by: STUDENT IN AN ORGANIZED HEALTH CARE EDUCATION/TRAINING PROGRAM

## 2023-03-27 PROCEDURE — 93306 ECHO (CUPID ONLY): ICD-10-PCS | Mod: 26,,, | Performed by: STUDENT IN AN ORGANIZED HEALTH CARE EDUCATION/TRAINING PROGRAM

## 2023-03-27 PROCEDURE — 93356 ECHO (CUPID ONLY): ICD-10-PCS | Mod: ,,, | Performed by: STUDENT IN AN ORGANIZED HEALTH CARE EDUCATION/TRAINING PROGRAM

## 2023-03-27 PROCEDURE — 93306 TTE W/DOPPLER COMPLETE: CPT | Mod: 26,,, | Performed by: STUDENT IN AN ORGANIZED HEALTH CARE EDUCATION/TRAINING PROGRAM

## 2023-03-27 PROCEDURE — 93356 MYOCRD STRAIN IMG SPCKL TRCK: CPT

## 2023-03-29 ENCOUNTER — INFUSION (OUTPATIENT)
Dept: INFUSION THERAPY | Facility: HOSPITAL | Age: 53
End: 2023-03-29
Attending: FAMILY MEDICINE
Payer: COMMERCIAL

## 2023-03-29 VITALS
TEMPERATURE: 98 F | OXYGEN SATURATION: 99 % | WEIGHT: 178.13 LBS | BODY MASS INDEX: 34.78 KG/M2 | HEART RATE: 87 BPM | DIASTOLIC BLOOD PRESSURE: 79 MMHG | SYSTOLIC BLOOD PRESSURE: 121 MMHG | RESPIRATION RATE: 18 BRPM

## 2023-03-29 DIAGNOSIS — C50.112 MALIGNANT NEOPLASM OF CENTRAL PORTION OF LEFT BREAST IN FEMALE, ESTROGEN RECEPTOR POSITIVE: Primary | ICD-10-CM

## 2023-03-29 DIAGNOSIS — Z17.0 MALIGNANT NEOPLASM OF CENTRAL PORTION OF LEFT BREAST IN FEMALE, ESTROGEN RECEPTOR POSITIVE: Primary | ICD-10-CM

## 2023-03-29 PROCEDURE — 96413 CHEMO IV INFUSION 1 HR: CPT

## 2023-03-29 PROCEDURE — 25000003 PHARM REV CODE 250: Performed by: INTERNAL MEDICINE

## 2023-03-29 PROCEDURE — 63600175 PHARM REV CODE 636 W HCPCS: Performed by: NURSE PRACTITIONER

## 2023-03-29 PROCEDURE — 96417 CHEMO IV INFUS EACH ADDL SEQ: CPT

## 2023-03-29 PROCEDURE — 77334 RADIATION TREATMENT AID(S): CPT | Performed by: RADIOLOGY

## 2023-03-29 PROCEDURE — 77290 THER RAD SIMULAJ FIELD CPLX: CPT | Performed by: RADIOLOGY

## 2023-03-29 PROCEDURE — 96375 TX/PRO/DX INJ NEW DRUG ADDON: CPT

## 2023-03-29 PROCEDURE — 63600175 PHARM REV CODE 636 W HCPCS: Performed by: INTERNAL MEDICINE

## 2023-03-29 PROCEDURE — 96402 CHEMO HORMON ANTINEOPL SQ/IM: CPT

## 2023-03-29 RX ORDER — SODIUM CHLORIDE 0.9 % (FLUSH) 0.9 %
10 SYRINGE (ML) INJECTION
Status: DISCONTINUED | OUTPATIENT
Start: 2023-03-29 | End: 2023-03-29 | Stop reason: HOSPADM

## 2023-03-29 RX ORDER — DIPHENHYDRAMINE HYDROCHLORIDE 50 MG/ML
50 INJECTION INTRAMUSCULAR; INTRAVENOUS ONCE AS NEEDED
Status: DISCONTINUED | OUTPATIENT
Start: 2023-03-29 | End: 2023-03-29 | Stop reason: HOSPADM

## 2023-03-29 RX ORDER — EPINEPHRINE 0.3 MG/.3ML
0.3 INJECTION SUBCUTANEOUS ONCE AS NEEDED
Status: DISCONTINUED | OUTPATIENT
Start: 2023-03-29 | End: 2023-03-29 | Stop reason: HOSPADM

## 2023-03-29 RX ORDER — DIPHENHYDRAMINE HYDROCHLORIDE 50 MG/ML
12.5 INJECTION INTRAMUSCULAR; INTRAVENOUS
Status: COMPLETED | OUTPATIENT
Start: 2023-03-29 | End: 2023-03-29

## 2023-03-29 RX ORDER — HEPARIN 100 UNIT/ML
500 SYRINGE INTRAVENOUS
Status: DISCONTINUED | OUTPATIENT
Start: 2023-03-29 | End: 2023-03-29 | Stop reason: HOSPADM

## 2023-03-29 RX ORDER — ONDANSETRON 2 MG/ML
8 INJECTION INTRAMUSCULAR; INTRAVENOUS ONCE
Status: COMPLETED | OUTPATIENT
Start: 2023-03-29 | End: 2023-03-29

## 2023-03-29 RX ADMIN — PERTUZUMAB 420 MG: 30 INJECTION, SOLUTION, CONCENTRATE INTRAVENOUS at 12:03

## 2023-03-29 RX ADMIN — GOSERELIN ACETATE 3.6 MG: 3.6 IMPLANT SUBCUTANEOUS at 10:03

## 2023-03-29 RX ADMIN — ONDANSETRON 8 MG: 2 INJECTION INTRAMUSCULAR; INTRAVENOUS at 12:03

## 2023-03-29 RX ADMIN — DIPHENHYDRAMINE HYDROCHLORIDE 12.5 MG: 50 INJECTION, SOLUTION INTRAMUSCULAR; INTRAVENOUS at 11:03

## 2023-03-29 RX ADMIN — TRASTUZUMAB 484 MG: 420 INJECTION, POWDER, LYOPHILIZED, FOR SOLUTION INTRAVENOUS at 11:03

## 2023-03-29 NOTE — PLAN OF CARE
Patient received C1 Zoladex, C7 D1, tolerated well. No adverse reaction noted. Next appointment given. Discharge in stable condition.

## 2023-03-30 PROCEDURE — 77295 3-D RADIOTHERAPY PLAN: CPT | Performed by: RADIOLOGY

## 2023-03-30 PROCEDURE — 77300 RADIATION THERAPY DOSE PLAN: CPT | Performed by: RADIOLOGY

## 2023-03-30 PROCEDURE — 77334 RADIATION TREATMENT AID(S): CPT | Performed by: RADIOLOGY

## 2023-04-02 ENCOUNTER — OFFICE VISIT (OUTPATIENT)
Dept: URGENT CARE | Facility: CLINIC | Age: 53
End: 2023-04-02
Payer: COMMERCIAL

## 2023-04-02 VITALS
DIASTOLIC BLOOD PRESSURE: 82 MMHG | HEIGHT: 60 IN | SYSTOLIC BLOOD PRESSURE: 134 MMHG | RESPIRATION RATE: 20 BRPM | BODY MASS INDEX: 34.95 KG/M2 | WEIGHT: 178 LBS | TEMPERATURE: 98 F | OXYGEN SATURATION: 99 % | HEART RATE: 98 BPM

## 2023-04-02 DIAGNOSIS — R05.9 COUGH, UNSPECIFIED TYPE: ICD-10-CM

## 2023-04-02 DIAGNOSIS — J40 BRONCHITIS: ICD-10-CM

## 2023-04-02 DIAGNOSIS — J02.9 PHARYNGITIS, UNSPECIFIED ETIOLOGY: Primary | ICD-10-CM

## 2023-04-02 DIAGNOSIS — J01.90 ACUTE NON-RECURRENT SINUSITIS, UNSPECIFIED LOCATION: ICD-10-CM

## 2023-04-02 LAB
CTP QC/QA: YES
MOLECULAR STREP A: NEGATIVE

## 2023-04-02 PROCEDURE — 99203 OFFICE O/P NEW LOW 30 MIN: CPT | Mod: ,,,

## 2023-04-02 PROCEDURE — 87651 POCT STREP A MOLECULAR: ICD-10-PCS | Mod: QW,,,

## 2023-04-02 PROCEDURE — 87651 STREP A DNA AMP PROBE: CPT | Mod: QW,,,

## 2023-04-02 PROCEDURE — 99203 PR OFFICE/OUTPT VISIT, NEW, LEVL III, 30-44 MIN: ICD-10-PCS | Mod: ,,,

## 2023-04-02 RX ORDER — DOXYCYCLINE 100 MG/1
100 CAPSULE ORAL EVERY 12 HOURS
Qty: 14 CAPSULE | Refills: 0 | Status: SHIPPED | OUTPATIENT
Start: 2023-04-02 | End: 2023-04-09

## 2023-04-02 RX ORDER — ALBUTEROL SULFATE 90 UG/1
2 AEROSOL, METERED RESPIRATORY (INHALATION) EVERY 6 HOURS PRN
Qty: 18 G | Refills: 0 | Status: ON HOLD | OUTPATIENT
Start: 2023-04-02 | End: 2023-09-27 | Stop reason: HOSPADM

## 2023-04-02 RX ORDER — PROMETHAZINE HYDROCHLORIDE AND DEXTROMETHORPHAN HYDROBROMIDE 6.25; 15 MG/5ML; MG/5ML
5 SYRUP ORAL EVERY 6 HOURS PRN
Qty: 118 ML | Refills: 0 | Status: SHIPPED | OUTPATIENT
Start: 2023-04-02 | End: 2023-04-12

## 2023-04-02 NOTE — PROGRESS NOTES
Subjective:      Patient ID: Nita Dejesus is a 52 y.o. female.    Vitals:  height is 5' (1.524 m) and weight is 80.7 kg (178 lb). Her temperature is 98.3 °F (36.8 °C). Her blood pressure is 134/82 and her pulse is 98. Her respiration is 20 and oxygen saturation is 99%.     Chief Complaint: Sinus Problem (Pt presents to clinic with nasal/ chest congestion, sore throat, cough, ringing in ears starting last night. Declines covid testing in clinic. )    Patient is a 52-year-old female with past medical history of her 2 positive breast cancer currently finished last chemo treatment however has radiation scheduled for Wednesday.  Patient reports  has been sick for the last week.  She developed nasal congestion, sore throat, cough, bilateral frontal and maxillary sinus pressure, blood-tinged mucus production yesterday.  Patient reports chest discomfort with taking deep breath.  Patient denies chest pain, shortness breast, neck stiffness, rash, GI symptoms cough fever, body aches.  Patient does complain of nausea from postnasal drip.  Patient declines any COVID testing in clinic.    Sinus Problem  Associated symptoms include coughing, sinus pressure and a sore throat.     HENT:  Positive for postnasal drip, sinus pressure and sore throat.    Respiratory:  Positive for cough.     Objective:     Physical Exam   Constitutional: She is oriented to person, place, and time. She appears well-developed. She is cooperative.  Non-toxic appearance. She does not appear ill. No distress.   HENT:   Head: Normocephalic and atraumatic.   Ears:   Right Ear: Hearing, tympanic membrane, external ear and ear canal normal.   Left Ear: Hearing, tympanic membrane, external ear and ear canal normal.   Nose: Nose normal. No mucosal edema, rhinorrhea or nasal deformity. No epistaxis. Right sinus exhibits no maxillary sinus tenderness and no frontal sinus tenderness. Left sinus exhibits no maxillary sinus tenderness and no frontal sinus  tenderness.   Mouth/Throat: Uvula is midline, oropharynx is clear and moist and mucous membranes are normal. No trismus in the jaw. Normal dentition. No uvula swelling. No oropharyngeal exudate, posterior oropharyngeal edema or posterior oropharyngeal erythema.   Eyes: Conjunctivae and lids are normal. No scleral icterus.   Neck: Trachea normal and phonation normal. Neck supple. No edema present. No erythema present. No neck rigidity present.   Cardiovascular: Normal rate, regular rhythm, normal heart sounds and normal pulses.   Pulmonary/Chest: Effort normal. No respiratory distress. She has no decreased breath sounds. She has no rhonchi. She has rales.         Comments: Rales right posterior lung base    Abdominal: Normal appearance.   Musculoskeletal: Normal range of motion.         General: No deformity. Normal range of motion.   Neurological: She is alert and oriented to person, place, and time. She exhibits normal muscle tone. Coordination normal.   Skin: Skin is warm, dry, intact, not diaphoretic and not pale.   Psychiatric: Her speech is normal and behavior is normal. Judgment and thought content normal.   Nursing note and vitals reviewed.    Assessment:     1. Pharyngitis, unspecified etiology    2. Acute non-recurrent sinusitis, unspecified location    3. Cough, unspecified type    4. Bronchitis        Plan:       Pharyngitis, unspecified etiology  -     POCT Strep A, Molecular    Acute non-recurrent sinusitis, unspecified location  -     doxycycline (MONODOX) 100 MG capsule; Take 1 capsule (100 mg total) by mouth every 12 (twelve) hours. for 7 days  Dispense: 14 capsule; Refill: 0    Cough, unspecified type  -     promethazine-dextromethorphan (PROMETHAZINE-DM) 6.25-15 mg/5 mL Syrp; Take 5 mLs by mouth every 6 (six) hours as needed (cough).  Dispense: 118 mL; Refill: 0  -     albuterol (VENTOLIN HFA) 90 mcg/actuation inhaler; Inhale 2 puffs into the lungs every 6 (six) hours as needed for Wheezing. Rescue   Dispense: 18 g; Refill: 0    Bronchitis  -     promethazine-dextromethorphan (PROMETHAZINE-DM) 6.25-15 mg/5 mL Syrp; Take 5 mLs by mouth every 6 (six) hours as needed (cough).  Dispense: 118 mL; Refill: 0  -     albuterol (VENTOLIN HFA) 90 mcg/actuation inhaler; Inhale 2 puffs into the lungs every 6 (six) hours as needed for Wheezing. Rescue  Dispense: 18 g; Refill: 0      Lengthy discussion with patient about testing for COVID-19 in clinic due to symptoms and patient's medical history.  Patient declines, reports that she has had COVID-19 3 times in the past, this does not feel like COVID and she does not want antivirals for COVID therapy.  Patient reports she knows this is a bacterial infection and as she is beginning radiation treatment in a few days, she would like antibiotics to ensure symptoms do not worsen.    No x-ray capability at this time, however Due to medical history, rales to right posterior lung base, worry for possible developing pneumonia will treat with antibiotics.  Patient has lengthy allergy list with 22 allergies, reports she has tolerated Levaquin and doxycycline well in the past.        Drink plenty of fluids. Get plenty of rest.   Prescription cough syrup nightly as it may make you drowsy.  Over-the-counter cough and cold medication as needed for daytime cough.  Claritin or Zyrtec 10 mg for nasal congestion.  Nasal spray such as Nasacort or Flonase for congestion.  Warm saltwater gargles for sore throat.  Warm water with honey to help coat the throat.  Throat lozenges.  Tylenol or ibuprofen as needed for sore throat and fever.   Chloraseptic Spray for worsening sore throat  Call or return to clinic as needed   Go to the ER with any significant change or worsening of symptoms.   Follow up with your primary care doctor.

## 2023-04-02 NOTE — PATIENT INSTRUCTIONS
Negative strep swab  Due to symptoms, bronchitis versus worry for possible pneumonia, will treat with antibiotics    Take antibiotics with food.  Drink plenty of fluids. Get plenty of rest.   Prescription cough syrup nightly as it may make you drowsy.  Over-the-counter cough and cold medication as needed for daytime cough.  Claritin or Zyrtec 10 mg for nasal congestion.  Nasal spray such as Nasacort or Flonase for congestion.  Warm saltwater gargles for sore throat.  Warm water with honey to help coat the throat.  Throat lozenges.  Tylenol or ibuprofen as needed for sore throat and fever.   Chloraseptic Spray for worsening sore throat  Call or return to clinic as needed   Go to the ER with any significant change or worsening of symptoms.   Follow up with your primary care doctor.

## 2023-04-10 ENCOUNTER — OFFICE VISIT (OUTPATIENT)
Dept: RADIATION THERAPY | Facility: HOSPITAL | Age: 53
End: 2023-04-10
Attending: RADIOLOGY
Payer: COMMERCIAL

## 2023-04-12 PROCEDURE — 77412 RADIATION TX DELIVERY LVL 3: CPT | Performed by: RADIOLOGY

## 2023-04-12 PROCEDURE — 77280 THER RAD SIMULAJ FIELD SMPL: CPT | Performed by: RADIOLOGY

## 2023-04-13 PROCEDURE — 77412 RADIATION TX DELIVERY LVL 3: CPT | Performed by: RADIOLOGY

## 2023-04-13 NOTE — PROGRESS NOTES
Subjective:       Patient ID: Nita Dejesus is a 52 y.o. female.    Previous Oncologist: Dr. Katlyn Gonzalez at [facility]  Surgeon: Dr. Kelly Santos    Left Breast Cancer Stage IA([id number]) Triple Positive--22  Biopsy/Surgery/pathology:   1. Excisional biopsy left breast mass/left breast cyst done 22--invasive ductal carcinoma, Grade 3, measures 3.0cm, a 0.6cm region of cauterized carcinoma is present at the inked superior-deep margin, cyst with reactive changes, suggestive of previous lumpectomy site, fluid left breast cyst with rare atypical cells present, suspicious for carcinoma, ER 95%, MA 27%, Her2 3+ by IHC, positive, Ki67 51%.   2. Left SLN biopsies done 22--3 benign lymph nodes.   3. Re-excision lumpectomy done 3/8/23--no residual invasive malignancy identified.   Imagin. Bilateral diagnostic MMG done at [facility] 22--left inferior breast large, minimally complex cystic lesion at 6:00 measures 4.4X3.5X4.2cm, corresponding to the palpable area, appears benign. Routine screening MMG recommended.   2. MRI breasts bilateral at [facility] 22--post-surgical seroma at 12:00 position of left breast s/p recent excisional biopsy, no definite suspicious enhancing masses or areas of nonmass enhancement of left breast, and no suspicious areas in right breast, BIRADS 6.   3. CT C/A/P w/ contrast 22 at [facility]--no thoracic metastatic disease, fatty infiltration of liver, small to moderate-sized hiatal hernia, no metastatic disease.   4. NM Bone scan whole body done 22 at [facility]--activity in cervical and lumbar spines likely degenerative, no anatomic correlate seen on CT scan in lumbar spines, suggest correlation with C-spine Xrays, increase tracer w/n both feet, likely degnerative change, physiologic activity of urinary tract.  5. CT A/P w/ contrast 22 at [facility]--no acute abdominopelvic pathology or change compared to recent CT from 22, fairly large amount of stool in colon may reflect  constipation, large hiatal hernia, diffuse hepatic steatosis, post-cholecystectomy, post hysterectomy, mild thoracic and lumbar degenerative disease.   6. Bilateral diagnostic MMG/Left breast US 2/28/23--No suspicious masses, microcalcifications, or other abnormalities are seen  within the right or left breast. Postsurgical changes are noted at the  6:00 position of the left breast at site of prior excisional biopsy with positive margins.     ECHO:  08/18/22--EF 55-65%.  11/17/22--EF 60%.  03/23/23--EF 55-60%.    Genetic testing: [employer] done 7/21/22 negative for any pathogenic variants.     Treatment history:  Left breast excisional biopsy done 7/13/22  Left SLN biopsies done 8/8/22.  2 units PRBC 12/19/22.  TCHP X 6 cycles completed only 8/23/22--1/25/23 (multiple treatment delays for thrombocytopenia, despite dose reductions, multiple side effects).  Re-excision lumpectomy done 3/8/23--no residual disease.     Current treatment plan:  Maintenance HP X 11 cycles started 3/29/23  Cycle 2 due 4/19/23 (cycle 8 in plan)  2.  Adjuvant radiation started 4/12/23  3. Adjuvant OS Zoladex monthly + Femara started 3/29/23 (DAISY for endometriosis, ovaries remain)    Chief Complaint: Other Misc (Pt reports that she had URI for 2.5 weeks and that it was worse than Covid.)      HPI  Patient presents for follow-up of breast cancer. She started radiation last week, was delayed due to a URI. She is otherwise doing okay but has been having some room spinning dizziness on occasion, upon standing. Discussed with patient to be sure she is keeping well-hydrated. She started on the Zoladex and Femara and only c/o some hot flashes. Her hair is growing back.       Past Medical History:   Diagnosis Date    Allergy     Anemia     Anxiety     Cancer     Clotting disorder     GERD (gastroesophageal reflux disease)     Neuromuscular disorder       Past Surgical History:   Procedure Laterality Date    CHOLECYSTECTOMY      HERNIA REPAIR       "HYSTERECTOMY      lymphectomy Left     REDUCTION OF BOTH BREASTS  2018    TUBAL LIGATION Bilateral 1993     Family History   Problem Relation Age of Onset    Hypertension Mother     Lupus Mother     Hypoparathyroidism Mother     Diabetes Father     Heart disease Father     Prostate cancer Father     Kidney disease Father     Hodgkin's lymphoma Brother     Pancreatitis Brother      Social History     Socioeconomic History    Marital status:    Tobacco Use    Smoking status: Every Day     Packs/day: 0.50     Types: Cigarettes     Last attempt to quit: 2022     Years since quittin.3    Smokeless tobacco: Never   Substance and Sexual Activity    Alcohol use: Not Currently    Drug use: Never       Review of patient's allergies indicates:   Allergen Reactions    Corticosteroids (glucocorticoids) Hives, Other (See Comments) and Swelling     Patient states "Severe Inflammation"      Ketorolac Diarrhea, Nausea And Vomiting, Other (See Comments) and Swelling     Migraines      Peanut Anaphylaxis    Penicillins Anaphylaxis, Diarrhea, Hives, Nausea And Vomiting and Swelling    Shiitake mushroom (lentinus edodes) Anaphylaxis    Tramadol Diarrhea, Itching, Nausea And Vomiting, Rash and Swelling    Latex Other (See Comments)     Skin blisters      Macrolide antibiotics Hives and Rash    Adhesive Other (See Comments)     Blisters, skin tears; CANNOT USE PAPER TAPE    Anticoag citrate phos dextrose     Aspartame     Egg     Erythromycin      Other reaction(s): Unknown    Estrogens     Other omega-3s      Anticoagulant   Patient states "low blood pressure"    Pork derived (porcine)     Sucralose Other (See Comments)    Amitriptyline Anxiety and Other (See Comments)     Severe    Other reaction(s): Unknown    Aspirin Nausea Only and Other (See Comments)    Eszopiclone Anxiety and Itching    Heparin analogues Other (See Comments) and Palpitations    Topiramate Anxiety, Other (See Comments) and Palpitations     " Shaking        Current Outpatient Medications on File Prior to Visit   Medication Sig Dispense Refill    albuterol (VENTOLIN HFA) 90 mcg/actuation inhaler Inhale 2 puffs into the lungs every 6 (six) hours as needed for Wheezing. Rescue 18 g 0    butalbital-acetaminophen-caffeine -40 mg (FIORICET, ESGIC) -40 mg per tablet Take by mouth.      clonazePAM (KLONOPIN) 0.5 MG tablet Take 0.5 mg by mouth every 8 (eight) hours as needed.      diclofenac (VOLTAREN) 75 MG EC tablet       diphenhydrAMINE (BENADRYL) 50 MG capsule Take 50 mg by mouth every 6 (six) hours as needed.      gabapentin (NEURONTIN) 300 MG capsule 3 (three) times daily.      gabapentin (NEURONTIN) 600 MG tablet Take 600 mg by mouth 3 (three) times daily.      HYDROcodone-acetaminophen (NORCO) 5-325 mg per tablet Take 1 tablet by mouth every 6 (six) hours as needed for Pain. 60 tablet 0    letrozole (FEMARA) 2.5 mg Tab Take 1 tablet (2.5 mg total) by mouth once daily. 30 tablet 3    omeprazole (PRILOSEC) 40 MG capsule Take 1 capsule (40 mg total) by mouth once daily. 30 capsule 6    ondansetron (ZOFRAN-ODT) 4 MG TbDL Take 4 mg by mouth every 8 (eight) hours as needed.      tiZANidine (ZANAFLEX) 4 MG tablet Take 12 mg by mouth 3 (three) times daily.      (Magic mouthwash) 1:1:1 diphenhydrAMINE(Benadryl) 12.5mg/5ml liq, aluminum & magnesium hydroxide-simethicone (Maalox), LIDOcaine viscous 2% Swish and spit 10 mLs every 4 (four) hours as needed. for mouth sores (Patient not taking: Reported on 4/18/2023) 500 mL 1    diphenoxylate-atropine 2.5-0.025 mg (LOMOTIL) 2.5-0.025 mg per tablet Take 1 tablet by mouth 4 (four) times daily as needed for Diarrhea. (Patient not taking: Reported on 4/18/2023) 30 tablet 1     No current facility-administered medications on file prior to visit.      Review of Systems   Constitutional:  Negative for appetite change and unexpected weight change.   HENT:  Negative for mouth sores.    Respiratory:  Negative for  cough.    Gastrointestinal:  Negative for abdominal pain and nausea.   Genitourinary:  Positive for hot flashes. Negative for frequency.   Musculoskeletal:  Negative for back pain.        Bone pains/aches from Neulasta   Integumentary:  Negative for rash.   Neurological:  Positive for dizziness. Negative for headaches.   Hematological:  Negative for adenopathy.   Psychiatric/Behavioral:  The patient is not nervous/anxious.        Vitals:    04/18/23 0941   BP: 125/85   Pulse: 87   Resp: 14   Temp: 98.1 °F (36.7 °C)   SpO2: 100%   Weight: 79.8 kg (176 lb)   Height: 5' (1.524 m)       Physical Exam  Constitutional:       Appearance: Normal appearance.   HENT:      Head: Normocephalic.      Comments: +alopecia wearing a wig     Nose: Nose normal.      Mouth/Throat:      Mouth: Mucous membranes are moist.   Eyes:      Extraocular Movements: Extraocular movements intact.      Conjunctiva/sclera: Conjunctivae normal.   Cardiovascular:      Rate and Rhythm: Normal rate and regular rhythm.   Pulmonary:      Effort: Pulmonary effort is normal.      Breath sounds: Normal breath sounds.   Chest:      Comments: Right chest wall mediport in place, left breast with inferior lumpectomy scar, well healed. Left axillary incision from LN biopsy also healed well. No masses palpable.   Abdominal:      General: Bowel sounds are normal. There is no distension.      Palpations: Abdomen is soft.      Tenderness: There is no abdominal tenderness.   Musculoskeletal:         General: Normal range of motion.   Skin:     General: Skin is warm.   Neurological:      General: No focal deficit present.      Mental Status: She is alert and oriented to person, place, and time.   Psychiatric:         Mood and Affect: Mood normal.         Judgment: Judgment normal.     Lab Visit on 04/18/2023   Component Date Value    WBC 04/18/2023 6.0     RBC 04/18/2023 3.44 (L)     Hgb 04/18/2023 11.6 (L)     Hct 04/18/2023 36.6 (L)     MCV 04/18/2023 106.4 (H)      MCH 04/18/2023 33.7 (H)     MCHC 04/18/2023 31.7 (L)     RDW 04/18/2023 15.1     Platelet 04/18/2023 196     MPV 04/18/2023 8.4     Neut % 04/18/2023 59.2     Lymph % 04/18/2023 26.3     Mono % 04/18/2023 6.2     Eos % 04/18/2023 7.5     Basophil % 04/18/2023 0.5     Lymph # 04/18/2023 1.58     Neut # 04/18/2023 3.55     Mono # 04/18/2023 0.37     Eos # 04/18/2023 0.45     Baso # 04/18/2023 0.03     IG# 04/18/2023 0.02     IG% 04/18/2023 0.3           Assessment:       1. Malignant neoplasm of central portion of left breast in female, estrogen receptor positive      Plan:       Patient with Stage IA Left breast cancer triple positive s/p excisional biopsy of a cystic lesion that found cancer incidentally diagnosed 7/13/22. Tumor measured 3.0cm and SLN biopsies negative, Grade 3, strongly ER and NH positive and Her2 positive with high Ki67. There was a positive margin on the excisional biopsy.  Treatment with chemotherapy neoadjuvant started at [facility] with Dr. Gonzalez.    Patient received 2 cycles at [facility] then changed care to [facility]. She was having multiple side effects.   She does not have any steroids due to h/o allergy causing inflammation.   Patient had multiple delays due to multiple side effects, thrombocytopenia, requiring dose reduction, but finally completed 6 cycles on 1/25/23.  Surgery was delayed and finally done on 3/8/23. Patient had re-excision lumpectomy and there was no residual invasive malignancy.     Treatment resumced with maintenance HP X 11 cycles 3/29/23.   Repeat ECHO from 3/27/23 normal EF. Repeat will be needed in 6/2023.    Recommended also OS + AI X 5 years followed by completion of AI to complete 10 years.   She had DAISY in 2012 for endometriosis but still had ovaries remaining functional prior to starting chemotherapy.   8/4/22 estradiol level 11, FSH 44, LH 38.   Zoladex/Femara started on 3/29/23.   So far she is tolerating well with only some hot flashes.     Patient started radiation on  4/12/23, plan for 4 weeks.  CBCdiff shows anemia improving, normal WBC and normal platelets.   Proceed with cycle 2 maintenance HP tomorrow (cycle 8 in system).  Schedule labs and treatment cycle 3 5/10/23.     RTC 6 weeks T/D visit with labs prior to cycle 4 maintenance.   Continue Zoladex, next due 4/26/23.  Continue Femara.  Continue vitamin D. Calcium stopped due to high level.     Will order DEXA as baseline prior to RTC. If osteopenia, consider adjuvant Zometa X 2 years.       All questions answered at this time.        Flory Vargas MD

## 2023-04-14 PROCEDURE — 77412 RADIATION TX DELIVERY LVL 3: CPT | Performed by: RADIOLOGY

## 2023-04-17 PROCEDURE — 77336 RADIATION PHYSICS CONSULT: CPT | Performed by: RADIOLOGY

## 2023-04-17 PROCEDURE — 77412 RADIATION TX DELIVERY LVL 3: CPT | Performed by: RADIOLOGY

## 2023-04-18 ENCOUNTER — TELEPHONE (OUTPATIENT)
Dept: HEMATOLOGY/ONCOLOGY | Facility: CLINIC | Age: 53
End: 2023-04-18

## 2023-04-18 ENCOUNTER — OFFICE VISIT (OUTPATIENT)
Dept: HEMATOLOGY/ONCOLOGY | Facility: CLINIC | Age: 53
End: 2023-04-18
Payer: COMMERCIAL

## 2023-04-18 VITALS
BODY MASS INDEX: 34.55 KG/M2 | DIASTOLIC BLOOD PRESSURE: 85 MMHG | WEIGHT: 176 LBS | RESPIRATION RATE: 14 BRPM | TEMPERATURE: 98 F | HEART RATE: 87 BPM | SYSTOLIC BLOOD PRESSURE: 125 MMHG | OXYGEN SATURATION: 100 % | HEIGHT: 60 IN

## 2023-04-18 DIAGNOSIS — C50.112 MALIGNANT NEOPLASM OF CENTRAL PORTION OF LEFT BREAST IN FEMALE, ESTROGEN RECEPTOR POSITIVE: Primary | ICD-10-CM

## 2023-04-18 DIAGNOSIS — Z17.0 MALIGNANT NEOPLASM OF CENTRAL PORTION OF LEFT BREAST IN FEMALE, ESTROGEN RECEPTOR POSITIVE: Primary | ICD-10-CM

## 2023-04-18 PROCEDURE — 3074F SYST BP LT 130 MM HG: CPT | Mod: CPTII,S$GLB,, | Performed by: INTERNAL MEDICINE

## 2023-04-18 PROCEDURE — 3079F PR MOST RECENT DIASTOLIC BLOOD PRESSURE 80-89 MM HG: ICD-10-PCS | Mod: CPTII,S$GLB,, | Performed by: INTERNAL MEDICINE

## 2023-04-18 PROCEDURE — 1159F MED LIST DOCD IN RCRD: CPT | Mod: CPTII,S$GLB,, | Performed by: INTERNAL MEDICINE

## 2023-04-18 PROCEDURE — 1160F RVW MEDS BY RX/DR IN RCRD: CPT | Mod: CPTII,S$GLB,, | Performed by: INTERNAL MEDICINE

## 2023-04-18 PROCEDURE — 99999 PR PBB SHADOW E&M-EST. PATIENT-LVL V: CPT | Mod: PBBFAC,,, | Performed by: INTERNAL MEDICINE

## 2023-04-18 PROCEDURE — 3008F PR BODY MASS INDEX (BMI) DOCUMENTED: ICD-10-PCS | Mod: CPTII,S$GLB,, | Performed by: INTERNAL MEDICINE

## 2023-04-18 PROCEDURE — 3008F BODY MASS INDEX DOCD: CPT | Mod: CPTII,S$GLB,, | Performed by: INTERNAL MEDICINE

## 2023-04-18 PROCEDURE — 99215 PR OFFICE/OUTPT VISIT, EST, LEVL V, 40-54 MIN: ICD-10-PCS | Mod: S$GLB,,, | Performed by: INTERNAL MEDICINE

## 2023-04-18 PROCEDURE — 3079F DIAST BP 80-89 MM HG: CPT | Mod: CPTII,S$GLB,, | Performed by: INTERNAL MEDICINE

## 2023-04-18 PROCEDURE — 77412 RADIATION TX DELIVERY LVL 3: CPT | Performed by: RADIOLOGY

## 2023-04-18 PROCEDURE — 1159F PR MEDICATION LIST DOCUMENTED IN MEDICAL RECORD: ICD-10-PCS | Mod: CPTII,S$GLB,, | Performed by: INTERNAL MEDICINE

## 2023-04-18 PROCEDURE — 1160F PR REVIEW ALL MEDS BY PRESCRIBER/CLIN PHARMACIST DOCUMENTED: ICD-10-PCS | Mod: CPTII,S$GLB,, | Performed by: INTERNAL MEDICINE

## 2023-04-18 PROCEDURE — 99215 OFFICE O/P EST HI 40 MIN: CPT | Mod: S$GLB,,, | Performed by: INTERNAL MEDICINE

## 2023-04-18 PROCEDURE — 3074F PR MOST RECENT SYSTOLIC BLOOD PRESSURE < 130 MM HG: ICD-10-PCS | Mod: CPTII,S$GLB,, | Performed by: INTERNAL MEDICINE

## 2023-04-18 PROCEDURE — 99999 PR PBB SHADOW E&M-EST. PATIENT-LVL V: ICD-10-PCS | Mod: PBBFAC,,, | Performed by: INTERNAL MEDICINE

## 2023-04-18 RX ORDER — ONDANSETRON 2 MG/ML
8 INJECTION INTRAMUSCULAR; INTRAVENOUS
Status: CANCELLED
Start: 2023-04-19

## 2023-04-18 RX ORDER — DIPHENHYDRAMINE HYDROCHLORIDE 50 MG/ML
12.5 INJECTION INTRAMUSCULAR; INTRAVENOUS
Status: CANCELLED
Start: 2023-04-19

## 2023-04-18 RX ORDER — SODIUM CHLORIDE 0.9 % (FLUSH) 0.9 %
10 SYRINGE (ML) INJECTION
Status: CANCELLED | OUTPATIENT
Start: 2023-04-19

## 2023-04-18 RX ORDER — EPINEPHRINE 0.3 MG/.3ML
0.3 INJECTION SUBCUTANEOUS ONCE AS NEEDED
Status: CANCELLED | OUTPATIENT
Start: 2023-04-19

## 2023-04-18 RX ORDER — HEPARIN 100 UNIT/ML
500 SYRINGE INTRAVENOUS
Status: CANCELLED | OUTPATIENT
Start: 2023-04-19

## 2023-04-18 RX ORDER — DIPHENHYDRAMINE HYDROCHLORIDE 50 MG/ML
50 INJECTION INTRAMUSCULAR; INTRAVENOUS ONCE AS NEEDED
Status: CANCELLED | OUTPATIENT
Start: 2023-04-19

## 2023-04-19 ENCOUNTER — INFUSION (OUTPATIENT)
Dept: INFUSION THERAPY | Facility: HOSPITAL | Age: 53
End: 2023-04-19
Attending: FAMILY MEDICINE
Payer: COMMERCIAL

## 2023-04-19 VITALS
WEIGHT: 176.81 LBS | RESPIRATION RATE: 16 BRPM | HEART RATE: 72 BPM | TEMPERATURE: 99 F | OXYGEN SATURATION: 97 % | DIASTOLIC BLOOD PRESSURE: 85 MMHG | SYSTOLIC BLOOD PRESSURE: 122 MMHG | BODY MASS INDEX: 34.53 KG/M2

## 2023-04-19 DIAGNOSIS — C50.112 MALIGNANT NEOPLASM OF CENTRAL PORTION OF LEFT BREAST IN FEMALE, ESTROGEN RECEPTOR POSITIVE: Primary | ICD-10-CM

## 2023-04-19 DIAGNOSIS — Z17.0 MALIGNANT NEOPLASM OF CENTRAL PORTION OF LEFT BREAST IN FEMALE, ESTROGEN RECEPTOR POSITIVE: Primary | ICD-10-CM

## 2023-04-19 PROCEDURE — 96417 CHEMO IV INFUS EACH ADDL SEQ: CPT

## 2023-04-19 PROCEDURE — 63600175 PHARM REV CODE 636 W HCPCS: Performed by: INTERNAL MEDICINE

## 2023-04-19 PROCEDURE — 77412 RADIATION TX DELIVERY LVL 3: CPT | Performed by: RADIOLOGY

## 2023-04-19 PROCEDURE — 25000003 PHARM REV CODE 250: Performed by: INTERNAL MEDICINE

## 2023-04-19 PROCEDURE — 77417 THER RADIOLOGY PORT IMAGE(S): CPT | Performed by: RADIOLOGY

## 2023-04-19 PROCEDURE — 96413 CHEMO IV INFUSION 1 HR: CPT

## 2023-04-19 PROCEDURE — 96375 TX/PRO/DX INJ NEW DRUG ADDON: CPT

## 2023-04-19 RX ORDER — SODIUM CHLORIDE 0.9 % (FLUSH) 0.9 %
10 SYRINGE (ML) INJECTION
Status: DISCONTINUED | OUTPATIENT
Start: 2023-04-19 | End: 2023-04-19 | Stop reason: HOSPADM

## 2023-04-19 RX ORDER — HEPARIN 100 UNIT/ML
500 SYRINGE INTRAVENOUS
Status: DISCONTINUED | OUTPATIENT
Start: 2023-04-19 | End: 2023-04-19 | Stop reason: HOSPADM

## 2023-04-19 RX ORDER — DIPHENHYDRAMINE HYDROCHLORIDE 50 MG/ML
50 INJECTION INTRAMUSCULAR; INTRAVENOUS ONCE AS NEEDED
Status: DISCONTINUED | OUTPATIENT
Start: 2023-04-19 | End: 2023-04-19 | Stop reason: HOSPADM

## 2023-04-19 RX ORDER — ONDANSETRON 2 MG/ML
8 INJECTION INTRAMUSCULAR; INTRAVENOUS
Status: COMPLETED | OUTPATIENT
Start: 2023-04-19 | End: 2023-04-19

## 2023-04-19 RX ORDER — EPINEPHRINE 0.3 MG/.3ML
0.3 INJECTION SUBCUTANEOUS ONCE AS NEEDED
Status: DISCONTINUED | OUTPATIENT
Start: 2023-04-19 | End: 2023-04-19 | Stop reason: HOSPADM

## 2023-04-19 RX ORDER — DIPHENHYDRAMINE HYDROCHLORIDE 50 MG/ML
12.5 INJECTION INTRAMUSCULAR; INTRAVENOUS
Status: COMPLETED | OUTPATIENT
Start: 2023-04-19 | End: 2023-04-19

## 2023-04-19 RX ADMIN — PERTUZUMAB 420 MG: 30 INJECTION, SOLUTION, CONCENTRATE INTRAVENOUS at 09:04

## 2023-04-19 RX ADMIN — ONDANSETRON 8 MG: 2 INJECTION INTRAMUSCULAR; INTRAVENOUS at 08:04

## 2023-04-19 RX ADMIN — TRASTUZUMAB 484 MG: 420 INJECTION, POWDER, LYOPHILIZED, FOR SOLUTION INTRAVENOUS at 09:04

## 2023-04-19 RX ADMIN — DIPHENHYDRAMINE HYDROCHLORIDE 12.5 MG: 50 INJECTION, SOLUTION INTRAMUSCULAR; INTRAVENOUS at 08:04

## 2023-04-19 NOTE — NURSING
"Pt c/o tenderness near mediport insertion site that started today.  No swelling, no warmth, no redness.  Eliseo De Los Santos informed prior to accessing site.  OK to access and proceed.  Mediport accessed with 1" zheng, blood return present and flushed well.  Zheng needle removed from mediport. Site without signs and symptoms of complications.   Flushed with sterile saline only per pt req.  Dressing applied. Pt tolerated treatment with no complaints.   "

## 2023-04-20 DIAGNOSIS — K44.9 HIATAL HERNIA: Primary | ICD-10-CM

## 2023-04-20 PROCEDURE — 77412 RADIATION TX DELIVERY LVL 3: CPT | Performed by: RADIOLOGY

## 2023-04-21 PROCEDURE — 77412 RADIATION TX DELIVERY LVL 3: CPT | Performed by: RADIOLOGY

## 2023-04-24 ENCOUNTER — HOSPITAL ENCOUNTER (OUTPATIENT)
Dept: RADIOLOGY | Facility: HOSPITAL | Age: 53
Discharge: HOME | End: 2023-04-24
Attending: INTERNAL MEDICINE
Payer: COMMERCIAL

## 2023-04-24 DIAGNOSIS — K44.9 HIATAL HERNIA: ICD-10-CM

## 2023-04-24 PROCEDURE — 77336 RADIATION PHYSICS CONSULT: CPT | Performed by: RADIOLOGY

## 2023-04-24 PROCEDURE — 25500020 PHARM REV CODE 255: Performed by: INTERNAL MEDICINE

## 2023-04-24 PROCEDURE — 77412 RADIATION TX DELIVERY LVL 3: CPT | Performed by: RADIOLOGY

## 2023-04-24 PROCEDURE — A9698 NON-RAD CONTRAST MATERIALNOC: HCPCS | Performed by: INTERNAL MEDICINE

## 2023-04-24 PROCEDURE — 74240 X-RAY XM UPR GI TRC 1CNTRST: CPT | Mod: TC

## 2023-04-24 RX ADMIN — BARIUM SULFATE 150 ML: 0.6 SUSPENSION ORAL at 08:04

## 2023-04-24 RX ADMIN — BARIUM SULFATE 100 ML: 980 POWDER, FOR SUSPENSION ORAL at 08:04

## 2023-04-25 PROCEDURE — 77412 RADIATION TX DELIVERY LVL 3: CPT | Performed by: RADIOLOGY

## 2023-04-26 ENCOUNTER — INFUSION (OUTPATIENT)
Dept: INFUSION THERAPY | Facility: HOSPITAL | Age: 53
End: 2023-04-26
Attending: FAMILY MEDICINE
Payer: COMMERCIAL

## 2023-04-26 VITALS
OXYGEN SATURATION: 96 % | SYSTOLIC BLOOD PRESSURE: 129 MMHG | WEIGHT: 174.31 LBS | RESPIRATION RATE: 18 BRPM | DIASTOLIC BLOOD PRESSURE: 86 MMHG | BODY MASS INDEX: 34.22 KG/M2 | TEMPERATURE: 98 F | HEART RATE: 82 BPM | HEIGHT: 60 IN

## 2023-04-26 DIAGNOSIS — C50.112 MALIGNANT NEOPLASM OF CENTRAL PORTION OF LEFT BREAST IN FEMALE, ESTROGEN RECEPTOR POSITIVE: Primary | ICD-10-CM

## 2023-04-26 DIAGNOSIS — Z17.0 MALIGNANT NEOPLASM OF CENTRAL PORTION OF LEFT BREAST IN FEMALE, ESTROGEN RECEPTOR POSITIVE: Primary | ICD-10-CM

## 2023-04-26 PROCEDURE — 63600175 PHARM REV CODE 636 W HCPCS: Performed by: NURSE PRACTITIONER

## 2023-04-26 PROCEDURE — 25000003 PHARM REV CODE 250: Performed by: NURSE PRACTITIONER

## 2023-04-26 PROCEDURE — 96360 HYDRATION IV INFUSION INIT: CPT

## 2023-04-26 PROCEDURE — 77412 RADIATION TX DELIVERY LVL 3: CPT | Performed by: RADIOLOGY

## 2023-04-26 PROCEDURE — 96402 CHEMO HORMON ANTINEOPL SQ/IM: CPT

## 2023-04-26 PROCEDURE — 77417 THER RADIOLOGY PORT IMAGE(S): CPT | Performed by: RADIOLOGY

## 2023-04-26 PROCEDURE — 96361 HYDRATE IV INFUSION ADD-ON: CPT

## 2023-04-26 PROCEDURE — 63600175 PHARM REV CODE 636 W HCPCS: Mod: JZ,JG | Performed by: INTERNAL MEDICINE

## 2023-04-26 RX ORDER — HEPARIN 100 UNIT/ML
500 SYRINGE INTRAVENOUS
Status: DISCONTINUED | OUTPATIENT
Start: 2023-04-26 | End: 2023-04-26 | Stop reason: HOSPADM

## 2023-04-26 RX ORDER — SODIUM CHLORIDE 0.9 % (FLUSH) 0.9 %
10 SYRINGE (ML) INJECTION
Status: CANCELLED | OUTPATIENT
Start: 2023-05-12

## 2023-04-26 RX ORDER — HEPARIN 100 UNIT/ML
500 SYRINGE INTRAVENOUS
Status: CANCELLED | OUTPATIENT
Start: 2023-05-12

## 2023-04-26 RX ORDER — SODIUM CHLORIDE 0.9 % (FLUSH) 0.9 %
10 SYRINGE (ML) INJECTION
Status: DISCONTINUED | OUTPATIENT
Start: 2023-04-26 | End: 2023-04-26 | Stop reason: HOSPADM

## 2023-04-26 RX ADMIN — SODIUM CHLORIDE 1000 ML: 9 INJECTION, SOLUTION INTRAVENOUS at 09:04

## 2023-04-26 RX ADMIN — GOSERELIN ACETATE 3.6 MG: 3.6 IMPLANT SUBCUTANEOUS at 09:04

## 2023-04-26 RX ADMIN — HEPARIN 500 UNITS: 100 SYRINGE at 11:04

## 2023-04-26 NOTE — PLAN OF CARE
Problem: Adult Inpatient Plan of Care  Goal: Plan of Care Review  Outcome: Met  Flowsheets (Taken 4/26/2023 1135)  Plan of Care Reviewed With: patient  Goal: Absence of Hospital-Acquired Illness or Injury  Outcome: Met  Intervention: Identify and Manage Fall Risk  Flowsheets (Taken 4/26/2023 1135)  Safety Promotion/Fall Prevention:   assistive device/personal item within reach   Fall Risk reviewed with patient/family   in recliner, wheels locked     Pt tolerated hydration well. Next appt reviewed; pt denied questions or further needs at the time of discharge.

## 2023-04-27 PROCEDURE — 77412 RADIATION TX DELIVERY LVL 3: CPT | Performed by: RADIOLOGY

## 2023-04-28 PROCEDURE — 77412 RADIATION TX DELIVERY LVL 3: CPT | Performed by: RADIOLOGY

## 2023-05-01 ENCOUNTER — OFFICE VISIT (OUTPATIENT)
Dept: RADIATION THERAPY | Facility: HOSPITAL | Age: 53
End: 2023-05-01
Attending: RADIOLOGY
Payer: COMMERCIAL

## 2023-05-01 PROCEDURE — 77412 RADIATION TX DELIVERY LVL 3: CPT | Performed by: RADIOLOGY

## 2023-05-01 PROCEDURE — 77336 RADIATION PHYSICS CONSULT: CPT | Performed by: RADIOLOGY

## 2023-05-02 PROCEDURE — 77412 RADIATION TX DELIVERY LVL 3: CPT | Performed by: RADIOLOGY

## 2023-05-03 PROCEDURE — 77280 THER RAD SIMULAJ FIELD SMPL: CPT | Performed by: RADIOLOGY

## 2023-05-03 PROCEDURE — 77412 RADIATION TX DELIVERY LVL 3: CPT | Performed by: RADIOLOGY

## 2023-05-03 RX ORDER — DIPHENHYDRAMINE HYDROCHLORIDE 50 MG/ML
12.5 INJECTION INTRAMUSCULAR; INTRAVENOUS
Status: CANCELLED
Start: 2023-05-09

## 2023-05-03 RX ORDER — DIPHENHYDRAMINE HYDROCHLORIDE 50 MG/ML
50 INJECTION INTRAMUSCULAR; INTRAVENOUS ONCE AS NEEDED
Status: CANCELLED | OUTPATIENT
Start: 2023-05-09

## 2023-05-03 RX ORDER — HEPARIN 100 UNIT/ML
500 SYRINGE INTRAVENOUS
Status: CANCELLED | OUTPATIENT
Start: 2023-05-09

## 2023-05-03 RX ORDER — ONDANSETRON 2 MG/ML
8 INJECTION INTRAMUSCULAR; INTRAVENOUS
Status: CANCELLED
Start: 2023-05-09

## 2023-05-03 RX ORDER — EPINEPHRINE 0.3 MG/.3ML
0.3 INJECTION SUBCUTANEOUS ONCE AS NEEDED
Status: CANCELLED | OUTPATIENT
Start: 2023-05-09

## 2023-05-03 RX ORDER — SODIUM CHLORIDE 0.9 % (FLUSH) 0.9 %
10 SYRINGE (ML) INJECTION
Status: CANCELLED | OUTPATIENT
Start: 2023-05-09

## 2023-05-04 PROCEDURE — 77412 RADIATION TX DELIVERY LVL 3: CPT | Performed by: RADIOLOGY

## 2023-05-05 PROCEDURE — 77412 RADIATION TX DELIVERY LVL 3: CPT | Performed by: RADIOLOGY

## 2023-05-08 PROCEDURE — 77336 RADIATION PHYSICS CONSULT: CPT | Performed by: RADIOLOGY

## 2023-05-08 PROCEDURE — 77412 RADIATION TX DELIVERY LVL 3: CPT | Performed by: RADIOLOGY

## 2023-05-09 ENCOUNTER — INFUSION (OUTPATIENT)
Dept: INFUSION THERAPY | Facility: HOSPITAL | Age: 53
End: 2023-05-09
Attending: FAMILY MEDICINE
Payer: COMMERCIAL

## 2023-05-09 ENCOUNTER — LAB VISIT (OUTPATIENT)
Dept: LAB | Facility: HOSPITAL | Age: 53
End: 2023-05-09
Attending: INTERNAL MEDICINE
Payer: COMMERCIAL

## 2023-05-09 VITALS
HEART RATE: 94 BPM | TEMPERATURE: 99 F | WEIGHT: 174.69 LBS | SYSTOLIC BLOOD PRESSURE: 126 MMHG | DIASTOLIC BLOOD PRESSURE: 77 MMHG | RESPIRATION RATE: 16 BRPM | OXYGEN SATURATION: 96 % | BODY MASS INDEX: 34.12 KG/M2

## 2023-05-09 DIAGNOSIS — Z17.0 MALIGNANT NEOPLASM OF CENTRAL PORTION OF LEFT BREAST IN FEMALE, ESTROGEN RECEPTOR POSITIVE: Primary | ICD-10-CM

## 2023-05-09 DIAGNOSIS — Z17.0 MALIGNANT NEOPLASM OF CENTRAL PORTION OF LEFT BREAST IN FEMALE, ESTROGEN RECEPTOR POSITIVE: ICD-10-CM

## 2023-05-09 DIAGNOSIS — C50.112 MALIGNANT NEOPLASM OF CENTRAL PORTION OF LEFT BREAST IN FEMALE, ESTROGEN RECEPTOR POSITIVE: ICD-10-CM

## 2023-05-09 DIAGNOSIS — C50.112 MALIGNANT NEOPLASM OF CENTRAL PORTION OF LEFT BREAST IN FEMALE, ESTROGEN RECEPTOR POSITIVE: Primary | ICD-10-CM

## 2023-05-09 LAB
ALBUMIN SERPL-MCNC: 3.7 G/DL (ref 3.5–5)
ALBUMIN/GLOB SERPL: 1.5 RATIO (ref 1.1–2)
ALP SERPL-CCNC: 109 UNIT/L (ref 40–150)
ALT SERPL-CCNC: 14 UNIT/L (ref 0–55)
AST SERPL-CCNC: 11 UNIT/L (ref 5–34)
BASOPHILS # BLD AUTO: 0.03 X10(3)/MCL
BASOPHILS NFR BLD AUTO: 0.6 %
BILIRUBIN DIRECT+TOT PNL SERPL-MCNC: 0.5 MG/DL
BUN SERPL-MCNC: 16.9 MG/DL (ref 9.8–20.1)
CALCIUM SERPL-MCNC: 10 MG/DL (ref 8.4–10.2)
CHLORIDE SERPL-SCNC: 108 MMOL/L (ref 98–107)
CO2 SERPL-SCNC: 26 MMOL/L (ref 22–29)
CREAT SERPL-MCNC: 0.83 MG/DL (ref 0.55–1.02)
EOSINOPHIL # BLD AUTO: 0.26 X10(3)/MCL (ref 0–0.9)
EOSINOPHIL NFR BLD AUTO: 4.9 %
ERYTHROCYTE [DISTWIDTH] IN BLOOD BY AUTOMATED COUNT: 13.6 % (ref 11.5–17)
GFR SERPLBLD CREATININE-BSD FMLA CKD-EPI: >60 MLS/MIN/1.73/M2
GLOBULIN SER-MCNC: 2.5 GM/DL (ref 2.4–3.5)
GLUCOSE SERPL-MCNC: 141 MG/DL (ref 74–100)
HCT VFR BLD AUTO: 36.1 % (ref 37–47)
HGB BLD-MCNC: 11.4 G/DL (ref 12–16)
IMM GRANULOCYTES # BLD AUTO: 0.01 X10(3)/MCL (ref 0–0.04)
IMM GRANULOCYTES NFR BLD AUTO: 0.2 %
LYMPHOCYTES # BLD AUTO: 1.11 X10(3)/MCL (ref 0.6–4.6)
LYMPHOCYTES NFR BLD AUTO: 21.1 %
MCH RBC QN AUTO: 33 PG (ref 27–31)
MCHC RBC AUTO-ENTMCNC: 31.6 G/DL (ref 33–36)
MCV RBC AUTO: 104.6 FL (ref 80–94)
MONOCYTES # BLD AUTO: 0.28 X10(3)/MCL (ref 0.1–1.3)
MONOCYTES NFR BLD AUTO: 5.3 %
NEUTROPHILS # BLD AUTO: 3.58 X10(3)/MCL (ref 2.1–9.2)
NEUTROPHILS NFR BLD AUTO: 67.9 %
PLATELET # BLD AUTO: 145 X10(3)/MCL (ref 130–400)
PMV BLD AUTO: 9 FL (ref 7.4–10.4)
POTASSIUM SERPL-SCNC: 4.2 MMOL/L (ref 3.5–5.1)
PROT SERPL-MCNC: 6.2 GM/DL (ref 6.4–8.3)
PTH-INTACT SERPL-MCNC: 225 PG/ML (ref 8.7–77)
RBC # BLD AUTO: 3.45 X10(6)/MCL (ref 4.2–5.4)
SODIUM SERPL-SCNC: 143 MMOL/L (ref 136–145)
WBC # SPEC AUTO: 5.27 X10(3)/MCL (ref 4.5–11.5)

## 2023-05-09 PROCEDURE — 96417 CHEMO IV INFUS EACH ADDL SEQ: CPT

## 2023-05-09 PROCEDURE — 63600175 PHARM REV CODE 636 W HCPCS: Mod: JZ,JG | Performed by: INTERNAL MEDICINE

## 2023-05-09 PROCEDURE — 77412 RADIATION TX DELIVERY LVL 3: CPT | Performed by: RADIOLOGY

## 2023-05-09 PROCEDURE — 85025 COMPLETE CBC W/AUTO DIFF WBC: CPT

## 2023-05-09 PROCEDURE — 96413 CHEMO IV INFUSION 1 HR: CPT

## 2023-05-09 PROCEDURE — 80053 COMPREHEN METABOLIC PANEL: CPT

## 2023-05-09 PROCEDURE — 83970 ASSAY OF PARATHORMONE: CPT

## 2023-05-09 PROCEDURE — 96375 TX/PRO/DX INJ NEW DRUG ADDON: CPT

## 2023-05-09 PROCEDURE — 25000003 PHARM REV CODE 250: Performed by: INTERNAL MEDICINE

## 2023-05-09 PROCEDURE — 36415 COLL VENOUS BLD VENIPUNCTURE: CPT

## 2023-05-09 RX ORDER — HEPARIN 100 UNIT/ML
500 SYRINGE INTRAVENOUS
Status: DISCONTINUED | OUTPATIENT
Start: 2023-05-09 | End: 2023-05-09 | Stop reason: HOSPADM

## 2023-05-09 RX ORDER — ONDANSETRON 2 MG/ML
8 INJECTION INTRAMUSCULAR; INTRAVENOUS
Status: COMPLETED | OUTPATIENT
Start: 2023-05-09 | End: 2023-05-09

## 2023-05-09 RX ORDER — EPINEPHRINE 0.3 MG/.3ML
0.3 INJECTION SUBCUTANEOUS ONCE AS NEEDED
Status: DISCONTINUED | OUTPATIENT
Start: 2023-05-09 | End: 2023-05-09 | Stop reason: HOSPADM

## 2023-05-09 RX ORDER — DIPHENHYDRAMINE HYDROCHLORIDE 50 MG/ML
12.5 INJECTION INTRAMUSCULAR; INTRAVENOUS
Status: COMPLETED | OUTPATIENT
Start: 2023-05-09 | End: 2023-05-09

## 2023-05-09 RX ORDER — SODIUM CHLORIDE 0.9 % (FLUSH) 0.9 %
10 SYRINGE (ML) INJECTION
Status: DISCONTINUED | OUTPATIENT
Start: 2023-05-09 | End: 2023-05-09 | Stop reason: HOSPADM

## 2023-05-09 RX ORDER — DIPHENHYDRAMINE HYDROCHLORIDE 50 MG/ML
50 INJECTION INTRAMUSCULAR; INTRAVENOUS ONCE AS NEEDED
Status: DISCONTINUED | OUTPATIENT
Start: 2023-05-09 | End: 2023-05-09 | Stop reason: HOSPADM

## 2023-05-09 RX ADMIN — DIPHENHYDRAMINE HYDROCHLORIDE 12.5 MG: 50 INJECTION INTRAMUSCULAR; INTRAVENOUS at 09:05

## 2023-05-09 RX ADMIN — SODIUM CHLORIDE: 9 INJECTION, SOLUTION INTRAVENOUS at 09:05

## 2023-05-09 RX ADMIN — ONDANSETRON 8 MG: 2 INJECTION INTRAMUSCULAR; INTRAVENOUS at 09:05

## 2023-05-09 RX ADMIN — PERTUZUMAB 420 MG: 30 INJECTION, SOLUTION, CONCENTRATE INTRAVENOUS at 09:05

## 2023-05-09 RX ADMIN — TRASTUZUMAB 484 MG: 420 INJECTION, POWDER, LYOPHILIZED, FOR SOLUTION INTRAVENOUS at 09:05

## 2023-05-09 NOTE — NURSING
Pt tolerated treatment with no complaints. Hollis needle removed from mediport. Site without signs and symptoms of complications. Dressing applied.

## 2023-05-11 ENCOUNTER — HOSPITAL ENCOUNTER (OUTPATIENT)
Dept: RADIOLOGY | Facility: HOSPITAL | Age: 53
Discharge: HOME | End: 2023-05-11
Attending: INTERNAL MEDICINE
Payer: COMMERCIAL

## 2023-05-11 DIAGNOSIS — C50.112 MALIGNANT NEOPLASM OF CENTRAL PORTION OF LEFT BREAST IN FEMALE, ESTROGEN RECEPTOR POSITIVE: ICD-10-CM

## 2023-05-11 DIAGNOSIS — Z17.0 MALIGNANT NEOPLASM OF CENTRAL PORTION OF LEFT BREAST IN FEMALE, ESTROGEN RECEPTOR POSITIVE: ICD-10-CM

## 2023-05-11 PROCEDURE — 76642 ULTRASOUND BREAST LIMITED: CPT | Mod: TC,RT

## 2023-05-11 PROCEDURE — 76642 ULTRASOUND BREAST LIMITED: CPT | Mod: 26,RT,, | Performed by: STUDENT IN AN ORGANIZED HEALTH CARE EDUCATION/TRAINING PROGRAM

## 2023-05-11 PROCEDURE — 77080 DXA BONE DENSITY AXIAL SKELETON 1 OR MORE SITES: ICD-10-PCS | Mod: 26,,, | Performed by: RADIOLOGY

## 2023-05-11 PROCEDURE — 76642 US BREAST RIGHT LIMITED: ICD-10-PCS | Mod: 26,RT,, | Performed by: STUDENT IN AN ORGANIZED HEALTH CARE EDUCATION/TRAINING PROGRAM

## 2023-05-11 PROCEDURE — 77080 DXA BONE DENSITY AXIAL: CPT | Mod: 26,,, | Performed by: RADIOLOGY

## 2023-05-11 PROCEDURE — 77080 DXA BONE DENSITY AXIAL: CPT | Mod: TC

## 2023-05-21 NOTE — PROGRESS NOTES
Subjective:       Patient ID: Nita Dejesus is a 53 y.o. female.    Previous Oncologist: Dr. Katlyn Gonzalez at [facility]  Surgeon: Dr. Kelly Santos    Left Breast Cancer Stage IA(T2N0M0) Triple Positive--22  Biopsy/Surgery/pathology:   1. Excisional biopsy left breast mass/left breast cyst done 22--invasive ductal carcinoma, Grade 3, measures 3.0cm, a 0.6cm region of cauterized carcinoma is present at the inked superior-deep margin, cyst with reactive changes, suggestive of previous lumpectomy site, fluid left breast cyst with rare atypical cells present, suspicious for carcinoma, ER 95%, WY 27%, Her2 3+ by IHC, positive, Ki67 51%.   2. Left SLN biopsies done 22--3 benign lymph nodes.   3. Re-excision lumpectomy done 3/8/23--no residual invasive malignancy identified.   Imagin. Bilateral diagnostic MMG done at [facility] [place]--left inferior breast large, minimally complex cystic lesion at 6:00 measures 4.4X3.5X4.2cm, corresponding to the palpable area, appears benign. Routine screening MMG recommended.   2. MRI breasts bilateral at [facility] 22--post-surgical seroma at 12:00 position of left breast s/p recent excisional biopsy, no definite suspicious enhancing masses or areas of nonmass enhancement of left breast, and no suspicious areas in right breast, BIRADS 6.   3. CT C/A/P w/ contrast 22 at [facility]--no thoracic metastatic disease, fatty infiltration of liver, small to moderate-sized hiatal hernia, no metastatic disease.   4. NM Bone scan whole body done 22 at [facility]--activity in cervical and lumbar spines likely degenerative, no anatomic correlate seen on CT scan in lumbar spines, suggest correlation with C-spine Xrays, increase tracer w/n both feet, likely degnerative change, physiologic activity of urinary tract.  5. CT A/P w/ contrast 22 at [facility]--no acute abdominopelvic pathology or change compared to recent CT from 22, fairly large amount of stool in colon may reflect  constipation, large hiatal hernia, diffuse hepatic steatosis, post-cholecystectomy, post hysterectomy, mild thoracic and lumbar degenerative disease.   6. Bilateral diagnostic MMG/Left breast US 2/28/23--No suspicious masses, microcalcifications, or other abnormalities are seen  within the right or left breast. Postsurgical changes are noted at the  6:00 position of the left breast at site of prior excisional biopsy with positive margins.     DEXA:  5/11/23--AP spine 0.1, left femoral neck -1.5, left total hip -0.4, right femoral neck -1.2, right total hip 0.0, c/w osteopenia bilateral femoral necks.       ECHO:  08/18/22--EF 55-65%.  11/17/22--EF 60%.  03/23/23--EF 55-60%.    Genetic testing: [employer] done 7/21/22 negative for any pathogenic variants.     Treatment history:  Left breast excisional biopsy done 7/13/22  Left SLN biopsies done 8/8/22.  2 units PRBC 12/19/22.  TCHP X 6 cycles completed only 8/23/22--1/25/23 (multiple treatment delays for thrombocytopenia, despite dose reductions, multiple side effects).  Re-excision lumpectomy done 3/8/23--no residual disease.   Adjuvant radiation therapy completed 4/12/23--5/9/23.    Current treatment plan:  Maintenance HP X 11 cycles started 3/29/23  Cycle 4 due 4/19/23 (cycle 10 in plan)  2. Adjuvant OS Zoladex monthly + Femara started 3/29/23 (DAISY for endometriosis, ovaries remain)    Chief Complaint: Other Misc (Pt reports breast pain since MMG in [facility].) and Peripheral Neuropathy    HPI  Patient presents for follow-up of breast cancer. She is doing okay but reports that her hiatal hernia is acting up and she needs another surgery to repair. She cannot eat and she has lost another 5 lbs. It hurts when she eats. She continues on Zoladex/Femara and on Perjeta/Herceptin without problems. Discussed elevated PTH and plans to refer to endocrinology.       Past Medical History:   Diagnosis Date    Allergy     Anemia     Anxiety     Cancer     Clotting disorder     GERD  "(gastroesophageal reflux disease)     Neuromuscular disorder       Past Surgical History:   Procedure Laterality Date    CHOLECYSTECTOMY      HERNIA REPAIR      HYSTERECTOMY      lymphectomy Left     REDUCTION OF BOTH BREASTS  2018    TUBAL LIGATION Bilateral 1993     Family History   Problem Relation Age of Onset    Hypertension Mother     Lupus Mother     Hypoparathyroidism Mother     Diabetes Father     Heart disease Father     Prostate cancer Father     Kidney disease Father     Hodgkin's lymphoma Brother     Pancreatitis Brother      Social History     Socioeconomic History    Marital status:    Tobacco Use    Smoking status: Every Day     Packs/day: 0.50     Types: Cigarettes     Last attempt to quit: 2022     Years since quittin.5    Smokeless tobacco: Current   Substance and Sexual Activity    Alcohol use: Not Currently    Drug use: Never       Review of patient's allergies indicates:   Allergen Reactions    Corticosteroids (glucocorticoids) Hives, Other (See Comments) and Swelling     Patient states "Severe Inflammation"      Ketorolac Diarrhea, Nausea And Vomiting, Other (See Comments) and Swelling     Migraines      Peanut Anaphylaxis    Penicillins Anaphylaxis, Diarrhea, Hives, Nausea And Vomiting and Swelling    Shiitake mushroom (lentinus edodes) Anaphylaxis    Tramadol Diarrhea, Itching, Nausea And Vomiting, Rash and Swelling    Latex Other (See Comments)     Skin blisters      Macrolide antibiotics Hives and Rash    Adhesive Other (See Comments)     Blisters, skin tears; CANNOT USE PAPER TAPE    Anticoag citrate phos dextrose     Aspartame     Egg     Erythromycin      Other reaction(s): Unknown    Estrogens     Other omega-3s      Anticoagulant   Patient states "low blood pressure"    Pork derived (porcine)     Sucralose Other (See Comments)    Amitriptyline Anxiety and Other (See Comments)     Severe    Other reaction(s): Unknown    Aspirin Nausea Only and Other (See Comments)    " Eszopiclone Anxiety and Itching    Heparin analogues Other (See Comments) and Palpitations    Topiramate Anxiety, Other (See Comments) and Palpitations     Shaking        Current Outpatient Medications on File Prior to Visit   Medication Sig Dispense Refill    (Magic mouthwash) 1:1:1 diphenhydrAMINE(Benadryl) 12.5mg/5ml liq, aluminum & magnesium hydroxide-simethicone (Maalox), LIDOcaine viscous 2% Swish and spit 10 mLs every 4 (four) hours as needed. for mouth sores 500 mL 1    biotin 1 mg tablet 1 tablet Orally Once a day      butalbital-acetaminophen-caffeine -40 mg (FIORICET, ESGIC) -40 mg per tablet Take by mouth.      clonazePAM (KLONOPIN) 0.5 MG tablet Take 0.5 mg by mouth every 8 (eight) hours as needed.      diclofenac (VOLTAREN) 75 MG EC tablet       diphenhydrAMINE (BENADRYL) 50 MG capsule Take 50 mg by mouth every 6 (six) hours as needed.      famotidine (PEPCID) 40 MG tablet       gabapentin (NEURONTIN) 300 MG capsule 3 (three) times daily.      gabapentin (NEURONTIN) 600 MG tablet Take 600 mg by mouth 3 (three) times daily.      HYDROcodone-acetaminophen (NORCO) 5-325 mg per tablet Take 1 tablet by mouth every 6 (six) hours as needed for Pain. 60 tablet 0    loperamide (IMODIUM) 2 mg capsule Take 2 mg by mouth 4 (four) times daily as needed.      loratadine (CLARITIN) 10 mg tablet Take 1 tablet by mouth every morning.      metoclopramide HCl (REGLAN) 10 MG tablet 1 Tablet Orally Once a day as needed for nausea for 30 days      omeprazole (PRILOSEC) 40 MG capsule Take 1 capsule (40 mg total) by mouth once daily. 30 capsule 6    onabotulinumtoxina (BOTOX) 200 unit SolR 155 units IM Injection      ondansetron (ZOFRAN-ODT) 4 MG TbDL Take 4 mg by mouth every 8 (eight) hours as needed.      promethazine (PHENERGAN) 25 MG tablet Take 25 mg by mouth every 6 (six) hours as needed.      tiZANidine (ZANAFLEX) 4 MG tablet Take 12 mg by mouth 3 (three) times daily.      albuterol (VENTOLIN HFA) 90  "mcg/actuation inhaler Inhale 2 puffs into the lungs every 6 (six) hours as needed for Wheezing. Rescue (Patient not taking: Reported on 5/30/2023) 18 g 0    diphenoxylate-atropine 2.5-0.025 mg (LOMOTIL) 2.5-0.025 mg per tablet Take 1 tablet by mouth 4 (four) times daily as needed for Diarrhea. (Patient not taking: Reported on 4/18/2023) 30 tablet 1    letrozole (FEMARA) 2.5 mg Tab Take 1 tablet (2.5 mg total) by mouth once daily. (Patient not taking: Reported on 5/30/2023.) 30 tablet 3     No current facility-administered medications on file prior to visit.      Review of Systems   Constitutional:  Negative for appetite change and unexpected weight change.   HENT:  Negative for mouth sores.    Respiratory:  Negative for cough.    Gastrointestinal:  Positive for reflux. Negative for abdominal pain and nausea.        +hiatal hernia, pain when she eats   Genitourinary:  Positive for hot flashes. Negative for frequency.   Musculoskeletal:  Negative for back pain.   Integumentary:  Negative for rash.   Neurological:  Negative for headaches.   Hematological:  Negative for adenopathy.   Psychiatric/Behavioral:  The patient is not nervous/anxious.        Vitals:    05/30/23 0952   BP: 127/84   Pulse: 80   Resp: 14   Temp: 98.1 °F (36.7 °C)   TempSrc: Oral   SpO2: 98%   Weight: 78.6 kg (173 lb 4.8 oz)   Height: 5' 1" (1.549 m)     Physical Exam  Constitutional:       Appearance: Normal appearance.   HENT:      Head: Normocephalic.      Comments: Resolved alopecia     Nose: Nose normal.      Mouth/Throat:      Mouth: Mucous membranes are moist.   Eyes:      Extraocular Movements: Extraocular movements intact.      Conjunctiva/sclera: Conjunctivae normal.   Cardiovascular:      Rate and Rhythm: Normal rate and regular rhythm.   Pulmonary:      Effort: Pulmonary effort is normal.      Breath sounds: Normal breath sounds.   Chest:      Comments: Right chest wall mediport in place, left breast with inferior lumpectomy scar, well " healed. Left axillary incision from LN biopsy also healed well. No masses palpable.   Abdominal:      General: Bowel sounds are normal. There is no distension.      Palpations: Abdomen is soft.      Tenderness: There is no abdominal tenderness.   Musculoskeletal:         General: Normal range of motion.   Skin:     General: Skin is warm.   Neurological:      General: No focal deficit present.      Mental Status: She is alert and oriented to person, place, and time.   Psychiatric:         Mood and Affect: Mood normal.         Judgment: Judgment normal.     Lab Visit on 05/30/2023   Component Date Value    WBC 05/30/2023 4.89     RBC 05/30/2023 3.66 (L)     Hgb 05/30/2023 11.8 (L)     Hct 05/30/2023 37.6     MCV 05/30/2023 102.7 (H)     MCH 05/30/2023 32.2 (H)     MCHC 05/30/2023 31.4 (L)     RDW 05/30/2023 13.2     Platelet 05/30/2023 154     MPV 05/30/2023 9.1     Neut % 05/30/2023 59.6     Lymph % 05/30/2023 28.4     Mono % 05/30/2023 5.9     Eos % 05/30/2023 5.7     Basophil % 05/30/2023 0.4     Lymph # 05/30/2023 1.39     Neut # 05/30/2023 2.91     Mono # 05/30/2023 0.29     Eos # 05/30/2023 0.28     Baso # 05/30/2023 0.02     IG# 05/30/2023 0.00     IG% 05/30/2023 0.0           Assessment:       1. Malignant neoplasm of central portion of left breast in female, estrogen receptor positive        Plan:       Patient with Stage IA Left breast cancer triple positive s/p excisional biopsy of a cystic lesion that found cancer incidentally diagnosed 7/13/22. Tumor measured 3.0cm and SLN biopsies negative, Grade 3, strongly ER and TX positive and Her2 positive with high Ki67. There was a positive margin on the excisional biopsy.  Treatment with chemotherapy neoadjuvant started at [facility] with Dr. Gonzalez.    Patient received 2 cycles at [facility] then changed care to [facility]. She was having multiple side effects.   She does not have any steroids due to h/o allergy causing inflammation.   Patient had multiple delays due to multiple  side effects, thrombocytopenia, requiring dose reduction, but finally completed 6 cycles on 1/25/23.  Surgery was delayed and finally done on 3/8/23. Patient had re-excision lumpectomy and there was no residual invasive malignancy.     Treatment resumced with maintenance HP X 11 cycles 3/29/23.   Repeat ECHO from 3/27/23 normal EF. Next due in 6/2023--ordered today.    Recommended also OS + AI X 5 years followed by completion of AI to complete 10 years.   She had DAISY in 2012 for endometriosis but still had ovaries remaining functional prior to starting chemotherapy.   8/4/22 estradiol level 11, FSH 44, LH 38.   Zoladex/Femara started on 3/29/23.   Patient completed radiation on 5/9/23.    CBCdiff shows anemia continues improving, normal WBC and normal platelets. CMP pending and will follow-up.     Proceed with cycle 4 maintenance HP tomorrow (cycle 10 in system).  Schedule labs and treatment cycle 5--6/21/23.     RTC 6 weeks T/D visit with labs prior to cycle 6 maintenance.   Patient is planning surgery for hiatal hernia, recovery time is 1-2 weeks.     Continue Zoladex, next due 6/21/23.  Continue Femara.  Continue vitamin D. Calcium stopped due to high level.   PTH elevated, so will refer to Endocrinology.     DEXA from 5/11/23 shows mild osteopenia in bilateral femoral necks. Will need to consider Zometa every 6 months X 2 years but I don't want to start until after endocrinology evaluation.       All questions answered at this time.        Flory Vargas MD

## 2023-05-24 ENCOUNTER — INFUSION (OUTPATIENT)
Dept: INFUSION THERAPY | Facility: HOSPITAL | Age: 53
End: 2023-05-24
Attending: FAMILY MEDICINE
Payer: COMMERCIAL

## 2023-05-24 VITALS
DIASTOLIC BLOOD PRESSURE: 88 MMHG | TEMPERATURE: 98 F | OXYGEN SATURATION: 96 % | RESPIRATION RATE: 16 BRPM | HEART RATE: 71 BPM | SYSTOLIC BLOOD PRESSURE: 138 MMHG

## 2023-05-24 DIAGNOSIS — C50.112 MALIGNANT NEOPLASM OF CENTRAL PORTION OF LEFT BREAST IN FEMALE, ESTROGEN RECEPTOR POSITIVE: Primary | ICD-10-CM

## 2023-05-24 DIAGNOSIS — Z17.0 MALIGNANT NEOPLASM OF CENTRAL PORTION OF LEFT BREAST IN FEMALE, ESTROGEN RECEPTOR POSITIVE: Primary | ICD-10-CM

## 2023-05-24 PROCEDURE — 63600175 PHARM REV CODE 636 W HCPCS: Mod: JZ,JG | Performed by: INTERNAL MEDICINE

## 2023-05-24 PROCEDURE — 96402 CHEMO HORMON ANTINEOPL SQ/IM: CPT

## 2023-05-24 RX ADMIN — GOSERELIN ACETATE 3.6 MG: 3.6 IMPLANT SUBCUTANEOUS at 11:05

## 2023-05-30 ENCOUNTER — OFFICE VISIT (OUTPATIENT)
Dept: HEMATOLOGY/ONCOLOGY | Facility: CLINIC | Age: 53
End: 2023-05-30
Payer: COMMERCIAL

## 2023-05-30 VITALS
TEMPERATURE: 98 F | SYSTOLIC BLOOD PRESSURE: 127 MMHG | BODY MASS INDEX: 32.72 KG/M2 | WEIGHT: 173.31 LBS | OXYGEN SATURATION: 98 % | HEART RATE: 80 BPM | DIASTOLIC BLOOD PRESSURE: 84 MMHG | RESPIRATION RATE: 14 BRPM | HEIGHT: 61 IN

## 2023-05-30 DIAGNOSIS — C50.912 HER2-POSITIVE CARCINOMA OF LEFT BREAST: Primary | ICD-10-CM

## 2023-05-30 DIAGNOSIS — C50.112 MALIGNANT NEOPLASM OF CENTRAL PORTION OF LEFT BREAST IN FEMALE, ESTROGEN RECEPTOR POSITIVE: ICD-10-CM

## 2023-05-30 DIAGNOSIS — Z17.0 MALIGNANT NEOPLASM OF CENTRAL PORTION OF LEFT BREAST IN FEMALE, ESTROGEN RECEPTOR POSITIVE: ICD-10-CM

## 2023-05-30 DIAGNOSIS — Z17.0 MALIGNANT NEOPLASM OF CENTRAL PORTION OF LEFT BREAST IN FEMALE, ESTROGEN RECEPTOR POSITIVE: Primary | ICD-10-CM

## 2023-05-30 DIAGNOSIS — C50.112 MALIGNANT NEOPLASM OF CENTRAL PORTION OF LEFT BREAST IN FEMALE, ESTROGEN RECEPTOR POSITIVE: Primary | ICD-10-CM

## 2023-05-30 PROCEDURE — 3079F PR MOST RECENT DIASTOLIC BLOOD PRESSURE 80-89 MM HG: ICD-10-PCS | Mod: CPTII,S$GLB,, | Performed by: INTERNAL MEDICINE

## 2023-05-30 PROCEDURE — 99999 PR PBB SHADOW E&M-EST. PATIENT-LVL V: CPT | Mod: PBBFAC,,, | Performed by: INTERNAL MEDICINE

## 2023-05-30 PROCEDURE — 3008F BODY MASS INDEX DOCD: CPT | Mod: CPTII,S$GLB,, | Performed by: INTERNAL MEDICINE

## 2023-05-30 PROCEDURE — 1159F MED LIST DOCD IN RCRD: CPT | Mod: CPTII,S$GLB,, | Performed by: INTERNAL MEDICINE

## 2023-05-30 PROCEDURE — 3074F PR MOST RECENT SYSTOLIC BLOOD PRESSURE < 130 MM HG: ICD-10-PCS | Mod: CPTII,S$GLB,, | Performed by: INTERNAL MEDICINE

## 2023-05-30 PROCEDURE — 1159F PR MEDICATION LIST DOCUMENTED IN MEDICAL RECORD: ICD-10-PCS | Mod: CPTII,S$GLB,, | Performed by: INTERNAL MEDICINE

## 2023-05-30 PROCEDURE — 99215 OFFICE O/P EST HI 40 MIN: CPT | Mod: S$GLB,,, | Performed by: INTERNAL MEDICINE

## 2023-05-30 PROCEDURE — 3074F SYST BP LT 130 MM HG: CPT | Mod: CPTII,S$GLB,, | Performed by: INTERNAL MEDICINE

## 2023-05-30 PROCEDURE — 3079F DIAST BP 80-89 MM HG: CPT | Mod: CPTII,S$GLB,, | Performed by: INTERNAL MEDICINE

## 2023-05-30 PROCEDURE — 99999 PR PBB SHADOW E&M-EST. PATIENT-LVL V: ICD-10-PCS | Mod: PBBFAC,,, | Performed by: INTERNAL MEDICINE

## 2023-05-30 PROCEDURE — 99215 PR OFFICE/OUTPT VISIT, EST, LEVL V, 40-54 MIN: ICD-10-PCS | Mod: S$GLB,,, | Performed by: INTERNAL MEDICINE

## 2023-05-30 PROCEDURE — 1160F RVW MEDS BY RX/DR IN RCRD: CPT | Mod: CPTII,S$GLB,, | Performed by: INTERNAL MEDICINE

## 2023-05-30 PROCEDURE — 1160F PR REVIEW ALL MEDS BY PRESCRIBER/CLIN PHARMACIST DOCUMENTED: ICD-10-PCS | Mod: CPTII,S$GLB,, | Performed by: INTERNAL MEDICINE

## 2023-05-30 PROCEDURE — 3008F PR BODY MASS INDEX (BMI) DOCUMENTED: ICD-10-PCS | Mod: CPTII,S$GLB,, | Performed by: INTERNAL MEDICINE

## 2023-05-30 RX ORDER — PROMETHAZINE HYDROCHLORIDE 25 MG/1
25 TABLET ORAL EVERY 6 HOURS PRN
COMMUNITY
Start: 2022-08-04 | End: 2023-05-30 | Stop reason: SDUPTHER

## 2023-05-30 RX ORDER — PROMETHAZINE HYDROCHLORIDE 25 MG/1
25 TABLET ORAL EVERY 6 HOURS PRN
Qty: 30 TABLET | Refills: 0 | Status: SHIPPED | OUTPATIENT
Start: 2023-05-30

## 2023-05-30 RX ORDER — LORATADINE 10 MG/1
1 TABLET ORAL EVERY MORNING
Status: ON HOLD | COMMUNITY
Start: 2022-08-28 | End: 2023-09-27 | Stop reason: HOSPADM

## 2023-05-30 RX ORDER — FAMOTIDINE 40 MG/1
TABLET, FILM COATED ORAL
COMMUNITY
Start: 2022-10-05 | End: 2023-05-30 | Stop reason: SDUPTHER

## 2023-05-30 RX ORDER — FAMOTIDINE 40 MG/1
40 TABLET, FILM COATED ORAL
Qty: 30 TABLET | Refills: 0 | Status: ON HOLD | OUTPATIENT
Start: 2023-05-30 | End: 2023-09-27 | Stop reason: HOSPADM

## 2023-05-30 RX ORDER — BIOTIN 1 MG
TABLET ORAL
Status: ON HOLD | COMMUNITY
End: 2023-09-27 | Stop reason: HOSPADM

## 2023-05-30 RX ORDER — ONABOTULINUMTOXINA 200 [USP'U]/1
200 INJECTION, POWDER, LYOPHILIZED, FOR SOLUTION INTRADERMAL; INTRAMUSCULAR
COMMUNITY
Start: 2022-04-26

## 2023-05-30 RX ORDER — METOCLOPRAMIDE 10 MG/1
TABLET ORAL
COMMUNITY
Start: 2022-06-15

## 2023-05-30 RX ORDER — LOPERAMIDE HYDROCHLORIDE 2 MG/1
2 CAPSULE ORAL ONCE AS NEEDED
COMMUNITY

## 2023-05-31 ENCOUNTER — INFUSION (OUTPATIENT)
Dept: INFUSION THERAPY | Facility: HOSPITAL | Age: 53
End: 2023-05-31
Attending: FAMILY MEDICINE
Payer: COMMERCIAL

## 2023-05-31 VITALS
DIASTOLIC BLOOD PRESSURE: 80 MMHG | HEIGHT: 61 IN | OXYGEN SATURATION: 97 % | HEART RATE: 101 BPM | RESPIRATION RATE: 18 BRPM | WEIGHT: 173.31 LBS | BODY MASS INDEX: 32.72 KG/M2 | SYSTOLIC BLOOD PRESSURE: 114 MMHG | TEMPERATURE: 99 F

## 2023-05-31 DIAGNOSIS — C50.112 MALIGNANT NEOPLASM OF CENTRAL PORTION OF LEFT BREAST IN FEMALE, ESTROGEN RECEPTOR POSITIVE: Primary | ICD-10-CM

## 2023-05-31 DIAGNOSIS — Z17.0 MALIGNANT NEOPLASM OF CENTRAL PORTION OF LEFT BREAST IN FEMALE, ESTROGEN RECEPTOR POSITIVE: Primary | ICD-10-CM

## 2023-05-31 PROCEDURE — 96417 CHEMO IV INFUS EACH ADDL SEQ: CPT

## 2023-05-31 PROCEDURE — 96413 CHEMO IV INFUSION 1 HR: CPT

## 2023-05-31 PROCEDURE — 96375 TX/PRO/DX INJ NEW DRUG ADDON: CPT

## 2023-05-31 PROCEDURE — A4216 STERILE WATER/SALINE, 10 ML: HCPCS | Performed by: INTERNAL MEDICINE

## 2023-05-31 PROCEDURE — 25000003 PHARM REV CODE 250: Performed by: INTERNAL MEDICINE

## 2023-05-31 PROCEDURE — 63600175 PHARM REV CODE 636 W HCPCS: Performed by: INTERNAL MEDICINE

## 2023-05-31 PROCEDURE — 96361 HYDRATE IV INFUSION ADD-ON: CPT

## 2023-05-31 PROCEDURE — 96367 TX/PROPH/DG ADDL SEQ IV INF: CPT

## 2023-05-31 RX ORDER — ONDANSETRON 2 MG/ML
8 INJECTION INTRAMUSCULAR; INTRAVENOUS
Status: COMPLETED | OUTPATIENT
Start: 2023-05-31 | End: 2023-05-31

## 2023-05-31 RX ORDER — SODIUM CHLORIDE 0.9 % (FLUSH) 0.9 %
10 SYRINGE (ML) INJECTION
Status: CANCELLED | OUTPATIENT
Start: 2023-05-31

## 2023-05-31 RX ORDER — HEPARIN 100 UNIT/ML
500 SYRINGE INTRAVENOUS
Status: CANCELLED | OUTPATIENT
Start: 2023-05-31

## 2023-05-31 RX ORDER — EPINEPHRINE 0.3 MG/.3ML
0.3 INJECTION SUBCUTANEOUS ONCE AS NEEDED
Status: CANCELLED | OUTPATIENT
Start: 2023-05-31

## 2023-05-31 RX ORDER — SODIUM CHLORIDE 0.9 % (FLUSH) 0.9 %
10 SYRINGE (ML) INJECTION
Status: DISCONTINUED | OUTPATIENT
Start: 2023-05-31 | End: 2023-05-31 | Stop reason: HOSPADM

## 2023-05-31 RX ORDER — EPINEPHRINE 0.3 MG/.3ML
0.3 INJECTION SUBCUTANEOUS ONCE AS NEEDED
Status: DISCONTINUED | OUTPATIENT
Start: 2023-05-31 | End: 2023-05-31 | Stop reason: HOSPADM

## 2023-05-31 RX ORDER — DIPHENHYDRAMINE HYDROCHLORIDE 50 MG/ML
50 INJECTION INTRAMUSCULAR; INTRAVENOUS ONCE AS NEEDED
Status: DISCONTINUED | OUTPATIENT
Start: 2023-05-31 | End: 2023-05-31 | Stop reason: HOSPADM

## 2023-05-31 RX ORDER — DIPHENHYDRAMINE HYDROCHLORIDE 50 MG/ML
12.5 INJECTION INTRAMUSCULAR; INTRAVENOUS
Status: COMPLETED | OUTPATIENT
Start: 2023-05-31 | End: 2023-05-31

## 2023-05-31 RX ORDER — DIPHENHYDRAMINE HYDROCHLORIDE 50 MG/ML
12.5 INJECTION INTRAMUSCULAR; INTRAVENOUS
Status: CANCELLED
Start: 2023-05-31

## 2023-05-31 RX ORDER — HEPARIN 100 UNIT/ML
500 SYRINGE INTRAVENOUS
Status: DISCONTINUED | OUTPATIENT
Start: 2023-05-31 | End: 2023-05-31 | Stop reason: HOSPADM

## 2023-05-31 RX ORDER — ONDANSETRON 2 MG/ML
8 INJECTION INTRAMUSCULAR; INTRAVENOUS
Status: CANCELLED
Start: 2023-05-31

## 2023-05-31 RX ORDER — DIPHENHYDRAMINE HYDROCHLORIDE 50 MG/ML
50 INJECTION INTRAMUSCULAR; INTRAVENOUS ONCE AS NEEDED
Status: CANCELLED | OUTPATIENT
Start: 2023-05-31

## 2023-05-31 RX ADMIN — PERTUZUMAB 420 MG: 30 INJECTION, SOLUTION, CONCENTRATE INTRAVENOUS at 12:05

## 2023-05-31 RX ADMIN — ONDANSETRON 8 MG: 2 INJECTION INTRAMUSCULAR; INTRAVENOUS at 11:05

## 2023-05-31 RX ADMIN — TRASTUZUMAB 484 MG: 420 INJECTION, POWDER, LYOPHILIZED, FOR SOLUTION INTRAVENOUS at 12:05

## 2023-05-31 RX ADMIN — Medication 10 ML: at 01:05

## 2023-05-31 RX ADMIN — DIPHENHYDRAMINE HYDROCHLORIDE 12.5 MG: 50 INJECTION, SOLUTION INTRAMUSCULAR; INTRAVENOUS at 11:05

## 2023-05-31 RX ADMIN — SODIUM CHLORIDE 1000 ML: 9 INJECTION, SOLUTION INTRAVENOUS at 11:05

## 2023-05-31 RX ADMIN — SODIUM CHLORIDE: 9 INJECTION, SOLUTION INTRAVENOUS at 11:05

## 2023-06-05 ENCOUNTER — TELEPHONE (OUTPATIENT)
Dept: HEMATOLOGY/ONCOLOGY | Facility: CLINIC | Age: 53
End: 2023-06-05
Payer: COMMERCIAL

## 2023-06-05 NOTE — TELEPHONE ENCOUNTER
Received a notification of interaction with Tizanidine and Famotidine.   Notified patient and she will stop Famotidine.   She will stay on Omeprazole and instructed her to take Tums as needed if she needed something extra.    Flory Vargas MD

## 2023-06-08 ENCOUNTER — TELEPHONE (OUTPATIENT)
Dept: HEMATOLOGY/ONCOLOGY | Facility: CLINIC | Age: 53
End: 2023-06-08
Payer: COMMERCIAL

## 2023-06-08 NOTE — TELEPHONE ENCOUNTER
Patient is stating that Dr. Alvarado's office has not received her referral. They are requesting that it be faxed instead to: [phone].

## 2023-06-19 RX ORDER — DIPHENHYDRAMINE HYDROCHLORIDE 50 MG/ML
50 INJECTION INTRAMUSCULAR; INTRAVENOUS ONCE AS NEEDED
Status: CANCELLED | OUTPATIENT
Start: 2023-06-21

## 2023-06-19 RX ORDER — DIPHENHYDRAMINE HYDROCHLORIDE 50 MG/ML
12.5 INJECTION INTRAMUSCULAR; INTRAVENOUS
Status: CANCELLED
Start: 2023-06-21

## 2023-06-19 RX ORDER — SODIUM CHLORIDE 0.9 % (FLUSH) 0.9 %
10 SYRINGE (ML) INJECTION
Status: CANCELLED | OUTPATIENT
Start: 2023-06-21

## 2023-06-19 RX ORDER — HEPARIN 100 UNIT/ML
500 SYRINGE INTRAVENOUS
Status: CANCELLED | OUTPATIENT
Start: 2023-06-21

## 2023-06-19 RX ORDER — EPINEPHRINE 0.3 MG/.3ML
0.3 INJECTION SUBCUTANEOUS ONCE AS NEEDED
Status: CANCELLED | OUTPATIENT
Start: 2023-06-21

## 2023-06-19 RX ORDER — ONDANSETRON 2 MG/ML
8 INJECTION INTRAMUSCULAR; INTRAVENOUS
Status: CANCELLED
Start: 2023-06-21

## 2023-06-20 ENCOUNTER — HOSPITAL ENCOUNTER (OUTPATIENT)
Dept: CARDIOLOGY | Facility: HOSPITAL | Age: 53
Discharge: HOME | End: 2023-06-20
Attending: INTERNAL MEDICINE
Payer: COMMERCIAL

## 2023-06-20 VITALS — BODY MASS INDEX: 32.66 KG/M2 | WEIGHT: 173 LBS | HEIGHT: 61 IN

## 2023-06-20 DIAGNOSIS — C50.112 MALIGNANT NEOPLASM OF CENTRAL PORTION OF LEFT BREAST IN FEMALE, ESTROGEN RECEPTOR POSITIVE: ICD-10-CM

## 2023-06-20 DIAGNOSIS — Z17.0 MALIGNANT NEOPLASM OF CENTRAL PORTION OF LEFT BREAST IN FEMALE, ESTROGEN RECEPTOR POSITIVE: ICD-10-CM

## 2023-06-20 LAB
AV INDEX (PROSTH): 0.8
AV MEAN GRADIENT: 3 MMHG
AV PEAK GRADIENT: 4 MMHG
AV VALVE AREA: 2.51 CM2
AV VELOCITY RATIO: 0.8
BSA FOR ECHO PROCEDURE: 1.84 M2
CV ECHO LV RWT: 0.44 CM
DOP CALC AO PEAK VEL: 1.05 M/S
DOP CALC AO VTI: 20.5 CM
DOP CALC LVOT AREA: 3.1 CM2
DOP CALC LVOT DIAMETER: 2 CM
DOP CALC LVOT PEAK VEL: 0.84 M/S
DOP CALC LVOT STROKE VOLUME: 51.5 CM3
DOP CALC MV VTI: 16.2 CM
DOP CALCLVOT PEAK VEL VTI: 16.4 CM
E WAVE DECELERATION TIME: 222 MSEC
E/A RATIO: 1.06
E/E' RATIO: 5.74 M/S
ECHO LV POSTERIOR WALL: 0.96 CM (ref 0.6–1.1)
EJECTION FRACTION: 60 %
FRACTIONAL SHORTENING: 42 % (ref 28–44)
INTERVENTRICULAR SEPTUM: 0.74 CM (ref 0.6–1.1)
LEFT ATRIUM SIZE: 3.5 CM
LEFT ATRIUM VOLUME INDEX MOD: 18.3 ML/M2
LEFT ATRIUM VOLUME MOD: 32.6 CM3
LEFT INTERNAL DIMENSION IN SYSTOLE: 2.5 CM (ref 2.1–4)
LEFT VENTRICLE DIASTOLIC VOLUME INDEX: 47.42 ML/M2
LEFT VENTRICLE DIASTOLIC VOLUME: 84.4 ML
LEFT VENTRICLE MASS INDEX: 65 G/M2
LEFT VENTRICLE SYSTOLIC VOLUME INDEX: 12.5 ML/M2
LEFT VENTRICLE SYSTOLIC VOLUME: 22.3 ML
LEFT VENTRICULAR INTERNAL DIMENSION IN DIASTOLE: 4.33 CM (ref 3.5–6)
LEFT VENTRICULAR MASS: 115.48 G
LV LATERAL E/E' RATIO: 4.71 M/S
LV SEPTAL E/E' RATIO: 7.33 M/S
LVOT MG: 1 MMHG
LVOT MV: 0.56 CM/S
MV MEAN GRADIENT: 1 MMHG
MV PEAK A VEL: 0.62 M/S
MV PEAK E VEL: 0.66 M/S
MV PEAK GRADIENT: 3 MMHG
MV VALVE AREA BY CONTINUITY EQUATION: 3.18 CM2
OHS LV EJECTION FRACTION SIMPSONS BIPLANE MOD: 5 %
RA MAJOR: 4.51 CM
RA PRESSURE: 3 MMHG
RA WIDTH: 3.64 CM
RIGHT VENTRICULAR END-DIASTOLIC DIMENSION: 2.83 CM
TDI LATERAL: 0.14 M/S
TDI SEPTAL: 0.09 M/S
TDI: 0.12 M/S
TRICUSPID ANNULAR PLANE SYSTOLIC EXCURSION: 2.05 CM

## 2023-06-20 PROCEDURE — 93306 ECHO (CUPID ONLY): ICD-10-PCS | Mod: 26,,, | Performed by: INTERNAL MEDICINE

## 2023-06-20 PROCEDURE — 93356 MYOCRD STRAIN IMG SPCKL TRCK: CPT

## 2023-06-20 PROCEDURE — 93306 TTE W/DOPPLER COMPLETE: CPT | Mod: 26,,, | Performed by: INTERNAL MEDICINE

## 2023-06-20 PROCEDURE — 93356 ECHO (CUPID ONLY): ICD-10-PCS | Mod: ,,, | Performed by: INTERNAL MEDICINE

## 2023-06-20 PROCEDURE — 93356 MYOCRD STRAIN IMG SPCKL TRCK: CPT | Mod: ,,, | Performed by: INTERNAL MEDICINE

## 2023-06-21 ENCOUNTER — INFUSION (OUTPATIENT)
Dept: INFUSION THERAPY | Facility: HOSPITAL | Age: 53
End: 2023-06-21
Attending: FAMILY MEDICINE
Payer: COMMERCIAL

## 2023-06-21 VITALS
TEMPERATURE: 98 F | RESPIRATION RATE: 18 BRPM | WEIGHT: 170.81 LBS | HEART RATE: 92 BPM | SYSTOLIC BLOOD PRESSURE: 127 MMHG | DIASTOLIC BLOOD PRESSURE: 85 MMHG | BODY MASS INDEX: 32.25 KG/M2 | OXYGEN SATURATION: 97 % | HEIGHT: 61 IN

## 2023-06-21 DIAGNOSIS — C50.112 MALIGNANT NEOPLASM OF CENTRAL PORTION OF LEFT BREAST IN FEMALE, ESTROGEN RECEPTOR POSITIVE: Primary | ICD-10-CM

## 2023-06-21 DIAGNOSIS — Z17.0 MALIGNANT NEOPLASM OF CENTRAL PORTION OF LEFT BREAST IN FEMALE, ESTROGEN RECEPTOR POSITIVE: Primary | ICD-10-CM

## 2023-06-21 PROCEDURE — 96361 HYDRATE IV INFUSION ADD-ON: CPT

## 2023-06-21 PROCEDURE — 63600175 PHARM REV CODE 636 W HCPCS: Performed by: INTERNAL MEDICINE

## 2023-06-21 PROCEDURE — A4216 STERILE WATER/SALINE, 10 ML: HCPCS | Performed by: INTERNAL MEDICINE

## 2023-06-21 PROCEDURE — 96402 CHEMO HORMON ANTINEOPL SQ/IM: CPT

## 2023-06-21 PROCEDURE — 96367 TX/PROPH/DG ADDL SEQ IV INF: CPT

## 2023-06-21 PROCEDURE — 96417 CHEMO IV INFUS EACH ADDL SEQ: CPT

## 2023-06-21 PROCEDURE — 96375 TX/PRO/DX INJ NEW DRUG ADDON: CPT

## 2023-06-21 PROCEDURE — 96413 CHEMO IV INFUSION 1 HR: CPT

## 2023-06-21 PROCEDURE — 25000003 PHARM REV CODE 250: Performed by: INTERNAL MEDICINE

## 2023-06-21 RX ORDER — EPINEPHRINE 0.3 MG/.3ML
0.3 INJECTION SUBCUTANEOUS ONCE AS NEEDED
Status: DISCONTINUED | OUTPATIENT
Start: 2023-06-21 | End: 2023-06-21 | Stop reason: HOSPADM

## 2023-06-21 RX ORDER — DIPHENHYDRAMINE HYDROCHLORIDE 50 MG/ML
12.5 INJECTION INTRAMUSCULAR; INTRAVENOUS
Status: COMPLETED | OUTPATIENT
Start: 2023-06-21 | End: 2023-06-21

## 2023-06-21 RX ORDER — SODIUM CHLORIDE 0.9 % (FLUSH) 0.9 %
10 SYRINGE (ML) INJECTION
Status: DISCONTINUED | OUTPATIENT
Start: 2023-06-21 | End: 2023-06-21 | Stop reason: HOSPADM

## 2023-06-21 RX ORDER — ONDANSETRON 2 MG/ML
8 INJECTION INTRAMUSCULAR; INTRAVENOUS
Status: COMPLETED | OUTPATIENT
Start: 2023-06-21 | End: 2023-06-21

## 2023-06-21 RX ORDER — DIPHENHYDRAMINE HYDROCHLORIDE 50 MG/ML
50 INJECTION INTRAMUSCULAR; INTRAVENOUS ONCE AS NEEDED
Status: DISCONTINUED | OUTPATIENT
Start: 2023-06-21 | End: 2023-06-21 | Stop reason: HOSPADM

## 2023-06-21 RX ORDER — HEPARIN 100 UNIT/ML
500 SYRINGE INTRAVENOUS
Status: DISCONTINUED | OUTPATIENT
Start: 2023-06-21 | End: 2023-06-21 | Stop reason: HOSPADM

## 2023-06-21 RX ADMIN — SODIUM CHLORIDE: 9 INJECTION, SOLUTION INTRAVENOUS at 11:06

## 2023-06-21 RX ADMIN — Medication 10 ML: at 01:06

## 2023-06-21 RX ADMIN — TRASTUZUMAB 484 MG: 420 INJECTION, POWDER, LYOPHILIZED, FOR SOLUTION INTRAVENOUS at 01:06

## 2023-06-21 RX ADMIN — GOSERELIN ACETATE 3.6 MG: 3.6 IMPLANT SUBCUTANEOUS at 01:06

## 2023-06-21 RX ADMIN — ONDANSETRON 8 MG: 2 INJECTION INTRAMUSCULAR; INTRAVENOUS at 11:06

## 2023-06-21 RX ADMIN — DIPHENHYDRAMINE HYDROCHLORIDE 12.5 MG: 50 INJECTION INTRAMUSCULAR; INTRAVENOUS at 12:06

## 2023-06-21 RX ADMIN — SODIUM CHLORIDE 1000 ML: 9 INJECTION, SOLUTION INTRAVENOUS at 11:06

## 2023-06-21 RX ADMIN — PERTUZUMAB 420 MG: 30 INJECTION, SOLUTION, CONCENTRATE INTRAVENOUS at 12:06

## 2023-06-22 DIAGNOSIS — K44.9 HIATAL HERNIA: Primary | ICD-10-CM

## 2023-07-04 NOTE — PROGRESS NOTES
Subjective:       Patient ID: Nita Dejesus is a 53 y.o. female.    Previous Oncologist: Dr. Katlyn Gonzalez at [facility]  Surgeon: Dr. Kelly Santos    Left Breast Cancer Stage IA(T2N0M0) Triple Positive--22  Biopsy/Surgery/pathology:   1. Excisional biopsy left breast mass/left breast cyst done 22--invasive ductal carcinoma, Grade 3, measures 3.0cm, a 0.6cm region of cauterized carcinoma is present at the inked superior-deep margin, cyst with reactive changes, suggestive of previous lumpectomy site, fluid left breast cyst with rare atypical cells present, suspicious for carcinoma, ER 95%, W[place] 27%, Her2 3+ by IHC, positive, Ki67 51%.   2. Left SLN biopsies done 22--3 benign lymph nodes.   3. Re-excision lumpectomy done 3/8/23--no residual invasive malignancy identified.   Imagin. Bilateral diagnostic MMG done at [facility] 22--left inferior breast large, minimally complex cystic lesion at 6:00 measures 4.4X3.5X4.2cm, corresponding to the palpable area, appears benign. Routine screening MMG recommended.   2. MRI breasts bilateral at [facility] 22--post-surgical seroma at 12:00 position of left breast s/p recent excisional biopsy, no definite suspicious enhancing masses or areas of nonmass enhancement of left breast, and no suspicious areas in right breast, BIRADS 6.   3. CT C/A/P w/ contrast 22 at [facility]--no thoracic metastatic disease, fatty infiltration of liver, small to moderate-sized hiatal hernia, no metastatic disease.   4. NM Bone scan whole body done 22 at [facility]--activity in cervical and lumbar spines likely degenerative, no anatomic correlate seen on CT scan in lumbar spines, suggest correlation with C-spine Xrays, increase tracer w/n both feet, likely degnerative change, physiologic activity of urinary tract.  5. CT A/P w/ contrast 22 at [facility]--no acute abdominopelvic pathology or change compared to recent CT from 22, fairly large amount of stool in colon may reflect  constipation, large hiatal hernia, diffuse hepatic steatosis, post-cholecystectomy, post hysterectomy, mild thoracic and lumbar degenerative disease.   6. Bilateral diagnostic MMG/Left breast US 2/28/23--No suspicious masses, microcalcifications, or other abnormalities are seen  within the right or left breast. Postsurgical changes are noted at the  6:00 position of the left breast at site of prior excisional biopsy with positive margins.     DEXA:  5/11/23--AP spine 0.1, left femoral neck -1.5, left total hip -0.4, right femoral neck -1.2, right total hip 0.0, c/w osteopenia bilateral femoral necks.     ECHO:  08/18/22--EF 55-65%.  11/17/22--EF 60%.  03/23/23--EF 55-60%.  06/20/23--EF 60%.    Genetic testing: [employer] done 7/21/22 negative for any pathogenic variants.     Treatment history:  Left breast excisional biopsy done 7/13/22  Left SLN biopsies done 8/8/22.  2 units PRBC 12/19/22.  TCHP X 6 cycles completed only 8/23/22--1/25/23 (multiple treatment delays for thrombocytopenia, despite dose reductions, multiple side effects).  Re-excision lumpectomy done 3/8/23--no residual disease.   Adjuvant radiation therapy completed 4/12/23--5/9/23.    Current treatment plan:  Maintenance HP X 11 cycles started 3/29/23  Cycle 6 due 7/12/23 (cycle 12 in plan)  2. Adjuvant OS Zoladex monthly + Femara started 3/29/23 (DAISY for endometriosis, ovaries remain)    Chief Complaint: Other Misc (Pt states that she never heard from Dr Raul Alvarado's office re: referral. I faxed on 6/7/23. Pt states that she is now going to see Dr. Ebony Hamilton at [facility].) and Peripheral Neuropathy    HPI  Patient presents for follow-up of breast cancer. She never heard from Dr. Alvarado's office so she made an appointment with Dr. Hamilton. 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 She is having a parathyroid scan and  "then she will be referred to a surgeon in [place].  She is otherwise doing okay. Continues on Femara/Zoladex. She does have bilateral breast pain. She had a MMG/US in 2023 due to the pain that was good. We do not have those results. She continues on Maintenance HP without any problems. She has continued nausea when she eats. She is meeting with Dr. Denton on  for evaluation to fix her hiatal hernia.       Past Medical History:   Diagnosis Date    Allergy     Anemia     Anxiety     Cancer     Clotting disorder     GERD (gastroesophageal reflux disease)     Neuromuscular disorder       Past Surgical History:   Procedure Laterality Date    CHOLECYSTECTOMY      HERNIA REPAIR      HYSTERECTOMY      lymphectomy Left     REDUCTION OF BOTH BREASTS  2018    TUBAL LIGATION Bilateral 1993     Family History   Problem Relation Age of Onset    Hypertension Mother     Lupus Mother     Hypoparathyroidism Mother     Diabetes Father     Heart disease Father     Prostate cancer Father     Kidney disease Father     Hodgkin's lymphoma Brother     Pancreatitis Brother      Social History     Socioeconomic History    Marital status:    Tobacco Use    Smoking status: Every Day     Packs/day: 0.50     Types: Cigarettes     Last attempt to quit: 2022     Years since quittin.6    Smokeless tobacco: Current   Substance and Sexual Activity    Alcohol use: Not Currently    Drug use: Never       Review of patient's allergies indicates:   Allergen Reactions    Corticosteroids (glucocorticoids) Hives, Other (See Comments) and Swelling     Patient states "Severe Inflammation"      Ketorolac Diarrhea, Nausea And Vomiting, Other (See Comments) and Swelling     Migraines      Peanut Anaphylaxis    Penicillins Anaphylaxis, Diarrhea, Hives, Nausea And Vomiting and Swelling    Shiitake mushroom (lentinus edodes) Anaphylaxis    Tramadol Diarrhea, Itching, Nausea And Vomiting, Rash and Swelling    Latex Other (See Comments)     " "Skin blisters      Macrolide antibiotics Hives and Rash    Adhesive Other (See Comments)     Blisters, skin tears; CANNOT USE PAPER TAPE    Anticoag citrate phos dextrose     Aspartame     Egg     Erythromycin      Other reaction(s): Unknown    Estrogens     Other omega-3s      Anticoagulant   Patient states "low blood pressure"    Pork derived (porcine) Diarrhea    Sucralose Other (See Comments)    Amitriptyline Anxiety and Other (See Comments)     Severe    Other reaction(s): Unknown    Aspirin Other (See Comments)     Avoid if possible due to blood thinning    Eszopiclone Anxiety and Itching    Heparin analogues Palpitations and Other (See Comments)     "All anticoagulation Meds cause dangerously low BP"    Topiramate Anxiety, Other (See Comments) and Palpitations     Shaking        Current Outpatient Medications on File Prior to Visit   Medication Sig Dispense Refill    (Magic mouthwash) 1:1:1 diphenhydrAMINE(Benadryl) 12.5mg/5ml liq, aluminum & magnesium hydroxide-simethicone (Maalox), LIDOcaine viscous 2% Swish and spit 10 mLs every 4 (four) hours as needed. for mouth sores 500 mL 1    albuterol (VENTOLIN HFA) 90 mcg/actuation inhaler Inhale 2 puffs into the lungs every 6 (six) hours as needed for Wheezing. Rescue 18 g 0    biotin 1 mg tablet 1 tablet Orally Once a day      butalbital-acetaminophen-caffeine -40 mg (FIORICET, ESGIC) -40 mg per tablet Take by mouth.      clonazePAM (KLONOPIN) 0.5 MG tablet Take 0.5 mg by mouth every 8 (eight) hours as needed.      diclofenac (VOLTAREN) 75 MG EC tablet       diphenhydrAMINE (BENADRYL) 50 MG capsule Take 50 mg by mouth every 6 (six) hours as needed.      famotidine (PEPCID) 40 MG tablet Take 1 tablet (40 mg total) by mouth as needed for Heartburn. 30 tablet 0    gabapentin (NEURONTIN) 300 MG capsule 3 (three) times daily.      gabapentin (NEURONTIN) 600 MG tablet Take 600 mg by mouth 3 (three) times daily.      HYDROcodone-acetaminophen (NORCO) 5-325 " mg per tablet Take 1 tablet by mouth every 6 (six) hours as needed for Pain. 60 tablet 0    letrozole (FEMARA) 2.5 mg Tab Take 1 tablet (2.5 mg total) by mouth once daily. 30 tablet 3    loperamide (IMODIUM) 2 mg capsule Take 2 mg by mouth 4 (four) times daily as needed.      loratadine (CLARITIN) 10 mg tablet Take 1 tablet by mouth every morning.      metoclopramide HCl (REGLAN) 10 MG tablet 1 Tablet Orally Once a day as needed for nausea for 30 days      omeprazole (PRILOSEC) 40 MG capsule Take 1 capsule (40 mg total) by mouth once daily. 30 capsule 6    onabotulinumtoxina (BOTOX) 200 unit SolR 155 units IM Injection      ondansetron (ZOFRAN-ODT) 4 MG TbDL Take 4 mg by mouth every 8 (eight) hours as needed.      promethazine (PHENERGAN) 25 MG tablet Take 1 tablet (25 mg total) by mouth every 6 (six) hours as needed for Nausea. 30 tablet 0    tiZANidine (ZANAFLEX) 4 MG tablet Take 12 mg by mouth 3 (three) times daily.      diphenoxylate-atropine 2.5-0.025 mg (LOMOTIL) 2.5-0.025 mg per tablet Take 1 tablet by mouth 4 (four) times daily as needed for Diarrhea. (Patient not taking: Reported on 7/11/2023) 30 tablet 1     No current facility-administered medications on file prior to visit.      Review of Systems   Constitutional:  Negative for appetite change and unexpected weight change.   HENT:  Negative for mouth sores.    Respiratory:  Negative for cough.    Gastrointestinal:  Positive for nausea and reflux. Negative for abdominal pain.        +hiatal hernia   Genitourinary:  Positive for hot flashes. Negative for frequency.   Musculoskeletal:  Negative for back pain.   Integumentary:  Negative for rash.        Bilateral breast pain, chronic   Neurological:  Negative for headaches.   Hematological:  Negative for adenopathy.   Psychiatric/Behavioral:  The patient is not nervous/anxious.        Vitals:    07/11/23 0916   BP: (!) 135/92   Pulse: 78   Resp: 14   Temp: 98.4 °F (36.9 °C)   TempSrc: Oral   SpO2: 98%  "  Weight: 78.7 kg (173 lb 8 oz)   Height: 5' 1" (1.549 m)       Physical Exam  Constitutional:       Appearance: Normal appearance.   HENT:      Head: Normocephalic.      Comments: Resolved alopecia     Nose: Nose normal.      Mouth/Throat:      Mouth: Mucous membranes are moist.   Eyes:      Extraocular Movements: Extraocular movements intact.      Conjunctiva/sclera: Conjunctivae normal.   Cardiovascular:      Rate and Rhythm: Normal rate and regular rhythm.   Pulmonary:      Effort: Pulmonary effort is normal.      Breath sounds: Normal breath sounds.   Chest:      Comments: Right chest wall mediport in place, left breast with inferior lumpectomy scar, well healed. Left axillary incision from LN biopsy also healed well. No masses palpable.   Abdominal:      General: Bowel sounds are normal. There is no distension.      Palpations: Abdomen is soft.      Tenderness: There is no abdominal tenderness.   Musculoskeletal:         General: Normal range of motion.   Skin:     General: Skin is warm.   Neurological:      General: No focal deficit present.      Mental Status: She is alert and oriented to person, place, and time.   Psychiatric:         Mood and Affect: Mood normal.         Judgment: Judgment normal.     Lab Visit on 07/11/2023   Component Date Value    WBC 07/11/2023 5.86     RBC 07/11/2023 3.99 (L)     Hgb 07/11/2023 12.4     Hct 07/11/2023 39.2     MCV 07/11/2023 98.2 (H)     MCH 07/11/2023 31.1 (H)     MCHC 07/11/2023 31.6 (L)     RDW 07/11/2023 13.6     Platelet 07/11/2023 181     MPV 07/11/2023 8.5     Neut % 07/11/2023 60.5     Lymph % 07/11/2023 25.6     Mono % 07/11/2023 6.7     Eos % 07/11/2023 6.7     Basophil % 07/11/2023 0.3     Lymph # 07/11/2023 1.50     Neut # 07/11/2023 3.55     Mono # 07/11/2023 0.39     Eos # 07/11/2023 0.39     Baso # 07/11/2023 0.02     IG# 07/11/2023 0.01     IG% 07/11/2023 0.2           Assessment:       1. Malignant neoplasm of central portion of left breast in female, " estrogen receptor positive      Plan:       Patient with Stage IA Left breast cancer triple positive s/p excisional biopsy of a cystic lesion that found cancer incidentally diagnosed 7/13/22. Tumor measured 3.0cm and SLN biopsies negative, Grade 3, strongly ER and [place] positive and Her2 positive with high Ki67. There was a positive margin on the excisional biopsy.  Treatment with chemotherapy neoadjuvant started at [facility] with Dr. Gonzalez.    Patient received 2 cycles at [facility] then changed care to [facility]. She was having multiple side effects.   She does not have any steroids due to h/o allergy causing inflammation.   Patient had multiple delays due to multiple side effects, thrombocytopenia, requiring dose reduction, but finally completed 6 cycles on 1/25/23.  Surgery was delayed and finally done on 3/8/23. Patient had re-excision lumpectomy and there was no residual invasive malignancy.     Treatment resumed with maintenance HP X 11 cycles 3/29/23.   Repeat ECHO from 6/20/23 normal EF. Next due in 9/2023, can order at next visit.    Recommended also OS + AI X 5 years followed by completion of AI to complete 10 years.   She had DAISY in 2012 for endometriosis but still had ovaries remaining functional prior to starting chemotherapy.   8/4/22 estradiol level 11, FSH 44, LH 38.     Zoladex/Femara started on 3/29/23.   Patient completed radiation on 5/9/23.    She did have MMG in 5/2023 at [facility], will obtain results.    CBCdiff normal today. CMP pending and will follow-up.     Proceed with cycle 6 maintenance HP tomorrow (cycle 12 in system).  Schedule labs and treatment cycle 7--8/2/23.     RTC 6 weeks T/D visit with labs prior to cycle 8 maintenance.     Continue Zoladex, next due 7/19/23.  Continue Femara.    Continue vitamin D. Calcium stopped due to high level.   PTH elevated, referral sent to Endocrinology. She never heard from Dr. Raul Alvarado's office, so she is now seeing Dr. Hamilton. Plan for parathyroid scan and referral  to specialist parathyroid surgeon in [facility].    DEXA from 5/11/23 showed mild osteopenia in bilateral femoral necks.   Will need to consider Zometa every 6 months X 2 years but I don't want to start until after her parathyroid surgery.      All questions answered at this time.        Flory Vargas MD

## 2023-07-11 ENCOUNTER — OFFICE VISIT (OUTPATIENT)
Dept: HEMATOLOGY/ONCOLOGY | Facility: CLINIC | Age: 53
End: 2023-07-11
Payer: COMMERCIAL

## 2023-07-11 VITALS
HEIGHT: 61 IN | DIASTOLIC BLOOD PRESSURE: 92 MMHG | HEART RATE: 78 BPM | WEIGHT: 173.5 LBS | SYSTOLIC BLOOD PRESSURE: 135 MMHG | OXYGEN SATURATION: 98 % | BODY MASS INDEX: 32.76 KG/M2 | RESPIRATION RATE: 14 BRPM | TEMPERATURE: 98 F

## 2023-07-11 DIAGNOSIS — Z17.0 MALIGNANT NEOPLASM OF CENTRAL PORTION OF LEFT BREAST IN FEMALE, ESTROGEN RECEPTOR POSITIVE: Primary | ICD-10-CM

## 2023-07-11 DIAGNOSIS — C50.112 MALIGNANT NEOPLASM OF CENTRAL PORTION OF LEFT BREAST IN FEMALE, ESTROGEN RECEPTOR POSITIVE: Primary | ICD-10-CM

## 2023-07-11 PROCEDURE — 99999 PR PBB SHADOW E&M-EST. PATIENT-LVL V: ICD-10-PCS | Mod: PBBFAC,,, | Performed by: INTERNAL MEDICINE

## 2023-07-11 PROCEDURE — 1160F RVW MEDS BY RX/DR IN RCRD: CPT | Mod: CPTII,S$GLB,, | Performed by: INTERNAL MEDICINE

## 2023-07-11 PROCEDURE — 3008F BODY MASS INDEX DOCD: CPT | Mod: CPTII,S$GLB,, | Performed by: INTERNAL MEDICINE

## 2023-07-11 PROCEDURE — 3075F PR MOST RECENT SYSTOLIC BLOOD PRESS GE 130-139MM HG: ICD-10-PCS | Mod: CPTII,S$GLB,, | Performed by: INTERNAL MEDICINE

## 2023-07-11 PROCEDURE — 99999 PR PBB SHADOW E&M-EST. PATIENT-LVL V: CPT | Mod: PBBFAC,,, | Performed by: INTERNAL MEDICINE

## 2023-07-11 PROCEDURE — 3080F DIAST BP >= 90 MM HG: CPT | Mod: CPTII,S$GLB,, | Performed by: INTERNAL MEDICINE

## 2023-07-11 PROCEDURE — 1159F PR MEDICATION LIST DOCUMENTED IN MEDICAL RECORD: ICD-10-PCS | Mod: CPTII,S$GLB,, | Performed by: INTERNAL MEDICINE

## 2023-07-11 PROCEDURE — 1160F PR REVIEW ALL MEDS BY PRESCRIBER/CLIN PHARMACIST DOCUMENTED: ICD-10-PCS | Mod: CPTII,S$GLB,, | Performed by: INTERNAL MEDICINE

## 2023-07-11 PROCEDURE — 3008F PR BODY MASS INDEX (BMI) DOCUMENTED: ICD-10-PCS | Mod: CPTII,S$GLB,, | Performed by: INTERNAL MEDICINE

## 2023-07-11 PROCEDURE — 1159F MED LIST DOCD IN RCRD: CPT | Mod: CPTII,S$GLB,, | Performed by: INTERNAL MEDICINE

## 2023-07-11 PROCEDURE — 3075F SYST BP GE 130 - 139MM HG: CPT | Mod: CPTII,S$GLB,, | Performed by: INTERNAL MEDICINE

## 2023-07-11 PROCEDURE — 99215 OFFICE O/P EST HI 40 MIN: CPT | Mod: S$GLB,,, | Performed by: INTERNAL MEDICINE

## 2023-07-11 PROCEDURE — 3080F PR MOST RECENT DIASTOLIC BLOOD PRESSURE >= 90 MM HG: ICD-10-PCS | Mod: CPTII,S$GLB,, | Performed by: INTERNAL MEDICINE

## 2023-07-11 PROCEDURE — 99215 PR OFFICE/OUTPT VISIT, EST, LEVL V, 40-54 MIN: ICD-10-PCS | Mod: S$GLB,,, | Performed by: INTERNAL MEDICINE

## 2023-07-12 ENCOUNTER — INFUSION (OUTPATIENT)
Dept: INFUSION THERAPY | Facility: HOSPITAL | Age: 53
End: 2023-07-12
Attending: FAMILY MEDICINE
Payer: COMMERCIAL

## 2023-07-12 VITALS
OXYGEN SATURATION: 97 % | TEMPERATURE: 98 F | RESPIRATION RATE: 16 BRPM | DIASTOLIC BLOOD PRESSURE: 84 MMHG | SYSTOLIC BLOOD PRESSURE: 133 MMHG | HEART RATE: 80 BPM

## 2023-07-12 DIAGNOSIS — C50.112 MALIGNANT NEOPLASM OF CENTRAL PORTION OF LEFT BREAST IN FEMALE, ESTROGEN RECEPTOR POSITIVE: Primary | ICD-10-CM

## 2023-07-12 DIAGNOSIS — Z17.0 MALIGNANT NEOPLASM OF CENTRAL PORTION OF LEFT BREAST IN FEMALE, ESTROGEN RECEPTOR POSITIVE: Primary | ICD-10-CM

## 2023-07-12 PROCEDURE — 63600175 PHARM REV CODE 636 W HCPCS: Mod: JZ,JG | Performed by: INTERNAL MEDICINE

## 2023-07-12 PROCEDURE — 96361 HYDRATE IV INFUSION ADD-ON: CPT

## 2023-07-12 PROCEDURE — 25000003 PHARM REV CODE 250: Performed by: INTERNAL MEDICINE

## 2023-07-12 PROCEDURE — 96417 CHEMO IV INFUS EACH ADDL SEQ: CPT

## 2023-07-12 PROCEDURE — 96375 TX/PRO/DX INJ NEW DRUG ADDON: CPT

## 2023-07-12 PROCEDURE — 96413 CHEMO IV INFUSION 1 HR: CPT

## 2023-07-12 RX ORDER — SODIUM CHLORIDE 0.9 % (FLUSH) 0.9 %
10 SYRINGE (ML) INJECTION
Status: DISCONTINUED | OUTPATIENT
Start: 2023-07-12 | End: 2023-07-12 | Stop reason: HOSPADM

## 2023-07-12 RX ORDER — SODIUM CHLORIDE 0.9 % (FLUSH) 0.9 %
10 SYRINGE (ML) INJECTION
Status: CANCELLED | OUTPATIENT
Start: 2023-07-12

## 2023-07-12 RX ORDER — DIPHENHYDRAMINE HYDROCHLORIDE 50 MG/ML
12.5 INJECTION INTRAMUSCULAR; INTRAVENOUS
Status: CANCELLED
Start: 2023-07-12

## 2023-07-12 RX ORDER — EPINEPHRINE 0.3 MG/.3ML
0.3 INJECTION SUBCUTANEOUS ONCE AS NEEDED
Status: DISCONTINUED | OUTPATIENT
Start: 2023-07-12 | End: 2023-07-12 | Stop reason: HOSPADM

## 2023-07-12 RX ORDER — DIPHENHYDRAMINE HYDROCHLORIDE 50 MG/ML
12.5 INJECTION INTRAMUSCULAR; INTRAVENOUS
Status: COMPLETED | OUTPATIENT
Start: 2023-07-12 | End: 2023-07-12

## 2023-07-12 RX ORDER — EPINEPHRINE 0.3 MG/.3ML
0.3 INJECTION SUBCUTANEOUS ONCE AS NEEDED
Status: CANCELLED | OUTPATIENT
Start: 2023-07-12

## 2023-07-12 RX ORDER — DIPHENHYDRAMINE HYDROCHLORIDE 50 MG/ML
50 INJECTION INTRAMUSCULAR; INTRAVENOUS ONCE AS NEEDED
Status: CANCELLED | OUTPATIENT
Start: 2023-07-12

## 2023-07-12 RX ORDER — ONDANSETRON 2 MG/ML
8 INJECTION INTRAMUSCULAR; INTRAVENOUS
Status: CANCELLED
Start: 2023-07-12

## 2023-07-12 RX ORDER — DIPHENHYDRAMINE HYDROCHLORIDE 50 MG/ML
50 INJECTION INTRAMUSCULAR; INTRAVENOUS ONCE AS NEEDED
Status: DISCONTINUED | OUTPATIENT
Start: 2023-07-12 | End: 2023-07-12 | Stop reason: HOSPADM

## 2023-07-12 RX ORDER — HEPARIN 100 UNIT/ML
500 SYRINGE INTRAVENOUS
Status: CANCELLED | OUTPATIENT
Start: 2023-07-12

## 2023-07-12 RX ORDER — ONDANSETRON 2 MG/ML
8 INJECTION INTRAMUSCULAR; INTRAVENOUS
Status: COMPLETED | OUTPATIENT
Start: 2023-07-12 | End: 2023-07-12

## 2023-07-12 RX ORDER — HEPARIN 100 UNIT/ML
500 SYRINGE INTRAVENOUS
Status: DISCONTINUED | OUTPATIENT
Start: 2023-07-12 | End: 2023-07-12 | Stop reason: HOSPADM

## 2023-07-12 RX ADMIN — TRASTUZUMAB 484 MG: 420 INJECTION, POWDER, LYOPHILIZED, FOR SOLUTION INTRAVENOUS at 12:07

## 2023-07-12 RX ADMIN — ONDANSETRON 8 MG: 2 INJECTION INTRAMUSCULAR; INTRAVENOUS at 12:07

## 2023-07-12 RX ADMIN — SODIUM CHLORIDE: 9 INJECTION, SOLUTION INTRAVENOUS at 12:07

## 2023-07-12 RX ADMIN — SODIUM CHLORIDE 1000 ML: 9 INJECTION, SOLUTION INTRAVENOUS at 11:07

## 2023-07-12 RX ADMIN — DIPHENHYDRAMINE HYDROCHLORIDE 12.5 MG: 50 INJECTION INTRAMUSCULAR; INTRAVENOUS at 12:07

## 2023-07-12 RX ADMIN — PERTUZUMAB 420 MG: 30 INJECTION, SOLUTION, CONCENTRATE INTRAVENOUS at 12:07

## 2023-07-12 NOTE — NURSING
At the completion of treatment. Patient complained of pre-existing BLE neuropathy pain and feeling unable to stand/support weight. Believes it was due to the Benadryl 12.5 mg. Dr. Flory Vargas notified. Patient takes Norco and Gabapentin for the pain at home. Patient took Norco and applied lidocaine ointment to her legs. After 30 minutes, symptoms resolved and patient able to walk. MD considering removing Benadryl.

## 2023-07-18 DIAGNOSIS — E21.0 PRIMARY HYPERPARATHYROIDISM: Primary | ICD-10-CM

## 2023-07-19 ENCOUNTER — INFUSION (OUTPATIENT)
Dept: INFUSION THERAPY | Facility: HOSPITAL | Age: 53
End: 2023-07-19
Attending: FAMILY MEDICINE
Payer: COMMERCIAL

## 2023-07-19 VITALS
DIASTOLIC BLOOD PRESSURE: 84 MMHG | HEART RATE: 90 BPM | SYSTOLIC BLOOD PRESSURE: 126 MMHG | OXYGEN SATURATION: 97 % | HEIGHT: 61 IN | TEMPERATURE: 98 F | BODY MASS INDEX: 31.87 KG/M2 | WEIGHT: 168.81 LBS | RESPIRATION RATE: 16 BRPM

## 2023-07-19 DIAGNOSIS — Z17.0 MALIGNANT NEOPLASM OF CENTRAL PORTION OF LEFT BREAST IN FEMALE, ESTROGEN RECEPTOR POSITIVE: Primary | ICD-10-CM

## 2023-07-19 DIAGNOSIS — C50.112 MALIGNANT NEOPLASM OF CENTRAL PORTION OF LEFT BREAST IN FEMALE, ESTROGEN RECEPTOR POSITIVE: Primary | ICD-10-CM

## 2023-07-19 PROCEDURE — 96402 CHEMO HORMON ANTINEOPL SQ/IM: CPT

## 2023-07-19 PROCEDURE — 63600175 PHARM REV CODE 636 W HCPCS: Mod: JZ,JG | Performed by: INTERNAL MEDICINE

## 2023-07-19 RX ADMIN — GOSERELIN ACETATE 3.6 MG: 3.6 IMPLANT SUBCUTANEOUS at 11:07

## 2023-07-25 DIAGNOSIS — K44.9 HIATAL HERNIA: Primary | ICD-10-CM

## 2023-07-26 RX ORDER — EPINEPHRINE 0.3 MG/.3ML
0.3 INJECTION SUBCUTANEOUS ONCE AS NEEDED
Status: CANCELLED | OUTPATIENT
Start: 2023-08-02

## 2023-07-26 RX ORDER — SODIUM CHLORIDE 0.9 % (FLUSH) 0.9 %
10 SYRINGE (ML) INJECTION
Status: CANCELLED | OUTPATIENT
Start: 2023-08-02

## 2023-07-26 RX ORDER — ONDANSETRON 2 MG/ML
8 INJECTION INTRAMUSCULAR; INTRAVENOUS
Status: CANCELLED
Start: 2023-08-02

## 2023-07-26 RX ORDER — DIPHENHYDRAMINE HYDROCHLORIDE 50 MG/ML
50 INJECTION INTRAMUSCULAR; INTRAVENOUS ONCE AS NEEDED
Status: CANCELLED | OUTPATIENT
Start: 2023-08-02

## 2023-07-27 ENCOUNTER — HOSPITAL ENCOUNTER (OUTPATIENT)
Dept: RADIOLOGY | Facility: HOSPITAL | Age: 53
Discharge: HOME | End: 2023-07-27
Attending: INTERNAL MEDICINE
Payer: COMMERCIAL

## 2023-07-27 DIAGNOSIS — E21.0 PRIMARY HYPERPARATHYROIDISM: ICD-10-CM

## 2023-07-27 PROCEDURE — 78070 PARATHYROID PLANAR IMAGING: CPT | Mod: TC

## 2023-07-31 DIAGNOSIS — C50.112 MALIGNANT NEOPLASM OF CENTRAL PORTION OF LEFT BREAST IN FEMALE, ESTROGEN RECEPTOR POSITIVE: ICD-10-CM

## 2023-07-31 DIAGNOSIS — Z17.0 MALIGNANT NEOPLASM OF CENTRAL PORTION OF LEFT BREAST IN FEMALE, ESTROGEN RECEPTOR POSITIVE: ICD-10-CM

## 2023-07-31 RX ORDER — LETROZOLE 2.5 MG/1
2.5 TABLET, FILM COATED ORAL
Qty: 30 TABLET | Refills: 3 | Status: SHIPPED | OUTPATIENT
Start: 2023-07-31 | End: 2023-12-19

## 2023-08-01 ENCOUNTER — LAB VISIT (OUTPATIENT)
Dept: LAB | Facility: HOSPITAL | Age: 53
End: 2023-08-01
Attending: FAMILY MEDICINE
Payer: COMMERCIAL

## 2023-08-01 DIAGNOSIS — Z17.0 MALIGNANT NEOPLASM OF CENTRAL PORTION OF LEFT BREAST IN FEMALE, ESTROGEN RECEPTOR POSITIVE: ICD-10-CM

## 2023-08-01 DIAGNOSIS — C50.112 MALIGNANT NEOPLASM OF CENTRAL PORTION OF LEFT BREAST IN FEMALE, ESTROGEN RECEPTOR POSITIVE: ICD-10-CM

## 2023-08-01 LAB
ALBUMIN SERPL-MCNC: 3.7 G/DL (ref 3.5–5)
ALBUMIN/GLOB SERPL: 1.5 RATIO (ref 1.1–2)
ALP SERPL-CCNC: 113 UNIT/L (ref 40–150)
ALT SERPL-CCNC: 12 UNIT/L (ref 0–55)
AST SERPL-CCNC: 14 UNIT/L (ref 5–34)
BASOPHILS # BLD AUTO: 0.01 X10(3)/MCL
BASOPHILS NFR BLD AUTO: 0.2 %
BILIRUBIN DIRECT+TOT PNL SERPL-MCNC: 0.6 MG/DL
BUN SERPL-MCNC: 15 MG/DL (ref 9.8–20.1)
CALCIUM SERPL-MCNC: 10 MG/DL (ref 8.4–10.2)
CHLORIDE SERPL-SCNC: 108 MMOL/L (ref 98–107)
CO2 SERPL-SCNC: 28 MMOL/L (ref 22–29)
CREAT SERPL-MCNC: 0.83 MG/DL (ref 0.55–1.02)
EOSINOPHIL # BLD AUTO: 0.31 X10(3)/MCL (ref 0–0.9)
EOSINOPHIL NFR BLD AUTO: 5.5 %
ERYTHROCYTE [DISTWIDTH] IN BLOOD BY AUTOMATED COUNT: 13.7 % (ref 11.5–17)
GFR SERPLBLD CREATININE-BSD FMLA CKD-EPI: >60 MLS/MIN/1.73/M2
GLOBULIN SER-MCNC: 2.4 GM/DL (ref 2.4–3.5)
GLUCOSE SERPL-MCNC: 97 MG/DL (ref 74–100)
HCT VFR BLD AUTO: 37.8 % (ref 37–47)
HGB BLD-MCNC: 11.8 G/DL (ref 12–16)
IMM GRANULOCYTES # BLD AUTO: 0.01 X10(3)/MCL (ref 0–0.04)
IMM GRANULOCYTES NFR BLD AUTO: 0.2 %
LYMPHOCYTES # BLD AUTO: 1.31 X10(3)/MCL (ref 0.6–4.6)
LYMPHOCYTES NFR BLD AUTO: 23.4 %
MCH RBC QN AUTO: 31.4 PG (ref 27–31)
MCHC RBC AUTO-ENTMCNC: 31.2 G/DL (ref 33–36)
MCV RBC AUTO: 100.5 FL (ref 80–94)
MONOCYTES # BLD AUTO: 0.32 X10(3)/MCL (ref 0.1–1.3)
MONOCYTES NFR BLD AUTO: 5.7 %
NEUTROPHILS # BLD AUTO: 3.64 X10(3)/MCL (ref 2.1–9.2)
NEUTROPHILS NFR BLD AUTO: 65 %
PLATELET # BLD AUTO: 180 X10(3)/MCL (ref 130–400)
PMV BLD AUTO: 8.5 FL (ref 7.4–10.4)
POTASSIUM SERPL-SCNC: 4.5 MMOL/L (ref 3.5–5.1)
PROT SERPL-MCNC: 6.1 GM/DL (ref 6.4–8.3)
RBC # BLD AUTO: 3.76 X10(6)/MCL (ref 4.2–5.4)
SODIUM SERPL-SCNC: 141 MMOL/L (ref 136–145)
WBC # SPEC AUTO: 5.6 X10(3)/MCL (ref 4.5–11.5)

## 2023-08-01 PROCEDURE — 85025 COMPLETE CBC W/AUTO DIFF WBC: CPT

## 2023-08-01 PROCEDURE — 80053 COMPREHEN METABOLIC PANEL: CPT

## 2023-08-01 PROCEDURE — 36415 COLL VENOUS BLD VENIPUNCTURE: CPT

## 2023-08-02 ENCOUNTER — INFUSION (OUTPATIENT)
Dept: INFUSION THERAPY | Facility: HOSPITAL | Age: 53
End: 2023-08-02
Attending: FAMILY MEDICINE
Payer: COMMERCIAL

## 2023-08-02 VITALS
HEIGHT: 61 IN | SYSTOLIC BLOOD PRESSURE: 123 MMHG | DIASTOLIC BLOOD PRESSURE: 81 MMHG | OXYGEN SATURATION: 96 % | RESPIRATION RATE: 16 BRPM | HEART RATE: 88 BPM | BODY MASS INDEX: 31.72 KG/M2 | WEIGHT: 168 LBS | TEMPERATURE: 98 F

## 2023-08-02 DIAGNOSIS — Z17.0 MALIGNANT NEOPLASM OF CENTRAL PORTION OF LEFT BREAST IN FEMALE, ESTROGEN RECEPTOR POSITIVE: Primary | ICD-10-CM

## 2023-08-02 DIAGNOSIS — C50.112 MALIGNANT NEOPLASM OF CENTRAL PORTION OF LEFT BREAST IN FEMALE, ESTROGEN RECEPTOR POSITIVE: Primary | ICD-10-CM

## 2023-08-02 PROCEDURE — 96375 TX/PRO/DX INJ NEW DRUG ADDON: CPT

## 2023-08-02 PROCEDURE — A4216 STERILE WATER/SALINE, 10 ML: HCPCS | Performed by: INTERNAL MEDICINE

## 2023-08-02 PROCEDURE — 96417 CHEMO IV INFUS EACH ADDL SEQ: CPT

## 2023-08-02 PROCEDURE — 25000003 PHARM REV CODE 250: Performed by: INTERNAL MEDICINE

## 2023-08-02 PROCEDURE — 63600175 PHARM REV CODE 636 W HCPCS: Mod: JZ,JG | Performed by: INTERNAL MEDICINE

## 2023-08-02 PROCEDURE — 96413 CHEMO IV INFUSION 1 HR: CPT

## 2023-08-02 RX ORDER — DIPHENHYDRAMINE HYDROCHLORIDE 50 MG/ML
50 INJECTION INTRAMUSCULAR; INTRAVENOUS ONCE AS NEEDED
Status: DISCONTINUED | OUTPATIENT
Start: 2023-08-02 | End: 2023-08-02 | Stop reason: HOSPADM

## 2023-08-02 RX ORDER — SODIUM CHLORIDE 0.9 % (FLUSH) 0.9 %
10 SYRINGE (ML) INJECTION
Status: DISCONTINUED | OUTPATIENT
Start: 2023-08-02 | End: 2023-08-02 | Stop reason: HOSPADM

## 2023-08-02 RX ORDER — EPINEPHRINE 0.3 MG/.3ML
0.3 INJECTION SUBCUTANEOUS ONCE AS NEEDED
Status: DISCONTINUED | OUTPATIENT
Start: 2023-08-02 | End: 2023-08-02 | Stop reason: HOSPADM

## 2023-08-02 RX ORDER — ONDANSETRON 2 MG/ML
8 INJECTION INTRAMUSCULAR; INTRAVENOUS
Status: COMPLETED | OUTPATIENT
Start: 2023-08-02 | End: 2023-08-02

## 2023-08-02 RX ADMIN — PERTUZUMAB 420 MG: 30 INJECTION, SOLUTION, CONCENTRATE INTRAVENOUS at 11:08

## 2023-08-02 RX ADMIN — SODIUM CHLORIDE 1000 ML: 9 INJECTION, SOLUTION INTRAVENOUS at 11:08

## 2023-08-02 RX ADMIN — ONDANSETRON 8 MG: 2 INJECTION INTRAMUSCULAR; INTRAVENOUS at 11:08

## 2023-08-02 RX ADMIN — TRASTUZUMAB 484 MG: 420 INJECTION, POWDER, LYOPHILIZED, FOR SOLUTION INTRAVENOUS at 12:08

## 2023-08-02 RX ADMIN — Medication 10 ML: at 01:08

## 2023-08-02 RX ADMIN — SODIUM CHLORIDE: 9 INJECTION, SOLUTION INTRAVENOUS at 11:08

## 2023-08-02 NOTE — PLAN OF CARE
C13 Perjeta/Ogivri given. Tolerated well. Mediport flushed with only NS due to heparin allergy. Next appts given to pt. Dc'd home in stable condition.

## 2023-08-04 ENCOUNTER — HOSPITAL ENCOUNTER (OUTPATIENT)
Dept: RADIOLOGY | Facility: HOSPITAL | Age: 53
Discharge: HOME | End: 2023-08-04
Attending: SURGERY
Payer: COMMERCIAL

## 2023-08-04 DIAGNOSIS — K44.9 HIATAL HERNIA: ICD-10-CM

## 2023-08-04 PROCEDURE — 71250 CT THORAX DX C-: CPT | Mod: TC

## 2023-08-04 PROCEDURE — 74150 CT ABDOMEN W/O CONTRAST: CPT | Mod: TC

## 2023-08-15 PROBLEM — K20.80 OTHER ESOPHAGITIS WITHOUT BLEEDING: Status: ACTIVE | Noted: 2023-06-09

## 2023-08-15 PROBLEM — K80.50 CALCULUS OF BILE DUCT: Status: ACTIVE | Noted: 2023-08-15

## 2023-08-15 PROBLEM — K57.30 DIVERTICULOSIS OF LARGE INTESTINE WITHOUT PERFORATION OR ABSCESS WITHOUT BLEEDING: Status: ACTIVE | Noted: 2023-06-09

## 2023-08-15 PROBLEM — C50.312 MALIGNANT NEOPLASM OF LOWER-INNER QUADRANT OF LEFT BREAST IN FEMALE, ESTROGEN RECEPTOR POSITIVE: Status: ACTIVE | Noted: 2022-10-30

## 2023-08-15 NOTE — PROGRESS NOTES
Subjective:       Patient ID: Nita Dejesus is a 53 y.o. female.    Previous Oncologist: Dr. Katlyn Gonzalez at [facility]  Surgeon: Dr. Kelly Santos    Left Breast Cancer Stage IA(T2N0M0) Triple Positive--22  Biopsy/Surgery/pathology:   1. Excisional biopsy left breast mass/left breast cyst done 22--invasive ductal carcinoma, Grade 3, measures 3.0cm, a 0.6cm region of cauterized carcinoma is present at the inked superior-deep margin, cyst with reactive changes, suggestive of previous lumpectomy site, fluid left breast cyst with rare atypical cells present, suspicious for carcinoma, ER 95%, MS 27%, Her2 3+ by IHC, positive, Ki67 51%.   2. Left SLN biopsies done 22--3 benign lymph nodes.   3. Re-excision lumpectomy done 3/8/23--no residual invasive malignancy identified.   Imagin. [place] [facility] [place] inferior breast large, minimally complex cystic lesion at 6:00 measures 4.4X3.5X4.2cm, corresponding to the palpable area, appears benign. Routine screening MMG recommended.   2. MRI breasts bilateral at [facility] 22--post-surgical seroma at 12:00 position of left breast s/p recent excisional biopsy, no definite suspicious enhancing masses or areas of nonmass enhancement of left breast, and no suspicious areas in right breast, BIRADS 6.   3. CT C/A/P w/ contrast 22 at [facility]--no thoracic metastatic disease, fatty infiltration of liver, small to moderate-sized hiatal hernia, no metastatic disease.   4. NM Bone scan whole body done 22 at [facility]--activity in cervical and lumbar spines likely degenerative, no anatomic correlate seen on CT scan in lumbar spines, suggest correlation with C-spine Xrays, increase tracer w/n both feet, likely degnerative change, physiologic activity of urinary tract.  5. CT A/P w/ contrast 22 at [facility]--no acute abdominopelvic pathology or change compared to recent CT from 22, fairly large amount of stool in colon may reflect  constipation, large hiatal hernia, diffuse hepatic steatosis, post-cholecystectomy, post hysterectomy, mild thoracic and lumbar degenerative disease.   6. Bilateral diagnostic MMG/Left breast US 2/28/23--No suspicious masses, microcalcifications, or other abnormalities are seen  within the right or left breast. Postsurgical changes are noted at the  6:00 position of the left breast at site of prior excisional biopsy with positive margins.   7. NM parathyroid scan on 7/27/23--Overall asymmetric increased activity at the right thyroid lobe with limited washout on the delayed 2 hour exam.  A underlying hyperfunctioning parathyroid adenoma cannot be entirely excluded.  7. CT C/A/P on 8/4/23--Mucosal prominence at a small to moderate size hiatus hernia. Atelectasis and/or scarring anterior left upper lobe and posterior right lower lung. Postsurgical changes left axilla and left breast with small residual fluid density/edema/stranding. Right central line/MediPort. Thoracolumbar spondylosis.     DEXA:  5/11/23--AP spine 0.1, left femoral neck -1.5, left total hip -0.4, right femoral neck -1.2, right total hip 0.0, c/w osteopenia bilateral femoral necks.     ECHO:  08/18/22--EF 55-65%.  11/17/22--EF 60%.  03/23/23--EF 55-60%.  06/20/23--EF 60%.    Genetic testing: [employer] done 7/21/22 negative for any pathogenic variants.     Treatment history:  Left breast excisional biopsy done 7/13/22  Left SLN biopsies done 8/8/22.  2 units PRBC 12/19/22.  TCHP X 6 cycles completed only 8/23/22--1/25/23 (multiple treatment delays for thrombocytopenia, despite dose reductions, multiple side effects).  Re-excision lumpectomy done 3/8/23--no residual disease.   Adjuvant radiation therapy completed 4/12/23--5/9/23.    Current treatment plan:  Maintenance HP X 11 cycles started 3/29/23  Cycle 8 due 8/23/23 (cycle 14 in plan).  2. Adjuvant OS Zoladex monthly + Femara started 3/29/23 (DAISY for endometriosis, ovaries remain).    Chief Complaint:  Other Misc (Pt reports Hyperparathyroidism from radiation and states that she is always tired. ), Fatigue, Constipation, Diarrhea, Nausea, and Emesis    HPI  Patient presents for follow-up of breast cancer. She continues on maintenance Herceptin + Perjeta. Due for Cycle 8 tomorrow. Continues on Femara/Zoladex. She had a MMG/US in 5/2023 due to breast pain that was good. She continues on Maintenance HP without any problems other than off/on constipation and diarrhea. Continues to be followed by Dr. Hamilton. He told her her pancreas and parathyroid was damaged from the radiation, with her AIC going up. Parathyroid scan showed overall asymmetric increased activity at the right thyroid lobe with limited washout, an underlying hyperfunctioning parathyroid adenoma cannot be entirely excluded. Referred to a surgeon in [place] and will see him in September. She has a lot of fatigue for this reason. She is otherwise doing okay.   She has continued nausea when she eats. She met with Dr. Denton on 7/24 for evaluation to fix her hiatal hernia. He obtained CT scans that showed mucosal prominence at a small to moderate size hiatus hernia. Plans are for lap hernia repair on 9/26/23.     Past Medical History:   Diagnosis Date    Allergy     Anemia     Anxiety     Cancer     Clotting disorder     GERD (gastroesophageal reflux disease)     Neuromuscular disorder       Past Surgical History:   Procedure Laterality Date    CHOLECYSTECTOMY      HERNIA REPAIR      HYSTERECTOMY      lymphectomy Left     REDUCTION OF BOTH BREASTS  2018    TUBAL LIGATION Bilateral 1993     Family History   Problem Relation Age of Onset    Hypertension Mother     Lupus Mother     Hypoparathyroidism Mother     Diabetes Father     Heart disease Father     Prostate cancer Father     Kidney disease Father     Hodgkin's lymphoma Brother     Pancreatitis Brother      Social History     Socioeconomic History    Marital status:    Tobacco Use    Smoking  "status: Every Day     Current packs/day: 0.00     Types: Cigarettes     Last attempt to quit: 2022     Years since quittin.7    Smokeless tobacco: Current   Substance and Sexual Activity    Alcohol use: Not Currently    Drug use: Never       Review of patient's allergies indicates:   Allergen Reactions    Corticosteroids (glucocorticoids) Hives, Other (See Comments) and Swelling     Patient states "Severe Inflammation"      Heparin analogues Palpitations and Other (See Comments)     "All anticoagulation Meds cause dangerously low BP"    Ketorolac Diarrhea, Nausea And Vomiting, Other (See Comments) and Swelling     Migraines      Peanut Anaphylaxis    Penicillins Anaphylaxis, Diarrhea, Hives, Nausea And Vomiting and Swelling    Shiitake mushroom (lentinus edodes) Anaphylaxis    Tramadol Diarrhea, Itching, Nausea And Vomiting, Rash and Swelling    Latex Other (See Comments)     Skin blisters      Macrolide antibiotics Hives and Rash    Adhesive Other (See Comments)     Blisters, skin tears; CANNOT USE PAPER TAPE    Anticoag citrate phos dextrose     Aspartame     Codeine sulfate     Egg     Erythromycin      Other reaction(s): Unknown    Estrogens     Other omega-3s      Anticoagulant   Patient states "low blood pressure"    Pork derived (porcine) Diarrhea    Sucralose Other (See Comments)    Amitriptyline Anxiety and Other (See Comments)     Severe    Other reaction(s): Unknown    Aspirin Other (See Comments)     Avoid if possible due to blood thinning    Eszopiclone Anxiety and Itching    Topiramate Anxiety, Other (See Comments) and Palpitations     Shaking        Current Outpatient Medications on File Prior to Visit   Medication Sig Dispense Refill    (Magic mouthwash) 1:1:1 diphenhydrAMINE(Benadryl) 12.5mg/5ml liq, aluminum & magnesium hydroxide-simethicone (Maalox), LIDOcaine viscous 2% Swish and spit 10 mLs every 4 (four) hours as needed. for mouth sores 500 mL 1    albuterol (VENTOLIN HFA) 90 " mcg/actuation inhaler Inhale 2 puffs into the lungs every 6 (six) hours as needed for Wheezing. Rescue 18 g 0    biotin 1 mg tablet 1 tablet Orally Once a day      butalbital-acetaminophen-caffeine -40 mg (FIORICET, ESGIC) -40 mg per tablet Take by mouth.      clonazePAM (KLONOPIN) 0.5 MG tablet Take 0.5 mg by mouth every 8 (eight) hours as needed.      diclofenac (VOLTAREN) 75 MG EC tablet       famotidine (PEPCID) 40 MG tablet Take 1 tablet (40 mg total) by mouth as needed for Heartburn. 30 tablet 0    gabapentin (NEURONTIN) 300 MG capsule 3 (three) times daily.      gabapentin (NEURONTIN) 600 MG tablet Take 600 mg by mouth 3 (three) times daily.      HYDROcodone-acetaminophen (NORCO) 5-325 mg per tablet Take 1 tablet by mouth every 6 (six) hours as needed for Pain. 60 tablet 0    letrozole (FEMARA) 2.5 mg Tab TAKE 1 TABLET BY MOUTH ONCE DAILY 30 tablet 3    loperamide (IMODIUM) 2 mg capsule Take 2 mg by mouth 4 (four) times daily as needed.      loratadine (CLARITIN) 10 mg tablet Take 1 tablet by mouth every morning.      metFORMIN (GLUCOPHAGE) 500 MG tablet Take 500 mg by mouth once daily.      metoclopramide HCl (REGLAN) 10 MG tablet 1 Tablet Orally Once a day as needed for nausea for 30 days      omeprazole (PRILOSEC) 40 MG capsule Take 1 capsule (40 mg total) by mouth once daily. 30 capsule 6    onabotulinumtoxina (BOTOX) 200 unit SolR 155 units IM Injection      ondansetron (ZOFRAN-ODT) 4 MG TbDL Take 4 mg by mouth every 8 (eight) hours as needed.      promethazine (PHENERGAN) 25 MG tablet Take 1 tablet (25 mg total) by mouth every 6 (six) hours as needed for Nausea. 30 tablet 0    tiZANidine (ZANAFLEX) 4 MG tablet Take 12 mg by mouth 3 (three) times daily.      TRULICITY 0.75 mg/0.5 mL pen injector INJECT CONTENTS OF ONE PEN (0.75MG) subcutaneously ONCE A WEEK      diphenhydrAMINE (BENADRYL) 50 MG capsule Take 50 mg by mouth every 6 (six) hours as needed.      diphenoxylate-atropine 2.5-0.025 mg  "(LOMOTIL) 2.5-0.025 mg per tablet Take 1 tablet by mouth 4 (four) times daily as needed for Diarrhea. (Patient not taking: Reported on 7/11/2023) 30 tablet 1     No current facility-administered medications on file prior to visit.      Review of Systems   Constitutional:  Positive for fatigue. Negative for appetite change and unexpected weight change.   HENT:  Negative for mouth sores.    Respiratory:  Negative for cough.    Gastrointestinal:  Positive for nausea and reflux. Negative for abdominal pain.        +hiatal hernia   Genitourinary:  Positive for hot flashes. Negative for frequency.   Musculoskeletal:  Negative for back pain.   Integumentary:  Negative for rash.        Bilateral breast pain, chronic   Neurological:  Negative for headaches.   Hematological:  Negative for adenopathy.   Psychiatric/Behavioral:  The patient is not nervous/anxious.         Vitals:    08/22/23 1440   BP: 122/81   Pulse: 64   Resp: 14   Temp: 98.4 °F (36.9 °C)   TempSrc: Oral   SpO2: 97%   Weight: 77.2 kg (170 lb 4.8 oz)   Height: 5' 1" (1.549 m)     Physical Exam  Constitutional:       Appearance: Normal appearance. She is overweight.   HENT:      Head: Normocephalic.      Comments: Resolved alopecia     Nose: Nose normal.      Mouth/Throat:      Mouth: Mucous membranes are moist.   Eyes:      General: Lids are normal. Vision grossly intact.      Extraocular Movements: Extraocular movements intact.      Conjunctiva/sclera: Conjunctivae normal.   Cardiovascular:      Rate and Rhythm: Normal rate and regular rhythm.      Heart sounds: Normal heart sounds.   Pulmonary:      Effort: Pulmonary effort is normal.      Breath sounds: Normal breath sounds and air entry.   Chest:      Comments: Right chest wall mediport in place, left breast with inferior lumpectomy scar, well healed. Left axillary incision from LN biopsy also healed well. No masses palpable.   Abdominal:      General: Abdomen is protuberant. Bowel sounds are normal. There " is no distension.      Palpations: Abdomen is soft.      Tenderness: There is no abdominal tenderness.   Musculoskeletal:         General: Normal range of motion.      Cervical back: Normal range of motion and neck supple.      Right lower leg: No edema.      Left lower leg: No edema.   Skin:     General: Skin is warm and moist.   Neurological:      General: No focal deficit present.      Mental Status: She is alert and oriented to person, place, and time.   Psychiatric:         Mood and Affect: Mood normal.         Behavior: Behavior is cooperative.         Judgment: Judgment normal.       Lab Visit on 08/22/2023   Component Date Value    Sodium Level 08/22/2023 144     Potassium Level 08/22/2023 4.4     Chloride 08/22/2023 108 (H)     Carbon Dioxide 08/22/2023 28     Glucose Level 08/22/2023 91     Blood Urea Nitrogen 08/22/2023 13.2     Creatinine 08/22/2023 0.86     Calcium Level Total 08/22/2023 10.4 (H)     Protein Total 08/22/2023 6.1 (L)     Albumin Level 08/22/2023 3.7     Globulin 08/22/2023 2.4     Albumin/Globulin Ratio 08/22/2023 1.5     Bilirubin Total 08/22/2023 0.3     Alkaline Phosphatase 08/22/2023 115     Alanine Aminotransferase 08/22/2023 16     Aspartate Aminotransfera* 08/22/2023 13     eGFR 08/22/2023 >60     WBC 08/22/2023 5.62     RBC 08/22/2023 3.80 (L)     Hgb 08/22/2023 11.8 (L)     Hct 08/22/2023 38.2     MCV 08/22/2023 100.5 (H)     MCH 08/22/2023 31.1 (H)     MCHC 08/22/2023 30.9 (L)     RDW 08/22/2023 13.5     Platelet 08/22/2023 227     MPV 08/22/2023 9.0     Neut % 08/22/2023 60.2     Lymph % 08/22/2023 26.3     Mono % 08/22/2023 6.4     Eos % 08/22/2023 6.4     Basophil % 08/22/2023 0.5     Lymph # 08/22/2023 1.48     Neut # 08/22/2023 3.38     Mono # 08/22/2023 0.36     Eos # 08/22/2023 0.36     Baso # 08/22/2023 0.03     IG# 08/22/2023 0.01     IG% 08/22/2023 0.2           Assessment:       1. Malignant neoplasm of lower-inner quadrant of left breast in female, estrogen receptor  positive    2. HER2-positive carcinoma of left breast    3. Encounter for monitoring cardiotoxic drug therapy      Plan:       Patient with Stage IA Left breast cancer triple positive s/p excisional biopsy of a cystic lesion that found cancer incidentally diagnosed 7/13/22. Tumor measured 3.0cm and SLN biopsies negative, Grade 3, strongly ER and NH positive and Her2 positive with high Ki67. There was a positive margin on the excisional biopsy.  Treatment with chemotherapy neoadjuvant started at [facility] with Dr. Gonzalez.    Patient received 2 cycles at [facility] then changed care to [facility]. She was having multiple side effects.   She does not have any steroids due to h/o allergy causing inflammation.   Patient had multiple delays due to multiple side effects, thrombocytopenia, requiring dose reduction, but finally completed 6 cycles on 1/25/23.  Surgery was delayed and finally done on 3/8/23. Patient had re-excision lumpectomy and there was no residual invasive malignancy.     Treatment resumed with maintenance HP x 11 cycles 3/29/23.   Due for Cycle 8 tomorrow.   Repeat ECHO from 6/20/23 normal EF. Next due in 9/2023, will order today.     Recommended also OS + AI X 5 years followed by completion of AI to complete 10 years.   She had DAISY in 2012 for endometriosis but still had ovaries remaining functional prior to starting chemotherapy.   8/4/22 estradiol level 11, FSH 44, LH 38.   Zoladex/Femara started on 3/29/23.   Patient completed radiation on 5/9/23.  CBCdiff with stable, mild anemia. CMP pending and will follow-up.   Proceed with cycle 8 maintenance HP tomorrow (cycle 14 in system).  Labs and treatment on 9/13/23 for Cycle 9.   RTC 6 weeks T/D visit with labs prior to cycle 10 maintenance.   Continue Zoladex, next due 9/13/23.  Continue Femara.  Continue vitamin D. Calcium stopped due to high level.     PTH elevated, referral sent to Endocrinology. She never heard from Dr. Raul Alvarado's office, so she is now seeing   Joy. Parathyroid scan showed overall asymmetric increased activity at the right thyroid lobe with limited washout, an underlying hyperfunctioning parathyroid adenoma cannot be entirely excluded. Referred to a surgeon in [place]. Appointment scheduled on 9/22/23.   DEXA from 5/11/23 showed mild osteopenia in bilateral femoral necks.   Will need to consider Zometa every 6 months X 2 years but waiting until after the appointment for possible parathyroid surgery.    She met with Dr. Denton on 7/24 for evaluation to fix her hiatal hernia. He obtained CT scans that showed mucosal prominence at a small to moderate size hiatus hernia. Plans are for lap hernia repair on 9/26/23.   All questions answered at this time.        MAYELA Abebe

## 2023-08-16 ENCOUNTER — INFUSION (OUTPATIENT)
Dept: INFUSION THERAPY | Facility: HOSPITAL | Age: 53
End: 2023-08-16
Attending: FAMILY MEDICINE
Payer: COMMERCIAL

## 2023-08-16 VITALS
RESPIRATION RATE: 16 BRPM | OXYGEN SATURATION: 98 % | HEART RATE: 66 BPM | TEMPERATURE: 98 F | SYSTOLIC BLOOD PRESSURE: 135 MMHG | DIASTOLIC BLOOD PRESSURE: 76 MMHG

## 2023-08-16 DIAGNOSIS — Z17.0 MALIGNANT NEOPLASM OF LOWER-INNER QUADRANT OF LEFT BREAST IN FEMALE, ESTROGEN RECEPTOR POSITIVE: Primary | ICD-10-CM

## 2023-08-16 DIAGNOSIS — C50.312 MALIGNANT NEOPLASM OF LOWER-INNER QUADRANT OF LEFT BREAST IN FEMALE, ESTROGEN RECEPTOR POSITIVE: Primary | ICD-10-CM

## 2023-08-16 PROCEDURE — 63600175 PHARM REV CODE 636 W HCPCS: Mod: JZ,JG | Performed by: INTERNAL MEDICINE

## 2023-08-16 PROCEDURE — 96402 CHEMO HORMON ANTINEOPL SQ/IM: CPT

## 2023-08-16 RX ADMIN — GOSERELIN ACETATE 3.6 MG: 3.6 IMPLANT SUBCUTANEOUS at 01:08

## 2023-08-22 ENCOUNTER — OFFICE VISIT (OUTPATIENT)
Dept: HEMATOLOGY/ONCOLOGY | Facility: CLINIC | Age: 53
End: 2023-08-22
Payer: COMMERCIAL

## 2023-08-22 VITALS
WEIGHT: 170.31 LBS | RESPIRATION RATE: 14 BRPM | HEART RATE: 64 BPM | BODY MASS INDEX: 32.15 KG/M2 | OXYGEN SATURATION: 97 % | TEMPERATURE: 98 F | DIASTOLIC BLOOD PRESSURE: 81 MMHG | HEIGHT: 61 IN | SYSTOLIC BLOOD PRESSURE: 122 MMHG

## 2023-08-22 DIAGNOSIS — C50.912 HER2-POSITIVE CARCINOMA OF LEFT BREAST: ICD-10-CM

## 2023-08-22 DIAGNOSIS — Z17.0 MALIGNANT NEOPLASM OF LOWER-INNER QUADRANT OF LEFT BREAST IN FEMALE, ESTROGEN RECEPTOR POSITIVE: Primary | ICD-10-CM

## 2023-08-22 DIAGNOSIS — Z79.899 ENCOUNTER FOR MONITORING CARDIOTOXIC DRUG THERAPY: ICD-10-CM

## 2023-08-22 DIAGNOSIS — C50.312 MALIGNANT NEOPLASM OF LOWER-INNER QUADRANT OF LEFT BREAST IN FEMALE, ESTROGEN RECEPTOR POSITIVE: Primary | ICD-10-CM

## 2023-08-22 DIAGNOSIS — Z51.81 ENCOUNTER FOR MONITORING CARDIOTOXIC DRUG THERAPY: ICD-10-CM

## 2023-08-22 PROCEDURE — 3079F PR MOST RECENT DIASTOLIC BLOOD PRESSURE 80-89 MM HG: ICD-10-PCS | Mod: CPTII,S$GLB,, | Performed by: NURSE PRACTITIONER

## 2023-08-22 PROCEDURE — 1159F PR MEDICATION LIST DOCUMENTED IN MEDICAL RECORD: ICD-10-PCS | Mod: CPTII,S$GLB,, | Performed by: NURSE PRACTITIONER

## 2023-08-22 PROCEDURE — 99215 OFFICE O/P EST HI 40 MIN: CPT | Mod: S$GLB,,, | Performed by: NURSE PRACTITIONER

## 2023-08-22 PROCEDURE — 3074F SYST BP LT 130 MM HG: CPT | Mod: CPTII,S$GLB,, | Performed by: NURSE PRACTITIONER

## 2023-08-22 PROCEDURE — 99999 PR PBB SHADOW E&M-EST. PATIENT-LVL V: ICD-10-PCS | Mod: PBBFAC,,, | Performed by: NURSE PRACTITIONER

## 2023-08-22 PROCEDURE — 99215 PR OFFICE/OUTPT VISIT, EST, LEVL V, 40-54 MIN: ICD-10-PCS | Mod: S$GLB,,, | Performed by: NURSE PRACTITIONER

## 2023-08-22 PROCEDURE — 1160F RVW MEDS BY RX/DR IN RCRD: CPT | Mod: CPTII,S$GLB,, | Performed by: NURSE PRACTITIONER

## 2023-08-22 PROCEDURE — 99999 PR PBB SHADOW E&M-EST. PATIENT-LVL V: CPT | Mod: PBBFAC,,, | Performed by: NURSE PRACTITIONER

## 2023-08-22 PROCEDURE — 3008F PR BODY MASS INDEX (BMI) DOCUMENTED: ICD-10-PCS | Mod: CPTII,S$GLB,, | Performed by: NURSE PRACTITIONER

## 2023-08-22 PROCEDURE — 1159F MED LIST DOCD IN RCRD: CPT | Mod: CPTII,S$GLB,, | Performed by: NURSE PRACTITIONER

## 2023-08-22 PROCEDURE — 3008F BODY MASS INDEX DOCD: CPT | Mod: CPTII,S$GLB,, | Performed by: NURSE PRACTITIONER

## 2023-08-22 PROCEDURE — 3079F DIAST BP 80-89 MM HG: CPT | Mod: CPTII,S$GLB,, | Performed by: NURSE PRACTITIONER

## 2023-08-22 PROCEDURE — 3074F PR MOST RECENT SYSTOLIC BLOOD PRESSURE < 130 MM HG: ICD-10-PCS | Mod: CPTII,S$GLB,, | Performed by: NURSE PRACTITIONER

## 2023-08-22 PROCEDURE — 1160F PR REVIEW ALL MEDS BY PRESCRIBER/CLIN PHARMACIST DOCUMENTED: ICD-10-PCS | Mod: CPTII,S$GLB,, | Performed by: NURSE PRACTITIONER

## 2023-08-22 RX ORDER — EPINEPHRINE 0.3 MG/.3ML
0.3 INJECTION SUBCUTANEOUS ONCE AS NEEDED
Status: CANCELLED | OUTPATIENT
Start: 2023-08-23

## 2023-08-22 RX ORDER — ONDANSETRON 2 MG/ML
8 INJECTION INTRAMUSCULAR; INTRAVENOUS
Status: CANCELLED
Start: 2023-08-23

## 2023-08-22 RX ORDER — DULAGLUTIDE 0.75 MG/.5ML
0.75 INJECTION, SOLUTION SUBCUTANEOUS
COMMUNITY
Start: 2023-07-21

## 2023-08-22 RX ORDER — DIPHENHYDRAMINE HYDROCHLORIDE 50 MG/ML
50 INJECTION INTRAMUSCULAR; INTRAVENOUS ONCE AS NEEDED
Status: CANCELLED | OUTPATIENT
Start: 2023-08-23

## 2023-08-22 RX ORDER — SODIUM CHLORIDE 0.9 % (FLUSH) 0.9 %
10 SYRINGE (ML) INJECTION
Status: CANCELLED | OUTPATIENT
Start: 2023-08-23

## 2023-08-22 RX ORDER — METFORMIN HYDROCHLORIDE 500 MG/1
500 TABLET ORAL DAILY
COMMUNITY
Start: 2023-07-12

## 2023-08-23 ENCOUNTER — INFUSION (OUTPATIENT)
Dept: INFUSION THERAPY | Facility: HOSPITAL | Age: 53
End: 2023-08-23
Attending: FAMILY MEDICINE
Payer: COMMERCIAL

## 2023-08-23 VITALS
TEMPERATURE: 98 F | HEART RATE: 81 BPM | RESPIRATION RATE: 16 BRPM | OXYGEN SATURATION: 97 % | SYSTOLIC BLOOD PRESSURE: 119 MMHG | DIASTOLIC BLOOD PRESSURE: 77 MMHG

## 2023-08-23 DIAGNOSIS — Z17.0 MALIGNANT NEOPLASM OF LOWER-INNER QUADRANT OF LEFT BREAST IN FEMALE, ESTROGEN RECEPTOR POSITIVE: Primary | ICD-10-CM

## 2023-08-23 DIAGNOSIS — C50.312 MALIGNANT NEOPLASM OF LOWER-INNER QUADRANT OF LEFT BREAST IN FEMALE, ESTROGEN RECEPTOR POSITIVE: Primary | ICD-10-CM

## 2023-08-23 PROCEDURE — 96413 CHEMO IV INFUSION 1 HR: CPT

## 2023-08-23 PROCEDURE — 96361 HYDRATE IV INFUSION ADD-ON: CPT

## 2023-08-23 PROCEDURE — 96417 CHEMO IV INFUS EACH ADDL SEQ: CPT

## 2023-08-23 PROCEDURE — 96375 TX/PRO/DX INJ NEW DRUG ADDON: CPT

## 2023-08-23 PROCEDURE — 63600175 PHARM REV CODE 636 W HCPCS: Performed by: NURSE PRACTITIONER

## 2023-08-23 PROCEDURE — 25000003 PHARM REV CODE 250: Performed by: NURSE PRACTITIONER

## 2023-08-23 RX ORDER — EPINEPHRINE 0.3 MG/.3ML
0.3 INJECTION SUBCUTANEOUS ONCE AS NEEDED
Status: DISCONTINUED | OUTPATIENT
Start: 2023-08-23 | End: 2023-08-23 | Stop reason: HOSPADM

## 2023-08-23 RX ORDER — ONDANSETRON 2 MG/ML
8 INJECTION INTRAMUSCULAR; INTRAVENOUS
Status: COMPLETED | OUTPATIENT
Start: 2023-08-23 | End: 2023-08-23

## 2023-08-23 RX ORDER — DIPHENHYDRAMINE HYDROCHLORIDE 50 MG/ML
50 INJECTION INTRAMUSCULAR; INTRAVENOUS ONCE AS NEEDED
Status: DISCONTINUED | OUTPATIENT
Start: 2023-08-23 | End: 2023-08-23 | Stop reason: HOSPADM

## 2023-08-23 RX ORDER — SODIUM CHLORIDE 0.9 % (FLUSH) 0.9 %
10 SYRINGE (ML) INJECTION
Status: DISCONTINUED | OUTPATIENT
Start: 2023-08-23 | End: 2023-08-23 | Stop reason: HOSPADM

## 2023-08-23 RX ADMIN — ONDANSETRON 8 MG: 2 INJECTION INTRAMUSCULAR; INTRAVENOUS at 11:08

## 2023-08-23 RX ADMIN — PERTUZUMAB 420 MG: 30 INJECTION, SOLUTION, CONCENTRATE INTRAVENOUS at 12:08

## 2023-08-23 RX ADMIN — TRASTUZUMAB 484 MG: 420 INJECTION, POWDER, LYOPHILIZED, FOR SOLUTION INTRAVENOUS at 11:08

## 2023-08-23 RX ADMIN — SODIUM CHLORIDE 1000 ML: 9 INJECTION, SOLUTION INTRAVENOUS at 11:08

## 2023-08-28 ENCOUNTER — HOSPITAL ENCOUNTER (OUTPATIENT)
Dept: RADIOLOGY | Facility: HOSPITAL | Age: 53
Discharge: HOME | End: 2023-08-28
Attending: SURGERY
Payer: COMMERCIAL

## 2023-08-28 DIAGNOSIS — C50.312 MALIGNANT NEOPLASM OF LOWER-INNER QUADRANT OF LEFT FEMALE BREAST: ICD-10-CM

## 2023-08-28 PROCEDURE — 77065 MAMMO DIGITAL DIAGNOSTIC LEFT WITH TOMO: ICD-10-PCS | Mod: 26,LT,, | Performed by: STUDENT IN AN ORGANIZED HEALTH CARE EDUCATION/TRAINING PROGRAM

## 2023-08-28 PROCEDURE — 77061 BREAST TOMOSYNTHESIS UNI: CPT | Mod: 26,LT,, | Performed by: STUDENT IN AN ORGANIZED HEALTH CARE EDUCATION/TRAINING PROGRAM

## 2023-08-28 PROCEDURE — 77061 MAMMO DIGITAL DIAGNOSTIC LEFT WITH TOMO: ICD-10-PCS | Mod: 26,LT,, | Performed by: STUDENT IN AN ORGANIZED HEALTH CARE EDUCATION/TRAINING PROGRAM

## 2023-08-28 PROCEDURE — 77065 DX MAMMO INCL CAD UNI: CPT | Mod: 26,LT,, | Performed by: STUDENT IN AN ORGANIZED HEALTH CARE EDUCATION/TRAINING PROGRAM

## 2023-08-28 PROCEDURE — 77061 BREAST TOMOSYNTHESIS UNI: CPT | Mod: TC,LT

## 2023-09-05 ENCOUNTER — HOSPITAL ENCOUNTER (OUTPATIENT)
Dept: CARDIOLOGY | Facility: HOSPITAL | Age: 53
Discharge: HOME | End: 2023-09-05
Attending: NURSE PRACTITIONER
Payer: COMMERCIAL

## 2023-09-05 DIAGNOSIS — Z51.81 ENCOUNTER FOR MONITORING CARDIOTOXIC DRUG THERAPY: ICD-10-CM

## 2023-09-05 DIAGNOSIS — Z79.899 ENCOUNTER FOR MONITORING CARDIOTOXIC DRUG THERAPY: ICD-10-CM

## 2023-09-05 LAB
AV INDEX (PROSTH): 0.67
AV MEAN GRADIENT: 3 MMHG
AV PEAK GRADIENT: 6 MMHG
AV VELOCITY RATIO: 0.7
CV ECHO LV RWT: 0.58 CM
DOP CALC AO PEAK VEL: 1.26 M/S
DOP CALC AO VTI: 20.9 CM
DOP CALC LVOT PEAK VEL: 0.88 M/S
DOP CALCLVOT PEAK VEL VTI: 13.9 CM
E WAVE DECELERATION TIME: 156 MSEC
E/A RATIO: 0.75
E/E' RATIO: 7 M/S
ECHO LV POSTERIOR WALL: 1.14 CM (ref 0.6–1.1)
FRACTIONAL SHORTENING: 35 % (ref 28–44)
GLOBAL LONGITUIDAL STRAIN: 13 %
INTERVENTRICULAR SEPTUM: 0.94 CM (ref 0.6–1.1)
LEFT ATRIUM SIZE: 3.6 CM
LEFT ATRIUM VOLUME MOD: 35.4 CM3
LEFT INTERNAL DIMENSION IN SYSTOLE: 2.54 CM (ref 2.1–4)
LEFT VENTRICLE DIASTOLIC VOLUME: 66.7 ML
LEFT VENTRICLE SYSTOLIC VOLUME: 23.2 ML
LEFT VENTRICULAR INTERNAL DIMENSION IN DIASTOLE: 3.92 CM (ref 3.5–6)
LEFT VENTRICULAR MASS: 130.2 G
LV LATERAL E/E' RATIO: 5.6 M/S
LV SEPTAL E/E' RATIO: 9.33 M/S
LVOT MG: 2 MMHG
LVOT MV: 0.57 CM/S
MV PEAK A VEL: 0.75 M/S
MV PEAK E VEL: 0.56 M/S
OHS LV EJECTION FRACTION SIMPSONS BIPLANE MOD: 57 %
PISA TR MAX VEL: 1.74 M/S
PV PEAK GRADIENT: 4 MMHG
PV PEAK VELOCITY: 0.98 M/S
RA PRESSURE ESTIMATED: 3 MMHG
RV TB RVSP: 5 MMHG
TDI LATERAL: 0.1 M/S
TDI SEPTAL: 0.06 M/S
TDI: 0.08 M/S
TR MAX PG: 12 MMHG
TRICUSPID ANNULAR PLANE SYSTOLIC EXCURSION: 2.07 CM
TV REST PULMONARY ARTERY PRESSURE: 15 MMHG

## 2023-09-05 PROCEDURE — 93306 ECHO (CUPID ONLY): ICD-10-PCS | Mod: 26,,, | Performed by: INTERNAL MEDICINE

## 2023-09-05 PROCEDURE — 93306 TTE W/DOPPLER COMPLETE: CPT | Mod: 26,,, | Performed by: INTERNAL MEDICINE

## 2023-09-05 PROCEDURE — 93306 TTE W/DOPPLER COMPLETE: CPT

## 2023-09-11 RX ORDER — EPINEPHRINE 0.3 MG/.3ML
0.3 INJECTION SUBCUTANEOUS ONCE AS NEEDED
Status: CANCELLED | OUTPATIENT
Start: 2023-09-13

## 2023-09-11 RX ORDER — SODIUM CHLORIDE 0.9 % (FLUSH) 0.9 %
10 SYRINGE (ML) INJECTION
Status: CANCELLED | OUTPATIENT
Start: 2023-09-13

## 2023-09-11 RX ORDER — DIPHENHYDRAMINE HYDROCHLORIDE 50 MG/ML
50 INJECTION INTRAMUSCULAR; INTRAVENOUS ONCE AS NEEDED
Status: CANCELLED | OUTPATIENT
Start: 2023-09-13

## 2023-09-11 RX ORDER — ONDANSETRON 2 MG/ML
8 INJECTION INTRAMUSCULAR; INTRAVENOUS
Status: CANCELLED
Start: 2023-09-13

## 2023-09-12 ENCOUNTER — LAB VISIT (OUTPATIENT)
Dept: LAB | Facility: HOSPITAL | Age: 53
End: 2023-09-12
Attending: FAMILY MEDICINE
Payer: COMMERCIAL

## 2023-09-12 DIAGNOSIS — Z17.0 MALIGNANT NEOPLASM OF LOWER-INNER QUADRANT OF LEFT BREAST IN FEMALE, ESTROGEN RECEPTOR POSITIVE: ICD-10-CM

## 2023-09-12 DIAGNOSIS — Z79.899 ENCOUNTER FOR MONITORING CARDIOTOXIC DRUG THERAPY: ICD-10-CM

## 2023-09-12 DIAGNOSIS — C50.312 MALIGNANT NEOPLASM OF LOWER-INNER QUADRANT OF LEFT BREAST IN FEMALE, ESTROGEN RECEPTOR POSITIVE: ICD-10-CM

## 2023-09-12 DIAGNOSIS — Z51.81 ENCOUNTER FOR MONITORING CARDIOTOXIC DRUG THERAPY: ICD-10-CM

## 2023-09-12 DIAGNOSIS — C50.912 HER2-POSITIVE CARCINOMA OF LEFT BREAST: ICD-10-CM

## 2023-09-12 LAB
ALBUMIN SERPL-MCNC: 3.8 G/DL (ref 3.5–5)
ALBUMIN/GLOB SERPL: 1.4 RATIO (ref 1.1–2)
ALP SERPL-CCNC: 133 UNIT/L (ref 40–150)
ALT SERPL-CCNC: 13 UNIT/L (ref 0–55)
AST SERPL-CCNC: 12 UNIT/L (ref 5–34)
BASOPHILS # BLD AUTO: 0.03 X10(3)/MCL
BASOPHILS NFR BLD AUTO: 0.5 %
BILIRUB SERPL-MCNC: 0.5 MG/DL
BUN SERPL-MCNC: 14.4 MG/DL (ref 9.8–20.1)
CALCIUM SERPL-MCNC: 10.7 MG/DL (ref 8.4–10.2)
CHLORIDE SERPL-SCNC: 105 MMOL/L (ref 98–107)
CO2 SERPL-SCNC: 28 MMOL/L (ref 22–29)
CREAT SERPL-MCNC: 0.77 MG/DL (ref 0.55–1.02)
EOSINOPHIL # BLD AUTO: 0.37 X10(3)/MCL (ref 0–0.9)
EOSINOPHIL NFR BLD AUTO: 6.4 %
ERYTHROCYTE [DISTWIDTH] IN BLOOD BY AUTOMATED COUNT: 13.1 % (ref 11.5–17)
GFR SERPLBLD CREATININE-BSD FMLA CKD-EPI: >60 MLS/MIN/1.73/M2
GLOBULIN SER-MCNC: 2.7 GM/DL (ref 2.4–3.5)
GLUCOSE SERPL-MCNC: 94 MG/DL (ref 74–100)
HCT VFR BLD AUTO: 39.7 % (ref 37–47)
HGB BLD-MCNC: 12.4 G/DL (ref 12–16)
IMM GRANULOCYTES # BLD AUTO: 0.01 X10(3)/MCL (ref 0–0.04)
IMM GRANULOCYTES NFR BLD AUTO: 0.2 %
LYMPHOCYTES # BLD AUTO: 1.51 X10(3)/MCL (ref 0.6–4.6)
LYMPHOCYTES NFR BLD AUTO: 26.1 %
MCH RBC QN AUTO: 31 PG (ref 27–31)
MCHC RBC AUTO-ENTMCNC: 31.2 G/DL (ref 33–36)
MCV RBC AUTO: 99.3 FL (ref 80–94)
MONOCYTES # BLD AUTO: 0.35 X10(3)/MCL (ref 0.1–1.3)
MONOCYTES NFR BLD AUTO: 6 %
NEUTROPHILS # BLD AUTO: 3.52 X10(3)/MCL (ref 2.1–9.2)
NEUTROPHILS NFR BLD AUTO: 60.8 %
PLATELET # BLD AUTO: 196 X10(3)/MCL (ref 130–400)
PMV BLD AUTO: 8.2 FL (ref 7.4–10.4)
POTASSIUM SERPL-SCNC: 4.4 MMOL/L (ref 3.5–5.1)
PROT SERPL-MCNC: 6.5 GM/DL (ref 6.4–8.3)
RBC # BLD AUTO: 4 X10(6)/MCL (ref 4.2–5.4)
SODIUM SERPL-SCNC: 143 MMOL/L (ref 136–145)
WBC # SPEC AUTO: 5.79 X10(3)/MCL (ref 4.5–11.5)

## 2023-09-12 PROCEDURE — 85025 COMPLETE CBC W/AUTO DIFF WBC: CPT

## 2023-09-12 PROCEDURE — 80053 COMPREHEN METABOLIC PANEL: CPT

## 2023-09-12 PROCEDURE — 36415 COLL VENOUS BLD VENIPUNCTURE: CPT

## 2023-09-13 ENCOUNTER — INFUSION (OUTPATIENT)
Dept: INFUSION THERAPY | Facility: HOSPITAL | Age: 53
End: 2023-09-13
Attending: FAMILY MEDICINE
Payer: COMMERCIAL

## 2023-09-13 VITALS
SYSTOLIC BLOOD PRESSURE: 128 MMHG | TEMPERATURE: 98 F | OXYGEN SATURATION: 98 % | WEIGHT: 170.19 LBS | DIASTOLIC BLOOD PRESSURE: 88 MMHG | HEART RATE: 102 BPM | HEIGHT: 61 IN | RESPIRATION RATE: 20 BRPM | BODY MASS INDEX: 32.13 KG/M2

## 2023-09-13 DIAGNOSIS — C50.312 MALIGNANT NEOPLASM OF LOWER-INNER QUADRANT OF LEFT BREAST IN FEMALE, ESTROGEN RECEPTOR POSITIVE: Primary | ICD-10-CM

## 2023-09-13 DIAGNOSIS — Z17.0 MALIGNANT NEOPLASM OF LOWER-INNER QUADRANT OF LEFT BREAST IN FEMALE, ESTROGEN RECEPTOR POSITIVE: Primary | ICD-10-CM

## 2023-09-13 PROCEDURE — 96402 CHEMO HORMON ANTINEOPL SQ/IM: CPT

## 2023-09-13 PROCEDURE — A4216 STERILE WATER/SALINE, 10 ML: HCPCS | Performed by: INTERNAL MEDICINE

## 2023-09-13 PROCEDURE — 63600175 PHARM REV CODE 636 W HCPCS: Mod: JZ,JG | Performed by: NURSE PRACTITIONER

## 2023-09-13 PROCEDURE — 96413 CHEMO IV INFUSION 1 HR: CPT

## 2023-09-13 PROCEDURE — 63600175 PHARM REV CODE 636 W HCPCS: Mod: JG | Performed by: INTERNAL MEDICINE

## 2023-09-13 PROCEDURE — 96417 CHEMO IV INFUS EACH ADDL SEQ: CPT

## 2023-09-13 PROCEDURE — 25000003 PHARM REV CODE 250: Performed by: INTERNAL MEDICINE

## 2023-09-13 PROCEDURE — 96375 TX/PRO/DX INJ NEW DRUG ADDON: CPT

## 2023-09-13 PROCEDURE — 96361 HYDRATE IV INFUSION ADD-ON: CPT

## 2023-09-13 RX ORDER — EPINEPHRINE 0.3 MG/.3ML
0.3 INJECTION SUBCUTANEOUS ONCE AS NEEDED
Status: DISCONTINUED | OUTPATIENT
Start: 2023-09-13 | End: 2023-09-13 | Stop reason: HOSPADM

## 2023-09-13 RX ORDER — DIPHENHYDRAMINE HYDROCHLORIDE 50 MG/ML
50 INJECTION INTRAMUSCULAR; INTRAVENOUS ONCE AS NEEDED
Status: DISCONTINUED | OUTPATIENT
Start: 2023-09-13 | End: 2023-09-13 | Stop reason: HOSPADM

## 2023-09-13 RX ORDER — SODIUM CHLORIDE 0.9 % (FLUSH) 0.9 %
10 SYRINGE (ML) INJECTION
Status: DISCONTINUED | OUTPATIENT
Start: 2023-09-13 | End: 2023-09-13 | Stop reason: HOSPADM

## 2023-09-13 RX ORDER — ONDANSETRON 2 MG/ML
8 INJECTION INTRAMUSCULAR; INTRAVENOUS
Status: COMPLETED | OUTPATIENT
Start: 2023-09-13 | End: 2023-09-13

## 2023-09-13 RX ADMIN — SODIUM CHLORIDE 1000 ML: 9 INJECTION, SOLUTION INTRAVENOUS at 02:09

## 2023-09-13 RX ADMIN — SODIUM CHLORIDE: 9 INJECTION, SOLUTION INTRAVENOUS at 02:09

## 2023-09-13 RX ADMIN — ONDANSETRON 8 MG: 2 INJECTION INTRAMUSCULAR; INTRAVENOUS at 02:09

## 2023-09-13 RX ADMIN — Medication 10 ML: at 04:09

## 2023-09-13 RX ADMIN — GOSERELIN ACETATE 3.6 MG: 3.6 IMPLANT SUBCUTANEOUS at 04:09

## 2023-09-13 RX ADMIN — PERTUZUMAB 420 MG: 30 INJECTION, SOLUTION, CONCENTRATE INTRAVENOUS at 02:09

## 2023-09-13 RX ADMIN — TRASTUZUMAB 484 MG: 420 INJECTION, POWDER, LYOPHILIZED, FOR SOLUTION INTRAVENOUS at 03:09

## 2023-09-14 RX ORDER — VITAMIN E 268 MG
800 CAPSULE ORAL DAILY
COMMUNITY

## 2023-09-19 ENCOUNTER — HOSPITAL ENCOUNTER (OUTPATIENT)
Dept: RADIOLOGY | Facility: HOSPITAL | Age: 53
Discharge: HOME | End: 2023-09-19
Attending: SURGERY
Payer: COMMERCIAL

## 2023-09-19 DIAGNOSIS — Z01.818 PRE-OP EXAM: ICD-10-CM

## 2023-09-19 PROCEDURE — 71046 X-RAY EXAM CHEST 2 VIEWS: CPT | Mod: TC

## 2023-09-22 ENCOUNTER — ANESTHESIA EVENT (OUTPATIENT)
Dept: SURGERY | Facility: HOSPITAL | Age: 53
End: 2023-09-22
Payer: COMMERCIAL

## 2023-09-25 NOTE — PRE-PROCEDURE INSTRUCTIONS
[facility]: Outpatient Surgery   Preprocedure Check-In Instructions       Your arrival time for your surgery or procedure is 7:30am.     We ask patients to arrive about 2 hours before surgery to allow for enough time to review your health history & medications, start your IV, complete any outstanding labwork or tests, and meet your Anesthesiologist.     You will arrive at [facility], [address] [place] [address]. Enter through the [place] entrance next to the Emergency Room, and come to the 6th floor to the Outpatient Surgery Department. If you need a wheelchair, please call [phone] for an attendant to meet you at the [place] entrance with a wheelchair.    Visitory Policy:   You are allowed 2 adult visitors to be with you in the hospital. All hospital visitors should be in good current health. No small children.     What to Bring:   Please have your ID, insurance cards, and all home medication bottles with you at check in. Bring your CPAP machine if one is used at home.     Fasting:   Nothing to eat or drink after midnight the night before your procedure. This includes no ice, gum, hard candies, and/or tobacco products.   Follow your doctor's instructions for taking any medications on the morning of your procedure. If no instructions for taking medications were given, do not take any medications but bring your medications in their bottles to your procedure check in.     Follow your doctor's preoperative instructions regarding skin prep, bowel prep, bathing, or showering prior to your procedure. If any special soaps were provided to you, please use according to your doctor's instructions. If no instructions were given from your doctor, take a good bath or shower with antibacterial soap the night before and the morning of your procedure. On the morning of procedure, wear loose, comfortable clothing. No lotions, makeup, perfumes, colognes, deodorant, or jewelry to  your procedure. Removable items (glasses, contact lenses, dentures, retainers, hearing aids) need to be removed for your procedure. Bring your storage containers for these items if you wear them.     Artificial nails, body jewelry, eyelash extensions, and/or hair extensions with metal clips are not allowed during your surgery. If you currently wear any of these items, please arrange for them to be removed prior to your arrival to the hospital.     Outpatient or Same Day Surgeries:   Any patients receiving sedation/anesthesia are advised not to drive for 24 hours after their procedure. We do not allow patients to drive themselves home after discharge. If you are going home after your procedure, please have someone available to drive you home from the hospital.     You may call the Outpatient Surgery Department at [phone] with any questions or concerns. We are looking forward to meeting you and taking great care of you for your procedure. Thank you for choosing [facility] for your surgical needs.       Status: complete  Spoke with: patient  Call Time: [id number]

## 2023-09-26 ENCOUNTER — ANESTHESIA (OUTPATIENT)
Dept: SURGERY | Facility: HOSPITAL | Age: 53
End: 2023-09-26
Payer: COMMERCIAL

## 2023-09-26 ENCOUNTER — HOSPITAL ENCOUNTER (OUTPATIENT)
Facility: HOSPITAL | Age: 53
Discharge: HOME | End: 2023-09-27
Attending: SURGERY | Admitting: SURGERY
Payer: COMMERCIAL

## 2023-09-26 DIAGNOSIS — K44.9 HIATAL HERNIA: ICD-10-CM

## 2023-09-26 LAB
GLUCOSE SERPL-MCNC: 92 MG/DL (ref 70–110)
POCT GLUCOSE: 97 MG/DL (ref 70–110)

## 2023-09-26 PROCEDURE — 43659 UNLISTED LAPS PX STOMACH: CPT | Mod: AS,,,

## 2023-09-26 PROCEDURE — G0378 HOSPITAL OBSERVATION PER HR: HCPCS

## 2023-09-26 PROCEDURE — 27201423 OPTIME MED/SURG SUP & DEVICES STERILE SUPPLY: Performed by: SURGERY

## 2023-09-26 PROCEDURE — C9113 INJ PANTOPRAZOLE SODIUM, VIA: HCPCS

## 2023-09-26 PROCEDURE — 71000016 HC POSTOP RECOV ADDL HR: Performed by: SURGERY

## 2023-09-26 PROCEDURE — 63600175 PHARM REV CODE 636 W HCPCS: Performed by: ANESTHESIOLOGY

## 2023-09-26 PROCEDURE — 71000039 HC RECOVERY, EACH ADD'L HOUR: Performed by: SURGERY

## 2023-09-26 PROCEDURE — D9220A PRA ANESTHESIA: ICD-10-PCS | Mod: CRNA,,, | Performed by: NURSE ANESTHETIST, CERTIFIED REGISTERED

## 2023-09-26 PROCEDURE — 37000009 HC ANESTHESIA EA ADD 15 MINS: Performed by: SURGERY

## 2023-09-26 PROCEDURE — D9220A PRA ANESTHESIA: ICD-10-PCS | Mod: ANES,,, | Performed by: ANESTHESIOLOGY

## 2023-09-26 PROCEDURE — 43659 PR LAPAROSCOPIC GASTROPEXY: ICD-10-PCS | Mod: ,,, | Performed by: SURGERY

## 2023-09-26 PROCEDURE — 99900035 HC TECH TIME PER 15 MIN (STAT)

## 2023-09-26 PROCEDURE — 25000003 PHARM REV CODE 250: Performed by: SURGERY

## 2023-09-26 PROCEDURE — 25000003 PHARM REV CODE 250: Performed by: ANESTHESIOLOGY

## 2023-09-26 PROCEDURE — 27000221 HC OXYGEN, UP TO 24 HOURS

## 2023-09-26 PROCEDURE — 36000710: Performed by: SURGERY

## 2023-09-26 PROCEDURE — 71000015 HC POSTOP RECOV 1ST HR: Performed by: SURGERY

## 2023-09-26 PROCEDURE — 43659 PR LAPAROSCOPIC GASTROPEXY: ICD-10-PCS | Mod: AS,,,

## 2023-09-26 PROCEDURE — 71000033 HC RECOVERY, INTIAL HOUR: Performed by: SURGERY

## 2023-09-26 PROCEDURE — 43281 PR LAP, REPAIR PARAESOPHAGEAL HERNIA, INCL FUNDOPLASTY W/O MESH: ICD-10-PCS | Mod: AS,,,

## 2023-09-26 PROCEDURE — 43281 LAP PARAESOPHAG HERN REPAIR: CPT | Mod: AS,,,

## 2023-09-26 PROCEDURE — 43659 UNLISTED LAPS PX STOMACH: CPT | Mod: ,,, | Performed by: SURGERY

## 2023-09-26 PROCEDURE — 43281 LAP PARAESOPHAG HERN REPAIR: CPT | Mod: ,,, | Performed by: SURGERY

## 2023-09-26 PROCEDURE — 25000003 PHARM REV CODE 250: Performed by: NURSE ANESTHETIST, CERTIFIED REGISTERED

## 2023-09-26 PROCEDURE — D9220A PRA ANESTHESIA: Mod: ANES,,, | Performed by: ANESTHESIOLOGY

## 2023-09-26 PROCEDURE — 43281 PR LAP, REPAIR PARAESOPHAGEAL HERNIA, INCL FUNDOPLASTY W/O MESH: ICD-10-PCS | Mod: ,,, | Performed by: SURGERY

## 2023-09-26 PROCEDURE — 37000008 HC ANESTHESIA 1ST 15 MINUTES: Performed by: SURGERY

## 2023-09-26 PROCEDURE — 63600175 PHARM REV CODE 636 W HCPCS: Performed by: NURSE ANESTHETIST, CERTIFIED REGISTERED

## 2023-09-26 PROCEDURE — D9220A PRA ANESTHESIA: Mod: CRNA,,, | Performed by: NURSE ANESTHETIST, CERTIFIED REGISTERED

## 2023-09-26 PROCEDURE — 63600175 PHARM REV CODE 636 W HCPCS: Performed by: SURGERY

## 2023-09-26 PROCEDURE — 63600175 PHARM REV CODE 636 W HCPCS

## 2023-09-26 PROCEDURE — C9290 INJ, BUPIVACAINE LIPOSOME: HCPCS | Performed by: SURGERY

## 2023-09-26 PROCEDURE — 36000711: Performed by: SURGERY

## 2023-09-26 PROCEDURE — 82962 GLUCOSE BLOOD TEST: CPT | Performed by: SURGERY

## 2023-09-26 RX ORDER — ONDANSETRON 4 MG/1
4 TABLET, ORALLY DISINTEGRATING ORAL EVERY 4 HOURS PRN
Status: DISCONTINUED | OUTPATIENT
Start: 2023-09-26 | End: 2023-09-27 | Stop reason: HOSPADM

## 2023-09-26 RX ORDER — ESMOLOL HYDROCHLORIDE 10 MG/ML
INJECTION INTRAVENOUS
Status: DISCONTINUED | OUTPATIENT
Start: 2023-09-26 | End: 2023-09-26

## 2023-09-26 RX ORDER — BUPIVACAINE HYDROCHLORIDE 5 MG/ML
INJECTION, SOLUTION EPIDURAL; INTRACAUDAL
Status: DISCONTINUED | OUTPATIENT
Start: 2023-09-26 | End: 2023-09-26 | Stop reason: HOSPADM

## 2023-09-26 RX ORDER — ONDANSETRON 2 MG/ML
4 INJECTION INTRAMUSCULAR; INTRAVENOUS ONCE
Status: DISCONTINUED | OUTPATIENT
Start: 2023-09-26 | End: 2023-09-26

## 2023-09-26 RX ORDER — LEVOFLOXACIN 5 MG/ML
500 INJECTION, SOLUTION INTRAVENOUS
Status: DISCONTINUED | OUTPATIENT
Start: 2023-09-26 | End: 2023-09-26 | Stop reason: HOSPADM

## 2023-09-26 RX ORDER — LETROZOLE 2.5 MG/1
2.5 TABLET, FILM COATED ORAL DAILY
Status: DISCONTINUED | OUTPATIENT
Start: 2023-09-26 | End: 2023-09-27 | Stop reason: HOSPADM

## 2023-09-26 RX ORDER — ACETAMINOPHEN 500 MG
1000 TABLET ORAL EVERY 8 HOURS
Status: DISCONTINUED | OUTPATIENT
Start: 2023-09-27 | End: 2023-09-27 | Stop reason: HOSPADM

## 2023-09-26 RX ORDER — SODIUM CHLORIDE, SODIUM LACTATE, POTASSIUM CHLORIDE, CALCIUM CHLORIDE 600; 310; 30; 20 MG/100ML; MG/100ML; MG/100ML; MG/100ML
INJECTION, SOLUTION INTRAVENOUS CONTINUOUS
Status: DISCONTINUED | OUTPATIENT
Start: 2023-09-26 | End: 2023-09-27 | Stop reason: HOSPADM

## 2023-09-26 RX ORDER — HYDROMORPHONE HYDROCHLORIDE 2 MG/ML
0.4 INJECTION, SOLUTION INTRAMUSCULAR; INTRAVENOUS; SUBCUTANEOUS EVERY 5 MIN PRN
Status: COMPLETED | OUTPATIENT
Start: 2023-09-26 | End: 2023-09-26

## 2023-09-26 RX ORDER — HYDROMORPHONE HYDROCHLORIDE 2 MG/ML
0.4 INJECTION, SOLUTION INTRAMUSCULAR; INTRAVENOUS; SUBCUTANEOUS ONCE
Status: COMPLETED | OUTPATIENT
Start: 2023-09-26 | End: 2023-09-26

## 2023-09-26 RX ORDER — FENTANYL CITRATE 50 UG/ML
INJECTION, SOLUTION INTRAMUSCULAR; INTRAVENOUS
Status: DISCONTINUED | OUTPATIENT
Start: 2023-09-26 | End: 2023-09-26

## 2023-09-26 RX ORDER — MEPERIDINE HYDROCHLORIDE 25 MG/ML
50 INJECTION INTRAMUSCULAR; INTRAVENOUS; SUBCUTANEOUS EVERY 4 HOURS PRN
Status: DISCONTINUED | OUTPATIENT
Start: 2023-09-26 | End: 2023-09-27 | Stop reason: HOSPADM

## 2023-09-26 RX ORDER — ONDANSETRON 2 MG/ML
4 INJECTION INTRAMUSCULAR; INTRAVENOUS ONCE
Status: COMPLETED | OUTPATIENT
Start: 2023-09-26 | End: 2023-09-26

## 2023-09-26 RX ORDER — OXYMETAZOLINE HCL 0.05 %
2 SPRAY, NON-AEROSOL (ML) NASAL DAILY PRN
COMMUNITY

## 2023-09-26 RX ORDER — LIDOCAINE HYDROCHLORIDE 20 MG/ML
INJECTION, SOLUTION EPIDURAL; INFILTRATION; INTRACAUDAL; PERINEURAL
Status: DISCONTINUED | OUTPATIENT
Start: 2023-09-26 | End: 2023-09-26

## 2023-09-26 RX ORDER — MIDAZOLAM HYDROCHLORIDE 1 MG/ML
INJECTION INTRAMUSCULAR; INTRAVENOUS
Status: DISCONTINUED | OUTPATIENT
Start: 2023-09-26 | End: 2023-09-26

## 2023-09-26 RX ORDER — SUCCINYLCHOLINE CHLORIDE 20 MG/ML
INJECTION INTRAMUSCULAR; INTRAVENOUS
Status: DISCONTINUED | OUTPATIENT
Start: 2023-09-26 | End: 2023-09-26

## 2023-09-26 RX ORDER — ONDANSETRON 2 MG/ML
4 INJECTION INTRAMUSCULAR; INTRAVENOUS EVERY 4 HOURS PRN
Status: DISCONTINUED | OUTPATIENT
Start: 2023-09-26 | End: 2023-09-27 | Stop reason: HOSPADM

## 2023-09-26 RX ORDER — CLONIDINE 0.1 MG/24H
1 PATCH, EXTENDED RELEASE TRANSDERMAL ONCE AS NEEDED
Status: DISCONTINUED | OUTPATIENT
Start: 2023-09-26 | End: 2023-09-27 | Stop reason: HOSPADM

## 2023-09-26 RX ORDER — LABETALOL HYDROCHLORIDE 5 MG/ML
10 INJECTION, SOLUTION INTRAVENOUS
Status: DISCONTINUED | OUTPATIENT
Start: 2023-09-26 | End: 2023-09-27 | Stop reason: HOSPADM

## 2023-09-26 RX ORDER — PROCHLORPERAZINE EDISYLATE 5 MG/ML
5 INJECTION INTRAMUSCULAR; INTRAVENOUS EVERY 4 HOURS PRN
Status: DISCONTINUED | OUTPATIENT
Start: 2023-09-26 | End: 2023-09-27 | Stop reason: HOSPADM

## 2023-09-26 RX ORDER — DEXMEDETOMIDINE HYDROCHLORIDE 100 UG/ML
INJECTION, SOLUTION INTRAVENOUS
Status: DISCONTINUED | OUTPATIENT
Start: 2023-09-26 | End: 2023-09-26

## 2023-09-26 RX ORDER — PHENYLEPHRINE HCL IN 0.9% NACL 1 MG/10 ML
SYRINGE (ML) INTRAVENOUS
Status: DISCONTINUED | OUTPATIENT
Start: 2023-09-26 | End: 2023-09-26

## 2023-09-26 RX ORDER — ROCURONIUM BROMIDE 10 MG/ML
INJECTION, SOLUTION INTRAVENOUS
Status: DISCONTINUED | OUTPATIENT
Start: 2023-09-26 | End: 2023-09-26

## 2023-09-26 RX ORDER — PANTOPRAZOLE SODIUM 40 MG/10ML
40 INJECTION, POWDER, LYOPHILIZED, FOR SOLUTION INTRAVENOUS DAILY
Status: DISCONTINUED | OUTPATIENT
Start: 2023-09-26 | End: 2023-09-27 | Stop reason: HOSPADM

## 2023-09-26 RX ORDER — PROPOFOL 10 MG/ML
VIAL (ML) INTRAVENOUS
Status: DISCONTINUED | OUTPATIENT
Start: 2023-09-26 | End: 2023-09-26

## 2023-09-26 RX ORDER — HYDROXYZINE 50 MG/ML
50 INJECTION, SOLUTION INTRAMUSCULAR EVERY 6 HOURS PRN
Status: DISCONTINUED | OUTPATIENT
Start: 2023-09-26 | End: 2023-09-27 | Stop reason: HOSPADM

## 2023-09-26 RX ORDER — ONDANSETRON 2 MG/ML
INJECTION INTRAMUSCULAR; INTRAVENOUS
Status: DISCONTINUED | OUTPATIENT
Start: 2023-09-26 | End: 2023-09-26

## 2023-09-26 RX ORDER — ENOXAPARIN SODIUM 100 MG/ML
40 INJECTION SUBCUTANEOUS DAILY
Status: DISCONTINUED | OUTPATIENT
Start: 2023-09-27 | End: 2023-09-27 | Stop reason: HOSPADM

## 2023-09-26 RX ADMIN — DEXMEDETOMIDINE 6 MCG: 200 INJECTION, SOLUTION INTRAVENOUS at 02:09

## 2023-09-26 RX ADMIN — FENTANYL CITRATE 50 MCG: 50 INJECTION, SOLUTION INTRAMUSCULAR; INTRAVENOUS at 02:09

## 2023-09-26 RX ADMIN — Medication 50 MCG: at 03:09

## 2023-09-26 RX ADMIN — ONDANSETRON 4 MG: 2 INJECTION INTRAMUSCULAR; INTRAVENOUS at 10:09

## 2023-09-26 RX ADMIN — LIDOCAINE HYDROCHLORIDE 100 MG: 20 INJECTION, SOLUTION EPIDURAL; INFILTRATION; INTRACAUDAL; PERINEURAL at 01:09

## 2023-09-26 RX ADMIN — ROCURONIUM BROMIDE 20 MG: 10 SOLUTION INTRAVENOUS at 03:09

## 2023-09-26 RX ADMIN — ESMOLOL HYDROCHLORIDE 20 MG: 100 INJECTION, SOLUTION INTRAVENOUS at 02:09

## 2023-09-26 RX ADMIN — ONDANSETRON 4 MG: 2 INJECTION INTRAMUSCULAR; INTRAVENOUS at 06:09

## 2023-09-26 RX ADMIN — FENTANYL CITRATE 25 MCG: 50 INJECTION, SOLUTION INTRAMUSCULAR; INTRAVENOUS at 03:09

## 2023-09-26 RX ADMIN — SODIUM CHLORIDE, POTASSIUM CHLORIDE, SODIUM LACTATE AND CALCIUM CHLORIDE: 600; 310; 30; 20 INJECTION, SOLUTION INTRAVENOUS at 04:09

## 2023-09-26 RX ADMIN — ROCURONIUM BROMIDE 50 MG: 10 SOLUTION INTRAVENOUS at 02:09

## 2023-09-26 RX ADMIN — HYDROMORPHONE HYDROCHLORIDE 0.2 MG: 2 INJECTION INTRAMUSCULAR; INTRAVENOUS; SUBCUTANEOUS at 10:09

## 2023-09-26 RX ADMIN — ESMOLOL HYDROCHLORIDE 10 MG: 100 INJECTION, SOLUTION INTRAVENOUS at 03:09

## 2023-09-26 RX ADMIN — HYDROMORPHONE HYDROCHLORIDE 0.4 MG: 2 INJECTION INTRAMUSCULAR; INTRAVENOUS; SUBCUTANEOUS at 04:09

## 2023-09-26 RX ADMIN — HYDROMORPHONE HYDROCHLORIDE 0.4 MG: 2 INJECTION INTRAMUSCULAR; INTRAVENOUS; SUBCUTANEOUS at 05:09

## 2023-09-26 RX ADMIN — SUGAMMADEX 300 MG: 100 INJECTION, SOLUTION INTRAVENOUS at 03:09

## 2023-09-26 RX ADMIN — LETROZOLE 2.5 MG: 2.5 TABLET ORAL at 09:09

## 2023-09-26 RX ADMIN — PROPOFOL 100 MG: 10 INJECTION, EMULSION INTRAVENOUS at 01:09

## 2023-09-26 RX ADMIN — SUCCINYLCHOLINE CHLORIDE 120 MG: 20 INJECTION, SOLUTION INTRAMUSCULAR; INTRAVENOUS at 01:09

## 2023-09-26 RX ADMIN — SODIUM CHLORIDE, SODIUM GLUCONATE, SODIUM ACETATE, POTASSIUM CHLORIDE AND MAGNESIUM CHLORIDE: 526; 502; 368; 37; 30 INJECTION, SOLUTION INTRAVENOUS at 01:09

## 2023-09-26 RX ADMIN — MIDAZOLAM HYDROCHLORIDE 2 MG: 1 INJECTION, SOLUTION INTRAMUSCULAR; INTRAVENOUS at 01:09

## 2023-09-26 RX ADMIN — LEVOFLOXACIN 500 MG: 500 INJECTION, SOLUTION INTRAVENOUS at 02:09

## 2023-09-26 RX ADMIN — ROCURONIUM BROMIDE 10 MG: 10 SOLUTION INTRAVENOUS at 01:09

## 2023-09-26 RX ADMIN — ONDANSETRON 4 MG: 2 INJECTION INTRAMUSCULAR; INTRAVENOUS at 03:09

## 2023-09-26 RX ADMIN — PANTOPRAZOLE SODIUM 40 MG: 40 INJECTION, POWDER, LYOPHILIZED, FOR SOLUTION INTRAVENOUS at 04:09

## 2023-09-26 RX ADMIN — DEXMEDETOMIDINE 4 MCG: 200 INJECTION, SOLUTION INTRAVENOUS at 02:09

## 2023-09-26 RX ADMIN — FENTANYL CITRATE 50 MCG: 50 INJECTION, SOLUTION INTRAMUSCULAR; INTRAVENOUS at 01:09

## 2023-09-26 RX ADMIN — MEPERIDINE HYDROCHLORIDE 50 MG: 25 INJECTION INTRAMUSCULAR; INTRAVENOUS; SUBCUTANEOUS at 07:09

## 2023-09-26 NOTE — TRANSFER OF CARE
Anesthesia Transfer of Care Note    Patient: Nita Dejesus    Procedure(s) Performed: Procedure(s) (LRB):  FUNDOPLICATION, NISSEN, LAPAROSCOPIC (N/A)    Patient location: PACU    Anesthesia Type: general    Transport from OR: Transported from OR on room air with adequate spontaneous ventilation    Post pain: adequate analgesia    Post assessment: no apparent anesthetic complications    Post vital signs: stable    Level of consciousness: sedated    Nausea/Vomiting: no nausea/vomiting    Complications: none    Transfer of care protocol was followed      Last vitals:   Visit Vitals  /84   Pulse 90   Temp 37.1 °C (98.7 °F) (Oral)   Resp 17   Ht 5' (1.524 m)   Wt 74.8 kg (165 lb)   LMP  (LMP Unknown)   SpO2 (!) 93%   Breastfeeding No   BMI 32.22 kg/m²

## 2023-09-26 NOTE — PROGRESS NOTES
Subjective:       Patient ID: Nita Dejesus is a 53 y.o. female.    Previous Oncologist: Dr. Katlyn Gonzalez at [facility]  Surgeon: Dr. Kelly Santos    Left Breast Cancer Stage IA(T2N0M0) Triple Positive--22  Biopsy/Surgery/pathology:   1. Excisional biopsy left breast mass/left breast cyst done 22--invasive ductal carcinoma, Grade 3, measures 3.0cm, a 0.6cm region of cauterized carcinoma is present at the inked superior-deep margin, cyst with reactive changes, suggestive of previous lumpectomy site, fluid left breast cyst with rare atypical cells present, suspicious for carcinoma, ER 95%, VA 27%, Her2 3+ by IHC, positive, Ki67 51%.   2. Left SLN biopsies done 22--3 benign lymph nodes.   3. Re-excision lumpectomy done 3/8/23--no residual invasive malignancy identified.   Imagin. [place] [facility] [place] inferior breast large, minimally complex cystic lesion at 6:00 measures 4.4X3.5X4.2cm, corresponding to the palpable area, appears benign. Routine screening MMG recommended.   2. MRI breasts bilateral at [facility] 22--post-surgical seroma at 12:00 position of left breast s/p recent excisional biopsy, no definite suspicious enhancing masses or areas of nonmass enhancement of left breast, and no suspicious areas in right breast, BIRADS 6.   3. CT C/A/P w/ contrast 22 at [facility]--no thoracic metastatic disease, fatty infiltration of liver, small to moderate-sized hiatal hernia, no metastatic disease.   4. NM Bone scan whole body done 22 at [facility]--activity in cervical and lumbar spines likely degenerative, no anatomic correlate seen on CT scan in lumbar spines, suggest correlation with C-spine Xrays, increase tracer w/n both feet, likely degnerative change, physiologic activity of urinary tract.  5. CT A/P w/ contrast 22 at [facility]--no acute abdominopelvic pathology or change compared to recent CT from 22, fairly large amount of stool in colon may reflect  constipation, large hiatal hernia, diffuse hepatic steatosis, post-cholecystectomy, post hysterectomy, mild thoracic and lumbar degenerative disease.   6. Bilateral diagnostic MMG/Left breast US 2/28/23--No suspicious masses, microcalcifications, or other abnormalities are seen  within the right or left breast. Postsurgical changes are noted at the  6:00 position of the left breast at site of prior excisional biopsy with positive margins.   7. NM parathyroid scan on 7/27/23--Overall asymmetric increased activity at the right thyroid lobe with limited washout on the delayed 2 hour exam.  A underlying hyperfunctioning parathyroid adenoma cannot be entirely excluded.  7. CT C/A/P on 8/4/23--Mucosal prominence at a small to moderate size hiatus hernia. Atelectasis and/or scarring anterior left upper lobe and posterior right lower lung. Postsurgical changes left axilla and left breast with small residual fluid density/edema/stranding. Right central line/MediPort. Thoracolumbar spondylosis.     DEXA:  5/11/23--AP spine 0.1, left femoral neck -1.5, left total hip -0.4, right femoral neck -1.2, right total hip 0.0, c/w osteopenia bilateral femoral necks.     ECHO:  08/18/22--EF 55-65%.  11/17/22--EF 60%.  03/23/23--EF 55-60%.  06/20/23--EF 60%.  09/05/23--EF 55-60%    Genetic testing: [employer] done 7/21/22 negative for any pathogenic variants.     Treatment history:  Left breast excisional biopsy done 7/13/22  Left SLN biopsies done 8/8/22.  2 units PRBC 12/19/22.  TCHP X 6 cycles completed only 8/23/22--1/25/23 (multiple treatment delays for thrombocytopenia, despite dose reductions, multiple side effects).  Re-excision lumpectomy done 3/8/23--no residual disease.   Adjuvant radiation therapy completed 4/12/23--5/9/23.    Current treatment plan:  Maintenance HP X 11 cycles started 3/29/23  Cycle 10 due 10/4/23 (cycle 16 in plan).  2. Adjuvant OS Zoladex monthly + Femara started 3/29/23 (DAISY for endometriosis, ovaries  remain).    Chief Complaint: Other Misc (Pt is furious at the experience that she had at [facility] last week when she was in pt for a sx.)    HPI  Patient presents for follow-up of breast cancer. She continues on maintenance Herceptin + Perjeta. She underwent surgery for repair of hiatal hernia last week. She had a bad experience with the IV access. But her surgery went very well. She has no other complaints this morning. She continues on Zoladex and Femara. She will be having surgery in [place] at [facility] to remove 3 overactive parathyroid glands on 12/7/23. She also underwent a thyroid nodule biopsy with pending results.     Past Medical History:   Diagnosis Date    Allergy     Anemia     Anxiety     Back pain     Breast cancer     GERD (gastroesophageal reflux disease)     Hiatal hernia     Hip pain     Hx of Clotting disorder     d/t medication    Hyperparathyroidism     Insulin resistance     Migraine     Neuromuscular disorder     Neuropathy     Osteopenia     Prediabetes       Past Surgical History:   Procedure Laterality Date    BREAST LUMPECTOMY Left     c lymph nodes    BREAST SURGERY      x2    CHOLECYSTECTOMY      COLONOSCOPY      ESOPHAGOGASTRODUODENOSCOPY      LAPAROSCOPIC NISSEN FUNDOPLICATION N/A 9/26/2023    Procedure: FUNDOPLICATION, NISSEN, LAPAROSCOPIC;  Surgeon: Maximiliano Denton Jr., MD;  Location: [facility];  Service: General;  Laterality: N/A;  WITH GASTROPEXY    NISSEN FUNDOPLICATION      PARTIAL HYSTERECTOMY      PLACEMENT, MEDIPORT      REDUCTION OF BOTH BREASTS  2018    TUBAL LIGATION Bilateral 1993     Family History   Problem Relation Age of Onset    Hypertension Mother     Lupus Mother     Hypoparathyroidism Mother     Diabetes Father     Heart disease Father     Prostate cancer Father     Kidney disease Father     Hodgkin's lymphoma Brother     Pancreatitis Brother      Social History     Socioeconomic History    Marital status:    Tobacco Use    Smoking status: Every Day      "Current packs/day: 0.25     Types: Cigarettes    Smokeless tobacco: Never   Substance and Sexual Activity    Alcohol use: Not Currently    Drug use: Never       Review of patient's allergies indicates:   Allergen Reactions    Amitriptyline Anxiety and Other (See Comments)     Severe    Other reaction(s): Unknown    Corticosteroids (glucocorticoids) Hives, Other (See Comments) and Swelling     Patient states "Severe Inflammation"      Heparin analogues Palpitations and Other (See Comments)     "All anticoagulation Meds cause dangerously low BP"    Ketorolac Diarrhea, Nausea And Vomiting, Other (See Comments) and Swelling     Migraines      Mushroom Anaphylaxis     ANY MUSHROOM CAUSES ANAPHYLAXIS    Peanut Anaphylaxis    Penicillins Anaphylaxis, Diarrhea, Hives, Nausea And Vomiting and Swelling    Tramadol Diarrhea, Itching, Nausea And Vomiting, Rash and Swelling    Latex Other (See Comments)     Skin blisters      Macrolide antibiotics Hives and Rash    Adhesive Other (See Comments)     Blisters, skin tears; CANNOT USE PAPER TAPE    Anticoag citrate phos dextrose      STATES ANY BLOOD THINNERS CAUSES EXTREMELY LOW BLOOD PRESSURE    Aspartame     Codeine sulfate     Egg     Erythromycin      Other reaction(s): Unknown    Estrogens     Other omega-3s      Anticoagulant   Patient states "low blood pressure"    Pork derived (porcine) Diarrhea    Sucralose Other (See Comments)    Aspirin Other (See Comments)     Avoid if possible due to blood thinning    Eszopiclone Itching and Anxiety     AKA LUNESTA    Topiramate Anxiety, Other (See Comments) and Palpitations     Shaking        Current Outpatient Medications on File Prior to Visit   Medication Sig Dispense Refill    (Magic mouthwash) 1:1:1 diphenhydrAMINE(Benadryl) 12.5mg/5ml liq, aluminum & magnesium hydroxide-simethicone (Maalox), LIDOcaine viscous 2% Swish and spit 10 mLs every 4 (four) hours as needed. for mouth sores (Patient taking differently: Swish and spit 10 " mLs every 4 (four) hours as needed. for mouth sores, no bottle to verify am of procedure) 500 mL 1    butalbital-acetaminophen-caffeine -40 mg (FIORICET, ESGIC) -40 mg per tablet Take 1 tablet by mouth daily as needed for Headaches.      clonazePAM (KLONOPIN) 0.5 MG tablet Take 0.5 mg by mouth every 8 (eight) hours as needed for Anxiety (pt states typically only uses once daily as needed). No bottle to verify am of procedure      diclofenac (VOLTAREN) 75 MG EC tablet Take 75 mg by mouth every evening. Only nightly rx says bid on bottle      diphenhydrAMINE (BENADRYL) 50 MG capsule Take 50 mg by mouth daily as needed for Allergies (ONLY TAKES  FOR POST CHEMO REACTIONS).      gabapentin (NEURONTIN) 300 MG capsule Take 300 mg by mouth 3 (three) times daily. Takes with 600 mg to total 900mg TID      gabapentin (NEURONTIN) 600 MG tablet Take 600 mg by mouth 3 (three) times daily.      goserelin acetate (ZOLADEX SUBQ) Inject 1 application  into the skin every 30 days. NO BOTTLE TO VERIFY ON ADMIT      HYDROcodone-acetaminophen (NORCO) 5-325 mg per tablet Take 1 tablet by mouth every 6 (six) hours as needed for Pain. (Patient taking differently: Take 1 tablet by mouth every 6 (six) hours as needed for Pain. NO BOTTLE TO VERIFY AM OF PROCEDURE) 60 tablet 0    HYDROcodone-acetaminophen (NORCO) 5-325 mg per tablet Take 1 tablet by mouth every 6 (six) hours as needed for Pain. 20 tablet 0    letrozole (FEMARA) 2.5 mg Tab TAKE 1 TABLET BY MOUTH ONCE DAILY (Patient taking differently: Take 2.5 mg by mouth nightly.) 30 tablet 3    loperamide (IMODIUM) 2 mg capsule Take 2 mg by mouth once as needed for Diarrhea.      metFORMIN (GLUCOPHAGE) 500 MG tablet Take 500 mg by mouth once daily. NO BOTTLE TO VERIFY AM OF PROCEDURE      metoclopramide HCl (REGLAN) 10 MG tablet 1 Tablet Orally Once a day as needed for nausea for 30 days      omeprazole (PRILOSEC) 40 MG capsule Take 1 capsule (40 mg total) by mouth once daily. 30  "capsule 6    onabotulinumtoxina (BOTOX) 200 unit SolR 200 Units every 3 (three) months. NO BOTTLE TO VERIFY AM OF PROCEDURE      ondansetron (ZOFRAN-ODT) 4 MG TbDL Take 4 mg by mouth every 8 (eight) hours as needed (NAUSEA).      oxymetazoline (AFRIN) 0.05 % nasal spray 2 sprays by Nasal route daily as needed for Congestion.      pertuzumab (PERJETA IV) Inject 1 Application into the vein. Q 3 weeks NO BOTTLE TO VERIFY AM OF PROCEDURE      promethazine (PHENERGAN) 25 MG tablet Take 1 tablet (25 mg total) by mouth every 6 (six) hours as needed for Nausea. 30 tablet 0    tiZANidine (ZANAFLEX) 4 MG tablet Take 4 mg by mouth 3 (three) times daily. 1 tab in am and 1 TAB  IN afternoon and 2 tabs at night      TRULICITY 0.75 mg/0.5 mL pen injector Inject 0.75 mg into the skin every 7 days. NO BOTTLE TO VERIFY AM OF PROCEDURE      vitamin E 400 UNIT capsule Take 800 Units by mouth once daily. STOPPED FOR PROCEDURE NO BOTTLE TO VERIFY AM OF PROCEDURE       No current facility-administered medications on file prior to visit.      Review of Systems   Constitutional:  Positive for fatigue. Negative for appetite change and unexpected weight change.   HENT:  Negative for mouth sores.    Respiratory:  Negative for cough.    Gastrointestinal:  Negative for abdominal pain.        +hiatal hernia   Genitourinary:  Positive for hot flashes. Negative for frequency.   Musculoskeletal:  Negative for back pain.   Integumentary:  Negative for rash.        Bilateral breast pain, chronic   Neurological:  Negative for headaches.   Hematological:  Negative for adenopathy.   Psychiatric/Behavioral:  The patient is not nervous/anxious.         Vitals:    10/03/23 0909   BP: (!) 152/94   Pulse: 84   Resp: 14   Temp: 98.2 °F (36.8 °C)   TempSrc: Oral   SpO2: 96%   Weight: 74.9 kg (165 lb 3.2 oz)   Height: 5' 1" (1.549 m)       Physical Exam  Constitutional:       Appearance: Normal appearance. She is overweight.   HENT:      Head: Normocephalic.     "  Comments: Resolved alopecia     Nose: Nose normal.      Mouth/Throat:      Mouth: Mucous membranes are moist.   Eyes:      General: Lids are normal. Vision grossly intact.      Extraocular Movements: Extraocular movements intact.      Conjunctiva/sclera: Conjunctivae normal.   Cardiovascular:      Rate and Rhythm: Normal rate and regular rhythm.      Heart sounds: Normal heart sounds.   Pulmonary:      Effort: Pulmonary effort is normal.      Breath sounds: Normal breath sounds and air entry.   Chest:      Comments: Right chest wall mediport in place, left breast with inferior lumpectomy scar, well healed. Left axillary incision from LN biopsy also healed well. No masses palpable.   Abdominal:      General: Abdomen is protuberant. Bowel sounds are normal. There is no distension.      Palpations: Abdomen is soft.      Tenderness: There is no abdominal tenderness.      Comments: Scattered laparoscopic incisions with steri strips in place, all closed and almost healed   Musculoskeletal:         General: Normal range of motion.      Cervical back: Normal range of motion and neck supple.      Right lower leg: No edema.      Left lower leg: No edema.   Skin:     General: Skin is warm and moist.   Neurological:      General: No focal deficit present.      Mental Status: She is alert and oriented to person, place, and time.   Psychiatric:         Mood and Affect: Mood normal.         Behavior: Behavior is cooperative.         Judgment: Judgment normal.       Lab Visit on 10/03/2023   Component Date Value    WBC 10/03/2023 6.06     RBC 10/03/2023 3.86 (L)     Hgb 10/03/2023 12.1     Hct 10/03/2023 37.9     MCV 10/03/2023 98.2 (H)     MCH 10/03/2023 31.3 (H)     MCHC 10/03/2023 31.9 (L)     RDW 10/03/2023 12.9     Platelet 10/03/2023 207     MPV 10/03/2023 8.7     Neut % 10/03/2023 61.6     Lymph % 10/03/2023 22.9     Mono % 10/03/2023 6.1     Eos % 10/03/2023 8.9     Basophil % 10/03/2023 0.3     Lymph # 10/03/2023 1.39      Neut # 10/03/2023 3.73     Mono # 10/03/2023 0.37     Eos # 10/03/2023 0.54     Baso # 10/03/2023 0.02     IG# 10/03/2023 0.01     IG% 10/03/2023 0.2    Admission on 09/26/2023, Discharged on 09/27/2023   Component Date Value    POCT Glucose 09/26/2023 97     POC Glucose 09/26/2023 92     Sodium Level 09/27/2023 139     Potassium Level 09/27/2023 4.0     Chloride 09/27/2023 108 (H)     Carbon Dioxide 09/27/2023 24     Glucose Level 09/27/2023 100     Blood Urea Nitrogen 09/27/2023 12.6     Creatinine 09/27/2023 0.75     BUN/Creatinine Ratio 09/27/2023 17     Calcium Level Total 09/27/2023 9.4     Anion Gap 09/27/2023 7.0     eGFR 09/27/2023 >60     WBC 09/27/2023 6.81     RBC 09/27/2023 3.54 (L)     Hgb 09/27/2023 11.3 (L)     Hct 09/27/2023 33.9 (L)     MCV 09/27/2023 95.8 (H)     MCH 09/27/2023 31.9 (H)     MCHC 09/27/2023 33.3     RDW 09/27/2023 13.1     Platelet 09/27/2023 169     MPV 09/27/2023 9.1     Neut % 09/27/2023 75.3     Lymph % 09/27/2023 16.7     Mono % 09/27/2023 4.8     Eos % 09/27/2023 2.6     Basophil % 09/27/2023 0.3     Lymph # 09/27/2023 1.14     Neut # 09/27/2023 5.12     Mono # 09/27/2023 0.33     Eos # 09/27/2023 0.18     Baso # 09/27/2023 0.02     IG# 09/27/2023 0.02     IG% 09/27/2023 0.3     NRBC% 09/27/2023 0.0     POCT Glucose 09/26/2023 92           Assessment:       1. Malignant neoplasm of lower-inner quadrant of left breast in female, estrogen receptor positive        Plan:       Patient with Stage IA Left breast cancer triple positive s/p excisional biopsy of a cystic lesion that found cancer incidentally diagnosed 7/13/22. Tumor measured 3.0cm and SLN biopsies negative, Grade 3, strongly ER and W[place] positive and Her2 positive with high Ki67. There was a positive margin on the excisional biopsy.  Treatment with chemotherapy neoadjuvant started at [facility] with Dr. Gonzalez.    Patient received 2 cycles at [facility] then changed care to [facility]. She was having multiple side effects.   She does not  have any steroids due to h/o allergy causing inflammation.   Patient had multiple delays due to multiple side effects, thrombocytopenia, requiring dose reduction, but finally completed 6 cycles on 1/25/23.  Surgery was delayed and finally done on 3/8/23. Patient had re-excision lumpectomy and there was no residual invasive malignancy.     Treatment resumed with maintenance HP x 11 cycles 3/29/23.     Repeat ECHO from 9/5/23 normal EF. Plan to repeat in 12/2023--will order today.     Next due in 9/2023, will order today.     Recommended also OS + AI X 5 years followed by completion of AI to complete 10 years.   She had DAISY in 2012 for endometriosis but still had ovaries remaining functional prior to starting chemotherapy.   8/4/22 estradiol level 11, FSH 44, LH 38.   Zoladex/Femara started on 3/29/23.   Patient completed radiation on 5/9/23.    CBCdiff good, will f/u CMP.   Due for Cycle 10 HP tomorrow (C16). Okay to proceed with treatment. Her incisions are almost all healed from surgery.  Labs and treatment on 10/24/23  and 10/25/23 final cycle 11.      RTC 6 weeks for follow-up visit with labs.  Plan to begin routine surveillance visits after next visit.     Continue Zoladex, next due 10/11/23.  Continue Femara.  Continue vitamin D. Calcium stopped due to high level.   MMG left done 8/28/23 benign, with expected post-surgery and radiation changes. Diagnostic bilateral MMG due in 2/2024, ordered by Dr. Santos.     PTH elevated, referral sent to Endocrinology. She never heard from Dr. Raul Alvarado's office, so she is now seeing Dr. Hamilton. Parathyroid scan showed overall asymmetric increased activity at the right thyroid lobe with limited washout, an underlying hyperfunctioning parathyroid adenoma cannot be entirely excluded.   Referred to a surgeon in [place] and surgery scheduled for 12/7/23 to remove 3 overactive parathyroid glands.     DEXA from 5/11/23 showed mild osteopenia in bilateral femoral necks.    Will need to consider Zometa every 6 months X 2 years but waiting until after her parathyroid surgery and stabilization of calcium.     All questions answered at this time.        Flory Vargas MD

## 2023-09-26 NOTE — ANESTHESIA POSTPROCEDURE EVALUATION
Anesthesia Post Evaluation    Patient: Nita Dejesus    Procedure(s) Performed: Procedure(s) (LRB):  FUNDOPLICATION, NISSEN, LAPAROSCOPIC (N/A)    Final Anesthesia Type: general (/Regional//MAC)      Patient location during evaluation: PACU  Post-procedure mental status: @ basline.  Pain management: adequate    PONV status: See postop meds for drugs used to control n/v if any.  Anesthetic complications: no      Cardiovascular status: blood pressure returned to baseline  Respiratory status: @ baseline.  Hydration status: euvolemic            Vitals Value Taken Time   /68 09/26/23 1721   Temp 36.2 °C (97.2 °F) 09/26/23 1550   Pulse 65 09/26/23 1731   Resp 12 09/26/23 1731   SpO2 96 % 09/26/23 1731   Vitals shown include unvalidated device data.      No case tracking events are documented in the log.      Pain/Gi Score: Pain Rating Prior to Med Admin: 7 (9/26/2023  4:10 PM)  Gi Score: 9 (9/26/2023  5:00 PM)

## 2023-09-26 NOTE — ANESTHESIA PREPROCEDURE EVALUATION
09/26/2023  Nita Dejesus is a 53 y.o., female.    12 / 40 / 196k  Na 143, K 4.4, Cr 0.77  tte nl lvef, mild MR, g1dd    Multiple drug allergies as listed.  Pt very vocal about specifics of her care.  She requested no peripheral IV and would like to use her mediport.  I informed her that the flow rates of these devices are inconsistent and often times inadequate for our purposes, therefore a PIV is necessary.  She was agreeable though somewhat upset.  She also requests no blood pressure cuff usage while she's awake, which I informed her would be unsafe and not possible.  All questions answered, r/b/a entertained.      Ashish BECK     Pre-op Assessment    I have reviewed the Patient Summary Reports.     I have reviewed the Nursing Notes. I have reviewed the NPO Status.   I have reviewed the Medications.     Review of Systems  Anesthesia Hx:   Denies Personal Hx of Anesthesia complications.   Cardiovascular:   Exercise tolerance: good    Pulmonary:  Pulmonary Normal    Hepatic/GI:   Hiatal Hernia, GERD    Musculoskeletal:   Arthritis     Neurological:   Neuromuscular Disease, (fibromyalgia) Headaches (migraines)    Endocrine:  Obesity / BMI > 30      Physical Exam  General: Well nourished, Cooperative and Alert    Airway:  Mallampati: II   Mouth Opening: Normal  TM Distance: Normal  Tongue: Normal    Dental:  Intact    Chest/Lungs:  Normal Respiratory Rate    Heart:  Rate: Normal        Anesthesia Plan  Type of Anesthesia, risks & benefits discussed:    Anesthesia Type: Gen ETT  Intra-op Monitoring Plan: Standard ASA Monitors  Post Op Pain Control Plan: multimodal analgesia  Induction:  IV  Informed Consent: Informed consent signed with the Patient and all parties understand the risks and agree with anesthesia plan.  All questions answered.   ASA Score: 2  Day of Surgery Review of History & Physical: H&P Update  referred to the surgeon/provider.    Ready For Surgery From Anesthesia Perspective.     .

## 2023-09-26 NOTE — ANESTHESIA PROCEDURE NOTES
Intubation    Date/Time: 9/26/2023 2:00 PM    Performed by: Reza Ureña MD  Authorized by: Reza Ureña MD    Intubation:     Induction:  Rapid sequence induction    Intubated:  Postinduction    Mask Ventilation:  Not attempted    Attempts:  1    Attempted By:  Student    Method of Intubation:  Direct    Blade:  Palmer 2    Laryngeal View Grade: Grade I - full view of cords      Difficult Airway Encountered?: No      Complications:  None    Airway Device:  Oral endotracheal tube    Airway Device Size:  7.0    Style/Cuff Inflation:  Cuffed (inflated to minimal occlusive pressure)    Tube secured:  22    Secured at:  The lips    Placement Verified By:  Capnometry and Colorimetric ETCO2 device    Complicating Factors:  None    Findings Post-Intubation:  BS equal bilateral and atraumatic/condition of teeth unchanged

## 2023-09-27 VITALS
OXYGEN SATURATION: 94 % | DIASTOLIC BLOOD PRESSURE: 70 MMHG | BODY MASS INDEX: 31.61 KG/M2 | RESPIRATION RATE: 18 BRPM | TEMPERATURE: 99 F | SYSTOLIC BLOOD PRESSURE: 120 MMHG | WEIGHT: 161 LBS | HEIGHT: 60 IN | HEART RATE: 70 BPM

## 2023-09-27 LAB
ANION GAP SERPL CALC-SCNC: 7 MEQ/L
BASOPHILS # BLD AUTO: 0.02 X10(3)/MCL
BASOPHILS NFR BLD AUTO: 0.3 %
BUN SERPL-MCNC: 12.6 MG/DL (ref 9.8–20.1)
CALCIUM SERPL-MCNC: 9.4 MG/DL (ref 8.4–10.2)
CHLORIDE SERPL-SCNC: 108 MMOL/L (ref 98–107)
CO2 SERPL-SCNC: 24 MMOL/L (ref 22–29)
CREAT SERPL-MCNC: 0.75 MG/DL (ref 0.55–1.02)
CREAT/UREA NIT SERPL: 17
EOSINOPHIL # BLD AUTO: 0.18 X10(3)/MCL (ref 0–0.9)
EOSINOPHIL NFR BLD AUTO: 2.6 %
ERYTHROCYTE [DISTWIDTH] IN BLOOD BY AUTOMATED COUNT: 13.1 % (ref 11.5–17)
GFR SERPLBLD CREATININE-BSD FMLA CKD-EPI: >60 MLS/MIN/1.73/M2
GLUCOSE SERPL-MCNC: 100 MG/DL (ref 74–100)
HCT VFR BLD AUTO: 33.9 % (ref 37–47)
HGB BLD-MCNC: 11.3 G/DL (ref 12–16)
IMM GRANULOCYTES # BLD AUTO: 0.02 X10(3)/MCL (ref 0–0.04)
IMM GRANULOCYTES NFR BLD AUTO: 0.3 %
LYMPHOCYTES # BLD AUTO: 1.14 X10(3)/MCL (ref 0.6–4.6)
LYMPHOCYTES NFR BLD AUTO: 16.7 %
MCH RBC QN AUTO: 31.9 PG (ref 27–31)
MCHC RBC AUTO-ENTMCNC: 33.3 G/DL (ref 33–36)
MCV RBC AUTO: 95.8 FL (ref 80–94)
MONOCYTES # BLD AUTO: 0.33 X10(3)/MCL (ref 0.1–1.3)
MONOCYTES NFR BLD AUTO: 4.8 %
NEUTROPHILS # BLD AUTO: 5.12 X10(3)/MCL (ref 2.1–9.2)
NEUTROPHILS NFR BLD AUTO: 75.3 %
NRBC BLD AUTO-RTO: 0 %
PLATELET # BLD AUTO: 169 X10(3)/MCL (ref 130–400)
PMV BLD AUTO: 9.1 FL (ref 7.4–10.4)
POTASSIUM SERPL-SCNC: 4 MMOL/L (ref 3.5–5.1)
RBC # BLD AUTO: 3.54 X10(6)/MCL (ref 4.2–5.4)
SODIUM SERPL-SCNC: 139 MMOL/L (ref 136–145)
WBC # SPEC AUTO: 6.81 X10(3)/MCL (ref 4.5–11.5)

## 2023-09-27 PROCEDURE — 96361 HYDRATE IV INFUSION ADD-ON: CPT

## 2023-09-27 PROCEDURE — 96376 TX/PRO/DX INJ SAME DRUG ADON: CPT

## 2023-09-27 PROCEDURE — 63600175 PHARM REV CODE 636 W HCPCS

## 2023-09-27 PROCEDURE — G0378 HOSPITAL OBSERVATION PER HR: HCPCS

## 2023-09-27 PROCEDURE — 96374 THER/PROPH/DIAG INJ IV PUSH: CPT

## 2023-09-27 PROCEDURE — 80048 BASIC METABOLIC PNL TOTAL CA: CPT

## 2023-09-27 PROCEDURE — 27000221 HC OXYGEN, UP TO 24 HOURS

## 2023-09-27 PROCEDURE — 96375 TX/PRO/DX INJ NEW DRUG ADDON: CPT

## 2023-09-27 PROCEDURE — 85025 COMPLETE CBC W/AUTO DIFF WBC: CPT

## 2023-09-27 RX ORDER — HYDROCODONE BITARTRATE AND ACETAMINOPHEN 5; 325 MG/1; MG/1
1 TABLET ORAL EVERY 6 HOURS PRN
Qty: 20 TABLET | Refills: 0 | Status: SHIPPED | OUTPATIENT
Start: 2023-09-27 | End: 2023-11-17

## 2023-09-27 RX ADMIN — SODIUM CHLORIDE, POTASSIUM CHLORIDE, SODIUM LACTATE AND CALCIUM CHLORIDE: 600; 310; 30; 20 INJECTION, SOLUTION INTRAVENOUS at 12:09

## 2023-09-27 RX ADMIN — MEPERIDINE HYDROCHLORIDE 50 MG: 25 INJECTION INTRAMUSCULAR; INTRAVENOUS; SUBCUTANEOUS at 12:09

## 2023-09-27 RX ADMIN — ONDANSETRON 4 MG: 2 INJECTION INTRAMUSCULAR; INTRAVENOUS at 12:09

## 2023-09-27 RX ADMIN — ONDANSETRON 4 MG: 2 INJECTION INTRAMUSCULAR; INTRAVENOUS at 05:09

## 2023-09-27 RX ADMIN — MEPERIDINE HYDROCHLORIDE 50 MG: 25 INJECTION INTRAMUSCULAR; INTRAVENOUS; SUBCUTANEOUS at 05:09

## 2023-09-27 NOTE — DISCHARGE SUMMARY
DISCHARGE DATE: 9/27/23          ADMISSION DIAGNOSIS:  GERD           DISCHARGE DIAGNOSIS:  GERD          OPERATION:  Laparoscopic Fundoplication          HOSPITAL COURSE:  The patient is a 53 y.o. -year-old female who underwent Revision Nissen Fundoplication for GERD.  The patient continued to progress and once the patient was tolerating a post-nissen diet with good urination and was discharged to home in good condition.           DISCHARGE MEDICATIONS:  The patient may resume her home medications.  The patient was discharged on PO pain meds, one orally, q 4 hours prn pain along with simethicone OTC.             DISCHARGE INSTRUCTIONS:  Activity, no heavy lifting greater than 20 lbs for six weeks.  Wounds, patient may shower, no tub baths for two weeks.  Patient is to follow up with Dr. Maximiliano Denton in two weeks.

## 2023-09-27 NOTE — PROGRESS NOTES
I received report from ALYSON Man @ [phone].  Vitals stable, lap sites x 6 with scant drainage.   Patient and  concerned about NPO status.   MATIAS Hagan notified and instructed to keep patient NPO.  Patient &  requesting medication for anxiety.  Patient refused Hydroxyzine IM due to unknown side effects--patient does not want to take any new medication.  Patient refused Compazine.  Patient given Zofran & Demerol.   Abdominal binder cut to fit torso appropriately.  Patient voiced concerned about why paper tape was used due to it being an allergy.

## 2023-09-27 NOTE — NURSING
Spoke with patient advocate regarding patient leaving a message to patient advocate. Patient concerns were talked about this morning regarded IV access. Patient stated request to use mediport for IV access though anesthesia and surgeon denied use d/t infection risk and mediport's inability to perform with the needs of meds administered during surgery. Patient c/o pain at IV site, IV removed after feeling the vein which felt tough, along with pt c/o pain when IV flush was attempted. MD contacted, who gave OK to leave IV out for time being and transition to oral hydration.

## 2023-09-27 NOTE — PROGRESS NOTES
POD 1 s/p Laparoscopic Hiatal Hernia Repair /Gastropexy    Pt doing well, resting in bed comfortably.     VSS, AF    Physical Exam:  CV: RRR  CH: CTAB  ABD: soft, dexter ttp, incisions c/d/i     POD1:  Doing well   - clear liquid diet  - continue IVFs  - ambulate  - incentive spirometer   - possible dc home later today

## 2023-09-27 NOTE — NURSING
Patient discharged accompanied by . Discharge instructions given per MD office protocol for surgery done, questions answered, pt states understanding. MATIAS Hagan contacted regarding pain medication, Norco sent to pt's home pharmacy. IV removed this morning, VSS, pain minimal as per expected for post op recovery, questions answered, pt told to call MD office is any problem were to arise. Patient satisfied with care.

## 2023-09-27 NOTE — PLAN OF CARE
09/27/23 1016   Discharge Assessment   Assessment Type Discharge Planning Assessment   Confirmed/corrected address, phone number and insurance Yes   Confirmed Demographics Correct on Facesheet   Source of Information patient;family   When was your last doctors appointment?   (Patient reports February of this year.)   Communicated HANNAH with patient/caregiver Yes   Reason For Admission Hiatal Hernia   People in Home spouse   Do you expect to return to your current living situation? Yes   Do you have help at home or someone to help you manage your care at home? Yes   Who are your caregiver(s) and their phone number(s)? Spouse: Antony Dejesus: [phone]   Prior to hospitilization cognitive status: Unable to Assess   Current cognitive status: Alert/Oriented   Walking or Climbing Stairs ambulation difficulty, requires equipment   Home Accessibility stairs to enter home   Number of Stairs, Main Entrance five   Home Layout Able to live on 1st floor   Equipment Currently Used at Home wheelchair;walker, rolling   Readmission within 30 days? No   Patient currently being followed by outpatient case management? No   Do you currently have service(s) that help you manage your care at home? No   Do you take prescription medications? Yes   Do you have prescription coverage? Yes   Coverage [employer]   Do you have any problems affording any of your prescribed medications? No   Is the patient taking medications as prescribed? yes   Who is going to help you get home at discharge? Spouse   How do you get to doctors appointments? car, drives self   Are you on dialysis? No   Do you take coumadin? No   DME Needed Upon Discharge  none   Discharge Plan discussed with: Patient;Spouse/sig other   Name(s) and Number(s) Spouse: Antony Dejesus: [phone]   Transition of Care Barriers None   Discharge Plan A Home with family   Discharge Plan B Home   OTHER   Name(s) of People in Home Patient resides with her spouse at home.        Sw completed Discharge Assessment with patient and spouse at bedside.  Patient's PCP is Dr. Paramjit Dejesus.  No barriers to discharge at this time.

## 2023-09-27 NOTE — NURSING
Nurses Note -- 4 Eyes      9/27/2023   10:02 AM      Skin assessed during: Admit      [x] No Altered Skin Integrity Present    []Prevention Measures Documented      [] Yes- Altered Skin Integrity Present or Discovered   [] LDA Added if Not in Epic (Describe Wound)   [] New Altered Skin Integrity was Present on Admit and Documented in LDA   [] Wound Image Taken    Wound Care Consulted? No    Attending Nurse:  Sotero Sutherland RN/Staff Member:  Maria Ines Mo RN

## 2023-09-28 LAB — POCT GLUCOSE: 92 MG/DL (ref 70–110)

## 2023-10-03 ENCOUNTER — TELEPHONE (OUTPATIENT)
Dept: HEMATOLOGY/ONCOLOGY | Facility: CLINIC | Age: 53
End: 2023-10-03

## 2023-10-03 ENCOUNTER — OFFICE VISIT (OUTPATIENT)
Dept: HEMATOLOGY/ONCOLOGY | Facility: CLINIC | Age: 53
End: 2023-10-03
Payer: COMMERCIAL

## 2023-10-03 VITALS
TEMPERATURE: 98 F | DIASTOLIC BLOOD PRESSURE: 94 MMHG | HEART RATE: 84 BPM | SYSTOLIC BLOOD PRESSURE: 152 MMHG | OXYGEN SATURATION: 96 % | HEIGHT: 61 IN | WEIGHT: 165.19 LBS | BODY MASS INDEX: 31.19 KG/M2 | RESPIRATION RATE: 14 BRPM

## 2023-10-03 DIAGNOSIS — C50.312 MALIGNANT NEOPLASM OF LOWER-INNER QUADRANT OF LEFT BREAST IN FEMALE, ESTROGEN RECEPTOR POSITIVE: Primary | ICD-10-CM

## 2023-10-03 DIAGNOSIS — Z17.0 MALIGNANT NEOPLASM OF LOWER-INNER QUADRANT OF LEFT BREAST IN FEMALE, ESTROGEN RECEPTOR POSITIVE: Primary | ICD-10-CM

## 2023-10-03 PROCEDURE — 1159F PR MEDICATION LIST DOCUMENTED IN MEDICAL RECORD: ICD-10-PCS | Mod: CPTII,S$GLB,, | Performed by: INTERNAL MEDICINE

## 2023-10-03 PROCEDURE — 1159F MED LIST DOCD IN RCRD: CPT | Mod: CPTII,S$GLB,, | Performed by: INTERNAL MEDICINE

## 2023-10-03 PROCEDURE — 99215 OFFICE O/P EST HI 40 MIN: CPT | Mod: S$GLB,,, | Performed by: INTERNAL MEDICINE

## 2023-10-03 PROCEDURE — 99999 PR PBB SHADOW E&M-EST. PATIENT-LVL V: ICD-10-PCS | Mod: PBBFAC,,, | Performed by: INTERNAL MEDICINE

## 2023-10-03 PROCEDURE — 3077F SYST BP >= 140 MM HG: CPT | Mod: CPTII,S$GLB,, | Performed by: INTERNAL MEDICINE

## 2023-10-03 PROCEDURE — 3008F PR BODY MASS INDEX (BMI) DOCUMENTED: ICD-10-PCS | Mod: CPTII,S$GLB,, | Performed by: INTERNAL MEDICINE

## 2023-10-03 PROCEDURE — 3080F PR MOST RECENT DIASTOLIC BLOOD PRESSURE >= 90 MM HG: ICD-10-PCS | Mod: CPTII,S$GLB,, | Performed by: INTERNAL MEDICINE

## 2023-10-03 PROCEDURE — 99999 PR PBB SHADOW E&M-EST. PATIENT-LVL V: CPT | Mod: PBBFAC,,, | Performed by: INTERNAL MEDICINE

## 2023-10-03 PROCEDURE — 99215 PR OFFICE/OUTPT VISIT, EST, LEVL V, 40-54 MIN: ICD-10-PCS | Mod: S$GLB,,, | Performed by: INTERNAL MEDICINE

## 2023-10-03 PROCEDURE — 3008F BODY MASS INDEX DOCD: CPT | Mod: CPTII,S$GLB,, | Performed by: INTERNAL MEDICINE

## 2023-10-03 PROCEDURE — 3077F PR MOST RECENT SYSTOLIC BLOOD PRESSURE >= 140 MM HG: ICD-10-PCS | Mod: CPTII,S$GLB,, | Performed by: INTERNAL MEDICINE

## 2023-10-03 PROCEDURE — 3080F DIAST BP >= 90 MM HG: CPT | Mod: CPTII,S$GLB,, | Performed by: INTERNAL MEDICINE

## 2023-10-03 RX ORDER — ONDANSETRON 2 MG/ML
8 INJECTION INTRAMUSCULAR; INTRAVENOUS
Status: CANCELLED
Start: 2023-10-04

## 2023-10-03 RX ORDER — SODIUM CHLORIDE 0.9 % (FLUSH) 0.9 %
10 SYRINGE (ML) INJECTION
Status: CANCELLED | OUTPATIENT
Start: 2023-10-04

## 2023-10-03 RX ORDER — EPINEPHRINE 0.3 MG/.3ML
0.3 INJECTION SUBCUTANEOUS ONCE AS NEEDED
Status: CANCELLED | OUTPATIENT
Start: 2023-10-04

## 2023-10-03 RX ORDER — DIPHENHYDRAMINE HYDROCHLORIDE 50 MG/ML
50 INJECTION INTRAMUSCULAR; INTRAVENOUS ONCE AS NEEDED
Status: CANCELLED | OUTPATIENT
Start: 2023-10-04

## 2023-10-03 NOTE — OP NOTE
PATIENT:  Nita Dejesus      : 1970       DATE OF SURGERY:   2023          SURGEON:  Maximiliano Denton MD       ASSISTANT:  Danya Ferrera NP (First Assistant)          PREOPERATIVE DIAGNOSIS:   Recurrent Gastroesophageal Reflux Disease           POSTOPERATIVE DIAGNOSIS:  Same.           OPERATIONS:   1.  Laparoscopic Reduction of Paraesophageal Hernia    2.  Hiatal Hernia Repair    3.  Gastropexy           Anesthesia:  General endotracheal anesthesia.      Estimated blood loss:  Less than 50 cc.      Blood administered:  None.      Lap and instrument counts correct x 2 at the end of the case.     Specimen: None           INDICATIONS/SIGNIFICANT HISTORY:  The patient is a 53 y.o. year old female who seen with complaints of recurrent gastroesophageal reflux with history of prior fundoplication.  Pt has tried non-surgical therapy but was continued reflux symptoms.  Risks and Benefits of Revisional surgery was discussed with the patient who voiced understanding of risks / benefits and elected to proceed with surgery.                   PROCEDURE IN DETAIL:  Once informed consents were obtained, the patient was taken to the operating room and placed supine on the operating table.  After general endotracheal anesthesia was induced, the abdomen was prepped and draped in a standard surgical fashion.  A dennis-umbilical incision was made and a 11mm Visiport trocar was used to enter the abdominal cavity under direct visualization.  Pneumoperitoneum was created with insufflation under direct visualization.  Two working trocar ports were placed in the right upper quadrant along with an assistant 5mm port placed in the left upper quadrant.  A subcostal incision was made and the Dayday liver retractor placed under direct visualization.  The patient appeared to have adhesions with a moderate paraesophageal hiatal hernia appreciated but appeared to have intact wrap.  Attention was directed toward the right zain upon  which the adhesions were taken down until the right zain identified.  The peritoneum was carefully dissected off the right zain of the diaphragm inferior to superiorly.  The herniated contents were retracted out.  The esophagus was identified  and the anterior vagus nerve identified and spared appropriately.  The anterior hernia sac was dissected appropriately.  Attention was then directed to the stomach and the fundus was mobilized.  The esophagus was adequately mobilized to obtain approximately 5cm of intra-abdominal esophagus.  The Hiatus was adequately repaired with 0 Ethibond sutures. A gastropexy was performed with 0 Ethibond sutures to the anterior abdominal wall x 2.  The liver retractor was removed.  All ports were removed under direct visualization with no active bleeding noted.  Alll incisions were closed with a 4-0 vicryl stitch.  The wounds were cleaned and dried and steri-strips were applied.  The patient tolerated the procedure well and was transported to recovery room in good condition.

## 2023-10-04 ENCOUNTER — INFUSION (OUTPATIENT)
Dept: INFUSION THERAPY | Facility: HOSPITAL | Age: 53
End: 2023-10-04
Attending: FAMILY MEDICINE
Payer: COMMERCIAL

## 2023-10-04 VITALS
WEIGHT: 165.13 LBS | DIASTOLIC BLOOD PRESSURE: 85 MMHG | RESPIRATION RATE: 18 BRPM | OXYGEN SATURATION: 97 % | BODY MASS INDEX: 31.18 KG/M2 | HEIGHT: 61 IN | HEART RATE: 83 BPM | TEMPERATURE: 98 F | SYSTOLIC BLOOD PRESSURE: 124 MMHG

## 2023-10-04 DIAGNOSIS — C50.312 MALIGNANT NEOPLASM OF LOWER-INNER QUADRANT OF LEFT BREAST IN FEMALE, ESTROGEN RECEPTOR POSITIVE: Primary | ICD-10-CM

## 2023-10-04 DIAGNOSIS — Z17.0 MALIGNANT NEOPLASM OF LOWER-INNER QUADRANT OF LEFT BREAST IN FEMALE, ESTROGEN RECEPTOR POSITIVE: Primary | ICD-10-CM

## 2023-10-04 PROCEDURE — 96413 CHEMO IV INFUSION 1 HR: CPT

## 2023-10-04 PROCEDURE — 96417 CHEMO IV INFUS EACH ADDL SEQ: CPT

## 2023-10-04 PROCEDURE — 96375 TX/PRO/DX INJ NEW DRUG ADDON: CPT

## 2023-10-04 PROCEDURE — 25000003 PHARM REV CODE 250: Performed by: INTERNAL MEDICINE

## 2023-10-04 PROCEDURE — 63600175 PHARM REV CODE 636 W HCPCS: Mod: JZ,JG | Performed by: INTERNAL MEDICINE

## 2023-10-04 RX ORDER — EPINEPHRINE 0.3 MG/.3ML
0.3 INJECTION SUBCUTANEOUS ONCE AS NEEDED
Status: DISCONTINUED | OUTPATIENT
Start: 2023-10-04 | End: 2023-10-04 | Stop reason: HOSPADM

## 2023-10-04 RX ORDER — DIPHENHYDRAMINE HYDROCHLORIDE 50 MG/ML
50 INJECTION INTRAMUSCULAR; INTRAVENOUS ONCE AS NEEDED
Status: DISCONTINUED | OUTPATIENT
Start: 2023-10-04 | End: 2023-10-04 | Stop reason: HOSPADM

## 2023-10-04 RX ORDER — SODIUM CHLORIDE 0.9 % (FLUSH) 0.9 %
10 SYRINGE (ML) INJECTION
Status: DISCONTINUED | OUTPATIENT
Start: 2023-10-04 | End: 2023-10-04 | Stop reason: HOSPADM

## 2023-10-04 RX ORDER — ONDANSETRON 2 MG/ML
8 INJECTION INTRAMUSCULAR; INTRAVENOUS
Status: COMPLETED | OUTPATIENT
Start: 2023-10-04 | End: 2023-10-04

## 2023-10-04 RX ADMIN — ONDANSETRON 8 MG: 2 INJECTION INTRAMUSCULAR; INTRAVENOUS at 11:10

## 2023-10-04 RX ADMIN — PERTUZUMAB 420 MG: 30 INJECTION, SOLUTION, CONCENTRATE INTRAVENOUS at 11:10

## 2023-10-04 RX ADMIN — TRASTUZUMAB 484 MG: 420 INJECTION, POWDER, LYOPHILIZED, FOR SOLUTION INTRAVENOUS at 12:10

## 2023-10-04 RX ADMIN — SODIUM CHLORIDE: 9 INJECTION, SOLUTION INTRAVENOUS at 11:10

## 2023-10-04 RX ADMIN — SODIUM CHLORIDE 1000 ML: 9 INJECTION, SOLUTION INTRAVENOUS at 11:10

## 2023-10-09 DIAGNOSIS — D35.1 PARATHYROID ADENOMA: Primary | ICD-10-CM

## 2023-10-11 ENCOUNTER — INFUSION (OUTPATIENT)
Dept: INFUSION THERAPY | Facility: HOSPITAL | Age: 53
End: 2023-10-11
Attending: FAMILY MEDICINE
Payer: COMMERCIAL

## 2023-10-11 ENCOUNTER — OFFICE VISIT (OUTPATIENT)
Dept: SURGERY | Facility: CLINIC | Age: 53
End: 2023-10-11
Payer: COMMERCIAL

## 2023-10-11 VITALS
BODY MASS INDEX: 30.51 KG/M2 | DIASTOLIC BLOOD PRESSURE: 84 MMHG | HEART RATE: 98 BPM | SYSTOLIC BLOOD PRESSURE: 132 MMHG | WEIGHT: 161.63 LBS | HEIGHT: 61 IN

## 2023-10-11 VITALS
TEMPERATURE: 98 F | SYSTOLIC BLOOD PRESSURE: 142 MMHG | DIASTOLIC BLOOD PRESSURE: 93 MMHG | HEART RATE: 87 BPM | RESPIRATION RATE: 18 BRPM

## 2023-10-11 DIAGNOSIS — C50.312 MALIGNANT NEOPLASM OF LOWER-INNER QUADRANT OF LEFT BREAST IN FEMALE, ESTROGEN RECEPTOR POSITIVE: Primary | ICD-10-CM

## 2023-10-11 DIAGNOSIS — Z98.890 POST-OPERATIVE STATE: Primary | ICD-10-CM

## 2023-10-11 DIAGNOSIS — Z17.0 MALIGNANT NEOPLASM OF LOWER-INNER QUADRANT OF LEFT BREAST IN FEMALE, ESTROGEN RECEPTOR POSITIVE: Primary | ICD-10-CM

## 2023-10-11 PROCEDURE — 3079F PR MOST RECENT DIASTOLIC BLOOD PRESSURE 80-89 MM HG: ICD-10-PCS | Mod: CPTII,,,

## 2023-10-11 PROCEDURE — 99024 PR POST-OP FOLLOW-UP VISIT: ICD-10-PCS | Mod: ,,,

## 2023-10-11 PROCEDURE — 1160F RVW MEDS BY RX/DR IN RCRD: CPT | Mod: CPTII,,,

## 2023-10-11 PROCEDURE — 99024 POSTOP FOLLOW-UP VISIT: CPT | Mod: ,,,

## 2023-10-11 PROCEDURE — 3075F PR MOST RECENT SYSTOLIC BLOOD PRESS GE 130-139MM HG: ICD-10-PCS | Mod: CPTII,,,

## 2023-10-11 PROCEDURE — 1160F PR REVIEW ALL MEDS BY PRESCRIBER/CLIN PHARMACIST DOCUMENTED: ICD-10-PCS | Mod: CPTII,,,

## 2023-10-11 PROCEDURE — 3075F SYST BP GE 130 - 139MM HG: CPT | Mod: CPTII,,,

## 2023-10-11 PROCEDURE — 63600175 PHARM REV CODE 636 W HCPCS: Mod: JZ,JG | Performed by: NURSE PRACTITIONER

## 2023-10-11 PROCEDURE — 1159F MED LIST DOCD IN RCRD: CPT | Mod: CPTII,,,

## 2023-10-11 PROCEDURE — 3079F DIAST BP 80-89 MM HG: CPT | Mod: CPTII,,,

## 2023-10-11 PROCEDURE — 96402 CHEMO HORMON ANTINEOPL SQ/IM: CPT

## 2023-10-11 PROCEDURE — 1159F PR MEDICATION LIST DOCUMENTED IN MEDICAL RECORD: ICD-10-PCS | Mod: CPTII,,,

## 2023-10-11 RX ADMIN — GOSERELIN ACETATE 3.6 MG: 3.6 IMPLANT SUBCUTANEOUS at 10:10

## 2023-10-11 NOTE — PROGRESS NOTES
Post Operative Progress Note  General Surgery    Patient Name: Nita Dejesus  YOB: 1970    Date: 10/11/2023                   SUBJECTIVE:     Chief Complaint/Reason for Admission:   Chief Complaint   Patient presents with    Post-op Evaluation     Lap nissen 09/12/23        History of Present Illness:  Ms. Nita Dejesus is a 53 y.o. female who is 2 weeks post op Laparoscopic Nissen Fundoplication.  The patient is currently without any complaints. She is tolerating a post-nissen diet without any complications. Denies any further reflux.     Review of Systems:  12 point ROS negative except as stated in HPI    PAST HISTORY:     Past Medical History:   Diagnosis Date    Allergy     Anemia     Anxiety     Back pain     Breast cancer     GERD (gastroesophageal reflux disease)     Hiatal hernia     Hip pain     Hx of Clotting disorder     d/t medication    Hyperparathyroidism     Insulin resistance     Migraine     Neuromuscular disorder     Neuropathy     Nontoxic single thyroid nodule     Osteopenia     Prediabetes      Past Surgical History:   Procedure Laterality Date    BREAST LUMPECTOMY Left     c lymph nodes    BREAST SURGERY      x2    CHOLECYSTECTOMY      COLONOSCOPY      ESOPHAGOGASTRODUODENOSCOPY      LAPAROSCOPIC NISSEN FUNDOPLICATION N/A 9/26/2023    Procedure: FUNDOPLICATION, NISSEN, LAPAROSCOPIC;  Surgeon: Maximiliano Denton Jr., MD;  Location: [facility];  Service: General;  Laterality: N/A;  WITH GASTROPEXY    NISSEN FUNDOPLICATION      PARTIAL HYSTERECTOMY      PLACEMENT, MEDIPORT      REDUCTION OF BOTH BREASTS  2018    TUBAL LIGATION Bilateral 1993     Family History   Problem Relation Age of Onset    Hypertension Mother     Lupus Mother     Hypoparathyroidism Mother     Diabetes Father     Heart disease Father     Prostate cancer Father     Kidney disease Father     Hodgkin's lymphoma Brother     Pancreatitis Brother      Social History     Socioeconomic History    Marital status:     Tobacco Use    Smoking status: Every Day     Current packs/day: 0.25     Types: Cigarettes    Smokeless tobacco: Never   Substance and Sexual Activity    Alcohol use: Not Currently    Drug use: Never       MEDICATIONS & ALLERGIES:     Current Outpatient Medications on File Prior to Visit   Medication Sig    (Magic mouthwash) 1:1:1 diphenhydrAMINE(Benadryl) 12.5mg/5ml liq, aluminum & magnesium hydroxide-simethicone (Maalox), LIDOcaine viscous 2% Swish and spit 10 mLs every 4 (four) hours as needed. for mouth sores (Patient taking differently: Swish and spit 10 mLs every 4 (four) hours as needed. for mouth sores, no bottle to verify am of procedure)    butalbital-acetaminophen-caffeine -40 mg (FIORICET, ESGIC) -40 mg per tablet Take 1 tablet by mouth daily as needed for Headaches.    clonazePAM (KLONOPIN) 0.5 MG tablet Take 0.5 mg by mouth every 8 (eight) hours as needed for Anxiety (pt states typically only uses once daily as needed). No bottle to verify am of procedure    diclofenac (VOLTAREN) 75 MG EC tablet Take 75 mg by mouth every evening. Only nightly rx says bid on bottle    diphenhydrAMINE (BENADRYL) 50 MG capsule Take 50 mg by mouth daily as needed for Allergies (ONLY TAKES  FOR POST CHEMO REACTIONS).    gabapentin (NEURONTIN) 300 MG capsule Take 300 mg by mouth 3 (three) times daily. Takes with 600 mg to total 900mg TID    gabapentin (NEURONTIN) 600 MG tablet Take 600 mg by mouth 3 (three) times daily.    goserelin acetate (ZOLADEX SUBQ) Inject 1 application  into the skin every 30 days. NO BOTTLE TO VERIFY ON ADMIT    HYDROcodone-acetaminophen (NORCO) 5-325 mg per tablet Take 1 tablet by mouth every 6 (six) hours as needed for Pain.    letrozole (FEMARA) 2.5 mg Tab TAKE 1 TABLET BY MOUTH ONCE DAILY (Patient taking differently: Take 2.5 mg by mouth nightly.)    loperamide (IMODIUM) 2 mg capsule Take 2 mg by mouth once as needed for Diarrhea.    metFORMIN (GLUCOPHAGE) 500 MG tablet Take 500 mg  "by mouth once daily. NO BOTTLE TO VERIFY AM OF PROCEDURE    metoclopramide HCl (REGLAN) 10 MG tablet 1 Tablet Orally Once a day as needed for nausea for 30 days    omeprazole (PRILOSEC) 40 MG capsule Take 1 capsule (40 mg total) by mouth once daily.    onabotulinumtoxina (BOTOX) 200 unit SolR 200 Units every 3 (three) months. NO BOTTLE TO VERIFY AM OF PROCEDURE    ondansetron (ZOFRAN-ODT) 4 MG TbDL Take 4 mg by mouth every 8 (eight) hours as needed (NAUSEA).    oxymetazoline (AFRIN) 0.05 % nasal spray 2 sprays by Nasal route daily as needed for Congestion.    pertuzumab (PERJETA IV) Inject 1 Application into the vein. Q 3 weeks NO BOTTLE TO VERIFY AM OF PROCEDURE    promethazine (PHENERGAN) 25 MG tablet Take 1 tablet (25 mg total) by mouth every 6 (six) hours as needed for Nausea.    tiZANidine (ZANAFLEX) 4 MG tablet Take 4 mg by mouth 3 (three) times daily. 1 tab in am and 1 TAB  IN afternoon and 2 tabs at night    TRULICITY 0.75 mg/0.5 mL pen injector Inject 0.75 mg into the skin every 7 days. NO BOTTLE TO VERIFY AM OF PROCEDURE    vitamin E 400 UNIT capsule Take 800 Units by mouth once daily. STOPPED FOR PROCEDURE NO BOTTLE TO VERIFY AM OF PROCEDURE    HYDROcodone-acetaminophen (NORCO) 5-325 mg per tablet Take 1 tablet by mouth every 6 (six) hours as needed for Pain. (Patient not taking: Reported on 10/11/2023)     Current Facility-Administered Medications on File Prior to Visit   Medication    goserelin (ZOLADEX) injection 3.6 mg     Review of patient's allergies indicates:   Allergen Reactions    Amitriptyline Anxiety and Other (See Comments)     Severe    Other reaction(s): Unknown    Corticosteroids (glucocorticoids) Hives, Other (See Comments) and Swelling     Patient states "Severe Inflammation"      Heparin analogues Palpitations and Other (See Comments)     "All anticoagulation Meds cause dangerously low BP"    Ketorolac Diarrhea, Nausea And Vomiting, Other (See Comments) and Swelling     Migraines      " "Mushroom Anaphylaxis     ANY MUSHROOM CAUSES ANAPHYLAXIS    Peanut Anaphylaxis    Penicillins Anaphylaxis, Diarrhea, Hives, Nausea And Vomiting and Swelling    Tramadol Diarrhea, Itching, Nausea And Vomiting, Rash and Swelling    Latex Other (See Comments)     Skin blisters      Macrolide antibiotics Hives and Rash    Adhesive Other (See Comments)     Blisters, skin tears; CANNOT USE PAPER TAPE    Anticoag citrate phos dextrose      STATES ANY BLOOD THINNERS CAUSES EXTREMELY LOW BLOOD PRESSURE    Aspartame     Codeine sulfate     Egg     Erythromycin      Other reaction(s): Unknown    Estrogens     Other omega-3s      Anticoagulant   Patient states "low blood pressure"    Pork derived (porcine) Diarrhea    Sucralose Other (See Comments)    Aspirin Other (See Comments)     Avoid if possible due to blood thinning    Eszopiclone Itching and Anxiety     AKA LUNESTA    Topiramate Anxiety, Other (See Comments) and Palpitations     Shaking         OBJECTIVE:   Visit Vitals  /84   Pulse 98   Ht 5' 1" (1.549 m)   Wt 73.3 kg (161 lb 9.6 oz)   LMP  (LMP Unknown)   BMI 30.53 kg/m²       Physical Exam:  General:  Well developed, well nourished, no acute distress  GI:  Abdomen soft, non-tender, non-distended, normoactive bowel sounds, no masses  Skin:  Incision Clean/Dry/Intact    VISIT DIAGNOSES:       ICD-10-CM ICD-9-CM   1. Post-operative state  Z98.890 V45.89       ASSESSMENT/PLAN:     53 y.o. female who is s/p Nissen Fundoplication    -  Pt doing well  -  Wounds healing well    RTC PRN        "

## 2023-10-13 ENCOUNTER — HOSPITAL ENCOUNTER (OUTPATIENT)
Dept: RADIOLOGY | Facility: HOSPITAL | Age: 53
Discharge: HOME | End: 2023-10-13
Attending: SURGERY
Payer: COMMERCIAL

## 2023-10-13 DIAGNOSIS — D35.1 PARATHYROID ADENOMA: ICD-10-CM

## 2023-10-13 PROCEDURE — 78071 PARATHYRD PLANAR W/WO SUBTRJ: CPT | Mod: TC

## 2023-10-13 PROCEDURE — A9500 TC99M SESTAMIBI: HCPCS

## 2023-10-23 RX ORDER — EPINEPHRINE 0.3 MG/.3ML
0.3 INJECTION SUBCUTANEOUS ONCE AS NEEDED
Status: CANCELLED | OUTPATIENT
Start: 2023-10-25

## 2023-10-23 RX ORDER — SODIUM CHLORIDE 0.9 % (FLUSH) 0.9 %
10 SYRINGE (ML) INJECTION
Status: CANCELLED | OUTPATIENT
Start: 2023-10-25

## 2023-10-23 RX ORDER — DIPHENHYDRAMINE HYDROCHLORIDE 50 MG/ML
50 INJECTION INTRAMUSCULAR; INTRAVENOUS ONCE AS NEEDED
Status: CANCELLED | OUTPATIENT
Start: 2023-10-25

## 2023-10-23 RX ORDER — ONDANSETRON 2 MG/ML
8 INJECTION INTRAMUSCULAR; INTRAVENOUS
Status: CANCELLED
Start: 2023-10-25

## 2023-10-24 ENCOUNTER — LAB VISIT (OUTPATIENT)
Dept: LAB | Facility: HOSPITAL | Age: 53
End: 2023-10-24
Attending: FAMILY MEDICINE
Payer: COMMERCIAL

## 2023-10-24 DIAGNOSIS — C50.312 MALIGNANT NEOPLASM OF LOWER-INNER QUADRANT OF LEFT BREAST IN FEMALE, ESTROGEN RECEPTOR POSITIVE: ICD-10-CM

## 2023-10-24 DIAGNOSIS — Z17.0 MALIGNANT NEOPLASM OF LOWER-INNER QUADRANT OF LEFT BREAST IN FEMALE, ESTROGEN RECEPTOR POSITIVE: ICD-10-CM

## 2023-10-24 LAB
ALBUMIN SERPL-MCNC: 4 G/DL (ref 3.5–5)
ALBUMIN/GLOB SERPL: 1.5 RATIO (ref 1.1–2)
ALP SERPL-CCNC: 131 UNIT/L (ref 40–150)
ALT SERPL-CCNC: 15 UNIT/L (ref 0–55)
AST SERPL-CCNC: 16 UNIT/L (ref 5–34)
BASOPHILS # BLD AUTO: 0.03 X10(3)/MCL
BASOPHILS NFR BLD AUTO: 0.4 %
BILIRUB SERPL-MCNC: 0.7 MG/DL
BUN SERPL-MCNC: 17.6 MG/DL (ref 9.8–20.1)
CALCIUM SERPL-MCNC: 10.5 MG/DL (ref 8.4–10.2)
CHLORIDE SERPL-SCNC: 107 MMOL/L (ref 98–107)
CO2 SERPL-SCNC: 29 MMOL/L (ref 22–29)
CREAT SERPL-MCNC: 0.95 MG/DL (ref 0.55–1.02)
EOSINOPHIL # BLD AUTO: 0.3 X10(3)/MCL (ref 0–0.9)
EOSINOPHIL NFR BLD AUTO: 4.4 %
ERYTHROCYTE [DISTWIDTH] IN BLOOD BY AUTOMATED COUNT: 13 % (ref 11.5–17)
GFR SERPLBLD CREATININE-BSD FMLA CKD-EPI: >60 MLS/MIN/1.73/M2
GLOBULIN SER-MCNC: 2.7 GM/DL (ref 2.4–3.5)
GLUCOSE SERPL-MCNC: 98 MG/DL (ref 74–100)
HCT VFR BLD AUTO: 41.1 % (ref 37–47)
HGB BLD-MCNC: 12.8 G/DL (ref 12–16)
IMM GRANULOCYTES # BLD AUTO: 0.01 X10(3)/MCL (ref 0–0.04)
IMM GRANULOCYTES NFR BLD AUTO: 0.1 %
LYMPHOCYTES # BLD AUTO: 1.45 X10(3)/MCL (ref 0.6–4.6)
LYMPHOCYTES NFR BLD AUTO: 21.3 %
MCH RBC QN AUTO: 30.8 PG (ref 27–31)
MCHC RBC AUTO-ENTMCNC: 31.1 G/DL (ref 33–36)
MCV RBC AUTO: 98.8 FL (ref 80–94)
MONOCYTES # BLD AUTO: 0.46 X10(3)/MCL (ref 0.1–1.3)
MONOCYTES NFR BLD AUTO: 6.7 %
NEUTROPHILS # BLD AUTO: 4.57 X10(3)/MCL (ref 2.1–9.2)
NEUTROPHILS NFR BLD AUTO: 67.1 %
PLATELET # BLD AUTO: 231 X10(3)/MCL (ref 130–400)
PMV BLD AUTO: 8.6 FL (ref 7.4–10.4)
POTASSIUM SERPL-SCNC: 4.2 MMOL/L (ref 3.5–5.1)
PROT SERPL-MCNC: 6.7 GM/DL (ref 6.4–8.3)
RBC # BLD AUTO: 4.16 X10(6)/MCL (ref 4.2–5.4)
SODIUM SERPL-SCNC: 142 MMOL/L (ref 136–145)
WBC # SPEC AUTO: 6.82 X10(3)/MCL (ref 4.5–11.5)

## 2023-10-24 PROCEDURE — 80053 COMPREHEN METABOLIC PANEL: CPT

## 2023-10-24 PROCEDURE — 85025 COMPLETE CBC W/AUTO DIFF WBC: CPT

## 2023-10-24 PROCEDURE — 36415 COLL VENOUS BLD VENIPUNCTURE: CPT

## 2023-10-25 ENCOUNTER — INFUSION (OUTPATIENT)
Dept: INFUSION THERAPY | Facility: HOSPITAL | Age: 53
End: 2023-10-25
Attending: FAMILY MEDICINE
Payer: COMMERCIAL

## 2023-10-25 VITALS
HEART RATE: 91 BPM | TEMPERATURE: 98 F | WEIGHT: 161.69 LBS | SYSTOLIC BLOOD PRESSURE: 127 MMHG | DIASTOLIC BLOOD PRESSURE: 78 MMHG | RESPIRATION RATE: 18 BRPM | BODY MASS INDEX: 30.53 KG/M2 | OXYGEN SATURATION: 95 % | HEIGHT: 61 IN

## 2023-10-25 DIAGNOSIS — Z17.0 MALIGNANT NEOPLASM OF LOWER-INNER QUADRANT OF LEFT BREAST IN FEMALE, ESTROGEN RECEPTOR POSITIVE: Primary | ICD-10-CM

## 2023-10-25 DIAGNOSIS — C50.312 MALIGNANT NEOPLASM OF LOWER-INNER QUADRANT OF LEFT BREAST IN FEMALE, ESTROGEN RECEPTOR POSITIVE: Primary | ICD-10-CM

## 2023-10-25 PROCEDURE — 96417 CHEMO IV INFUS EACH ADDL SEQ: CPT

## 2023-10-25 PROCEDURE — A4216 STERILE WATER/SALINE, 10 ML: HCPCS | Performed by: INTERNAL MEDICINE

## 2023-10-25 PROCEDURE — 96413 CHEMO IV INFUSION 1 HR: CPT

## 2023-10-25 PROCEDURE — 25000003 PHARM REV CODE 250: Performed by: INTERNAL MEDICINE

## 2023-10-25 PROCEDURE — 63600175 PHARM REV CODE 636 W HCPCS: Mod: JG | Performed by: INTERNAL MEDICINE

## 2023-10-25 RX ORDER — EPINEPHRINE 0.3 MG/.3ML
0.3 INJECTION SUBCUTANEOUS ONCE AS NEEDED
Status: DISCONTINUED | OUTPATIENT
Start: 2023-10-25 | End: 2023-10-25 | Stop reason: HOSPADM

## 2023-10-25 RX ORDER — ONDANSETRON 2 MG/ML
8 INJECTION INTRAMUSCULAR; INTRAVENOUS
Status: COMPLETED | OUTPATIENT
Start: 2023-10-25 | End: 2023-10-25

## 2023-10-25 RX ORDER — DIPHENHYDRAMINE HYDROCHLORIDE 50 MG/ML
50 INJECTION INTRAMUSCULAR; INTRAVENOUS ONCE AS NEEDED
Status: DISCONTINUED | OUTPATIENT
Start: 2023-10-25 | End: 2023-10-25 | Stop reason: HOSPADM

## 2023-10-25 RX ORDER — SODIUM CHLORIDE 0.9 % (FLUSH) 0.9 %
10 SYRINGE (ML) INJECTION
Status: DISCONTINUED | OUTPATIENT
Start: 2023-10-25 | End: 2023-10-25 | Stop reason: HOSPADM

## 2023-10-25 RX ADMIN — ONDANSETRON 8 MG: 2 INJECTION INTRAMUSCULAR; INTRAVENOUS at 11:10

## 2023-10-25 RX ADMIN — SODIUM CHLORIDE: 9 INJECTION, SOLUTION INTRAVENOUS at 11:10

## 2023-10-25 RX ADMIN — TRASTUZUMAB 484 MG: 420 INJECTION, POWDER, LYOPHILIZED, FOR SOLUTION INTRAVENOUS at 11:10

## 2023-10-25 RX ADMIN — SODIUM CHLORIDE 1000 ML: 9 INJECTION, SOLUTION INTRAVENOUS at 11:10

## 2023-10-25 RX ADMIN — Medication 10 ML: at 12:10

## 2023-10-25 RX ADMIN — PERTUZUMAB 420 MG: 30 INJECTION, SOLUTION, CONCENTRATE INTRAVENOUS at 11:10

## 2023-11-08 ENCOUNTER — INFUSION (OUTPATIENT)
Dept: INFUSION THERAPY | Facility: HOSPITAL | Age: 53
End: 2023-11-08
Attending: FAMILY MEDICINE
Payer: COMMERCIAL

## 2023-11-08 VITALS
DIASTOLIC BLOOD PRESSURE: 82 MMHG | TEMPERATURE: 99 F | OXYGEN SATURATION: 97 % | SYSTOLIC BLOOD PRESSURE: 121 MMHG | HEART RATE: 82 BPM

## 2023-11-08 DIAGNOSIS — Z17.0 MALIGNANT NEOPLASM OF LOWER-INNER QUADRANT OF LEFT BREAST IN FEMALE, ESTROGEN RECEPTOR POSITIVE: Primary | ICD-10-CM

## 2023-11-08 DIAGNOSIS — C50.312 MALIGNANT NEOPLASM OF LOWER-INNER QUADRANT OF LEFT BREAST IN FEMALE, ESTROGEN RECEPTOR POSITIVE: Primary | ICD-10-CM

## 2023-11-08 PROCEDURE — 63600175 PHARM REV CODE 636 W HCPCS: Mod: JZ,JG | Performed by: INTERNAL MEDICINE

## 2023-11-08 PROCEDURE — 96402 CHEMO HORMON ANTINEOPL SQ/IM: CPT

## 2023-11-08 RX ADMIN — GOSERELIN ACETATE 3.6 MG: 3.6 IMPLANT SUBCUTANEOUS at 08:11

## 2023-11-14 ENCOUNTER — TELEPHONE (OUTPATIENT)
Dept: HEMATOLOGY/ONCOLOGY | Facility: CLINIC | Age: 53
End: 2023-11-14
Payer: COMMERCIAL

## 2023-11-14 PROBLEM — T45.1X5A LEUKOPENIA DUE TO ANTINEOPLASTIC CHEMOTHERAPY: Status: RESOLVED | Noted: 2022-12-05 | Resolved: 2023-11-14

## 2023-11-14 PROBLEM — D64.81 ANEMIA DUE TO ANTINEOPLASTIC CHEMOTHERAPY: Status: RESOLVED | Noted: 2022-12-05 | Resolved: 2023-11-14

## 2023-11-14 PROBLEM — T45.1X5A ANEMIA DUE TO ANTINEOPLASTIC CHEMOTHERAPY: Status: RESOLVED | Noted: 2022-12-05 | Resolved: 2023-11-14

## 2023-11-14 PROBLEM — D70.1 LEUKOPENIA DUE TO ANTINEOPLASTIC CHEMOTHERAPY: Status: RESOLVED | Noted: 2022-12-05 | Resolved: 2023-11-14

## 2023-11-14 NOTE — PROGRESS NOTES
Subjective:       Patient ID: Nita Dejesus is a 53 y.o. female.    Previous Oncologist: Dr. Katlyn Gonzalez at [facility]  Surgeon: Dr. Kelly Santos    Left Breast Cancer Stage IA(T2N0M0) Triple Positive--22  Biopsy/Surgery/pathology:   1. Excisional biopsy left breast mass/left breast cyst done 22--invasive ductal carcinoma, Grade 3, measures 3.0cm, a 0.6cm region of cauterized carcinoma is present at the inked superior-deep margin, cyst with reactive changes, suggestive of previous lumpectomy site, fluid left breast cyst with rare atypical cells present, suspicious for carcinoma, ER 95%, MI 27%, Her2 3+ by IHC, positive, Ki67 51%.   2. Left SLN biopsies done 22--3 benign lymph nodes.   3. Re-excision lumpectomy done 3/8/23--no residual invasive malignancy identified.   Imagin. Bilateral diagnostic MMG done at [facility] 22--left inferior breast large, minimally complex cystic lesion at 6:00 measures 4.4X3.5X4.2cm, corresponding to the palpable area, appears benign. Routine screening MMG recommended.   2. MRI breasts bilateral at [facility] 22--post-surgical seroma at 12:00 position of left breast s/p recent excisional biopsy, no definite suspicious enhancing masses or areas of nonmass enhancement of left breast, and no suspicious areas in right breast, BIRADS 6.   3. CT C/A/P w/ contrast 22 at [facility]--no thoracic metastatic disease, fatty infiltration of liver, small to moderate-sized hiatal hernia, no metastatic disease.   4. NM Bone scan whole body done 22 at [facility]--activity in cervical and lumbar spines likely degenerative, no anatomic correlate seen on CT scan in lumbar spines, suggest correlation with C-spine Xrays, increase tracer w/n both feet, likely degnerative change, physiologic activity of urinary tract.  5. CT A/P w/ contrast 22 at [facility]--no acute abdominopelvic pathology or change compared to recent CT from 22, fairly large amount of stool in colon may reflect  constipation, large hiatal hernia, diffuse hepatic steatosis, post-cholecystectomy, post hysterectomy, mild thoracic and lumbar degenerative disease.   6. Bilateral diagnostic MMG/Left breast US 2/28/23--No suspicious masses, microcalcifications, or other abnormalities are seen  within the right or left breast. Postsurgical changes are noted at the  6:00 position of the left breast at site of prior excisional biopsy with positive margins.   7. NM parathyroid scan on 7/27/23--Overall asymmetric increased activity at the right thyroid lobe with limited washout on the delayed 2 hour exam.  A underlying hyperfunctioning parathyroid adenoma cannot be entirely excluded.  7. CT C/A/P on 8/4/23--Mucosal prominence at a small to moderate size hiatus hernia. Atelectasis and/or scarring anterior left upper lobe and posterior right lower lung. Postsurgical changes left axilla and left breast with small residual fluid density/edema/stranding. Right central line/MediPort. Thoracolumbar spondylosis.     DEXA:  5/11/23--AP spine 0.1, left femoral neck -1.5, left total hip -0.4, right femoral neck -1.2, right total hip 0.0, c/w osteopenia bilateral femoral necks.     ECHO:  08/18/22--EF 55-65%.  11/17/22--EF 60%.  03/23/23--EF 55-60%.  06/20/23--EF 60%.  09/05/23--EF 55-60%    Genetic testing: [employer] done 7/21/22 negative for any pathogenic variants.     Treatment history:  Left breast excisional biopsy done 7/13/22  Left SLN biopsies done 8/8/22.  2 units PRBC 12/19/22.  TCHP X 6 cycles completed only 8/23/22--1/25/23 (multiple treatment delays for thrombocytopenia, despite dose reductions, multiple side effects).  Re-excision lumpectomy done 3/8/23--no residual disease.   Adjuvant radiation therapy completed 4/12/23--5/9/23.  Maintenance HP x 11 cycles. 3/29/23 - 10/25/23.     Current treatment plan:  Adjuvant OS Zoladex monthly + Femara started 3/29/23 (DAISY for endometriosis, ovaries remain).    Chief Complaint: Nausea,  Diarrhea, and Pain (Pt reports bilateral leg pain at night.)    HPI  Patient presents for follow-up of breast cancer. She completed maintenance Herceptin + Perjeta on 10/25/23. She is doing okay. Complains of bone pain to bilateral legs and hips. States the Gabapentin or muscle relaxers do not help. She took a Norco last night which did help. She continues on Zoladex and Femara. I mentioned that the bone pain could be the Femara. I offered to switch to a different AI but she would prefer to continue on the medication at this time. She will be having surgery in [place] at [facility] to remove 3 overactive parathyroid glands on 12/7/23. Plan to start Zometa after her calcium level normalizes. No other problems reported.     Past Medical History:   Diagnosis Date    Allergy     Anemia     Anxiety     Back pain     Breast cancer     GERD (gastroesophageal reflux disease)     Hiatal hernia     Hip pain     Hx of Clotting disorder     d/t medication    Hyperparathyroidism     Insulin resistance     Migraine     Neuromuscular disorder     Neuropathy     Nontoxic single thyroid nodule     Osteopenia     Prediabetes       Past Surgical History:   Procedure Laterality Date    BREAST LUMPECTOMY Left     c lymph nodes    BREAST SURGERY      x2    CHOLECYSTECTOMY      COLONOSCOPY      ESOPHAGOGASTRODUODENOSCOPY      LAPAROSCOPIC NISSEN FUNDOPLICATION N/A 9/26/2023    Procedure: FUNDOPLICATION, NISSEN, LAPAROSCOPIC;  Surgeon: Maximiliano Denton Jr., MD;  Location: [facility];  Service: General;  Laterality: N/A;  WITH GASTROPEXY    NISSEN FUNDOPLICATION      PARTIAL HYSTERECTOMY      PLACEMENT, MEDIPORT      REDUCTION OF BOTH BREASTS  2018    TUBAL LIGATION Bilateral 1993     Family History   Problem Relation Age of Onset    Hypertension Mother     Lupus Mother     Hypoparathyroidism Mother     Diabetes Father     Heart disease Father     Prostate cancer Father     Kidney disease Father     Hodgkin's lymphoma Brother     Pancreatitis  "Brother      Social History     Socioeconomic History    Marital status:    Tobacco Use    Smoking status: Every Day     Current packs/day: 0.25     Types: Cigarettes    Smokeless tobacco: Never   Substance and Sexual Activity    Alcohol use: Not Currently    Drug use: Never       Review of patient's allergies indicates:   Allergen Reactions    Amitriptyline Anxiety and Other (See Comments)     Severe    Other reaction(s): Unknown    Corticosteroids (glucocorticoids) Hives, Other (See Comments) and Swelling     Patient states "Severe Inflammation"      Heparin analogues Palpitations and Other (See Comments)     "All anticoagulation Meds cause dangerously low BP"    Ketorolac Diarrhea, Nausea And Vomiting, Other (See Comments) and Swelling     Migraines      Mushroom Anaphylaxis     ANY MUSHROOM CAUSES ANAPHYLAXIS    Peanut Anaphylaxis    Penicillins Anaphylaxis, Diarrhea, Hives, Nausea And Vomiting and Swelling    Tramadol Diarrhea, Itching, Nausea And Vomiting, Rash and Swelling    Latex Other (See Comments)     Skin blisters      Macrolide antibiotics Hives and Rash    Adhesive Other (See Comments)     Blisters, skin tears; CANNOT USE PAPER TAPE    Anticoag citrate phos dextrose      STATES ANY BLOOD THINNERS CAUSES EXTREMELY LOW BLOOD PRESSURE    Aspartame     Codeine sulfate     Egg     Erythromycin      Other reaction(s): Unknown    Estrogens     Other omega-3s      Anticoagulant   Patient states "low blood pressure"    Pork derived (porcine) Diarrhea    Sucralose Other (See Comments)    Aspirin Other (See Comments)     Avoid if possible due to blood thinning    Eszopiclone Itching and Anxiety     AKA LUNESTA    Topiramate Anxiety, Other (See Comments) and Palpitations     Shaking        Current Outpatient Medications on File Prior to Visit   Medication Sig Dispense Refill    (Magic mouthwash) 1:1:1 diphenhydrAMINE(Benadryl) 12.5mg/5ml liq, aluminum & magnesium hydroxide-simethicone (Maalox), LIDOcaine " viscous 2% Swish and spit 10 mLs every 4 (four) hours as needed. for mouth sores (Patient taking differently: Swish and spit 10 mLs every 4 (four) hours as needed. for mouth sores, no bottle to verify am of procedure) 500 mL 1    butalbital-acetaminophen-caffeine -40 mg (FIORICET, ESGIC) -40 mg per tablet Take 1 tablet by mouth daily as needed for Headaches.      clonazePAM (KLONOPIN) 0.5 MG tablet Take 0.5 mg by mouth every 8 (eight) hours as needed for Anxiety (pt states typically only uses once daily as needed). No bottle to verify am of procedure      diclofenac (VOLTAREN) 75 MG EC tablet Take 75 mg by mouth every evening. Only nightly rx says bid on bottle      diphenhydrAMINE (BENADRYL) 50 MG capsule Take 50 mg by mouth daily as needed for Allergies (ONLY TAKES  FOR POST CHEMO REACTIONS).      ergocalciferol (ERGOCALCIFEROL) 50,000 unit Cap Take by mouth.      gabapentin (NEURONTIN) 300 MG capsule Take 300 mg by mouth 3 (three) times daily. Takes with 600 mg to total 900mg TID      gabapentin (NEURONTIN) 600 MG tablet Take 600 mg by mouth 3 (three) times daily.      goserelin acetate (ZOLADEX SUBQ) Inject 1 application  into the skin every 30 days. NO BOTTLE TO VERIFY ON ADMIT      HYDROcodone-acetaminophen (NORCO) 5-325 mg per tablet Take 1 tablet by mouth every 6 (six) hours as needed for Pain. 60 tablet 0    letrozole (FEMARA) 2.5 mg Tab TAKE 1 TABLET BY MOUTH ONCE DAILY (Patient taking differently: Take 2.5 mg by mouth nightly.) 30 tablet 3    loperamide (IMODIUM) 2 mg capsule Take 2 mg by mouth once as needed for Diarrhea.      metoclopramide HCl (REGLAN) 10 MG tablet 1 Tablet Orally Once a day as needed for nausea for 30 days      omeprazole (PRILOSEC) 40 MG capsule Take 1 capsule (40 mg total) by mouth once daily. 30 capsule 6    onabotulinumtoxina (BOTOX) 200 unit SolR 200 Units every 3 (three) months. NO BOTTLE TO VERIFY AM OF PROCEDURE      ondansetron (ZOFRAN-ODT) 4 MG TbDL Take 4 mg by  mouth every 8 (eight) hours as needed (NAUSEA).      promethazine (PHENERGAN) 25 MG tablet Take 1 tablet (25 mg total) by mouth every 6 (six) hours as needed for Nausea. 30 tablet 0    tiZANidine (ZANAFLEX) 4 MG tablet Take 4 mg by mouth 3 (three) times daily. 1 tab in am and 1 TAB  IN afternoon and 2 tabs at night      TRULICITY 0.75 mg/0.5 mL pen injector Inject 0.75 mg into the skin every 7 days. NO BOTTLE TO VERIFY AM OF PROCEDURE      vitamin E 400 UNIT capsule Take 800 Units by mouth once daily. STOPPED FOR PROCEDURE NO BOTTLE TO VERIFY AM OF PROCEDURE      diclofenac submicronized 18 mg Cap once daily.      HYDROcodone-acetaminophen (NORCO) 5-325 mg per tablet Take 1 tablet by mouth every 6 (six) hours as needed for Pain. (Patient not taking: Reported on 11/17/2023) 20 tablet 0    metFORMIN (GLUCOPHAGE) 500 MG tablet Take 500 mg by mouth once daily. NO BOTTLE TO VERIFY AM OF PROCEDURE      oxymetazoline (AFRIN) 0.05 % nasal spray 2 sprays by Nasal route daily as needed for Congestion.      pertuzumab (PERJETA IV) Inject 1 Application into the vein. Q 3 weeks NO BOTTLE TO VERIFY AM OF PROCEDURE       No current facility-administered medications on file prior to visit.      Review of Systems   Constitutional:  Positive for fatigue. Negative for appetite change and unexpected weight change.   HENT:  Negative for mouth sores.    Respiratory:  Negative for cough.    Gastrointestinal:  Negative for abdominal pain.        +hiatal hernia   Genitourinary:  Positive for hot flashes. Negative for frequency.   Musculoskeletal:  Positive for arthralgias and leg pain. Negative for back pain.   Integumentary:  Negative for rash.        Bilateral breast pain, chronic   Neurological:  Negative for headaches.   Hematological:  Negative for adenopathy.   Psychiatric/Behavioral:  The patient is not nervous/anxious.         Vitals:    11/17/23 0952   BP: (!) 139/92   Pulse: 85   Resp: 14   Temp: 98.2 °F (36.8 °C)   TempSrc: Oral    SpO2: 97%   Weight: 71.9 kg (158 lb 8 oz)   Height: 5' (1.524 m)     Physical Exam  Constitutional:       Appearance: Normal appearance. She is overweight.   HENT:      Head: Normocephalic.      Comments: Resolved alopecia     Nose: Nose normal.      Mouth/Throat:      Mouth: Mucous membranes are moist.   Eyes:      General: Lids are normal. Vision grossly intact.      Extraocular Movements: Extraocular movements intact.      Conjunctiva/sclera: Conjunctivae normal.   Cardiovascular:      Rate and Rhythm: Normal rate and regular rhythm.      Heart sounds: Normal heart sounds.   Pulmonary:      Effort: Pulmonary effort is normal.      Breath sounds: Normal breath sounds and air entry.   Chest:      Comments: Right chest wall mediport in place, left breast with inferior lumpectomy scar, well healed. Left axillary incision from LN biopsy also healed well. No masses palpable.   Abdominal:      General: Abdomen is protuberant. Bowel sounds are normal. There is no distension.      Palpations: Abdomen is soft.      Tenderness: There is no abdominal tenderness.      Comments: Scattered laparoscopic incisions   Musculoskeletal:         General: Normal range of motion.      Cervical back: Normal range of motion and neck supple.      Right lower leg: No edema.      Left lower leg: No edema.   Skin:     General: Skin is warm and moist.   Neurological:      General: No focal deficit present.      Mental Status: She is alert and oriented to person, place, and time.   Psychiatric:         Mood and Affect: Mood normal.         Behavior: Behavior is cooperative.         Judgment: Judgment normal.       Lab Visit on 11/17/2023   Component Date Value    WBC 11/17/2023 5.51     RBC 11/17/2023 4.03 (L)     Hgb 11/17/2023 12.3     Hct 11/17/2023 39.4     MCV 11/17/2023 97.8 (H)     MCH 11/17/2023 30.5     MCHC 11/17/2023 31.2 (L)     RDW 11/17/2023 12.8     Platelet 11/17/2023 204     MPV 11/17/2023 8.3     Neut % 11/17/2023 56.4      Lymph % 11/17/2023 30.5     Mono % 11/17/2023 6.9     Eos % 11/17/2023 5.6     Basophil % 11/17/2023 0.4     Lymph # 11/17/2023 1.68     Neut # 11/17/2023 3.11     Mono # 11/17/2023 0.38     Eos # 11/17/2023 0.31     Baso # 11/17/2023 0.02     IG# 11/17/2023 0.01     IG% 11/17/2023 0.2           Assessment:       1. Malignant neoplasm of lower-inner quadrant of left breast in female, estrogen receptor positive    2. HER2-positive carcinoma of left breast        Plan:       Patient with Stage IA Left breast cancer triple positive s/p excisional biopsy of a cystic lesion that found cancer incidentally diagnosed 7/13/22. Tumor measured 3.0cm and SLN biopsies negative, Grade 3, strongly ER and CO positive and Her2 positive with high Ki67. There was a positive margin on the excisional biopsy.  Treatment with chemotherapy neoadjuvant started at [facility] with Dr. Gonzalez.    Patient received 2 cycles at [facility] then changed care to [facility]. She was having multiple side effects.   She does not have any steroids due to h/o allergy causing inflammation.   Patient had multiple delays due to multiple side effects, thrombocytopenia, requiring dose reduction, but finally completed 6 cycles on 1/25/23.  Surgery was delayed and finally done on 3/8/23. Patient had re-excision lumpectomy and there was no residual invasive malignancy.     Treatment resumed with maintenance HP x 11 cycles 3/29/23.     Repeat ECHO from 9/5/23 normal EF. Plan to repeat in 12/2023--will order today.     Next due in 9/2023, will order today.     Recommended also OS + AI X 5 years followed by completion of AI to complete 10 years.   She had DAISY in 2012 for endometriosis but still had ovaries remaining functional prior to starting chemotherapy.   8/4/22 estradiol level 11, FSH 44, LH 38.   Zoladex/Femara started on 3/29/23.   Patient completed radiation on 5/9/23.    Completed 11 cycles of maintenance Herceptin + Perjeta on 10/25/23.   CBC today is good. CMP pending.    Plan to begin routine surveillance visits. Will also send referral to survivorship.    Continue Zoladex, next due 12/6/23.   Continue Femara.  Continue vitamin D. Calcium stopped due to high level.   MMG left done 8/28/23 benign, with expected post-surgery and radiation changes. Diagnostic bilateral MMG due in 2/2024, ordered by Dr. Santos.   Plan to begin MPF in January 2024.     PTH elevated, referral sent to Endocrinology. She never heard from Dr. Raul Alvarado's office, so she is now seeing Dr. Hamilton. Parathyroid scan showed overall asymmetric increased activity at the right thyroid lobe with limited washout, an underlying hyperfunctioning parathyroid adenoma cannot be entirely excluded. Referred to a surgeon in [place] and surgery scheduled for 12/7/23 to remove 3 overactive parathyroid glands.     DEXA from 5/11/23 showed mild osteopenia in bilateral femoral necks.   Will need to consider Zometa every 6 months X 2 years but waiting until after her parathyroid surgery and stabilization of calcium.   All questions answered at this time.        Eliseo De Los Santos, MAYELA

## 2023-11-17 ENCOUNTER — OFFICE VISIT (OUTPATIENT)
Dept: HEMATOLOGY/ONCOLOGY | Facility: CLINIC | Age: 53
End: 2023-11-17
Payer: COMMERCIAL

## 2023-11-17 ENCOUNTER — LAB VISIT (OUTPATIENT)
Dept: LAB | Facility: HOSPITAL | Age: 53
End: 2023-11-17
Attending: INTERNAL MEDICINE
Payer: COMMERCIAL

## 2023-11-17 VITALS
BODY MASS INDEX: 31.12 KG/M2 | HEART RATE: 85 BPM | RESPIRATION RATE: 14 BRPM | OXYGEN SATURATION: 97 % | WEIGHT: 158.5 LBS | HEIGHT: 60 IN | SYSTOLIC BLOOD PRESSURE: 139 MMHG | DIASTOLIC BLOOD PRESSURE: 92 MMHG | TEMPERATURE: 98 F

## 2023-11-17 DIAGNOSIS — Z17.0 MALIGNANT NEOPLASM OF LOWER-INNER QUADRANT OF LEFT BREAST IN FEMALE, ESTROGEN RECEPTOR POSITIVE: Primary | ICD-10-CM

## 2023-11-17 DIAGNOSIS — Z17.0 MALIGNANT NEOPLASM OF LOWER-INNER QUADRANT OF LEFT BREAST IN FEMALE, ESTROGEN RECEPTOR POSITIVE: ICD-10-CM

## 2023-11-17 DIAGNOSIS — C50.312 MALIGNANT NEOPLASM OF LOWER-INNER QUADRANT OF LEFT BREAST IN FEMALE, ESTROGEN RECEPTOR POSITIVE: ICD-10-CM

## 2023-11-17 DIAGNOSIS — C50.312 MALIGNANT NEOPLASM OF LOWER-INNER QUADRANT OF LEFT BREAST IN FEMALE, ESTROGEN RECEPTOR POSITIVE: Primary | ICD-10-CM

## 2023-11-17 DIAGNOSIS — C50.912 HER2-POSITIVE CARCINOMA OF LEFT BREAST: ICD-10-CM

## 2023-11-17 LAB
ALBUMIN SERPL-MCNC: 3.9 G/DL (ref 3.5–5)
ALBUMIN/GLOB SERPL: 1.6 RATIO (ref 1.1–2)
ALP SERPL-CCNC: 114 UNIT/L (ref 40–150)
ALT SERPL-CCNC: 13 UNIT/L (ref 0–55)
AST SERPL-CCNC: 13 UNIT/L (ref 5–34)
BASOPHILS # BLD AUTO: 0.02 X10(3)/MCL
BASOPHILS NFR BLD AUTO: 0.4 %
BILIRUB SERPL-MCNC: 0.5 MG/DL
BUN SERPL-MCNC: 10.2 MG/DL (ref 9.8–20.1)
CALCIUM SERPL-MCNC: 10.4 MG/DL (ref 8.4–10.2)
CHLORIDE SERPL-SCNC: 108 MMOL/L (ref 98–107)
CO2 SERPL-SCNC: 27 MMOL/L (ref 22–29)
CREAT SERPL-MCNC: 0.78 MG/DL (ref 0.55–1.02)
EOSINOPHIL # BLD AUTO: 0.31 X10(3)/MCL (ref 0–0.9)
EOSINOPHIL NFR BLD AUTO: 5.6 %
ERYTHROCYTE [DISTWIDTH] IN BLOOD BY AUTOMATED COUNT: 12.8 % (ref 11.5–17)
GFR SERPLBLD CREATININE-BSD FMLA CKD-EPI: >60 MLS/MIN/1.73/M2
GLOBULIN SER-MCNC: 2.5 GM/DL (ref 2.4–3.5)
GLUCOSE SERPL-MCNC: 101 MG/DL (ref 74–100)
HCT VFR BLD AUTO: 39.4 % (ref 37–47)
HGB BLD-MCNC: 12.3 G/DL (ref 12–16)
IMM GRANULOCYTES # BLD AUTO: 0.01 X10(3)/MCL (ref 0–0.04)
IMM GRANULOCYTES NFR BLD AUTO: 0.2 %
LYMPHOCYTES # BLD AUTO: 1.68 X10(3)/MCL (ref 0.6–4.6)
LYMPHOCYTES NFR BLD AUTO: 30.5 %
MCH RBC QN AUTO: 30.5 PG (ref 27–31)
MCHC RBC AUTO-ENTMCNC: 31.2 G/DL (ref 33–36)
MCV RBC AUTO: 97.8 FL (ref 80–94)
MONOCYTES # BLD AUTO: 0.38 X10(3)/MCL (ref 0.1–1.3)
MONOCYTES NFR BLD AUTO: 6.9 %
NEUTROPHILS # BLD AUTO: 3.11 X10(3)/MCL (ref 2.1–9.2)
NEUTROPHILS NFR BLD AUTO: 56.4 %
PLATELET # BLD AUTO: 204 X10(3)/MCL (ref 130–400)
PMV BLD AUTO: 8.3 FL (ref 7.4–10.4)
POTASSIUM SERPL-SCNC: 4.3 MMOL/L (ref 3.5–5.1)
PROT SERPL-MCNC: 6.4 GM/DL (ref 6.4–8.3)
RBC # BLD AUTO: 4.03 X10(6)/MCL (ref 4.2–5.4)
SODIUM SERPL-SCNC: 142 MMOL/L (ref 136–145)
WBC # SPEC AUTO: 5.51 X10(3)/MCL (ref 4.5–11.5)

## 2023-11-17 PROCEDURE — 3075F SYST BP GE 130 - 139MM HG: CPT | Mod: CPTII,S$GLB,, | Performed by: NURSE PRACTITIONER

## 2023-11-17 PROCEDURE — 80053 COMPREHEN METABOLIC PANEL: CPT

## 2023-11-17 PROCEDURE — 99999 PR PBB SHADOW E&M-EST. PATIENT-LVL V: ICD-10-PCS | Mod: PBBFAC,,, | Performed by: NURSE PRACTITIONER

## 2023-11-17 PROCEDURE — 99214 OFFICE O/P EST MOD 30 MIN: CPT | Mod: S$GLB,,, | Performed by: NURSE PRACTITIONER

## 2023-11-17 PROCEDURE — 1160F PR REVIEW ALL MEDS BY PRESCRIBER/CLIN PHARMACIST DOCUMENTED: ICD-10-PCS | Mod: CPTII,S$GLB,, | Performed by: NURSE PRACTITIONER

## 2023-11-17 PROCEDURE — 3080F PR MOST RECENT DIASTOLIC BLOOD PRESSURE >= 90 MM HG: ICD-10-PCS | Mod: CPTII,S$GLB,, | Performed by: NURSE PRACTITIONER

## 2023-11-17 PROCEDURE — 3080F DIAST BP >= 90 MM HG: CPT | Mod: CPTII,S$GLB,, | Performed by: NURSE PRACTITIONER

## 2023-11-17 PROCEDURE — 36415 COLL VENOUS BLD VENIPUNCTURE: CPT

## 2023-11-17 PROCEDURE — 85025 COMPLETE CBC W/AUTO DIFF WBC: CPT

## 2023-11-17 PROCEDURE — 1159F PR MEDICATION LIST DOCUMENTED IN MEDICAL RECORD: ICD-10-PCS | Mod: CPTII,S$GLB,, | Performed by: NURSE PRACTITIONER

## 2023-11-17 PROCEDURE — 1160F RVW MEDS BY RX/DR IN RCRD: CPT | Mod: CPTII,S$GLB,, | Performed by: NURSE PRACTITIONER

## 2023-11-17 PROCEDURE — 3075F PR MOST RECENT SYSTOLIC BLOOD PRESS GE 130-139MM HG: ICD-10-PCS | Mod: CPTII,S$GLB,, | Performed by: NURSE PRACTITIONER

## 2023-11-17 PROCEDURE — 1159F MED LIST DOCD IN RCRD: CPT | Mod: CPTII,S$GLB,, | Performed by: NURSE PRACTITIONER

## 2023-11-17 PROCEDURE — 99999 PR PBB SHADOW E&M-EST. PATIENT-LVL V: CPT | Mod: PBBFAC,,, | Performed by: NURSE PRACTITIONER

## 2023-11-17 PROCEDURE — 99214 PR OFFICE/OUTPT VISIT, EST, LEVL IV, 30-39 MIN: ICD-10-PCS | Mod: S$GLB,,, | Performed by: NURSE PRACTITIONER

## 2023-11-17 PROCEDURE — 3008F BODY MASS INDEX DOCD: CPT | Mod: CPTII,S$GLB,, | Performed by: NURSE PRACTITIONER

## 2023-11-17 PROCEDURE — 3008F PR BODY MASS INDEX (BMI) DOCUMENTED: ICD-10-PCS | Mod: CPTII,S$GLB,, | Performed by: NURSE PRACTITIONER

## 2023-11-17 RX ORDER — ERGOCALCIFEROL 1.25 MG/1
CAPSULE ORAL
COMMUNITY

## 2023-11-17 RX ORDER — ASPIRIN 325 MG
TABLET, DELAYED RELEASE (ENTERIC COATED) ORAL
COMMUNITY
Start: 2023-11-09

## 2023-11-17 RX ORDER — SODIUM CHLORIDE 0.9 % (FLUSH) 0.9 %
10 SYRINGE (ML) INJECTION
Status: CANCELLED | OUTPATIENT
Start: 2024-01-25

## 2023-11-17 RX ORDER — ONDANSETRON 4 MG/1
TABLET, FILM COATED ORAL
COMMUNITY
Start: 2023-06-29

## 2023-11-17 RX ORDER — HEPARIN 100 UNIT/ML
500 SYRINGE INTRAVENOUS
Status: CANCELLED | OUTPATIENT
Start: 2024-01-25

## 2023-11-17 RX ORDER — APREMILAST 30 MG/1
TABLET, FILM COATED ORAL
COMMUNITY

## 2023-12-05 ENCOUNTER — HOSPITAL ENCOUNTER (OUTPATIENT)
Dept: CARDIOLOGY | Facility: HOSPITAL | Age: 53
Discharge: HOME | End: 2023-12-05
Attending: INTERNAL MEDICINE
Payer: COMMERCIAL

## 2023-12-05 ENCOUNTER — INFUSION (OUTPATIENT)
Dept: INFUSION THERAPY | Facility: HOSPITAL | Age: 53
End: 2023-12-05
Attending: FAMILY MEDICINE
Payer: COMMERCIAL

## 2023-12-05 VITALS
SYSTOLIC BLOOD PRESSURE: 143 MMHG | HEART RATE: 83 BPM | DIASTOLIC BLOOD PRESSURE: 94 MMHG | TEMPERATURE: 98 F | RESPIRATION RATE: 16 BRPM

## 2023-12-05 DIAGNOSIS — Z17.0 MALIGNANT NEOPLASM OF LOWER-INNER QUADRANT OF LEFT BREAST IN FEMALE, ESTROGEN RECEPTOR POSITIVE: Primary | ICD-10-CM

## 2023-12-05 DIAGNOSIS — Z17.0 MALIGNANT NEOPLASM OF LOWER-INNER QUADRANT OF LEFT BREAST IN FEMALE, ESTROGEN RECEPTOR POSITIVE: ICD-10-CM

## 2023-12-05 DIAGNOSIS — C50.312 MALIGNANT NEOPLASM OF LOWER-INNER QUADRANT OF LEFT BREAST IN FEMALE, ESTROGEN RECEPTOR POSITIVE: ICD-10-CM

## 2023-12-05 DIAGNOSIS — C50.312 MALIGNANT NEOPLASM OF LOWER-INNER QUADRANT OF LEFT BREAST IN FEMALE, ESTROGEN RECEPTOR POSITIVE: Primary | ICD-10-CM

## 2023-12-05 LAB
AV INDEX (PROSTH): 0.76
AV MEAN GRADIENT: 3 MMHG
AV PEAK GRADIENT: 5 MMHG
AV VALVE AREA BY VELOCITY RATIO: 2.44 CM²
AV VALVE AREA: 2.38 CM²
AV VELOCITY RATIO: 0.78
CV ECHO LV RWT: 0.43 CM
DOP CALC AO PEAK VEL: 1.17 M/S
DOP CALC AO VTI: 22.2 CM
DOP CALC LVOT AREA: 3.1 CM2
DOP CALC LVOT DIAMETER: 2 CM
DOP CALC LVOT PEAK VEL: 0.91 M/S
DOP CALC LVOT STROKE VOLUME: 52.75 CM3
DOP CALC MV VTI: 14.8 CM
DOP CALCLVOT PEAK VEL VTI: 16.8 CM
E WAVE DECELERATION TIME: 187 MSEC
E/A RATIO: 1.18
E/E' RATIO: 6.38 M/S
ECHO LV POSTERIOR WALL: 1 CM (ref 0.6–1.1)
FRACTIONAL SHORTENING: 43 % (ref 28–44)
GLOBAL LONGITUIDAL STRAIN: 19 %
INTERVENTRICULAR SEPTUM: 0.8 CM (ref 0.6–1.1)
LEFT ATRIUM SIZE: 3.5 CM
LEFT ATRIUM VOLUME MOD: 42.1 CM3
LEFT INTERNAL DIMENSION IN SYSTOLE: 2.7 CM (ref 2.1–4)
LEFT VENTRICLE DIASTOLIC VOLUME: 102 ML
LEFT VENTRICLE SYSTOLIC VOLUME: 27 ML
LEFT VENTRICULAR INTERNAL DIMENSION IN DIASTOLE: 4.7 CM (ref 3.5–6)
LEFT VENTRICULAR MASS: 142.71 G
LV LATERAL E/E' RATIO: 6.09 M/S
LV SEPTAL E/E' RATIO: 6.7 M/S
LVOT MG: 2 MMHG
LVOT MV: 0.6 CM/S
MV MEAN GRADIENT: 1 MMHG
MV PEAK A VEL: 0.57 M/S
MV PEAK E VEL: 0.67 M/S
MV PEAK GRADIENT: 2 MMHG
MV VALVE AREA BY CONTINUITY EQUATION: 3.56 CM2
OHS LV EJECTION FRACTION SIMPSONS BIPLANE MOD: 64 %
RA PRESSURE ESTIMATED: 3 MMHG
RA WIDTH: 3.2 CM
RIGHT VENTRICULAR END-DIASTOLIC DIMENSION: 2.7 CM
TDI LATERAL: 0.11 M/S
TDI SEPTAL: 0.1 M/S
TDI: 0.11 M/S
TRICUSPID ANNULAR PLANE SYSTOLIC EXCURSION: 2.09 CM

## 2023-12-05 PROCEDURE — 93306 TTE W/DOPPLER COMPLETE: CPT | Mod: 26,,, | Performed by: INTERNAL MEDICINE

## 2023-12-05 PROCEDURE — 93306 ECHO (CUPID ONLY): ICD-10-PCS | Mod: 26,,, | Performed by: INTERNAL MEDICINE

## 2023-12-05 PROCEDURE — 93356 ECHO (CUPID ONLY): ICD-10-PCS | Mod: ,,, | Performed by: INTERNAL MEDICINE

## 2023-12-05 PROCEDURE — 63600175 PHARM REV CODE 636 W HCPCS: Mod: JZ,JG | Performed by: NURSE PRACTITIONER

## 2023-12-05 PROCEDURE — 96402 CHEMO HORMON ANTINEOPL SQ/IM: CPT

## 2023-12-05 PROCEDURE — 93356 MYOCRD STRAIN IMG SPCKL TRCK: CPT | Mod: ,,, | Performed by: INTERNAL MEDICINE

## 2023-12-05 PROCEDURE — 93356 MYOCRD STRAIN IMG SPCKL TRCK: CPT

## 2023-12-05 RX ADMIN — GOSERELIN ACETATE 3.6 MG: 3.6 IMPLANT SUBCUTANEOUS at 09:12

## 2023-12-19 DIAGNOSIS — Z17.0 MALIGNANT NEOPLASM OF CENTRAL PORTION OF LEFT BREAST IN FEMALE, ESTROGEN RECEPTOR POSITIVE: ICD-10-CM

## 2023-12-19 DIAGNOSIS — C50.112 MALIGNANT NEOPLASM OF CENTRAL PORTION OF LEFT BREAST IN FEMALE, ESTROGEN RECEPTOR POSITIVE: ICD-10-CM

## 2023-12-19 RX ORDER — LETROZOLE 2.5 MG/1
2.5 TABLET, FILM COATED ORAL
Qty: 30 TABLET | Refills: 3 | Status: SHIPPED | OUTPATIENT
Start: 2023-12-19 | End: 2024-02-22 | Stop reason: SDUPTHER

## 2024-01-02 ENCOUNTER — INFUSION (OUTPATIENT)
Dept: INFUSION THERAPY | Facility: HOSPITAL | Age: 54
End: 2024-01-02
Attending: FAMILY MEDICINE
Payer: COMMERCIAL

## 2024-01-02 VITALS
HEART RATE: 83 BPM | TEMPERATURE: 98 F | RESPIRATION RATE: 16 BRPM | OXYGEN SATURATION: 97 % | DIASTOLIC BLOOD PRESSURE: 87 MMHG | SYSTOLIC BLOOD PRESSURE: 142 MMHG

## 2024-01-02 DIAGNOSIS — C50.312 MALIGNANT NEOPLASM OF LOWER-INNER QUADRANT OF LEFT BREAST IN FEMALE, ESTROGEN RECEPTOR POSITIVE: Primary | ICD-10-CM

## 2024-01-02 DIAGNOSIS — Z17.0 MALIGNANT NEOPLASM OF LOWER-INNER QUADRANT OF LEFT BREAST IN FEMALE, ESTROGEN RECEPTOR POSITIVE: Primary | ICD-10-CM

## 2024-01-02 PROCEDURE — 63600175 PHARM REV CODE 636 W HCPCS: Mod: JZ,JG | Performed by: NURSE PRACTITIONER

## 2024-01-02 PROCEDURE — 96402 CHEMO HORMON ANTINEOPL SQ/IM: CPT

## 2024-01-02 RX ADMIN — GOSERELIN ACETATE 3.6 MG: 3.6 IMPLANT SUBCUTANEOUS at 02:01

## 2024-01-10 ENCOUNTER — TELEPHONE (OUTPATIENT)
Dept: HEMATOLOGY/ONCOLOGY | Facility: CLINIC | Age: 54
End: 2024-01-10
Payer: COMMERCIAL

## 2024-01-30 ENCOUNTER — INFUSION (OUTPATIENT)
Dept: INFUSION THERAPY | Facility: HOSPITAL | Age: 54
End: 2024-01-30
Attending: FAMILY MEDICINE
Payer: COMMERCIAL

## 2024-01-30 VITALS — RESPIRATION RATE: 18 BRPM | TEMPERATURE: 98 F | DIASTOLIC BLOOD PRESSURE: 93 MMHG | SYSTOLIC BLOOD PRESSURE: 143 MMHG

## 2024-01-30 DIAGNOSIS — Z17.0 MALIGNANT NEOPLASM OF LOWER-INNER QUADRANT OF LEFT BREAST IN FEMALE, ESTROGEN RECEPTOR POSITIVE: Primary | ICD-10-CM

## 2024-01-30 DIAGNOSIS — C50.312 MALIGNANT NEOPLASM OF LOWER-INNER QUADRANT OF LEFT BREAST IN FEMALE, ESTROGEN RECEPTOR POSITIVE: Primary | ICD-10-CM

## 2024-01-30 PROCEDURE — 25000003 PHARM REV CODE 250: Performed by: NURSE PRACTITIONER

## 2024-01-30 PROCEDURE — A4216 STERILE WATER/SALINE, 10 ML: HCPCS | Performed by: NURSE PRACTITIONER

## 2024-01-30 PROCEDURE — 96402 CHEMO HORMON ANTINEOPL SQ/IM: CPT

## 2024-01-30 PROCEDURE — 96523 IRRIG DRUG DELIVERY DEVICE: CPT

## 2024-01-30 PROCEDURE — 63600175 PHARM REV CODE 636 W HCPCS: Mod: JZ,JG | Performed by: NURSE PRACTITIONER

## 2024-01-30 RX ORDER — SODIUM CHLORIDE 0.9 % (FLUSH) 0.9 %
10 SYRINGE (ML) INJECTION
Status: DISCONTINUED | OUTPATIENT
Start: 2024-01-30 | End: 2024-01-30 | Stop reason: HOSPADM

## 2024-01-30 RX ORDER — HEPARIN 100 UNIT/ML
500 SYRINGE INTRAVENOUS
OUTPATIENT
Start: 2024-01-30

## 2024-01-30 RX ORDER — HEPARIN 100 UNIT/ML
500 SYRINGE INTRAVENOUS
Status: DISCONTINUED | OUTPATIENT
Start: 2024-01-30 | End: 2024-01-30 | Stop reason: HOSPADM

## 2024-01-30 RX ORDER — SODIUM CHLORIDE 0.9 % (FLUSH) 0.9 %
10 SYRINGE (ML) INJECTION
OUTPATIENT
Start: 2024-01-30

## 2024-01-30 RX ADMIN — Medication 10 ML: at 10:01

## 2024-01-30 RX ADMIN — GOSERELIN ACETATE 3.6 MG: 3.6 IMPLANT SUBCUTANEOUS at 10:01

## 2024-02-16 ENCOUNTER — LAB VISIT (OUTPATIENT)
Dept: LAB | Facility: HOSPITAL | Age: 54
End: 2024-02-16
Attending: NURSE PRACTITIONER
Payer: COMMERCIAL

## 2024-02-16 DIAGNOSIS — Z17.0 MALIGNANT NEOPLASM OF LOWER-INNER QUADRANT OF LEFT BREAST IN FEMALE, ESTROGEN RECEPTOR POSITIVE: ICD-10-CM

## 2024-02-16 DIAGNOSIS — C50.912 HER2-POSITIVE CARCINOMA OF LEFT BREAST: ICD-10-CM

## 2024-02-16 DIAGNOSIS — C50.312 MALIGNANT NEOPLASM OF LOWER-INNER QUADRANT OF LEFT BREAST IN FEMALE, ESTROGEN RECEPTOR POSITIVE: ICD-10-CM

## 2024-02-16 LAB
ALBUMIN SERPL-MCNC: 3.6 G/DL (ref 3.5–5)
ALBUMIN/GLOB SERPL: 1.4 RATIO (ref 1.1–2)
ALP SERPL-CCNC: 104 UNIT/L (ref 40–150)
ALT SERPL-CCNC: 12 UNIT/L (ref 0–55)
AST SERPL-CCNC: 14 UNIT/L (ref 5–34)
BASOPHILS # BLD AUTO: 0.03 X10(3)/MCL
BASOPHILS NFR BLD AUTO: 0.5 %
BILIRUB SERPL-MCNC: 0.3 MG/DL
BUN SERPL-MCNC: 18.1 MG/DL (ref 9.8–20.1)
CALCIUM SERPL-MCNC: 9.2 MG/DL (ref 8.4–10.2)
CHLORIDE SERPL-SCNC: 109 MMOL/L (ref 98–107)
CO2 SERPL-SCNC: 26 MMOL/L (ref 22–29)
CREAT SERPL-MCNC: 0.8 MG/DL (ref 0.55–1.02)
EOSINOPHIL # BLD AUTO: 0.31 X10(3)/MCL (ref 0–0.9)
EOSINOPHIL NFR BLD AUTO: 5.5 %
ERYTHROCYTE [DISTWIDTH] IN BLOOD BY AUTOMATED COUNT: 13.1 % (ref 11.5–17)
GFR SERPLBLD CREATININE-BSD FMLA CKD-EPI: >60 MLS/MIN/1.73/M2
GLOBULIN SER-MCNC: 2.6 GM/DL (ref 2.4–3.5)
GLUCOSE SERPL-MCNC: 105 MG/DL (ref 74–100)
HCT VFR BLD AUTO: 37.8 % (ref 37–47)
HGB BLD-MCNC: 12.2 G/DL (ref 12–16)
IMM GRANULOCYTES # BLD AUTO: 0 X10(3)/MCL (ref 0–0.04)
IMM GRANULOCYTES NFR BLD AUTO: 0 %
LYMPHOCYTES # BLD AUTO: 1.62 X10(3)/MCL (ref 0.6–4.6)
LYMPHOCYTES NFR BLD AUTO: 28.6 %
MCH RBC QN AUTO: 31.4 PG (ref 27–31)
MCHC RBC AUTO-ENTMCNC: 32.3 G/DL (ref 33–36)
MCV RBC AUTO: 97.4 FL (ref 80–94)
MONOCYTES # BLD AUTO: 0.28 X10(3)/MCL (ref 0.1–1.3)
MONOCYTES NFR BLD AUTO: 4.9 %
NEUTROPHILS # BLD AUTO: 3.43 X10(3)/MCL (ref 2.1–9.2)
NEUTROPHILS NFR BLD AUTO: 60.5 %
PLATELET # BLD AUTO: 190 X10(3)/MCL (ref 130–400)
PMV BLD AUTO: 8.1 FL (ref 7.4–10.4)
POTASSIUM SERPL-SCNC: 3.6 MMOL/L (ref 3.5–5.1)
PROT SERPL-MCNC: 6.2 GM/DL (ref 6.4–8.3)
RBC # BLD AUTO: 3.88 X10(6)/MCL (ref 4.2–5.4)
SODIUM SERPL-SCNC: 142 MMOL/L (ref 136–145)
WBC # SPEC AUTO: 5.67 X10(3)/MCL (ref 4.5–11.5)

## 2024-02-16 PROCEDURE — 85025 COMPLETE CBC W/AUTO DIFF WBC: CPT

## 2024-02-16 PROCEDURE — 80053 COMPREHEN METABOLIC PANEL: CPT

## 2024-02-16 PROCEDURE — 36415 COLL VENOUS BLD VENIPUNCTURE: CPT

## 2024-02-16 NOTE — PROGRESS NOTES
Subjective:       Patient ID: Nita Dejesus is a 53 y.o. female.    Previous Oncologist: Dr. Katlyn Gonzalez at [facility]  Surgeon: Dr. Kelly Santos    Left Breast Cancer Stage IA(T2N0M0) Triple Positive--22  Biopsy/Surgery/pathology:   1. Excisional biopsy left breast mass/left breast cyst done 22--invasive ductal carcinoma, Grade 3, measures 3.0cm, a 0.6cm region of cauterized carcinoma is present at the inked superior-deep margin, cyst with reactive changes, suggestive of previous lumpectomy site, fluid left breast cyst with rare atypical cells present, suspicious for carcinoma, ER 95%, DE 27%, Her2 3+ by IHC, positive, Ki67 51%.   2. Left SLN biopsies done 22--3 benign lymph nodes.   3. Re-excision lumpectomy done 3/8/23--no residual invasive malignancy identified.   Imagin. Bilateral diagnostic MMG done at [facility] 22--left inferior breast large, minimally complex cystic lesion at 6:00 measures 4.4X3.5X4.2cm, corresponding to the palpable area, appears benign. Routine screening MMG recommended.   2. MRI breasts bilateral at [facility] 22--post-surgical seroma at 12:00 position of left breast s/p recent excisional biopsy, no definite suspicious enhancing masses or areas of nonmass enhancement of left breast, and no suspicious areas in right breast, BIRADS 6.   3. CT C/A/P w/ contrast 22 at [facility]--no thoracic metastatic disease, fatty infiltration of liver, small to moderate-sized hiatal hernia, no metastatic disease.   4. NM Bone scan whole body done 22 at [facility]--activity in cervical and lumbar spines likely degenerative, no anatomic correlate seen on CT scan in lumbar spines, suggest correlation with C-spine Xrays, increase tracer w/n both feet, likely degnerative change, physiologic activity of urinary tract.  5. CT A/P w/ contrast 22 at [facility]--no acute abdominopelvic pathology or change compared to recent CT from 22, fairly large amount of stool in colon may reflect  constipation, large hiatal hernia, diffuse hepatic steatosis, post-cholecystectomy, post hysterectomy, mild thoracic and lumbar degenerative disease.   6. Bilateral diagnostic MMG/Left breast US 2/28/23--No suspicious masses, microcalcifications, or other abnormalities are seen  within the right or left breast. Postsurgical changes are noted at the  6:00 position of the left breast at site of prior excisional biopsy with positive margins.   7. NM parathyroid scan on 7/27/23--Overall asymmetric increased activity at the right thyroid lobe with limited washout on the delayed 2 hour exam.  A underlying hyperfunctioning parathyroid adenoma cannot be entirely excluded.  7. CT C/A/P on 8/4/23--Mucosal prominence at a small to moderate size hiatus hernia. Atelectasis and/or scarring anterior left upper lobe and posterior right lower lung. Postsurgical changes left axilla and left breast with small residual fluid density/edema/stranding. Right central line/MediPort. Thoracolumbar spondylosis.     DEXA:  5/11/23--AP spine 0.1, left femoral neck -1.5, left total hip -0.4, right femoral neck -1.2, right total hip 0.0, c/w osteopenia bilateral femoral necks.     ECHO:  08/18/22--EF 55-65%.  11/17/22--EF 60%.  03/23/23--EF 55-60%.  06/20/23--EF 60%.  09/05/23--EF 55-60%    Genetic testing: [employer] done 7/21/22 negative for any pathogenic variants.     Treatment history:  Left breast excisional biopsy done 7/13/22  Left SLN biopsies done 8/8/22.  2 units PRBC 12/19/22.  TCHP X 6 cycles completed only 8/23/22--1/25/23 (multiple treatment delays for thrombocytopenia, despite dose reductions, multiple side effects).  Re-excision lumpectomy done 3/8/23--no residual disease.   Adjuvant radiation therapy completed 4/12/23--5/9/23.  Maintenance HP x 11 cycles. 3/29/23 - 10/25/23.     Current treatment plan:  Adjuvant OS Zoladex monthly + Femara started 3/29/23 (DAISY for endometriosis, ovaries remain).  Zometa every 6 months. To start in  April 2024.     Chief Complaint: Fatigue (Pt reports L breast pain.), Dizziness, and Peripheral Neuropathy    HPI  Patient presents for follow-up of breast cancer. She completed maintenance Herceptin + Perjeta on 10/25/23. She is doing okay. She continues on Zoladex and Femara. She underwent surgery in [place] at [facility] to remove an overactive parathyroid gland on 12/7/23. The procedure went well and her calcium level is now normal. She has an appointment with Dr. Kirkpatrick next month to discuss reconstruction planning. She also has an appointment with her dentist next month. Plan to start Zometa in April. She is due for bilateral diagnostic MMG this month, will send orders. No other problems reported.     Past Medical History:   Diagnosis Date    Allergy     Anemia     Anxiety     Back pain     Breast cancer     GERD (gastroesophageal reflux disease)     Hiatal hernia     Hip pain     Hx of Clotting disorder     d/t medication    Hyperparathyroidism     Insulin resistance     Migraine     Neuromuscular disorder     Neuropathy     Nontoxic single thyroid nodule     Osteopenia     Prediabetes       Past Surgical History:   Procedure Laterality Date    BREAST LUMPECTOMY Left     c lymph nodes    BREAST SURGERY      x2    CHOLECYSTECTOMY      COLONOSCOPY      ESOPHAGOGASTRODUODENOSCOPY      LAPAROSCOPIC NISSEN FUNDOPLICATION N/A 9/26/2023    Procedure: FUNDOPLICATION, NISSEN, LAPAROSCOPIC;  Surgeon: Maximiliano Denton Jr., MD;  Location: [facility];  Service: General;  Laterality: N/A;  WITH GASTROPEXY    NISSEN FUNDOPLICATION      PARTIAL HYSTERECTOMY      PLACEMENT, MEDIPORT      REDUCTION OF BOTH BREASTS  2018    TUBAL LIGATION Bilateral 1993     Family History   Problem Relation Age of Onset    Hypertension Mother     Lupus Mother     Hypoparathyroidism Mother     Diabetes Father     Heart disease Father     Prostate cancer Father     Kidney disease Father     Hodgkin's lymphoma Brother     Pancreatitis Brother   "    Social History     Socioeconomic History    Marital status:    Tobacco Use    Smoking status: Every Day     Current packs/day: 0.25     Types: Cigarettes    Smokeless tobacco: Never   Substance and Sexual Activity    Alcohol use: Not Currently    Drug use: Never       Review of patient's allergies indicates:   Allergen Reactions    Amitriptyline Anxiety and Other (See Comments)     Severe    Other reaction(s): Unknown    Corticosteroids (glucocorticoids) Hives, Other (See Comments) and Swelling     Patient states "Severe Inflammation"      Heparin analogues Palpitations and Other (See Comments)     "All anticoagulation Meds cause dangerously low BP"    Ketorolac Diarrhea, Nausea And Vomiting, Other (See Comments) and Swelling     Migraines      Mushroom Anaphylaxis     ANY MUSHROOM CAUSES ANAPHYLAXIS    Peanut Anaphylaxis    Penicillins Anaphylaxis, Diarrhea, Hives, Nausea And Vomiting and Swelling    Tramadol Diarrhea, Itching, Nausea And Vomiting, Rash and Swelling    Latex Other (See Comments)     Skin blisters      Macrolide antibiotics Hives and Rash    Adhesive Other (See Comments)     Blisters, skin tears; CANNOT USE PAPER TAPE    Anticoag citrate phos dextrose      STATES ANY BLOOD THINNERS CAUSES EXTREMELY LOW BLOOD PRESSURE    Aspartame     Codeine sulfate     Egg     Erythromycin      Other reaction(s): Unknown    Estrogens     Other omega-3s      Anticoagulant   Patient states "low blood pressure"    Pork derived (porcine) Diarrhea    Sucralose Other (See Comments)    Aspirin Other (See Comments)     Avoid if possible due to blood thinning    Eszopiclone Itching and Anxiety     AKA LUNESTA    Topiramate Anxiety, Other (See Comments) and Palpitations     Shaking        Current Outpatient Medications on File Prior to Visit   Medication Sig Dispense Refill    butalbital-acetaminophen-caffeine -40 mg (FIORICET, ESGIC) -40 mg per tablet Take 1 tablet by mouth daily as needed for " Headaches.      cholecalciferol, vitamin D3, 1,250 mcg (50,000 unit) capsule SMARTSI Capsule(s) By Mouth Every 10 Days      clonazePAM (KLONOPIN) 0.5 MG tablet Take 0.5 mg by mouth every 8 (eight) hours as needed for Anxiety (pt states typically only uses once daily as needed). No bottle to verify am of procedure      diclofenac (VOLTAREN) 75 MG EC tablet Take 75 mg by mouth every evening. Only nightly rx says bid on bottle      diphenhydrAMINE (BENADRYL) 50 MG capsule Take 50 mg by mouth daily as needed for Allergies (ONLY TAKES  FOR POST CHEMO REACTIONS).      ergocalciferol (ERGOCALCIFEROL) 50,000 unit Cap Take by mouth.      gabapentin (NEURONTIN) 300 MG capsule Take 300 mg by mouth 3 (three) times daily. Takes with 600 mg to total 900mg TID      gabapentin (NEURONTIN) 600 MG tablet Take 600 mg by mouth 3 (three) times daily.      goserelin acetate (ZOLADEX SUBQ) Inject 1 application  into the skin every 30 days. NO BOTTLE TO VERIFY ON ADMIT      loperamide (IMODIUM) 2 mg capsule Take 2 mg by mouth once as needed for Diarrhea.      metFORMIN (GLUCOPHAGE) 500 MG tablet Take 500 mg by mouth once daily. NO BOTTLE TO VERIFY AM OF PROCEDURE      metoclopramide HCl (REGLAN) 10 MG tablet 1 Tablet Orally Once a day as needed for nausea for 30 days      omeprazole (PRILOSEC) 40 MG capsule Take 1 capsule (40 mg total) by mouth once daily. 30 capsule 6    onabotulinumtoxina (BOTOX) 200 unit SolR 200 Units every 3 (three) months. NO BOTTLE TO VERIFY AM OF PROCEDURE      ondansetron (ZOFRAN) 4 MG tablet 1 tablet Orally prn for 30 days      ondansetron (ZOFRAN-ODT) 4 MG TbDL Take 4 mg by mouth every 8 (eight) hours as needed (NAUSEA).      promethazine (PHENERGAN) 25 MG tablet Take 1 tablet (25 mg total) by mouth every 6 (six) hours as needed for Nausea. 30 tablet 0    tiZANidine (ZANAFLEX) 4 MG tablet Take 4 mg by mouth 3 (three) times daily. 1 tab in am and 1 TAB  IN afternoon and 2 tabs at night      [DISCONTINUED]  letrozole (FEMARA) 2.5 mg Tab TAKE 1 TABLET BY MOUTH ONCE DAILY 30 tablet 3    apremilast (OTEZLA) 30 mg Tab 1 tablet Orally Twice a day for 30 day(s)      diclofenac submicronized 18 mg Cap once daily.      oxymetazoline (AFRIN) 0.05 % nasal spray 2 sprays by Nasal route daily as needed for Congestion.      trastuzumab-dkst (OGIVRI) 150 mg injection as directed Intravenous      TRULICITY 0.75 mg/0.5 mL pen injector Inject 0.75 mg into the skin every 7 days. NO BOTTLE TO VERIFY AM OF PROCEDURE      vitamin E 400 UNIT capsule Take 800 Units by mouth once daily. STOPPED FOR PROCEDURE NO BOTTLE TO VERIFY AM OF PROCEDURE      [DISCONTINUED] HYDROcodone-acetaminophen (NORCO) 5-325 mg per tablet Take 1 tablet by mouth every 6 (six) hours as needed for Pain. (Patient not taking: Reported on 2/22/2024) 60 tablet 0     No current facility-administered medications on file prior to visit.      Review of Systems   Constitutional:  Positive for fatigue. Negative for appetite change and unexpected weight change.   HENT:  Negative for mouth sores.    Respiratory:  Negative for cough.    Gastrointestinal:  Negative for abdominal pain.        +hiatal hernia   Genitourinary:  Positive for hot flashes. Negative for frequency.   Musculoskeletal:  Positive for arthralgias. Negative for back pain.   Integumentary:  Negative for rash.        Bilateral breast pain, chronic   Neurological:  Negative for headaches.   Hematological:  Negative for adenopathy.   Psychiatric/Behavioral:  The patient is not nervous/anxious.         Vitals:    02/22/24 0931   BP: (!) 131/91   Pulse: 88   Resp: 14   Temp: 98.1 °F (36.7 °C)   TempSrc: Oral   SpO2: 99%   Weight: 74 kg (163 lb 3.2 oz)   Height: 5' (1.524 m)     Physical Exam  Constitutional:       Appearance: Normal appearance. She is overweight.   HENT:      Head: Normocephalic.      Nose: Nose normal.      Mouth/Throat:      Mouth: Mucous membranes are moist.   Eyes:      General: Lids are normal.  Vision grossly intact.      Extraocular Movements: Extraocular movements intact.      Conjunctiva/sclera: Conjunctivae normal.   Neck:      Comments: Midline neck incision intact  Cardiovascular:      Rate and Rhythm: Normal rate and regular rhythm.      Heart sounds: Normal heart sounds.   Pulmonary:      Effort: Pulmonary effort is normal.      Breath sounds: Normal breath sounds and air entry.   Chest:      Comments: Right chest wall mediport in place, left breast with inferior lumpectomy scar, well healed. Left axillary incision from LN biopsy also healed well. No masses palpable.   Abdominal:      General: Abdomen is protuberant. Bowel sounds are normal. There is no distension.      Palpations: Abdomen is soft.      Tenderness: There is no abdominal tenderness.      Comments: Scattered laparoscopic incisions   Musculoskeletal:         General: Normal range of motion.      Cervical back: Normal range of motion and neck supple.      Right lower leg: No edema.      Left lower leg: No edema.   Skin:     General: Skin is warm and moist.   Neurological:      General: No focal deficit present.      Mental Status: She is alert and oriented to person, place, and time.   Psychiatric:         Mood and Affect: Mood normal.         Behavior: Behavior is cooperative.         Judgment: Judgment normal.       Lab Visit on 02/16/2024   Component Date Value    Sodium Level 02/16/2024 142     Potassium Level 02/16/2024 3.6     Chloride 02/16/2024 109 (H)     Carbon Dioxide 02/16/2024 26     Glucose Level 02/16/2024 105 (H)     Blood Urea Nitrogen 02/16/2024 18.1     Creatinine 02/16/2024 0.80     Calcium Level Total 02/16/2024 9.2     Protein Total 02/16/2024 6.2 (L)     Albumin Level 02/16/2024 3.6     Globulin 02/16/2024 2.6     Albumin/Globulin Ratio 02/16/2024 1.4     Bilirubin Total 02/16/2024 0.3     Alkaline Phosphatase 02/16/2024 104     Alanine Aminotransferase 02/16/2024 12     Aspartate Aminotransfera* 02/16/2024 14      eGFR 02/16/2024 >60     WBC 02/16/2024 5.67     RBC 02/16/2024 3.88 (L)     Hgb 02/16/2024 12.2     Hct 02/16/2024 37.8     MCV 02/16/2024 97.4 (H)     MCH 02/16/2024 31.4 (H)     MCHC 02/16/2024 32.3 (L)     RDW 02/16/2024 13.1     Platelet 02/16/2024 190     MPV 02/16/2024 8.1     Neut % 02/16/2024 60.5     Lymph % 02/16/2024 28.6     Mono % 02/16/2024 4.9     Eos % 02/16/2024 5.5     Basophil % 02/16/2024 0.5     Lymph # 02/16/2024 1.62     Neut # 02/16/2024 3.43     Mono # 02/16/2024 0.28     Eos # 02/16/2024 0.31     Baso # 02/16/2024 0.03     IG# 02/16/2024 0.00     IG% 02/16/2024 0.0           Assessment:       1. Malignant neoplasm of lower-inner quadrant of left breast in female, estrogen receptor positive    2. Family history of ovarian cancer    3. HER2-positive carcinoma of left breast    4. Osteopenia of necks of both femurs    5. Malignant neoplasm of central portion of left breast in female, estrogen receptor positive        Plan:       Patient with Stage IA Left breast cancer triple positive s/p excisional biopsy of a cystic lesion that found cancer incidentally diagnosed 7/13/22. Tumor measured 3.0cm and SLN biopsies negative, Grade 3, strongly ER and UT positive and Her2 positive with high Ki67. There was a positive margin on the excisional biopsy.  Treatment with chemotherapy neoadjuvant started at [facility] with Dr. Gonzalez.    Patient received 2 cycles at [facility] then changed care to [facility]. She was having multiple side effects.   She does not have any steroids due to h/o allergy causing inflammation.   Patient had multiple delays due to multiple side effects, thrombocytopenia, requiring dose reduction, but finally completed 6 cycles on 1/25/23.  Surgery was delayed and finally done on 3/8/23. Patient had re-excision lumpectomy and there was no residual invasive malignancy.     Treatment resumed with maintenance HP x 11 cycles 3/29/23.     Repeat ECHO from 9/5/23 normal EF. Plan to repeat in 12/2023--will  order today.     Next due in 9/2023, will order today.     Recommended also OS + AI X 5 years followed by completion of AI to complete 10 years.   She had DAISY in 2012 for endometriosis but still had ovaries remaining functional prior to starting chemotherapy.   8/4/22 estradiol level 11, FSH 44, LH 38.   Zoladex/Femara started on 3/29/23.   Patient completed radiation on 5/9/23.    Completed 11 cycles of maintenance Herceptin + Perjeta on 10/25/23.   Plan to begin routine surveillance visits. Will also send referral to survivorship.    Continue Zoladex, next due 2/28/24.   Recent labs good. Calcium level now normal.   Continue Femara.  Continue vitamin D.   MMG left done 8/28/23 benign, with expected post-surgery and radiation changes. Diagnostic bilateral MMG due in 2/2024. Will send orders for this to be done.   Begin MPF.      Calcium level elevated. PTH elevated, referral sent to Endocrinology. She never heard from Dr. Raul Alvarado's office, so she is now seeing Dr. Hamilton. Parathyroid scan showed overall asymmetric increased activity at the right thyroid lobe with limited washout, an underlying hyperfunctioning parathyroid adenoma cannot be entirely excluded. Referred to a surgeon in [place] and had surgery on 12/7/23 to remove 1 overactive parathyroid gland. Procedure went well.     DEXA from 5/11/23 showed mild osteopenia in bilateral femoral necks.   Plan to start Zometa every 6 months X 2-3 years. Will start in April, after seen by her dentist.   All questions answered at this time.        Eliseo De Los Santos, MAYELA

## 2024-02-22 ENCOUNTER — OFFICE VISIT (OUTPATIENT)
Dept: HEMATOLOGY/ONCOLOGY | Facility: CLINIC | Age: 54
End: 2024-02-22
Payer: COMMERCIAL

## 2024-02-22 VITALS
HEART RATE: 88 BPM | BODY MASS INDEX: 32.04 KG/M2 | DIASTOLIC BLOOD PRESSURE: 91 MMHG | TEMPERATURE: 98 F | HEIGHT: 60 IN | RESPIRATION RATE: 14 BRPM | WEIGHT: 163.19 LBS | SYSTOLIC BLOOD PRESSURE: 131 MMHG | OXYGEN SATURATION: 99 %

## 2024-02-22 DIAGNOSIS — M85.852 OSTEOPENIA OF NECKS OF BOTH FEMURS: ICD-10-CM

## 2024-02-22 DIAGNOSIS — C50.112 MALIGNANT NEOPLASM OF CENTRAL PORTION OF LEFT BREAST IN FEMALE, ESTROGEN RECEPTOR POSITIVE: ICD-10-CM

## 2024-02-22 DIAGNOSIS — C50.312 MALIGNANT NEOPLASM OF LOWER-INNER QUADRANT OF LEFT BREAST IN FEMALE, ESTROGEN RECEPTOR POSITIVE: Primary | ICD-10-CM

## 2024-02-22 DIAGNOSIS — M85.851 OSTEOPENIA OF NECKS OF BOTH FEMURS: ICD-10-CM

## 2024-02-22 DIAGNOSIS — Z80.41 FAMILY HISTORY OF OVARIAN CANCER: ICD-10-CM

## 2024-02-22 DIAGNOSIS — C50.912 HER2-POSITIVE CARCINOMA OF LEFT BREAST: ICD-10-CM

## 2024-02-22 DIAGNOSIS — Z17.0 MALIGNANT NEOPLASM OF LOWER-INNER QUADRANT OF LEFT BREAST IN FEMALE, ESTROGEN RECEPTOR POSITIVE: Primary | ICD-10-CM

## 2024-02-22 DIAGNOSIS — Z17.0 MALIGNANT NEOPLASM OF CENTRAL PORTION OF LEFT BREAST IN FEMALE, ESTROGEN RECEPTOR POSITIVE: ICD-10-CM

## 2024-02-22 PROCEDURE — 99999 PR PBB SHADOW E&M-EST. PATIENT-LVL V: CPT | Mod: PBBFAC,,, | Performed by: NURSE PRACTITIONER

## 2024-02-22 PROCEDURE — 3075F SYST BP GE 130 - 139MM HG: CPT | Mod: CPTII,S$GLB,, | Performed by: NURSE PRACTITIONER

## 2024-02-22 PROCEDURE — 99215 OFFICE O/P EST HI 40 MIN: CPT | Mod: S$GLB,,, | Performed by: NURSE PRACTITIONER

## 2024-02-22 PROCEDURE — 1159F MED LIST DOCD IN RCRD: CPT | Mod: CPTII,S$GLB,, | Performed by: NURSE PRACTITIONER

## 2024-02-22 PROCEDURE — 1160F RVW MEDS BY RX/DR IN RCRD: CPT | Mod: CPTII,S$GLB,, | Performed by: NURSE PRACTITIONER

## 2024-02-22 PROCEDURE — 3008F BODY MASS INDEX DOCD: CPT | Mod: CPTII,S$GLB,, | Performed by: NURSE PRACTITIONER

## 2024-02-22 PROCEDURE — 3080F DIAST BP >= 90 MM HG: CPT | Mod: CPTII,S$GLB,, | Performed by: NURSE PRACTITIONER

## 2024-02-22 RX ORDER — SODIUM CHLORIDE 0.9 % (FLUSH) 0.9 %
10 SYRINGE (ML) INJECTION
Status: CANCELLED | OUTPATIENT
Start: 2024-04-10

## 2024-02-22 RX ORDER — LETROZOLE 2.5 MG/1
2.5 TABLET, FILM COATED ORAL DAILY
Qty: 90 TABLET | Refills: 3 | Status: SHIPPED | OUTPATIENT
Start: 2024-02-22

## 2024-02-22 RX ORDER — HEPARIN 100 UNIT/ML
500 SYRINGE INTRAVENOUS
Status: CANCELLED | OUTPATIENT
Start: 2024-04-10

## 2024-02-28 ENCOUNTER — INFUSION (OUTPATIENT)
Dept: INFUSION THERAPY | Facility: HOSPITAL | Age: 54
End: 2024-02-28
Attending: FAMILY MEDICINE
Payer: COMMERCIAL

## 2024-02-28 VITALS
OXYGEN SATURATION: 98 % | DIASTOLIC BLOOD PRESSURE: 91 MMHG | TEMPERATURE: 98 F | RESPIRATION RATE: 18 BRPM | HEART RATE: 74 BPM | SYSTOLIC BLOOD PRESSURE: 148 MMHG

## 2024-02-28 DIAGNOSIS — C50.312 MALIGNANT NEOPLASM OF LOWER-INNER QUADRANT OF LEFT BREAST IN FEMALE, ESTROGEN RECEPTOR POSITIVE: Primary | ICD-10-CM

## 2024-02-28 DIAGNOSIS — Z17.0 MALIGNANT NEOPLASM OF LOWER-INNER QUADRANT OF LEFT BREAST IN FEMALE, ESTROGEN RECEPTOR POSITIVE: Primary | ICD-10-CM

## 2024-02-28 PROCEDURE — 63600175 PHARM REV CODE 636 W HCPCS: Mod: JZ,JG | Performed by: NURSE PRACTITIONER

## 2024-02-28 PROCEDURE — 96402 CHEMO HORMON ANTINEOPL SQ/IM: CPT

## 2024-02-28 RX ADMIN — GOSERELIN ACETATE 3.6 MG: 3.6 IMPLANT SUBCUTANEOUS at 08:02

## 2024-03-06 ENCOUNTER — PATIENT MESSAGE (OUTPATIENT)
Dept: HEMATOLOGY/ONCOLOGY | Facility: CLINIC | Age: 54
End: 2024-03-06
Payer: COMMERCIAL

## 2024-03-14 ENCOUNTER — OFFICE VISIT (OUTPATIENT)
Dept: HEMATOLOGY/ONCOLOGY | Facility: CLINIC | Age: 54
End: 2024-03-14
Payer: COMMERCIAL

## 2024-03-14 ENCOUNTER — TELEPHONE (OUTPATIENT)
Dept: HEMATOLOGY/ONCOLOGY | Facility: CLINIC | Age: 54
End: 2024-03-14

## 2024-03-14 DIAGNOSIS — C50.312 MALIGNANT NEOPLASM OF LOWER-INNER QUADRANT OF LEFT BREAST IN FEMALE, ESTROGEN RECEPTOR POSITIVE: Primary | ICD-10-CM

## 2024-03-14 DIAGNOSIS — Z17.0 MALIGNANT NEOPLASM OF LOWER-INNER QUADRANT OF LEFT BREAST IN FEMALE, ESTROGEN RECEPTOR POSITIVE: Primary | ICD-10-CM

## 2024-03-14 PROCEDURE — 1159F MED LIST DOCD IN RCRD: CPT | Mod: CPTII,95,,

## 2024-03-14 PROCEDURE — 99215 OFFICE O/P EST HI 40 MIN: CPT | Mod: 95,,,

## 2024-03-14 NOTE — TELEPHONE ENCOUNTER
I called patient today and had a long discussion with her. She was very upset that her MMG could not get scheduled before her appointment with Dr. Kirkpatrick.   Explained that we are not the scheduling department and don't have much control over when a MMG gets scheduled.   Also told her it is not appropriate to be rude to my staff whenever we are just trying to help her.  She is adamant that she wants her estrogen levels checked.  Discussed that levels were not checked in the trials that were done with Zoladex and that there are no current recommendations to check Estradiol levels, while on a GnRH analog for breast cancer treatment.   She is adamant that she wants it checked.  Will schedule to be done with her next Zoladex.  If it is elevated, plan to change her to Tamoxifen (but she MUST quit smoking) vs. Refer for bilateral Oophorectomy.    She will keep her scheduled follow-up in May.    Flory Vargas MD

## 2024-03-14 NOTE — PROGRESS NOTES
"  Patient was seen for survivorship visit on 3/14/24 @ 10am as a virtual visit. During the visit, we spoke about upcoming appointments which included her next MMG appt. Patient was informed that her next MMG appt was scheduled at [facility] for 3/26/24 @ 11am. Patient instantly became upset and stated that she "spoke to some one in [place]" [facility] that gave her a MMG appt for 3/18/24 @ 8am. She said that "she has an appt with Dr. Kirkpatrick on 3/20/24 and needs her MMG done before that appt." I politely told the patient that the only MMG appt I see was scheduled for 3/26/24 @ 11am, but I would try my best to call around other imaging centers to see if any one had an opening for either 3/18/24/ or 3/19/24. Myself and Rose Sesay MA, called [facility], [facility], and [facility] and unfortunately none had openings. Patient was informed and still became very irrate.    "

## 2024-03-14 NOTE — PROGRESS NOTES
PATIENT: Nita Dejesus  MRN: [id number]  DATE: 3/14/2024  Chief Complaint: Survivorship Clinic    Established Patient - Video Telehealth Visit      The patient location is: Home  The chief complaint leading to consultation is: Therapy Education  Visit type: Virtual visit with Video Visual   Total time spent with patient: 60 mins        Each patient to whom I provide medical services by telemedicine is:  (1) informed of the relationship between the physician and patient and the respective role of any other health care provider with respect to management of the patient; and (2) notified that they may decline to receive medical services by telemedicine and may withdraw from such care at any time. Patient verbally consented to receive this Video service.     This service was not originating from a related E/M service provided within the previous 7 days nor will  to an E/M service or procedure within the next 24 hours or my soonest available appointment.  Prevailing standard of care was able to be met in this video visit.      Subjective:   Oncology History: Ms. Dejesus is a 53 y.o. female  with history of Left Breast Cancer Stage IA(T2N0M0) Triple Positive--7/13/22  who presents for survivorship clinic visit.  Patient stated she completed maintenance Herceptin + Perjeta on 10/25/23. She continues on Zoladex and Femara which was started on 3/29/23 with plans to start Zometa on 4/10/24 once cleared with her dentist. Patient stated she is considering breast reconstruction with Dr. Kirkpatrick which is schedule for 3/20/24. Patient's next bilateral diagnostic MMG is scheduled for 3/26/24. Patient also stated she is still experiencing fatigue and peripheral neuropathy in her upper extremities,but is manageable with her Gabapentin.     DEXA:  5/11/23--AP spine 0.1, left femoral neck -1.5, left total hip -0.4, right femoral neck -1.2, right total hip 0.0, c/w osteopenia bilateral femoral necks.      ECHO:  08/18/22--EF  55-65%.  11/17/22--EF 60%.  03/23/23--EF 55-60%.  06/20/23--EF 60%.  09/05/23--EF 55-60%     Genetic testing: [facility] done 7/21/22 negative for any pathogenic variants.      Treatment history:  Left breast excisional biopsy done 7/13/22  Left SLN biopsies done 8/8/22.  2 units PRBC 12/19/22.  TCHP X 6 cycles completed only 8/23/22--1/25/23 (multiple treatment delays for thrombocytopenia, despite dose reductions, multiple side effects).  Re-excision lumpectomy done 3/8/23--no residual disease.   Adjuvant radiation therapy completed 4/12/23--5/9/23.  Maintenance HP x 11 cycles. 3/29/23 - 10/25/23.      Current treatment plan:  Adjuvant OS Zoladex monthly + Femara started 3/29/23 (DAISY for endometriosis, ovaries remain).  Zometa every 6 months. To start in April 2024.     PCP: Paramjit Dejesus MD  Last Colonoscopy: patient stated her colonoscopy with completed in 2023        Past Medical History:   Past Medical History:   Diagnosis Date    Allergy     Anemia     Anxiety     Back pain     Breast cancer     GERD (gastroesophageal reflux disease)     Hiatal hernia     Hip pain     Hx of Clotting disorder     d/t medication    Hyperparathyroidism     Insulin resistance     Migraine     Neuromuscular disorder     Neuropathy     Nontoxic single thyroid nodule     Osteopenia     Prediabetes        Past Surgical History:   Past Surgical History:   Procedure Laterality Date    BREAST LUMPECTOMY Left     c lymph nodes    BREAST SURGERY      x2    CHOLECYSTECTOMY      COLONOSCOPY      ESOPHAGOGASTRODUODENOSCOPY      LAPAROSCOPIC NISSEN FUNDOPLICATION N/A 9/26/2023    Procedure: FUNDOPLICATION, NISSEN, LAPAROSCOPIC;  Surgeon: Maximiliano Denton Jr., MD;  Location: [facility];  Service: General;  Laterality: N/A;  WITH GASTROPEXY    NISSEN FUNDOPLICATION      PARTIAL HYSTERECTOMY      PLACEMENT, MEDIPORT      REDUCTION OF BOTH BREASTS  2018    TUBAL LIGATION Bilateral 1993       Family History:   Family History   Problem Relation Age of  "Onset    Hypertension Mother     Lupus Mother     Hypoparathyroidism Mother     Diabetes Father     Heart disease Father     Prostate cancer Father     Kidney disease Father     Hodgkin's lymphoma Brother     Pancreatitis Brother        Social History:  reports that she has been smoking cigarettes. She has never used smokeless tobacco. She reports that she does not currently use alcohol. She reports that she does not use drugs.    Allergies:  Review of patient's allergies indicates:   Allergen Reactions    Amitriptyline Anxiety and Other (See Comments)     Severe    Other reaction(s): Unknown    Corticosteroids (glucocorticoids) Hives, Other (See Comments) and Swelling     Patient states "Severe Inflammation"      Heparin analogues Palpitations and Other (See Comments)     "All anticoagulation Meds cause dangerously low BP"    Ketorolac Diarrhea, Nausea And Vomiting, Other (See Comments) and Swelling     Migraines      Mushroom Anaphylaxis     ANY MUSHROOM CAUSES ANAPHYLAXIS    Peanut Anaphylaxis    Penicillins Anaphylaxis, Diarrhea, Hives, Nausea And Vomiting and Swelling    Tramadol Diarrhea, Itching, Nausea And Vomiting, Rash and Swelling    Latex Other (See Comments)     Skin blisters      Macrolide antibiotics Hives and Rash    Adhesive Other (See Comments)     Blisters, skin tears; CANNOT USE PAPER TAPE    Anticoag citrate phos dextrose      STATES ANY BLOOD THINNERS CAUSES EXTREMELY LOW BLOOD PRESSURE    Aspartame     Codeine sulfate     Egg     Erythromycin      Other reaction(s): Unknown    Estrogens     Other omega-3s      Anticoagulant   Patient states "low blood pressure"    Pork derived (porcine) Diarrhea    Sucralose Other (See Comments)    Aspirin Other (See Comments)     Avoid if possible due to blood thinning    Eszopiclone Itching and Anxiety     AKA LUNESTA    Topiramate Anxiety, Other (See Comments) and Palpitations     Shaking         Medications:  Current Outpatient Medications   Medication Sig " Dispense Refill    apremilast (OTEZLA) 30 mg Tab 1 tablet Orally Twice a day for 30 day(s)      butalbital-acetaminophen-caffeine -40 mg (FIORICET, ESGIC) -40 mg per tablet Take 1 tablet by mouth daily as needed for Headaches.      cholecalciferol, vitamin D3, 1,250 mcg (50,000 unit) capsule SMARTSI Capsule(s) By Mouth Every 10 Days      clonazePAM (KLONOPIN) 0.5 MG tablet Take 0.5 mg by mouth every 8 (eight) hours as needed for Anxiety (pt states typically only uses once daily as needed). No bottle to verify am of procedure      diclofenac (VOLTAREN) 75 MG EC tablet Take 75 mg by mouth every evening. Only nightly rx says bid on bottle      diclofenac submicronized 18 mg Cap once daily.      diphenhydrAMINE (BENADRYL) 50 MG capsule Take 50 mg by mouth daily as needed for Allergies (ONLY TAKES  FOR POST CHEMO REACTIONS).      ergocalciferol (ERGOCALCIFEROL) 50,000 unit Cap Take by mouth.      gabapentin (NEURONTIN) 300 MG capsule Take 300 mg by mouth 3 (three) times daily. Takes with 600 mg to total 900mg TID      gabapentin (NEURONTIN) 600 MG tablet Take 600 mg by mouth 3 (three) times daily.      goserelin acetate (ZOLADEX SUBQ) Inject 1 application  into the skin every 30 days. NO BOTTLE TO VERIFY ON ADMIT      letrozole (FEMARA) 2.5 mg Tab Take 1 tablet (2.5 mg total) by mouth once daily. 90 tablet 3    loperamide (IMODIUM) 2 mg capsule Take 2 mg by mouth once as needed for Diarrhea.      metFORMIN (GLUCOPHAGE) 500 MG tablet Take 500 mg by mouth once daily. NO BOTTLE TO VERIFY AM OF PROCEDURE      metoclopramide HCl (REGLAN) 10 MG tablet 1 Tablet Orally Once a day as needed for nausea for 30 days      omeprazole (PRILOSEC) 40 MG capsule Take 1 capsule (40 mg total) by mouth once daily. 30 capsule 6    onabotulinumtoxina (BOTOX) 200 unit SolR 200 Units every 3 (three) months. NO BOTTLE TO VERIFY AM OF PROCEDURE      ondansetron (ZOFRAN) 4 MG tablet 1 tablet Orally prn for 30 days      ondansetron  (ZOFRAN-ODT) 4 MG TbDL Take 4 mg by mouth every 8 (eight) hours as needed (NAUSEA).      oxymetazoline (AFRIN) 0.05 % nasal spray 2 sprays by Nasal route daily as needed for Congestion.      promethazine (PHENERGAN) 25 MG tablet Take 1 tablet (25 mg total) by mouth every 6 (six) hours as needed for Nausea. 30 tablet 0    tiZANidine (ZANAFLEX) 4 MG tablet Take 4 mg by mouth 3 (three) times daily. 1 tab in am and 1 TAB  IN afternoon and 2 tabs at night      trastuzumab-dkst (OGIVRI) 150 mg injection as directed Intravenous      TRULICITY 0.75 mg/0.5 mL pen injector Inject 0.75 mg into the skin every 7 days. NO BOTTLE TO VERIFY AM OF PROCEDURE      vitamin E 400 UNIT capsule Take 800 Units by mouth once daily. STOPPED FOR PROCEDURE NO BOTTLE TO VERIFY AM OF PROCEDURE       No current facility-administered medications for this visit.        Review of Systems:  ROS     Objective:     Vitals: There were no vitals filed for this visit.  BMI: There is no height or weight on file to calculate BMI.          Assessment:   Left Breast Cancer Stage IA(T2N0M0) Triple Positive--7/13/22   Plan:   Completed 11 cycles of maintenance Herceptin + Perjeta on 10/25/23.   Continue Zoladex, next due 3/27/24.   Continue Femara.  Continue vitamin D.   Diagnostic bilateral MMG scheduled for 3/26/24  Continue Mediport flushes every 8 weeks       Survivorship treatment summary and care plan discussed with patient. We reviewed her diagnosis and previous treatment that was given. Discussed plans for future follow-up and monitoring. All current medical, psychosocial and practical issues were addressed. Potential late-term side effects reviewed. Counseled on healthy lifestyle and behavior modifications that could reduce risk of recurrence. Limit alcohol to less than one drink per day, Exercise at least 150 minutes per week of moderate intensity aerobic activity or at least 75 minutes of vigorous activity, Maintaining a healthy weight, Limit red  meat to no more than 2-3x per week, Reduce processed foods and meat. Also encouraged wide variety of fruits and vegetables, specifically cruciferous vegetables.    I spent a total of 30 minutes on the day of the visit.This includes face to face time and non-face to face time preparing to see the patient (eg, review of tests), obtaining and/or reviewing separately obtained history, documenting clinical information in the electronic or other health record, independently interpreting results and communicating results to the patient/family/caregiver, or care coordinator.  Patient states understanding with no further questions.        THOMAS REBOLLEDO FNP-C  PATIENT EDUCATOR  [facility]

## 2024-03-26 ENCOUNTER — HOSPITAL ENCOUNTER (OUTPATIENT)
Dept: RADIOLOGY | Facility: HOSPITAL | Age: 54
Discharge: HOME | End: 2024-03-26
Attending: NURSE PRACTITIONER
Payer: COMMERCIAL

## 2024-03-26 DIAGNOSIS — C50.312 MALIGNANT NEOPLASM OF LOWER-INNER QUADRANT OF LEFT BREAST IN FEMALE, ESTROGEN RECEPTOR POSITIVE: ICD-10-CM

## 2024-03-26 DIAGNOSIS — Z17.0 MALIGNANT NEOPLASM OF LOWER-INNER QUADRANT OF LEFT BREAST IN FEMALE, ESTROGEN RECEPTOR POSITIVE: ICD-10-CM

## 2024-03-26 PROCEDURE — 77062 BREAST TOMOSYNTHESIS BI: CPT | Mod: TC

## 2024-03-26 PROCEDURE — 77062 BREAST TOMOSYNTHESIS BI: CPT | Mod: 26,,, | Performed by: RADIOLOGY

## 2024-03-26 PROCEDURE — 77066 DX MAMMO INCL CAD BI: CPT | Mod: 26,,, | Performed by: RADIOLOGY

## 2024-03-27 ENCOUNTER — INFUSION (OUTPATIENT)
Dept: INFUSION THERAPY | Facility: HOSPITAL | Age: 54
End: 2024-03-27
Attending: FAMILY MEDICINE
Payer: COMMERCIAL

## 2024-03-27 ENCOUNTER — LAB VISIT (OUTPATIENT)
Dept: LAB | Facility: HOSPITAL | Age: 54
End: 2024-03-27
Attending: INTERNAL MEDICINE
Payer: COMMERCIAL

## 2024-03-27 VITALS
SYSTOLIC BLOOD PRESSURE: 135 MMHG | DIASTOLIC BLOOD PRESSURE: 95 MMHG | RESPIRATION RATE: 18 BRPM | HEART RATE: 71 BPM | TEMPERATURE: 98 F

## 2024-03-27 DIAGNOSIS — C50.312 MALIGNANT NEOPLASM OF LOWER-INNER QUADRANT OF LEFT BREAST IN FEMALE, ESTROGEN RECEPTOR POSITIVE: Primary | ICD-10-CM

## 2024-03-27 DIAGNOSIS — Z17.0 MALIGNANT NEOPLASM OF LOWER-INNER QUADRANT OF LEFT BREAST IN FEMALE, ESTROGEN RECEPTOR POSITIVE: ICD-10-CM

## 2024-03-27 DIAGNOSIS — C50.312 MALIGNANT NEOPLASM OF LOWER-INNER QUADRANT OF LEFT BREAST IN FEMALE, ESTROGEN RECEPTOR POSITIVE: ICD-10-CM

## 2024-03-27 DIAGNOSIS — Z17.0 MALIGNANT NEOPLASM OF LOWER-INNER QUADRANT OF LEFT BREAST IN FEMALE, ESTROGEN RECEPTOR POSITIVE: Primary | ICD-10-CM

## 2024-03-27 LAB — ESTRADIOL SERPL HS-MCNC: <24 PG/ML

## 2024-03-27 PROCEDURE — 82670 ASSAY OF TOTAL ESTRADIOL: CPT

## 2024-03-27 PROCEDURE — 63600175 PHARM REV CODE 636 W HCPCS: Mod: JZ,JG | Performed by: NURSE PRACTITIONER

## 2024-03-27 PROCEDURE — 36415 COLL VENOUS BLD VENIPUNCTURE: CPT

## 2024-03-27 PROCEDURE — 96402 CHEMO HORMON ANTINEOPL SQ/IM: CPT

## 2024-03-27 RX ADMIN — GOSERELIN ACETATE 3.6 MG: 3.6 IMPLANT SUBCUTANEOUS at 08:03

## 2024-04-05 ENCOUNTER — OFFICE VISIT (OUTPATIENT)
Dept: URGENT CARE | Facility: CLINIC | Age: 54
End: 2024-04-05
Payer: COMMERCIAL

## 2024-04-05 VITALS
OXYGEN SATURATION: 98 % | DIASTOLIC BLOOD PRESSURE: 89 MMHG | HEART RATE: 88 BPM | RESPIRATION RATE: 18 BRPM | TEMPERATURE: 98 F | HEIGHT: 60 IN | BODY MASS INDEX: 31.8 KG/M2 | SYSTOLIC BLOOD PRESSURE: 130 MMHG | WEIGHT: 162 LBS

## 2024-04-05 DIAGNOSIS — L72.9 INFECTED CYST OF SKIN: Primary | ICD-10-CM

## 2024-04-05 DIAGNOSIS — L08.9 INFECTED CYST OF SKIN: Primary | ICD-10-CM

## 2024-04-05 PROCEDURE — 99213 OFFICE O/P EST LOW 20 MIN: CPT | Mod: ,,,

## 2024-04-05 RX ORDER — DOXYCYCLINE 100 MG/1
100 CAPSULE ORAL EVERY 12 HOURS
Qty: 14 CAPSULE | Refills: 0 | Status: SHIPPED | OUTPATIENT
Start: 2024-04-05 | End: 2024-04-12

## 2024-04-05 RX ORDER — HYDROCODONE BITARTRATE AND ACETAMINOPHEN 5; 325 MG/1; MG/1
1 TABLET ORAL EVERY 8 HOURS PRN
Qty: 9 TABLET | Refills: 0 | Status: SHIPPED | OUTPATIENT
Start: 2024-04-05 | End: 2024-04-08

## 2024-04-05 RX ORDER — SEMAGLUTIDE 0.68 MG/ML
INJECTION, SOLUTION SUBCUTANEOUS
COMMUNITY
Start: 2024-03-21

## 2024-04-05 NOTE — PATIENT INSTRUCTIONS
Warm compresses to the area 2-3 times/day  Pain: Take OTC Tylenol or Ibuprofen per package instructions as needed for pain.  Norco for severe pain, caution as it may cause sedation, do not drive or drink alcohol while taking it  Loosen the bandage if needed.   Keep your scheduled appointment on Monday with your pcp for recheck   Present to the Emergency Department for any significant change or worsening symptoms including worsening redness, swelling, purulent discharge, fever, body aches, or chills.

## 2024-04-05 NOTE — PROGRESS NOTES
Subjective:      Patient ID: Nita Dejesus is a 53 y.o. female.    Vitals:  height is 5' (1.524 m) and weight is 73.5 kg (162 lb). Her oral temperature is 98.4 °F (36.9 °C). Her blood pressure is 130/89 and her pulse is 88. Her respiration is 18 and oxygen saturation is 98%.     Chief Complaint: Other (Possible cyst on back, noticed this morning)    A 54 y/o female presents to the clinic with c/o a possible cyst on her upper left back that she noticed this am. She recently completed chemo and reports frequent cysts since then that are usually removed by her dermatologist.  She denies any fever, body aches, chills, drainage from the area, or rash.         Constitution: Negative for chills and fever.   HENT: Negative.     Neck: neck negative.   Cardiovascular: Negative.    Skin:  Positive for lesion.      Objective:     Physical Exam   Constitutional: She is oriented to person, place, and time. She appears well-developed. She is cooperative.  Non-toxic appearance. She does not appear ill. No distress.   HENT:   Head: Normocephalic and atraumatic.   Ears:   Right Ear: Hearing normal.   Left Ear: Hearing normal.   Mouth/Throat: Oropharynx is clear and moist and mucous membranes are normal.   Eyes: Lids are normal.   Neck: Trachea normal and phonation normal. Neck supple. No edema present. No erythema present. No neck rigidity present.   Cardiovascular: Normal rate.   Pulmonary/Chest: Effort normal. She has no decreased breath sounds. She has no rales.   Abdominal: Normal appearance.   Neurological: She is alert and oriented to person, place, and time. She exhibits normal muscle tone. Coordination normal.   Skin: Skin is warm, dry, intact, not diaphoretic and no rash. lesion (there is a possible cystic lesion noted to the middle left side of the back under the bra line; mobile and soft, no fluctuance or signficant induation noted; mild erythema, TTP)   Psychiatric: Her speech is normal and behavior is normal. Mood  normal.   Nursing note and vitals reviewed.      Assessment:     1. Infected cyst of skin        Plan:       Infected cyst of skin    Other orders  -     doxycycline (MONODOX) 100 MG capsule; Take 1 capsule (100 mg total) by mouth every 12 (twelve) hours. for 7 days  Dispense: 14 capsule; Refill: 0  -     HYDROcodone-acetaminophen (NORCO) 5-325 mg per tablet; Take 1 tablet by mouth every 8 (eight) hours as needed for Pain.  Dispense: 9 tablet; Refill: 0    Start the antibiotic today, take to completion.   Warm compresses to the area 2-3 times/day  Pain: Take OTC Tylenol or Ibuprofen per package instructions as needed for pain.  Norco for severe pain, caution as it may cause sedation, do not drive or drink alcohol while taking it  Loosen the bandage if needed.   Keep your scheduled appointment on Monday with your pcp for recheck   Present to the Emergency Department for any significant change or worsening symptoms including worsening redness, swelling, purulent discharge, fever, body aches, or chills.

## 2024-04-12 ENCOUNTER — INFUSION (OUTPATIENT)
Dept: INFUSION THERAPY | Facility: HOSPITAL | Age: 54
End: 2024-04-12
Attending: FAMILY MEDICINE
Payer: COMMERCIAL

## 2024-04-12 VITALS
HEART RATE: 72 BPM | TEMPERATURE: 98 F | WEIGHT: 162 LBS | OXYGEN SATURATION: 99 % | HEIGHT: 60 IN | BODY MASS INDEX: 31.8 KG/M2 | DIASTOLIC BLOOD PRESSURE: 84 MMHG | SYSTOLIC BLOOD PRESSURE: 139 MMHG | RESPIRATION RATE: 16 BRPM

## 2024-04-12 DIAGNOSIS — M85.852 OSTEOPENIA OF NECKS OF BOTH FEMURS: Primary | ICD-10-CM

## 2024-04-12 DIAGNOSIS — M85.851 OSTEOPENIA OF NECKS OF BOTH FEMURS: Primary | ICD-10-CM

## 2024-04-12 PROCEDURE — 96365 THER/PROPH/DIAG IV INF INIT: CPT

## 2024-04-12 PROCEDURE — 63600175 PHARM REV CODE 636 W HCPCS: Performed by: NURSE PRACTITIONER

## 2024-04-12 RX ORDER — HEPARIN 100 UNIT/ML
500 SYRINGE INTRAVENOUS
Status: DISCONTINUED | OUTPATIENT
Start: 2024-04-12 | End: 2024-04-12

## 2024-04-12 RX ORDER — ZOLEDRONIC ACID 0.04 MG/ML
4 INJECTION, SOLUTION INTRAVENOUS
Status: COMPLETED | OUTPATIENT
Start: 2024-04-12 | End: 2024-04-12

## 2024-04-12 RX ORDER — SODIUM CHLORIDE 0.9 % (FLUSH) 0.9 %
10 SYRINGE (ML) INJECTION
OUTPATIENT
Start: 2024-10-12

## 2024-04-12 RX ORDER — ZOLEDRONIC ACID 0.04 MG/ML
4 INJECTION, SOLUTION INTRAVENOUS
Status: CANCELLED | OUTPATIENT
Start: 2025-04-11

## 2024-04-12 RX ORDER — SODIUM CHLORIDE 0.9 % (FLUSH) 0.9 %
10 SYRINGE (ML) INJECTION
Status: CANCELLED | OUTPATIENT
Start: 2025-04-11

## 2024-04-12 RX ORDER — ZOLEDRONIC ACID 0.04 MG/ML
4 INJECTION, SOLUTION INTRAVENOUS
OUTPATIENT
Start: 2024-10-12

## 2024-04-12 RX ORDER — HEPARIN 100 UNIT/ML
500 SYRINGE INTRAVENOUS
Status: CANCELLED | OUTPATIENT
Start: 2025-04-11

## 2024-04-12 RX ORDER — HEPARIN 100 UNIT/ML
500 SYRINGE INTRAVENOUS
OUTPATIENT
Start: 2024-10-12

## 2024-04-12 RX ORDER — SODIUM CHLORIDE 0.9 % (FLUSH) 0.9 %
10 SYRINGE (ML) INJECTION
Status: DISCONTINUED | OUTPATIENT
Start: 2024-04-12 | End: 2024-04-12 | Stop reason: HOSPADM

## 2024-04-12 RX ADMIN — ZOLEDRONIC ACID 4 MG: 0.04 INJECTION, SOLUTION INTRAVENOUS at 12:04

## 2024-04-24 ENCOUNTER — INFUSION (OUTPATIENT)
Dept: INFUSION THERAPY | Facility: HOSPITAL | Age: 54
End: 2024-04-24
Attending: FAMILY MEDICINE
Payer: COMMERCIAL

## 2024-04-24 VITALS
RESPIRATION RATE: 18 BRPM | OXYGEN SATURATION: 99 % | TEMPERATURE: 98 F | HEIGHT: 60 IN | BODY MASS INDEX: 31.74 KG/M2 | WEIGHT: 161.69 LBS | SYSTOLIC BLOOD PRESSURE: 131 MMHG | DIASTOLIC BLOOD PRESSURE: 86 MMHG | HEART RATE: 76 BPM

## 2024-04-24 DIAGNOSIS — Z17.0 MALIGNANT NEOPLASM OF LOWER-INNER QUADRANT OF LEFT BREAST IN FEMALE, ESTROGEN RECEPTOR POSITIVE: Primary | ICD-10-CM

## 2024-04-24 DIAGNOSIS — C50.312 MALIGNANT NEOPLASM OF LOWER-INNER QUADRANT OF LEFT BREAST IN FEMALE, ESTROGEN RECEPTOR POSITIVE: Primary | ICD-10-CM

## 2024-04-24 PROCEDURE — 96402 CHEMO HORMON ANTINEOPL SQ/IM: CPT

## 2024-04-24 PROCEDURE — 63600175 PHARM REV CODE 636 W HCPCS: Mod: JZ,JG | Performed by: NURSE PRACTITIONER

## 2024-04-24 RX ADMIN — GOSERELIN ACETATE 3.6 MG: 3.6 IMPLANT SUBCUTANEOUS at 09:04

## 2024-05-17 NOTE — PROGRESS NOTES
Subjective:       Patient ID: Nita Dejesus is a 54 y.o. female.    Previous Oncologist: Dr. Katlyn Gonzalez at [facility]  Surgeon: Dr. Kelly Santos    Left Breast Cancer Stage IA(T2N0M0) Triple Positive--22  Biopsy/Surgery/pathology:   1. Excisional biopsy left breast mass/left breast cyst done 22--invasive ductal carcinoma, Grade 3, measures 3.0cm, a 0.6cm region of cauterized carcinoma is present at the inked superior-deep margin, cyst with reactive changes, suggestive of previous lumpectomy site, fluid left breast cyst with rare atypical cells present, suspicious for carcinoma, ER 95%, IA 27%, Her2 3+ by IHC, positive, Ki67 51%.   2. Left SLN biopsies done 22--3 benign lymph nodes.   3. Re-excision lumpectomy done 3/8/23--no residual invasive malignancy identified.   Imagin. Bilateral diagnostic MMG done at [facility] 22--left inferior breast large, minimally complex cystic lesion at 6:00 measures 4.4X3.5X4.2cm, corresponding to the palpable area, appears benign. Routine screening MMG recommended.   2. MRI breasts bilateral at [facility] 22--post-surgical seroma at 12:00 position of left breast s/p recent excisional biopsy, no definite suspicious enhancing masses or areas of nonmass enhancement of left breast, and no suspicious areas in right breast, BIRADS 6.   3. CT C/A/P w/ contrast 22 at [facility]--no thoracic metastatic disease, fatty infiltration of liver, small to moderate-sized hiatal hernia, no metastatic disease.   4. NM Bone scan whole body done 22 at [facility]--activity in cervical and lumbar spines likely degenerative, no anatomic correlate seen on CT scan in lumbar spines, suggest correlation with C-spine Xrays, increase tracer w/n both feet, likely degnerative change, physiologic activity of urinary tract.  5. CT A/P w/ contrast 22 at [facility]--no acute abdominopelvic pathology or change compared to recent CT from 22, fairly large amount of stool in colon may reflect  constipation, large hiatal hernia, diffuse hepatic steatosis, post-cholecystectomy, post hysterectomy, mild thoracic and lumbar degenerative disease.   6. Bilateral diagnostic MMG/Left breast US 2/28/23--No suspicious masses, microcalcifications, or other abnormalities are seen  within the right or left breast. Postsurgical changes are noted at the  6:00 position of the left breast at site of prior excisional biopsy with positive margins.   7. NM parathyroid scan on 7/27/23--Overall asymmetric increased activity at the right thyroid lobe with limited washout on the delayed 2 hour exam.  A underlying hyperfunctioning parathyroid adenoma cannot be entirely excluded.  7. CT C/A/P on 8/4/23--Mucosal prominence at a small to moderate size hiatus hernia. Atelectasis and/or scarring anterior left upper lobe and posterior right lower lung. Postsurgical changes left axilla and left breast with small residual fluid density/edema/stranding. Right central line/MediPort. Thoracolumbar spondylosis.     DEXA:  5/11/23--AP spine 0.1, left femoral neck -1.5, left total hip -0.4, right femoral neck -1.2, right total hip 0.0, c/w osteopenia bilateral femoral necks.     ECHO:  08/18/22--EF 55-65%.  11/17/22--EF 60%.  03/23/23--EF 55-60%.  06/20/23--EF 60%.  09/05/23--EF 55-60%  12/05/23--EF 55-60%.    Genetic testing: [employer] done 7/21/22 negative for any pathogenic variants.     Treatment history:  Left breast excisional biopsy done 7/13/22  Left SLN biopsies done 8/8/22.  2 units PRBC 12/19/22.  TCHP X 6 cycles completed only 8/23/22--1/25/23 (multiple treatment delays for thrombocytopenia, despite dose reductions, multiple side effects).  Re-excision lumpectomy done 3/8/23--no residual disease.   Adjuvant radiation therapy completed 4/12/23--5/9/23.  Maintenance HP x 11 cycles. 3/29/23 - 10/25/23.   Zometa given X 1 dose 4/2024 had severe N/V, fever, joint/bone pain, flu-like symptoms all X 3 days so was stopped.     Current  treatment plan:  Adjuvant OS Zoladex monthly + Femara started 3/29/23 (DAISY for endometriosis, ovaries remain).    Chief Complaint: Cough and Shortness of Breath (Pt reports swollen glands, cough and congestion. States that she took at home Covid test. Denies fever.)    HPI  Patient presents for follow-up of breast cancer.  She had Zometa X 1 dose in 4/2024 and had severe side effects as above X 3 days. Discussed discontinuation. She is otherwise doing okay. She had 2 different opinions about reconstruction with Dr. Kirkpatrick and Dr. Alvarado and has not made her decision as of yet. We also discussed continuing Zoladex vs. Oophorectomy and she would like a referral for Oophorectomy, requesting to have Dr. Ana Ramirez. Patient already had her uterus removed. She has had a recent URI.    Past Medical History:   Diagnosis Date    Allergy     Anemia     Anxiety     Back pain     Breast cancer     GERD (gastroesophageal reflux disease)     Hiatal hernia     Hip pain     Hx of Clotting disorder     d/t medication    Hyperparathyroidism     Insulin resistance     Migraine     Neuromuscular disorder     Neuropathy     Nontoxic single thyroid nodule     Osteopenia     Prediabetes       Past Surgical History:   Procedure Laterality Date    BREAST LUMPECTOMY Left     c lymph nodes    BREAST SURGERY      x2    CHOLECYSTECTOMY      COLONOSCOPY      ESOPHAGOGASTRODUODENOSCOPY      EYE SURGERY  1995    HERNIA REPAIR  Aug. 2017    HYSTERECTOMY  Aug, 2012    LAPAROSCOPIC NISSEN FUNDOPLICATION N/A 09/26/2023    Procedure: FUNDOPLICATION, NISSEN, LAPAROSCOPIC;  Surgeon: Maximiliano Denton Jr., MD;  Location: [facility];  Service: General;  Laterality: N/A;  WITH GASTROPEXY    NISSEN FUNDOPLICATION      PARATHYROID GLAND SURGERY  12/07/2023    PARTIAL HYSTERECTOMY      PLACEMENT, MEDIPORT      REDUCTION OF BOTH BREASTS  2018    TUBAL LIGATION Bilateral 1993     Family History   Problem Relation Name Age of Onset    Hypertension Mother  "Judy Sheridan     Lupus Mother Judy Sheridan     Hypoparathyroidism Mother Judy Sheridan     Diabetes Father Mirza  Desandro     Heart disease Father Mirza  Desandro     Prostate cancer Father Mirza  Desandro     Kidney disease Father Mirza  Desandro     Cancer Father Mirza  Desandro     COPD Father Mirza  Desandro     Hearing loss Father Mirza  Desandro     Hodgkin's lymphoma Brother      Pancreatitis Brother       Social History     Socioeconomic History    Marital status:    Tobacco Use    Smoking status: Some Days     Current packs/day: 0.25     Average packs/day: 0.3 packs/day for 15.0 years (3.8 ttl pk-yrs)     Types: Cigarettes    Smokeless tobacco: Never   Substance and Sexual Activity    Alcohol use: Not Currently    Drug use: Never    Sexual activity: Not Currently     Partners: Male     Birth control/protection: See Surgical Hx, None       Review of patient's allergies indicates:   Allergen Reactions    Amitriptyline Anxiety and Other (See Comments)     Severe    Other reaction(s): Unknown    Corticosteroids (glucocorticoids) Hives, Other (See Comments) and Swelling     Patient states "Severe Inflammation"      Heparin analogues Palpitations and Other (See Comments)     "All anticoagulation Meds cause dangerously low BP"    Ketorolac Diarrhea, Nausea And Vomiting, Other (See Comments) and Swelling     Migraines      Mushroom Anaphylaxis     ANY MUSHROOM CAUSES ANAPHYLAXIS    Peanut Anaphylaxis    Penicillins Anaphylaxis, Diarrhea, Hives, Nausea And Vomiting and Swelling    Tramadol Diarrhea, Itching, Nausea And Vomiting, Rash and Swelling    Latex Other (See Comments)     Skin blisters      Macrolide antibiotics Hives and Rash    Adhesive Other (See Comments)     Blisters, skin tears; CANNOT USE PAPER TAPE    Anticoag citrate phos dextrose      STATES ANY BLOOD THINNERS CAUSES EXTREMELY LOW BLOOD PRESSURE    Aspartame     Codeine sulfate     Egg     Erythromycin      Other reaction(s): Unknown    " "Estrogens     Other omega-3s      Anticoagulant   Patient states "low blood pressure"    Pork derived (porcine) Diarrhea    Sucralose Other (See Comments)    Aspirin Other (See Comments)     Avoid if possible due to blood thinning    Eszopiclone Itching and Anxiety     AKA LUNESTA    Topiramate Anxiety, Other (See Comments) and Palpitations     Shaking        Current Outpatient Medications on File Prior to Visit   Medication Sig Dispense Refill    butalbital-acetaminophen-caffeine -40 mg (FIORICET, ESGIC) -40 mg per tablet Take 1 tablet by mouth daily as needed for Headaches.      cholecalciferol, vitamin D3, 1,250 mcg (50,000 unit) capsule SMARTSI Capsule(s) By Mouth Every 10 Days      clonazePAM (KLONOPIN) 0.5 MG tablet Take 0.5 mg by mouth every 8 (eight) hours as needed for Anxiety (pt states typically only uses once daily as needed). No bottle to verify am of procedure      diclofenac (VOLTAREN) 75 MG EC tablet Take 75 mg by mouth every evening. Only nightly rx says bid on bottle      diclofenac submicronized 18 mg Cap once daily.      diphenhydrAMINE (BENADRYL) 50 MG capsule Take 50 mg by mouth daily as needed for Allergies (ONLY TAKES  FOR POST CHEMO REACTIONS).      ergocalciferol (ERGOCALCIFEROL) 50,000 unit Cap Take by mouth.      gabapentin (NEURONTIN) 300 MG capsule Take 300 mg by mouth 3 (three) times daily. Takes with 600 mg to total 900mg TID      gabapentin (NEURONTIN) 600 MG tablet Take 600 mg by mouth 3 (three) times daily.      goserelin acetate (ZOLADEX SUBQ) Inject 1 application  into the skin every 30 days. NO BOTTLE TO VERIFY ON ADMIT      HYDROcodone-acetaminophen (NORCO) 5-325 mg per tablet Take 1 tablet by mouth.      letrozole (FEMARA) 2.5 mg Tab Take 1 tablet (2.5 mg total) by mouth once daily. 90 tablet 3    loperamide (IMODIUM) 2 mg capsule Take 2 mg by mouth once as needed for Diarrhea.      metFORMIN (GLUCOPHAGE) 500 MG tablet Take 500 mg by mouth once daily. NO BOTTLE " TO VERIFY AM OF PROCEDURE      metoclopramide HCl (REGLAN) 10 MG tablet 1 Tablet Orally Once a day as needed for nausea for 30 days      omeprazole (PRILOSEC) 40 MG capsule Take 1 capsule (40 mg total) by mouth once daily. 30 capsule 6    onabotulinumtoxina (BOTOX) 200 unit SolR 200 Units every 3 (three) months. NO BOTTLE TO VERIFY AM OF PROCEDURE      ondansetron (ZOFRAN) 4 MG tablet 1 tablet Orally prn for 30 days      ondansetron (ZOFRAN-ODT) 4 MG TbDL Take 4 mg by mouth every 8 (eight) hours as needed (NAUSEA).      OZEMPIC 0.25 mg or 0.5 mg (2 mg/3 mL) pen injector INJECT 0.25MG SUBCUTANEOUSLY ONCE A WEEK      promethazine (PHENERGAN) 25 MG tablet Take 1 tablet (25 mg total) by mouth every 6 (six) hours as needed for Nausea. 30 tablet 0    tiZANidine (ZANAFLEX) 4 MG tablet Take 4 mg by mouth 3 (three) times daily. 1 tab in am and 1 TAB  IN afternoon and 2 tabs at night      apremilast (OTEZLA) 30 mg Tab 1 tablet Orally Twice a day for 30 day(s) (Patient not taking: Reported on 5/22/2024)      oxymetazoline (AFRIN) 0.05 % nasal spray 2 sprays by Nasal route daily as needed for Congestion. (Patient not taking: Reported on 5/22/2024)      trastuzumab-dkst (OGIVRI) 150 mg injection as directed Intravenous (Patient not taking: Reported on 5/22/2024)      TRULICITY 0.75 mg/0.5 mL pen injector Inject 0.75 mg into the skin every 7 days. NO BOTTLE TO VERIFY AM OF PROCEDURE (Patient not taking: Reported on 5/22/2024)      vitamin E 400 UNIT capsule Take 800 Units by mouth once daily. STOPPED FOR PROCEDURE NO BOTTLE TO VERIFY AM OF PROCEDURE (Patient not taking: Reported on 5/22/2024)       No current facility-administered medications on file prior to visit.      Review of Systems   Constitutional:  Positive for fatigue. Negative for appetite change and unexpected weight change.   HENT:  Negative for mouth sores.    Respiratory:  Positive for cough.    Gastrointestinal:  Negative for abdominal pain.        +hiatal hernia    Genitourinary:  Positive for hot flashes. Negative for frequency.   Musculoskeletal:  Positive for arthralgias. Negative for back pain.   Integumentary:  Negative for rash.        Bilateral breast pain, chronic   Neurological:  Negative for headaches.   Hematological:  Negative for adenopathy.   Psychiatric/Behavioral:  The patient is not nervous/anxious.         Vitals:    05/22/24 1006   BP: 126/89   Pulse: 81   Resp: 14   Temp: 98.1 °F (36.7 °C)   TempSrc: Oral   SpO2: 97%   Weight: 73.3 kg (161 lb 11.2 oz)   Height: 5' (1.524 m)       Physical Exam  Constitutional:       Appearance: Normal appearance. She is overweight.   HENT:      Head: Normocephalic.      Nose: Nose normal.      Mouth/Throat:      Mouth: Mucous membranes are moist.   Eyes:      General: Lids are normal. Vision grossly intact.      Extraocular Movements: Extraocular movements intact.      Conjunctiva/sclera: Conjunctivae normal.   Neck:      Comments: Midline neck incision intact  Cardiovascular:      Rate and Rhythm: Normal rate and regular rhythm.      Heart sounds: Normal heart sounds.   Pulmonary:      Effort: Pulmonary effort is normal.      Breath sounds: Normal breath sounds and air entry.   Chest:      Comments: Right chest wall mediport in place, left breast with inferior lumpectomy scar, well healed. Left axillary incision from LN biopsy also healed well. No masses palpable.   Abdominal:      General: Abdomen is protuberant. Bowel sounds are normal. There is no distension.      Palpations: Abdomen is soft.      Tenderness: There is no abdominal tenderness.      Comments: Scattered laparoscopic incisions   Musculoskeletal:         General: Normal range of motion.      Cervical back: Normal range of motion and neck supple.      Right lower leg: No edema.      Left lower leg: No edema.   Skin:     General: Skin is warm and moist.   Neurological:      General: No focal deficit present.      Mental Status: She is alert and oriented to  person, place, and time.   Psychiatric:         Mood and Affect: Mood normal.         Behavior: Behavior is cooperative.         Judgment: Judgment normal.       Lab Visit on 05/20/2024   Component Date Value    Sodium 05/20/2024 142     Potassium 05/20/2024 4.2     Chloride 05/20/2024 108 (H)     CO2 05/20/2024 31 (H)     Glucose 05/20/2024 97     Blood Urea Nitrogen 05/20/2024 20.0     Creatinine 05/20/2024 0.74     Calcium 05/20/2024 8.4     Protein Total 05/20/2024 6.1 (L)     Albumin 05/20/2024 3.5     Globulin 05/20/2024 2.6     Albumin/Globulin Ratio 05/20/2024 1.3     Bilirubin Total 05/20/2024 0.4     ALP 05/20/2024 91     ALT 05/20/2024 16     AST 05/20/2024 15     eGFR 05/20/2024 >60     Anion Gap 05/20/2024 3.0     BUN/Creatinine Ratio 05/20/2024 27     WBC 05/20/2024 5.89     RBC 05/20/2024 4.32     Hgb 05/20/2024 13.2     Hct 05/20/2024 41.2     MCV 05/20/2024 95.4 (H)     MCH 05/20/2024 30.6     MCHC 05/20/2024 32.0 (L)     RDW 05/20/2024 12.8     Platelet 05/20/2024 186     MPV 05/20/2024 8.4     Neut % 05/20/2024 60.3     Lymph % 05/20/2024 27.5     Mono % 05/20/2024 5.8     Eos % 05/20/2024 5.9     Basophil % 05/20/2024 0.3     Lymph # 05/20/2024 1.62     Neut # 05/20/2024 3.55     Mono # 05/20/2024 0.34     Eos # 05/20/2024 0.35     Baso # 05/20/2024 0.02     IG# 05/20/2024 0.01     IG% 05/20/2024 0.2           Assessment:       1. Malignant neoplasm of lower-inner quadrant of left breast in female, estrogen receptor positive      Plan:       Patient with Stage IA Left breast cancer triple positive s/p excisional biopsy of a cystic lesion that found cancer incidentally diagnosed 7/13/22. Tumor measured 3.0cm and SLN biopsies negative, Grade 3, strongly ER and NE positive and Her2 positive with high Ki67. There was a positive margin on the excisional biopsy.  Treatment with chemotherapy neoadjuvant started at [facility] with Dr. Gonzalez.    Patient received 2 cycles at [facility] then changed care to [facility]. She was  having multiple side effects.   She does not have any steroids due to h/o allergy causing inflammation.   Patient had multiple delays due to multiple side effects, thrombocytopenia, requiring dose reduction, but finally completed 6 cycles on 1/25/23.  Surgery was delayed and finally done on 3/8/23. Patient had re-excision lumpectomy and there was no residual invasive malignancy.     Treatment resumed with maintenance HP x 11 cycles 3/29/23.     Recommended also OS + AI X 5 years followed by completion of AI to complete 10 years.   She had DAISY in 2012 for endometriosis but still had ovaries remaining functional prior to starting chemotherapy.   8/4/22 estradiol level 11, FSH 44, LH 38. Repeat Estradiol level done on Zoladex per patient request in 3/2024 <24.    Zoladex/Femara started on 3/29/23. Plan for endocrine therapy X 10 years.  Patient completed radiation on 5/9/23.    Completed 11 cycles of maintenance Herceptin + Perjeta on 10/25/23.   She was found to have primary hyperparathyroidism and was seen by Dr. Hamilton endocrinologist. Referred to a surgeon in [place] and had surgery on 12/7/23 to remove 1 overactive parathyroid gland.     Currently patient is doing well without any signs or symptoms to suggest disease recurrence.   Continue Zoladex, next due today 5/22/24.   Refer to Dr. Ramirez for laparoscopic Oophorectomy. Will be able to stop Zoladex after surgery.     Recent labs good.   Continue Femara.  Continue vitamin D.     Diagnostic bilateral MMG 3/26/24 benign. Routine screening MMG recommended in 3/2025. She was seen by Dr. Santos and she did order another diagnostic left breast MMG for 6 months in 9/2024.   Also recommended that patient discuss with Dr. Santos regarding screening annual breast MRI.     Continue routine surveillance visits.  F/u in 3 months with repeat labs and visit.       DEXA from 5/11/23 showed mild osteopenia in bilateral femoral necks. Given Zometa in 4/2024, but had  severe side effects, so recommended to stop.  She does not want to try Fosamax due to GI issues.  Discussed Prolia and she would prefer to try this. Can be ordered at next visit for 10/2024.      All questions answered at this time.        Flory Vargas MD

## 2024-05-20 ENCOUNTER — LAB VISIT (OUTPATIENT)
Dept: LAB | Facility: HOSPITAL | Age: 54
End: 2024-05-20
Attending: INTERNAL MEDICINE
Payer: COMMERCIAL

## 2024-05-20 DIAGNOSIS — C50.312 MALIGNANT NEOPLASM OF LOWER-INNER QUADRANT OF LEFT BREAST IN FEMALE, ESTROGEN RECEPTOR POSITIVE: ICD-10-CM

## 2024-05-20 DIAGNOSIS — Z17.0 MALIGNANT NEOPLASM OF CENTRAL PORTION OF LEFT BREAST IN FEMALE, ESTROGEN RECEPTOR POSITIVE: ICD-10-CM

## 2024-05-20 DIAGNOSIS — C50.912 HER2-POSITIVE CARCINOMA OF LEFT BREAST: ICD-10-CM

## 2024-05-20 DIAGNOSIS — Z17.0 MALIGNANT NEOPLASM OF LOWER-INNER QUADRANT OF LEFT BREAST IN FEMALE, ESTROGEN RECEPTOR POSITIVE: ICD-10-CM

## 2024-05-20 DIAGNOSIS — Z80.41 FAMILY HISTORY OF OVARIAN CANCER: ICD-10-CM

## 2024-05-20 DIAGNOSIS — M85.852 OSTEOPENIA OF NECKS OF BOTH FEMURS: ICD-10-CM

## 2024-05-20 DIAGNOSIS — C50.112 MALIGNANT NEOPLASM OF CENTRAL PORTION OF LEFT BREAST IN FEMALE, ESTROGEN RECEPTOR POSITIVE: ICD-10-CM

## 2024-05-20 DIAGNOSIS — M85.851 OSTEOPENIA OF NECKS OF BOTH FEMURS: ICD-10-CM

## 2024-05-20 LAB
ALBUMIN SERPL-MCNC: 3.5 G/DL (ref 3.5–5)
ALBUMIN/GLOB SERPL: 1.3 RATIO (ref 1.1–2)
ALP SERPL-CCNC: 91 UNIT/L (ref 40–150)
ALT SERPL-CCNC: 16 UNIT/L (ref 0–55)
ANION GAP SERPL CALC-SCNC: 3 MEQ/L
AST SERPL-CCNC: 15 UNIT/L (ref 5–34)
BASOPHILS # BLD AUTO: 0.02 X10(3)/MCL
BASOPHILS NFR BLD AUTO: 0.3 %
BILIRUB SERPL-MCNC: 0.4 MG/DL
BUN SERPL-MCNC: 20 MG/DL (ref 9.8–20.1)
CALCIUM SERPL-MCNC: 8.4 MG/DL (ref 8.4–10.2)
CHLORIDE SERPL-SCNC: 108 MMOL/L (ref 98–107)
CO2 SERPL-SCNC: 31 MMOL/L (ref 22–29)
CREAT SERPL-MCNC: 0.74 MG/DL (ref 0.55–1.02)
CREAT/UREA NIT SERPL: 27
EOSINOPHIL # BLD AUTO: 0.35 X10(3)/MCL (ref 0–0.9)
EOSINOPHIL NFR BLD AUTO: 5.9 %
ERYTHROCYTE [DISTWIDTH] IN BLOOD BY AUTOMATED COUNT: 12.8 % (ref 11.5–17)
GFR SERPLBLD CREATININE-BSD FMLA CKD-EPI: >60 ML/MIN/1.73/M2
GLOBULIN SER-MCNC: 2.6 GM/DL (ref 2.4–3.5)
GLUCOSE SERPL-MCNC: 97 MG/DL (ref 74–100)
HCT VFR BLD AUTO: 41.2 % (ref 37–47)
HGB BLD-MCNC: 13.2 G/DL (ref 12–16)
IMM GRANULOCYTES # BLD AUTO: 0.01 X10(3)/MCL (ref 0–0.04)
IMM GRANULOCYTES NFR BLD AUTO: 0.2 %
LYMPHOCYTES # BLD AUTO: 1.62 X10(3)/MCL (ref 0.6–4.6)
LYMPHOCYTES NFR BLD AUTO: 27.5 %
MCH RBC QN AUTO: 30.6 PG (ref 27–31)
MCHC RBC AUTO-ENTMCNC: 32 G/DL (ref 33–36)
MCV RBC AUTO: 95.4 FL (ref 80–94)
MONOCYTES # BLD AUTO: 0.34 X10(3)/MCL (ref 0.1–1.3)
MONOCYTES NFR BLD AUTO: 5.8 %
NEUTROPHILS # BLD AUTO: 3.55 X10(3)/MCL (ref 2.1–9.2)
NEUTROPHILS NFR BLD AUTO: 60.3 %
PLATELET # BLD AUTO: 186 X10(3)/MCL (ref 130–400)
PMV BLD AUTO: 8.4 FL (ref 7.4–10.4)
POTASSIUM SERPL-SCNC: 4.2 MMOL/L (ref 3.5–5.1)
PROT SERPL-MCNC: 6.1 GM/DL (ref 6.4–8.3)
RBC # BLD AUTO: 4.32 X10(6)/MCL (ref 4.2–5.4)
SODIUM SERPL-SCNC: 142 MMOL/L (ref 136–145)
WBC # SPEC AUTO: 5.89 X10(3)/MCL (ref 4.5–11.5)

## 2024-05-20 PROCEDURE — 36415 COLL VENOUS BLD VENIPUNCTURE: CPT

## 2024-05-20 PROCEDURE — 80053 COMPREHEN METABOLIC PANEL: CPT

## 2024-05-20 PROCEDURE — 85025 COMPLETE CBC W/AUTO DIFF WBC: CPT

## 2024-05-22 ENCOUNTER — INFUSION (OUTPATIENT)
Dept: INFUSION THERAPY | Facility: HOSPITAL | Age: 54
End: 2024-05-22
Attending: FAMILY MEDICINE
Payer: COMMERCIAL

## 2024-05-22 ENCOUNTER — OFFICE VISIT (OUTPATIENT)
Dept: HEMATOLOGY/ONCOLOGY | Facility: CLINIC | Age: 54
End: 2024-05-22
Payer: COMMERCIAL

## 2024-05-22 VITALS
HEART RATE: 81 BPM | BODY MASS INDEX: 31.74 KG/M2 | WEIGHT: 161.69 LBS | BODY MASS INDEX: 31.74 KG/M2 | SYSTOLIC BLOOD PRESSURE: 126 MMHG | DIASTOLIC BLOOD PRESSURE: 89 MMHG | DIASTOLIC BLOOD PRESSURE: 89 MMHG | HEIGHT: 60 IN | HEART RATE: 81 BPM | SYSTOLIC BLOOD PRESSURE: 126 MMHG | WEIGHT: 161.69 LBS | TEMPERATURE: 98 F | HEIGHT: 60 IN | OXYGEN SATURATION: 97 % | TEMPERATURE: 98 F | RESPIRATION RATE: 14 BRPM | RESPIRATION RATE: 14 BRPM | OXYGEN SATURATION: 97 %

## 2024-05-22 DIAGNOSIS — Z17.0 MALIGNANT NEOPLASM OF LOWER-INNER QUADRANT OF LEFT BREAST IN FEMALE, ESTROGEN RECEPTOR POSITIVE: Primary | ICD-10-CM

## 2024-05-22 DIAGNOSIS — C50.312 MALIGNANT NEOPLASM OF LOWER-INNER QUADRANT OF LEFT BREAST IN FEMALE, ESTROGEN RECEPTOR POSITIVE: Primary | ICD-10-CM

## 2024-05-22 PROCEDURE — 63600175 PHARM REV CODE 636 W HCPCS: Mod: JZ,JG | Performed by: INTERNAL MEDICINE

## 2024-05-22 PROCEDURE — 3079F DIAST BP 80-89 MM HG: CPT | Mod: CPTII,S$GLB,, | Performed by: INTERNAL MEDICINE

## 2024-05-22 PROCEDURE — 3074F SYST BP LT 130 MM HG: CPT | Mod: CPTII,S$GLB,, | Performed by: INTERNAL MEDICINE

## 2024-05-22 PROCEDURE — 3008F BODY MASS INDEX DOCD: CPT | Mod: CPTII,S$GLB,, | Performed by: INTERNAL MEDICINE

## 2024-05-22 PROCEDURE — 99214 OFFICE O/P EST MOD 30 MIN: CPT | Mod: S$GLB,,, | Performed by: INTERNAL MEDICINE

## 2024-05-22 PROCEDURE — 96402 CHEMO HORMON ANTINEOPL SQ/IM: CPT

## 2024-05-22 PROCEDURE — 1159F MED LIST DOCD IN RCRD: CPT | Mod: CPTII,S$GLB,, | Performed by: INTERNAL MEDICINE

## 2024-05-22 PROCEDURE — 99999 PR PBB SHADOW E&M-EST. PATIENT-LVL V: CPT | Mod: PBBFAC,,, | Performed by: INTERNAL MEDICINE

## 2024-05-22 RX ORDER — HYDROCODONE BITARTRATE AND ACETAMINOPHEN 5; 325 MG/1; MG/1
1 TABLET ORAL
COMMUNITY
Start: 2024-04-08

## 2024-05-22 RX ADMIN — GOSERELIN ACETATE 3.6 MG: 3.6 IMPLANT SUBCUTANEOUS at 11:05

## 2024-05-22 NOTE — NURSING
C16 Zoladex injection given, tolerated well. Pt discharged home in stable condition, will check portal for future appts.

## 2024-06-03 DIAGNOSIS — C50.912 HER2-POSITIVE CARCINOMA OF LEFT BREAST: ICD-10-CM

## 2024-06-03 DIAGNOSIS — Z17.0 MALIGNANT NEOPLASM OF CENTRAL PORTION OF LEFT BREAST IN FEMALE, ESTROGEN RECEPTOR POSITIVE: ICD-10-CM

## 2024-06-03 DIAGNOSIS — C50.112 MALIGNANT NEOPLASM OF CENTRAL PORTION OF LEFT BREAST IN FEMALE, ESTROGEN RECEPTOR POSITIVE: ICD-10-CM

## 2024-06-03 RX ORDER — PROMETHAZINE HYDROCHLORIDE 25 MG/1
25 TABLET ORAL EVERY 6 HOURS PRN
Qty: 30 TABLET | Refills: 3 | Status: SHIPPED | OUTPATIENT
Start: 2024-06-03

## 2024-06-19 ENCOUNTER — INFUSION (OUTPATIENT)
Dept: INFUSION THERAPY | Facility: HOSPITAL | Age: 54
End: 2024-06-19
Attending: FAMILY MEDICINE
Payer: COMMERCIAL

## 2024-06-19 VITALS
WEIGHT: 163.5 LBS | HEART RATE: 70 BPM | OXYGEN SATURATION: 97 % | DIASTOLIC BLOOD PRESSURE: 85 MMHG | BODY MASS INDEX: 32.1 KG/M2 | TEMPERATURE: 98 F | RESPIRATION RATE: 20 BRPM | HEIGHT: 60 IN | SYSTOLIC BLOOD PRESSURE: 123 MMHG

## 2024-06-19 DIAGNOSIS — Z17.0 MALIGNANT NEOPLASM OF LOWER-INNER QUADRANT OF LEFT BREAST IN FEMALE, ESTROGEN RECEPTOR POSITIVE: Primary | ICD-10-CM

## 2024-06-19 DIAGNOSIS — C50.312 MALIGNANT NEOPLASM OF LOWER-INNER QUADRANT OF LEFT BREAST IN FEMALE, ESTROGEN RECEPTOR POSITIVE: Primary | ICD-10-CM

## 2024-06-19 PROCEDURE — 96402 CHEMO HORMON ANTINEOPL SQ/IM: CPT

## 2024-06-19 PROCEDURE — 63600175 PHARM REV CODE 636 W HCPCS: Mod: JZ,JG | Performed by: INTERNAL MEDICINE

## 2024-06-19 RX ADMIN — GOSERELIN ACETATE 3.6 MG: 3.6 IMPLANT SUBCUTANEOUS at 08:06

## 2024-07-17 ENCOUNTER — INFUSION (OUTPATIENT)
Dept: INFUSION THERAPY | Facility: HOSPITAL | Age: 54
End: 2024-07-17
Attending: FAMILY MEDICINE
Payer: COMMERCIAL

## 2024-07-17 ENCOUNTER — TELEPHONE (OUTPATIENT)
Dept: INFUSION THERAPY | Facility: HOSPITAL | Age: 54
End: 2024-07-17

## 2024-07-17 VITALS
TEMPERATURE: 98 F | HEART RATE: 84 BPM | OXYGEN SATURATION: 96 % | SYSTOLIC BLOOD PRESSURE: 132 MMHG | DIASTOLIC BLOOD PRESSURE: 88 MMHG

## 2024-07-17 DIAGNOSIS — C50.312 MALIGNANT NEOPLASM OF LOWER-INNER QUADRANT OF LEFT BREAST IN FEMALE, ESTROGEN RECEPTOR POSITIVE: Primary | ICD-10-CM

## 2024-07-17 DIAGNOSIS — Z17.0 MALIGNANT NEOPLASM OF LOWER-INNER QUADRANT OF LEFT BREAST IN FEMALE, ESTROGEN RECEPTOR POSITIVE: Primary | ICD-10-CM

## 2024-07-17 PROCEDURE — 25000003 PHARM REV CODE 250: Performed by: NURSE PRACTITIONER

## 2024-07-17 PROCEDURE — 96523 IRRIG DRUG DELIVERY DEVICE: CPT

## 2024-07-17 PROCEDURE — A4216 STERILE WATER/SALINE, 10 ML: HCPCS | Performed by: NURSE PRACTITIONER

## 2024-07-17 RX ORDER — HEPARIN 100 UNIT/ML
500 SYRINGE INTRAVENOUS
Status: DISCONTINUED | OUTPATIENT
Start: 2024-07-17 | End: 2024-07-17 | Stop reason: HOSPADM

## 2024-07-17 RX ORDER — HEPARIN 100 UNIT/ML
500 SYRINGE INTRAVENOUS
OUTPATIENT
Start: 2024-07-17

## 2024-07-17 RX ORDER — SODIUM CHLORIDE 0.9 % (FLUSH) 0.9 %
10 SYRINGE (ML) INJECTION
Status: DISCONTINUED | OUTPATIENT
Start: 2024-07-17 | End: 2024-07-17 | Stop reason: HOSPADM

## 2024-07-17 RX ORDER — SODIUM CHLORIDE 0.9 % (FLUSH) 0.9 %
10 SYRINGE (ML) INJECTION
OUTPATIENT
Start: 2024-07-17

## 2024-07-17 RX ADMIN — Medication 10 ML: at 11:07

## 2024-07-17 NOTE — PLAN OF CARE
Pt received MPF (NS only); tolerated well.  Inbasket sent to LIZ De Los Santos NP to clarify frequency of MPF since pt stating has heparin allergy and wants NS only-will adjust appts accordingly.

## 2024-07-25 ENCOUNTER — LAB VISIT (OUTPATIENT)
Dept: LAB | Facility: HOSPITAL | Age: 54
End: 2024-07-25
Attending: INTERNAL MEDICINE
Payer: COMMERCIAL

## 2024-07-25 DIAGNOSIS — Z90.722 S/P TOTAL HYSTERECTOMY WITH REMOVAL OF BOTH TUBES AND OVARIES: ICD-10-CM

## 2024-07-25 DIAGNOSIS — E04.2 NONTOXIC MULTINODULAR GOITER: Primary | ICD-10-CM

## 2024-07-25 DIAGNOSIS — Z90.79 S/P TOTAL HYSTERECTOMY WITH REMOVAL OF BOTH TUBES AND OVARIES: ICD-10-CM

## 2024-07-25 DIAGNOSIS — Z90.710 S/P TOTAL HYSTERECTOMY WITH REMOVAL OF BOTH TUBES AND OVARIES: ICD-10-CM

## 2024-07-25 LAB
BASOPHILS # BLD AUTO: 0.03 X10(3)/MCL
BASOPHILS NFR BLD AUTO: 0.4 %
EOSINOPHIL # BLD AUTO: 0.48 X10(3)/MCL (ref 0–0.9)
EOSINOPHIL NFR BLD AUTO: 7.1 %
ERYTHROCYTE [DISTWIDTH] IN BLOOD BY AUTOMATED COUNT: 13.2 % (ref 11.5–17)
HCT VFR BLD AUTO: 38.9 % (ref 37–47)
HGB BLD-MCNC: 12.8 G/DL (ref 12–16)
IMM GRANULOCYTES # BLD AUTO: 0.02 X10(3)/MCL (ref 0–0.04)
IMM GRANULOCYTES NFR BLD AUTO: 0.3 %
LYMPHOCYTES # BLD AUTO: 1.57 X10(3)/MCL (ref 0.6–4.6)
LYMPHOCYTES NFR BLD AUTO: 23.4 %
MCH RBC QN AUTO: 31.1 PG (ref 27–31)
MCHC RBC AUTO-ENTMCNC: 32.9 G/DL (ref 33–36)
MCV RBC AUTO: 94.4 FL (ref 80–94)
MONOCYTES # BLD AUTO: 0.44 X10(3)/MCL (ref 0.1–1.3)
MONOCYTES NFR BLD AUTO: 6.5 %
NEUTROPHILS # BLD AUTO: 4.18 X10(3)/MCL (ref 2.1–9.2)
NEUTROPHILS NFR BLD AUTO: 62.3 %
NRBC BLD AUTO-RTO: 0 %
PLATELET # BLD AUTO: 223 X10(3)/MCL (ref 130–400)
PMV BLD AUTO: 8.7 FL (ref 7.4–10.4)
RBC # BLD AUTO: 4.12 X10(6)/MCL (ref 4.2–5.4)
T4 FREE SERPL-MCNC: 1.09 NG/DL (ref 0.7–1.48)
TSH SERPL-ACNC: 0.8 UIU/ML (ref 0.35–4.94)
WBC # BLD AUTO: 6.72 X10(3)/MCL (ref 4.5–11.5)

## 2024-07-25 PROCEDURE — 84439 ASSAY OF FREE THYROXINE: CPT

## 2024-07-25 PROCEDURE — 84443 ASSAY THYROID STIM HORMONE: CPT

## 2024-07-25 PROCEDURE — 83520 IMMUNOASSAY QUANT NOS NONAB: CPT

## 2024-07-25 PROCEDURE — 85025 COMPLETE CBC W/AUTO DIFF WBC: CPT

## 2024-07-25 PROCEDURE — 36415 COLL VENOUS BLD VENIPUNCTURE: CPT

## 2024-07-26 LAB — TSH RECEP AB SER-ACNC: 1.13 IU/L (ref 0–1.75)

## 2024-08-08 ENCOUNTER — OFFICE VISIT (OUTPATIENT)
Dept: URGENT CARE | Facility: CLINIC | Age: 54
End: 2024-08-08
Payer: COMMERCIAL

## 2024-08-08 VITALS
OXYGEN SATURATION: 98 % | RESPIRATION RATE: 18 BRPM | WEIGHT: 163 LBS | DIASTOLIC BLOOD PRESSURE: 87 MMHG | HEART RATE: 82 BPM | TEMPERATURE: 98 F | SYSTOLIC BLOOD PRESSURE: 133 MMHG | BODY MASS INDEX: 28.88 KG/M2 | HEIGHT: 63 IN

## 2024-08-08 DIAGNOSIS — M53.3 PAIN IN THE COCCYX: ICD-10-CM
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
## 2024-08-08 RX ORDER — INSULIN GLARGINE 100 [IU]/ML
INJECTION, SOLUTION SUBCUTANEOUS
COMMUNITY
Start: 2024-07-26

## 2024-08-08 RX ORDER — DICLOFENAC SODIUM 50 MG/1
50 TABLET, DELAYED RELEASE ORAL 2 TIMES DAILY PRN
Qty: 14 TABLET | Refills: 0 | Status: SHIPPED | OUTPATIENT
Start: 2024-08-08 | End: 2024-08-15

## 2024-08-08 RX ORDER — HYDROCODONE BITARTRATE AND ACETAMINOPHEN 5; 325 MG/1; MG/1
1 TABLET ORAL EVERY 6 HOURS PRN
Qty: 16 TABLET | Refills: 0 | Status: SHIPPED | OUTPATIENT
Start: 2024-08-08 | End: 2024-08-12

## 2024-08-09 PROBLEM — Z90.79 STATUS POST BILATERAL SALPINGECTOMY: Status: ACTIVE | Noted: 2024-07-18

## 2024-08-09 NOTE — PROGRESS NOTES
Subjective:       Patient ID: Nita Dejesus is a 54 y.o. female.    Previous Oncologist: Dr. Katlyn Gonzalez at [facility]  Surgeon: Dr. Kelly Santos    Left Breast Cancer Stage IA(T2N0M0) Triple Positive--22  Biopsy/Surgery/pathology:   1. Excisional biopsy left breast mass/left breast cyst done 22--invasive ductal carcinoma, Grade 3, measures 3.0cm, a 0.6cm region of cauterized carcinoma is present at the inked superior-deep margin, cyst with reactive changes, suggestive of previous lumpectomy site, fluid left breast cyst with rare atypical cells present, suspicious for carcinoma, ER 95%, VA 27%, Her2 3+ by IHC, positive, Ki67 51%.   2. Left SLN biopsies done 22--3 benign lymph nodes.   3. Re-excision lumpectomy done 3/8/23--no residual invasive malignancy identified.   4. S/p BSO on 24--Negative for malignancy.   Imagin. [place] [facility] [place]left inferior breast large, minimally complex cystic lesion at 6:00 measures 4.4X3.5X4.2cm, corresponding to the palpable area, appears benign. Routine screening MMG recommended.   2. MRI breasts bilateral at [facility] 22--post-surgical seroma at 12:00 position of left breast s/p recent excisional biopsy, no definite suspicious enhancing masses or areas of nonmass enhancement of left breast, and no suspicious areas in right breast, BIRADS 6.   3. CT C/A/P w/ contrast 22 at [facility]--no thoracic metastatic disease, fatty infiltration of liver, small to moderate-sized hiatal hernia, no metastatic disease.   4. NM Bone scan whole body done 22 at [facility]--activity in cervical and lumbar spines likely degenerative, no anatomic correlate seen on CT scan in lumbar spines, suggest correlation with C-spine Xrays, increase tracer w/n both feet, likely degnerative change, physiologic activity of urinary tract.  5. CT A/P w/ contrast 22 at [facility]--no acute abdominopelvic pathology or change compared to recent CT from 22, fairly  large amount of stool in colon may reflect constipation, large hiatal hernia, diffuse hepatic steatosis, post-cholecystectomy, post hysterectomy, mild thoracic and lumbar degenerative disease.   6. Bilateral diagnostic MMG/Left breast US 2/28/23--No suspicious masses, microcalcifications, or other abnormalities are seen  within the right or left breast. Postsurgical changes are noted at the  6:00 position of the left breast at site of prior excisional biopsy with positive margins.   7. NM parathyroid scan on 7/27/23--Overall asymmetric increased activity at the right thyroid lobe with limited washout on the delayed 2 hour exam.  A underlying hyperfunctioning parathyroid adenoma cannot be entirely excluded.  7. CT C/A/P on 8/4/23--Mucosal prominence at a small to moderate size hiatus hernia. Atelectasis and/or scarring anterior left upper lobe and posterior right lower lung. Postsurgical changes left axilla and left breast with small residual fluid density/edema/stranding. Right central line/MediPort. Thoracolumbar spondylosis.   8. CT A/P w/o on 8/8/24--Postop bilateral salpingo-oophorectomies. Moderate to large sized hiatal hernia with intraluminal punctate density measuring 4 mm. Scattered diverticulosis without evidence of inflammation. Moderate stool burden. Normal appendix. Periumbilical skin thickening. Trace right lateral abdominal wall edema. Scattered degenerative changes without focal or acute osseous abnormality. Stable pelvic benign bone islands.     DEXA:  5/11/23--AP spine 0.1, left femoral neck -1.5, left total hip -0.4, right femoral neck -1.2, right total hip 0.0, c/w osteopenia bilateral femoral necks.     ECHO:  08/18/22--EF 55-65%.  11/17/22--EF 60%.  03/23/23--EF 55-60%.  06/20/23--EF 60%.  09/05/23--EF 55-60%  12/05/23--EF 55-60%.    Genetic testing: [employer] done 7/21/22 negative for any pathogenic variants.     Treatment history:  Left breast excisional biopsy done 7/13/22  Left SLN biopsies  "done 8/8/22.  2 units PRBC 12/19/22.  TCHP X 6 cycles completed only 8/23/22--1/25/23 (multiple treatment delays for thrombocytopenia, despite dose reductions, multiple side effects).  Re-excision lumpectomy done 3/8/23--no residual disease.   Adjuvant radiation therapy completed 4/12/23--5/9/23.  Maintenance HP x 11 cycles. 3/29/23 - 10/25/23.   Zometa given X 1 dose 4/2024 had severe N/V, fever, joint/bone pain, flu-like symptoms all X 3 days so was stopped.   Zoladex monthly. 3/29/23 - 6/19/24. S/p BSO.     Current treatment plan:  Adjuvant OS Zoladex monthly + Femara started 3/29/23 (DAISY for endometriosis, ovaries remain). S/p BSO, Zoladex stopped.   2. Prolia to start in October 2024.     Chief Complaint: Other Misc (Pt reports that since the Zometa infusion her eyes feel swollen, have pressure behind the eye, are painful and dry.) and Abdominal Pain    HPI  Patient presents for follow-up of breast cancer. She is doing fairly well. Underwent BSO on 7/18/24 with pathology negative for malignancy. Zoladex was stopped. Continues on Femara daily. She is otherwise doing okay. She did have a CT of A/P without contrast last week after complaining of abdominal pain. No acute findings were noted. Complains of dry, itching eyes and "pressure" behind her eyes since her treatment. She is not followed by an Ophthalmologist so I will send a referral. Mentions her breast surgeon is retiring. She went to an urgent care after falling down stairs and a sacral and coccyx XR was negative for fractures. She had 2 different opinions about reconstruction with Dr. Kirkpatrick and Dr. Alvarado and has decided to go with Dr. Alvarado.     Past Medical History:   Diagnosis Date    Allergy     Anemia     Anxiety     Back pain     Breast cancer     GERD (gastroesophageal reflux disease)     Hiatal hernia     Hip pain     Hx of Clotting disorder     d/t medication    Hyperparathyroidism     Insulin resistance     Migraine     Neuromuscular " disorder     Neuropathy     Nontoxic single thyroid nodule     Osteopenia     Prediabetes       Past Surgical History:   Procedure Laterality Date    BREAST LUMPECTOMY Left     c lymph nodes    BREAST SURGERY      x2    CHOLECYSTECTOMY      COLONOSCOPY      ESOPHAGOGASTRODUODENOSCOPY      EYE SURGERY  1995    HERNIA REPAIR  Aug. 2017    HYSTERECTOMY  Aug, 2012    LAPAROSCOPIC NISSEN FUNDOPLICATION N/A 09/26/2023    Procedure: FUNDOPLICATION, NISSEN, LAPAROSCOPIC;  Surgeon: Maximiliano Denton Jr., MD;  Location: [facility] OR;  Service: General;  Laterality: N/A;  WITH GASTROPEXY    NISSEN FUNDOPLICATION      PARATHYROID GLAND SURGERY  12/07/2023    PARTIAL HYSTERECTOMY      PLACEMENT, MEDIPORT      REDUCTION OF BOTH BREASTS  2018    TUBAL LIGATION Bilateral 1993     Family History   Problem Relation Name Age of Onset    Hypertension Mother Judy Sheridan     Lupus Mother Judy Sheridan     Hypoparathyroidism Mother Judy Sheridan     Diabetes Father Mirza  Desandro     Heart disease Father Mirza  Desandro     Prostate cancer Father Mirza  Desandro     Kidney disease Father Mirza  Desandro     Cancer Father Mirza  Desandro     COPD Father Mirza  Desandro     Hearing loss Father Mirza  Desandro     Hodgkin's lymphoma Brother      Pancreatitis Brother       Social History     Socioeconomic History    Marital status:    Tobacco Use    Smoking status: Some Days     Current packs/day: 0.25     Average packs/day: 0.3 packs/day for 15.0 years (3.8 ttl pk-yrs)     Types: Cigarettes    Smokeless tobacco: Never   Substance and Sexual Activity    Alcohol use: Not Currently    Drug use: Never    Sexual activity: Not Currently     Partners: Male     Birth control/protection: See Surgical Hx, None       Review of patient's allergies indicates:   Allergen Reactions    Amitriptyline Anxiety and Other (See Comments)     Severe    Other reaction(s): Unknown    Corticosteroids (glucocorticoids) Hives, Other (See Comments) and Swelling  "    Patient states "Severe Inflammation"      Egg Anaphylaxis     Vaccines that have egg protein base cause anaphylaxix, can eat eggs    Heparin analogues Palpitations and Other (See Comments)     "All anticoagulation Meds cause dangerously low BP"    Ketorolac Diarrhea, Nausea And Vomiting, Other (See Comments) and Swelling     Migraines      Mushroom Anaphylaxis     ANY MUSHROOM CAUSES ANAPHYLAXIS    Peanut Anaphylaxis    Penicillins Anaphylaxis, Diarrhea, Hives, Nausea And Vomiting and Swelling    Tramadol Diarrhea, Itching, Nausea And Vomiting, Rash and Swelling    Latex Other (See Comments)     Skin blisters      Macrolide antibiotics Hives and Rash    Adhesive Other (See Comments)     Blisters, skin tears; CANNOT USE PAPER TAPE    Anticoag citrate phos dextrose      STATES ANY BLOOD THINNERS CAUSES EXTREMELY LOW BLOOD PRESSURE    Low b/p    Aspartame Other (See Comments)     Migraines and nausea    Codeine sulfate     Erythromycin      Other reaction(s): Unknown    Estrogens Other (See Comments)     Drives cancer    Other omega-3s      Anticoagulant   Patient states "low blood pressure"    Pork derived (porcine) Diarrhea    Progesterone Other (See Comments)     Swelling of lymph nodes, breast engorgemnt    Sucralose Other (See Comments)    Zometa [zoledronic acid]      Pain in eyes during the infusion.    Aspirin Other (See Comments)     Avoid if possible due to blood thinning    Eszopiclone Itching and Anxiety     AKA LUNESTA    Topiramate Anxiety, Other (See Comments) and Palpitations     Shaking      Zinc Nausea Only      Current Outpatient Medications on File Prior to Visit   Medication Sig Dispense Refill    apremilast (OTEZLA) 30 mg Tab       butalbital-acetaminophen-caffeine -40 mg (FIORICET, ESGIC) -40 mg per tablet Take 1 tablet by mouth daily as needed for Headaches.      cholecalciferol, vitamin D3, 1,250 mcg (50,000 unit) capsule SMARTSI Capsule(s) By Mouth Every 10 Days      " diclofenac (VOLTAREN) 50 MG EC tablet Take 1 tablet (50 mg total) by mouth 2 (two) times daily as needed (pain). 14 tablet 0    diphenhydrAMINE (BENADRYL) 50 MG capsule Take 50 mg by mouth daily as needed for Allergies (ONLY TAKES  FOR POST CHEMO REACTIONS).      ergocalciferol (ERGOCALCIFEROL) 50,000 unit Cap Take by mouth.      gabapentin (NEURONTIN) 300 MG capsule Take 300 mg by mouth 3 (three) times daily. Takes with 600 mg to total 900mg TID      gabapentin (NEURONTIN) 600 MG tablet Take 600 mg by mouth 3 (three) times daily.      LANTUS U-100 INSULIN 100 unit/mL injection SMARTSI Unit(s) SUB-Q Daily      letrozole (FEMARA) 2.5 mg Tab Take 1 tablet (2.5 mg total) by mouth once daily. 90 tablet 3    loperamide (IMODIUM) 2 mg capsule Take 2 mg by mouth once as needed for Diarrhea.      metoclopramide HCl (REGLAN) 10 MG tablet 1 Tablet Orally Once a day as needed for nausea for 30 days      omeprazole (PRILOSEC) 40 MG capsule Take 1 capsule (40 mg total) by mouth once daily. 30 capsule 6    onabotulinumtoxina (BOTOX) 200 unit SolR 200 Units every 3 (three) months. NO BOTTLE TO VERIFY AM OF PROCEDURE      ondansetron (ZOFRAN) 4 MG tablet 1 tablet Orally prn for 30 days      promethazine (PHENERGAN) 25 MG tablet take 1 tablet by mouth every 6 hours AS NEEDED FOR nausea 30 tablet 3    tiZANidine (ZANAFLEX) 4 MG tablet Take 4 mg by mouth 3 (three) times daily. 1 tab in am and 1 TAB  IN afternoon and 2 tabs at night      vitamin E 400 UNIT capsule Take 800 Units by mouth once daily. STOPPED FOR PROCEDURE NO BOTTLE TO VERIFY AM OF PROCEDURE      clonazePAM (KLONOPIN) 0.5 MG tablet Take 0.5 mg by mouth every 8 (eight) hours as needed for Anxiety (pt states typically only uses once daily as needed). No bottle to verify am of procedure (Patient not taking: Reported on 8/15/2024)      diclofenac (VOLTAREN) 75 MG EC tablet Take 75 mg by mouth every evening. Only nightly rx says bid on bottle (Patient not taking: Reported  on 8/15/2024)      diclofenac submicronized 18 mg Cap once daily. (Patient not taking: Reported on 8/15/2024)      goserelin acetate (ZOLADEX SUBQ) Inject 1 application  into the skin every 30 days. NO BOTTLE TO VERIFY ON ADMIT (Patient not taking: Reported on 8/8/2024)      HYDROcodone-acetaminophen (NORCO) 5-325 mg per tablet Take 1 tablet by mouth. (Patient not taking: Reported on 8/8/2024)      metFORMIN (GLUCOPHAGE) 500 MG tablet Take 500 mg by mouth once daily. NO BOTTLE TO VERIFY AM OF PROCEDURE (Patient not taking: Reported on 8/15/2024)      ondansetron (ZOFRAN-ODT) 4 MG TbDL Take 4 mg by mouth every 8 (eight) hours as needed (NAUSEA). (Patient not taking: Reported on 8/15/2024)      oxymetazoline (AFRIN) 0.05 % nasal spray 2 sprays by Nasal route daily as needed for Congestion. (Patient not taking: Reported on 5/22/2024)      OZEMPIC 0.25 mg or 0.5 mg (2 mg/3 mL) pen injector INJECT 0.25MG SUBCUTANEOUSLY ONCE A WEEK (Patient not taking: Reported on 8/8/2024)      trastuzumab-dkst (OGIVRI) 150 mg injection as directed Intravenous (Patient not taking: Reported on 5/22/2024)      TRULICITY 0.75 mg/0.5 mL pen injector Inject 0.75 mg into the skin every 7 days. NO BOTTLE TO VERIFY AM OF PROCEDURE (Patient not taking: Reported on 5/22/2024)       No current facility-administered medications on file prior to visit.      Review of Systems   Constitutional:  Positive for fatigue. Negative for appetite change and unexpected weight change.   HENT:  Negative for mouth sores.    Eyes:  Positive for eye dryness.        Pressure behind her eye   Gastrointestinal:  Negative for abdominal pain.        +hiatal hernia   Genitourinary:  Positive for hot flashes. Negative for frequency.   Musculoskeletal:  Positive for arthralgias. Negative for back pain.   Integumentary:  Negative for rash.        Bilateral breast pain, chronic   Neurological:  Negative for headaches.   Hematological:  Negative for adenopathy.  "  Psychiatric/Behavioral:  The patient is not nervous/anxious.         Vitals:    08/15/24 1020   BP: (!) 147/93   Pulse: 60   Resp: 14   Temp: 98 °F (36.7 °C)   TempSrc: Oral   SpO2: 98%   Weight: 74.7 kg (164 lb 9.6 oz)   Height: 5' 3" (1.6 m)     Physical Exam  Constitutional:       Appearance: Normal appearance. She is overweight.   HENT:      Head: Normocephalic.      Nose: Nose normal.      Mouth/Throat:      Mouth: Mucous membranes are moist.   Eyes:      General: Lids are normal. Vision grossly intact.      Extraocular Movements: Extraocular movements intact.      Conjunctiva/sclera: Conjunctivae normal.   Neck:      Comments: Midline neck incision intact  Cardiovascular:      Rate and Rhythm: Normal rate and regular rhythm.      Heart sounds: Normal heart sounds.   Pulmonary:      Effort: Pulmonary effort is normal.      Breath sounds: Normal breath sounds and air entry.   Chest:      Comments: Right chest wall mediport in place, left breast with inferior lumpectomy scar, well healed. Left axillary incision from LN biopsy also healed well. No masses palpable.   Abdominal:      General: Abdomen is protuberant. Bowel sounds are normal. There is no distension.      Palpations: Abdomen is soft.      Tenderness: There is no abdominal tenderness.      Comments: Scattered laparoscopic incisions all healed well.   Musculoskeletal:         General: Normal range of motion.      Cervical back: Normal range of motion and neck supple.      Right lower leg: No edema.      Left lower leg: No edema.   Skin:     General: Skin is warm and moist.   Neurological:      General: No focal deficit present.      Mental Status: She is alert and oriented to person, place, and time.   Psychiatric:         Mood and Affect: Mood normal.         Behavior: Behavior is cooperative.         Judgment: Judgment normal.       Lab Visit on 08/15/2024   Component Date Value    WBC 08/15/2024 6.89     RBC 08/15/2024 4.23     Hgb 08/15/2024 13.5  "    Hct 08/15/2024 40.2     MCV 08/15/2024 95.0 (H)     MCH 08/15/2024 31.9 (H)     MCHC 08/15/2024 33.6     RDW 08/15/2024 13.5     Platelet 08/15/2024 222     MPV 08/15/2024 8.4     Neut % 08/15/2024 57.3     Lymph % 08/15/2024 27.6     Mono % 08/15/2024 6.7     Eos % 08/15/2024 7.7     Basophil % 08/15/2024 0.4     Lymph # 08/15/2024 1.90     Neut # 08/15/2024 3.95     Mono # 08/15/2024 0.46     Eos # 08/15/2024 0.53     Baso # 08/15/2024 0.03     IG# 08/15/2024 0.02     IG% 08/15/2024 0.3           Assessment:       1. Malignant neoplasm of lower-inner quadrant of left breast in female, estrogen receptor positive    2. HER2-positive carcinoma of left breast    3. Osteopenia of necks of both femurs    4. Status post bilateral salpingectomy      Plan:       Patient with Stage IA Left breast cancer triple positive s/p excisional biopsy of a cystic lesion that found cancer incidentally diagnosed 7/13/22. Tumor measured 3.0cm and SLN biopsies negative, Grade 3, strongly ER and [place] positive and Her2 positive with high Ki67. There was a positive margin on the excisional biopsy.  Treatment with chemotherapy neoadjuvant started at [facility] with Dr. Gonzalez.    Patient received 2 cycles at [facility] then changed care to [facility]. She was having multiple side effects.   She does not have any steroids due to h/o allergy causing inflammation.   Patient had multiple delays due to multiple side effects, thrombocytopenia, requiring dose reduction, but finally completed 6 cycles on 1/25/23.  Surgery was delayed and finally done on 3/8/23. Patient had re-excision lumpectomy and there was no residual invasive malignancy.     Treatment resumed with maintenance HP x 11 cycles 3/29/23.     Recommended also OS + AI X 5 years followed by completion of AI to complete 10 years.   She had DAISY in 2012 for endometriosis but still had ovaries remaining functional prior to starting chemotherapy.   8/4/22 estradiol level 11, FSH 44, LH 38. Repeat Estradiol level  "done on Zoladex per patient request in 3/2024 <24.    Zoladex/Femara started on 3/29/23. Plan for endocrine therapy X 10 years.  Patient completed radiation on 5/9/23.    Completed 11 cycles of maintenance Herceptin + Perjeta on 10/25/23.   She was found to have primary hyperparathyroidism and was seen by Dr. Hamilton endocrinologist. Referred to a surgeon in [place] and had surgery on 12/7/23 to remove 1 overactive parathyroid gland.     Currently patient is doing well without any signs or symptoms to suggest disease recurrence.   Referred to Dr. Ramirez for laparoscopic Oophorectomy. S/p BSO on 7/18/24 with pathology benign.   Last Zoladex given on 6/19/24. Orders discontinued.   CBC today is good. CMP pending.   Continue Femara.  Continue vitamin D.   Diagnostic bilateral MMG 3/26/24 benign. Routine screening MMG recommended in 3/2025. She was seen by Dr. Santos and she had ordered another diagnostic left breast MMG for 6 months in 9/2024 but she is not scheduled so I will send orders today. Also recommended that patient discuss with Dr. Santos regarding screening annual breast MRI. However, Dr. Santos is retiring. I can go ahead and order an MRI of breasts for annual screening. She would prefer to start now instead of waiting until September 2025.   Continue routine surveillance visits.  F/u in 3 months with repeat labs and visit.   Will also send referral today to Dr. Yi for her eye dryness and "pressure".   Continue MPF.     DEXA from 5/11/23 showed mild osteopenia in bilateral femoral necks. Given Zometa in 4/2024, but had severe side effects, so recommended to stop. She does not want to try Fosamax due to GI issues. Previously discussed Prolia and she would prefer to try this. I will put in orders today for this to start in October.      All questions answered at this time.        MAYELA Abebe                  "

## 2024-08-15 ENCOUNTER — LAB VISIT (OUTPATIENT)
Dept: LAB | Facility: HOSPITAL | Age: 54
End: 2024-08-15
Attending: INTERNAL MEDICINE
Payer: COMMERCIAL

## 2024-08-15 ENCOUNTER — OFFICE VISIT (OUTPATIENT)
Dept: HEMATOLOGY/ONCOLOGY | Facility: CLINIC | Age: 54
End: 2024-08-15
Payer: COMMERCIAL

## 2024-08-15 VITALS
WEIGHT: 164.63 LBS | HEART RATE: 60 BPM | SYSTOLIC BLOOD PRESSURE: 147 MMHG | RESPIRATION RATE: 14 BRPM | DIASTOLIC BLOOD PRESSURE: 93 MMHG | BODY MASS INDEX: 29.17 KG/M2 | TEMPERATURE: 98 F | OXYGEN SATURATION: 98 % | HEIGHT: 63 IN

## 2024-08-15 DIAGNOSIS — C50.912 HER2-POSITIVE CARCINOMA OF LEFT BREAST: ICD-10-CM

## 2024-08-15 DIAGNOSIS — Z90.79 STATUS POST BILATERAL SALPINGECTOMY: ICD-10-CM

## 2024-08-15 DIAGNOSIS — M85.851 OSTEOPENIA OF NECKS OF BOTH FEMURS: ICD-10-CM

## 2024-08-15 DIAGNOSIS — Z17.0 MALIGNANT NEOPLASM OF LOWER-INNER QUADRANT OF LEFT BREAST IN FEMALE, ESTROGEN RECEPTOR POSITIVE: Primary | ICD-10-CM

## 2024-08-15 DIAGNOSIS — C50.112 MALIGNANT NEOPLASM OF CENTRAL PORTION OF LEFT BREAST IN FEMALE, ESTROGEN RECEPTOR POSITIVE: ICD-10-CM

## 2024-08-15 DIAGNOSIS — C50.312 MALIGNANT NEOPLASM OF LOWER-INNER QUADRANT OF LEFT BREAST IN FEMALE, ESTROGEN RECEPTOR POSITIVE: Primary | ICD-10-CM

## 2024-08-15 DIAGNOSIS — Z17.0 MALIGNANT NEOPLASM OF CENTRAL PORTION OF LEFT BREAST IN FEMALE, ESTROGEN RECEPTOR POSITIVE: ICD-10-CM

## 2024-08-15 DIAGNOSIS — C50.312 MALIGNANT NEOPLASM OF LOWER-INNER QUADRANT OF LEFT BREAST IN FEMALE, ESTROGEN RECEPTOR POSITIVE: ICD-10-CM

## 2024-08-15 DIAGNOSIS — H53.8 BLURRY VISION: ICD-10-CM

## 2024-08-15 DIAGNOSIS — Z17.0 MALIGNANT NEOPLASM OF LOWER-INNER QUADRANT OF LEFT BREAST IN FEMALE, ESTROGEN RECEPTOR POSITIVE: ICD-10-CM

## 2024-08-15 DIAGNOSIS — M85.852 OSTEOPENIA OF NECKS OF BOTH FEMURS: ICD-10-CM

## 2024-08-15 LAB
ALBUMIN SERPL-MCNC: 3.8 G/DL (ref 3.5–5)
ALBUMIN/GLOB SERPL: 1.4 RATIO (ref 1.1–2)
ALP SERPL-CCNC: 84 UNIT/L (ref 40–150)
ALT SERPL-CCNC: 21 UNIT/L (ref 0–55)
ANION GAP SERPL CALC-SCNC: 7 MEQ/L
AST SERPL-CCNC: 20 UNIT/L (ref 5–34)
BASOPHILS # BLD AUTO: 0.03 X10(3)/MCL
BASOPHILS NFR BLD AUTO: 0.4 %
BILIRUB SERPL-MCNC: 0.6 MG/DL
BUN SERPL-MCNC: 15.4 MG/DL (ref 9.8–20.1)
CALCIUM SERPL-MCNC: 9.3 MG/DL (ref 8.4–10.2)
CHLORIDE SERPL-SCNC: 108 MMOL/L (ref 98–107)
CO2 SERPL-SCNC: 26 MMOL/L (ref 22–29)
CREAT SERPL-MCNC: 0.82 MG/DL (ref 0.55–1.02)
CREAT/UREA NIT SERPL: 19
EOSINOPHIL # BLD AUTO: 0.53 X10(3)/MCL (ref 0–0.9)
EOSINOPHIL NFR BLD AUTO: 7.7 %
ERYTHROCYTE [DISTWIDTH] IN BLOOD BY AUTOMATED COUNT: 13.5 % (ref 11.5–17)
GFR SERPLBLD CREATININE-BSD FMLA CKD-EPI: >60 ML/MIN/1.73/M2
GLOBULIN SER-MCNC: 2.8 GM/DL (ref 2.4–3.5)
GLUCOSE SERPL-MCNC: 89 MG/DL (ref 74–100)
HCT VFR BLD AUTO: 40.2 % (ref 37–47)
HGB BLD-MCNC: 13.5 G/DL (ref 12–16)
IMM GRANULOCYTES # BLD AUTO: 0.02 X10(3)/MCL (ref 0–0.04)
IMM GRANULOCYTES NFR BLD AUTO: 0.3 %
LYMPHOCYTES # BLD AUTO: 1.9 X10(3)/MCL (ref 0.6–4.6)
LYMPHOCYTES NFR BLD AUTO: 27.6 %
MCH RBC QN AUTO: 31.9 PG (ref 27–31)
MCHC RBC AUTO-ENTMCNC: 33.6 G/DL (ref 33–36)
MCV RBC AUTO: 95 FL (ref 80–94)
MONOCYTES # BLD AUTO: 0.46 X10(3)/MCL (ref 0.1–1.3)
MONOCYTES NFR BLD AUTO: 6.7 %
NEUTROPHILS # BLD AUTO: 3.95 X10(3)/MCL (ref 2.1–9.2)
NEUTROPHILS NFR BLD AUTO: 57.3 %
PLATELET # BLD AUTO: 222 X10(3)/MCL (ref 130–400)
PMV BLD AUTO: 8.4 FL (ref 7.4–10.4)
POTASSIUM SERPL-SCNC: 4.8 MMOL/L (ref 3.5–5.1)
PROT SERPL-MCNC: 6.6 GM/DL (ref 6.4–8.3)
RBC # BLD AUTO: 4.23 X10(6)/MCL (ref 4.2–5.4)
SODIUM SERPL-SCNC: 141 MMOL/L (ref 136–145)
WBC # BLD AUTO: 6.89 X10(3)/MCL (ref 4.5–11.5)

## 2024-08-15 PROCEDURE — 3080F DIAST BP >= 90 MM HG: CPT | Mod: CPTII,S$GLB,, | Performed by: NURSE PRACTITIONER

## 2024-08-15 PROCEDURE — 3077F SYST BP >= 140 MM HG: CPT | Mod: CPTII,S$GLB,, | Performed by: NURSE PRACTITIONER

## 2024-08-15 PROCEDURE — 36415 COLL VENOUS BLD VENIPUNCTURE: CPT

## 2024-08-15 PROCEDURE — 80053 COMPREHEN METABOLIC PANEL: CPT

## 2024-08-15 PROCEDURE — 1159F MED LIST DOCD IN RCRD: CPT | Mod: CPTII,S$GLB,, | Performed by: NURSE PRACTITIONER

## 2024-08-15 PROCEDURE — 99999 PR PBB SHADOW E&M-EST. PATIENT-LVL V: CPT | Mod: PBBFAC,,, | Performed by: NURSE PRACTITIONER

## 2024-08-15 PROCEDURE — 99215 OFFICE O/P EST HI 40 MIN: CPT | Mod: S$GLB,,, | Performed by: NURSE PRACTITIONER

## 2024-08-15 PROCEDURE — 1160F RVW MEDS BY RX/DR IN RCRD: CPT | Mod: CPTII,S$GLB,, | Performed by: NURSE PRACTITIONER

## 2024-08-15 PROCEDURE — 3008F BODY MASS INDEX DOCD: CPT | Mod: CPTII,S$GLB,, | Performed by: NURSE PRACTITIONER

## 2024-08-15 PROCEDURE — 85025 COMPLETE CBC W/AUTO DIFF WBC: CPT

## 2024-08-15 RX ORDER — LETROZOLE 2.5 MG/1
2.5 TABLET, FILM COATED ORAL DAILY
Qty: 90 TABLET | Refills: 3 | Status: SHIPPED | OUTPATIENT
Start: 2024-08-15

## 2024-08-15 RX ORDER — SODIUM CHLORIDE 0.9 % (FLUSH) 0.9 %
10 SYRINGE (ML) INJECTION
OUTPATIENT
Start: 2024-10-06

## 2024-08-15 RX ORDER — HEPARIN 100 UNIT/ML
500 SYRINGE INTRAVENOUS
OUTPATIENT
Start: 2024-10-06

## 2024-09-18 ENCOUNTER — LAB VISIT (OUTPATIENT)
Dept: LAB | Facility: HOSPITAL | Age: 54
End: 2024-09-18
Attending: INTERNAL MEDICINE
Payer: COMMERCIAL

## 2024-09-18 DIAGNOSIS — R06.02 SHORTNESS OF BREATH: ICD-10-CM

## 2024-09-18 DIAGNOSIS — R94.39 ABNORMAL BALLISTOCARDIOGRAM: Primary | ICD-10-CM

## 2024-09-18 LAB
ANION GAP SERPL CALC-SCNC: 6 MEQ/L
BUN SERPL-MCNC: 17 MG/DL (ref 9.8–20.1)
CALCIUM SERPL-MCNC: 9.2 MG/DL (ref 8.4–10.2)
CHLORIDE SERPL-SCNC: 108 MMOL/L (ref 98–107)
CO2 SERPL-SCNC: 27 MMOL/L (ref 22–29)
CREAT SERPL-MCNC: 0.87 MG/DL (ref 0.55–1.02)
CREAT/UREA NIT SERPL: 20
ERYTHROCYTE [DISTWIDTH] IN BLOOD BY AUTOMATED COUNT: 13.6 % (ref 11.5–17)
GFR SERPLBLD CREATININE-BSD FMLA CKD-EPI: >60 ML/MIN/1.73/M2
GLUCOSE SERPL-MCNC: 101 MG/DL (ref 74–100)
HCT VFR BLD AUTO: 39.2 % (ref 37–47)
HGB BLD-MCNC: 13 G/DL (ref 12–16)
MCH RBC QN AUTO: 31.4 PG (ref 27–31)
MCHC RBC AUTO-ENTMCNC: 33.2 G/DL (ref 33–36)
MCV RBC AUTO: 94.7 FL (ref 80–94)
PLATELET # BLD AUTO: 239 X10(3)/MCL (ref 130–400)
PMV BLD AUTO: 8.9 FL (ref 7.4–10.4)
POTASSIUM SERPL-SCNC: 4.1 MMOL/L (ref 3.5–5.1)
RBC # BLD AUTO: 4.14 X10(6)/MCL (ref 4.2–5.4)
SODIUM SERPL-SCNC: 141 MMOL/L (ref 136–145)
WBC # BLD AUTO: 7.25 X10(3)/MCL (ref 4.5–11.5)

## 2024-09-18 PROCEDURE — 85027 COMPLETE CBC AUTOMATED: CPT

## 2024-09-18 PROCEDURE — 80048 BASIC METABOLIC PNL TOTAL CA: CPT

## 2024-09-18 PROCEDURE — 36415 COLL VENOUS BLD VENIPUNCTURE: CPT

## 2024-09-26 ENCOUNTER — HOSPITAL ENCOUNTER (OUTPATIENT)
Facility: HOSPITAL | Age: 54
Discharge: HOME | End: 2024-09-26
Attending: INTERNAL MEDICINE | Admitting: INTERNAL MEDICINE
Payer: COMMERCIAL

## 2024-09-26 DIAGNOSIS — R94.8 ABNORMAL PET SCAN, MEDIASTINUM: ICD-10-CM

## 2024-09-26 LAB — POCT GLUCOSE: 91 MG/DL (ref 70–110)

## 2024-09-26 PROCEDURE — 25000003 PHARM REV CODE 250: Performed by: INTERNAL MEDICINE

## 2024-09-26 PROCEDURE — 99152 MOD SED SAME PHYS/QHP 5/>YRS: CPT | Performed by: INTERNAL MEDICINE

## 2024-09-26 PROCEDURE — 63600175 PHARM REV CODE 636 W HCPCS: Performed by: INTERNAL MEDICINE

## 2024-09-26 PROCEDURE — 93458 L HRT ARTERY/VENTRICLE ANGIO: CPT | Performed by: INTERNAL MEDICINE

## 2024-09-26 PROCEDURE — C1894 INTRO/SHEATH, NON-LASER: HCPCS | Performed by: INTERNAL MEDICINE

## 2024-09-26 PROCEDURE — C1769 GUIDE WIRE: HCPCS | Performed by: INTERNAL MEDICINE

## 2024-09-26 PROCEDURE — C1887 CATHETER, GUIDING: HCPCS | Performed by: INTERNAL MEDICINE

## 2024-09-26 RX ORDER — ACETAMINOPHEN 325 MG/1
650 TABLET ORAL EVERY 4 HOURS PRN
Status: DISCONTINUED | OUTPATIENT
Start: 2024-09-26 | End: 2024-09-26 | Stop reason: HOSPADM

## 2024-09-26 RX ORDER — FENTANYL CITRATE 50 UG/ML
INJECTION, SOLUTION INTRAMUSCULAR; INTRAVENOUS
Status: DISCONTINUED | OUTPATIENT
Start: 2024-09-26 | End: 2024-09-26 | Stop reason: HOSPADM

## 2024-09-26 RX ORDER — ONDANSETRON 4 MG/1
8 TABLET, ORALLY DISINTEGRATING ORAL EVERY 8 HOURS PRN
Status: DISCONTINUED | OUTPATIENT
Start: 2024-09-26 | End: 2024-09-26 | Stop reason: HOSPADM

## 2024-09-26 RX ORDER — HYDROCODONE BITARTRATE AND ACETAMINOPHEN 5; 325 MG/1; MG/1
1 TABLET ORAL ONCE
Status: DISCONTINUED | OUTPATIENT
Start: 2024-09-26 | End: 2024-09-26

## 2024-09-26 RX ORDER — DIAZEPAM 5 MG/1
10 TABLET ORAL
Status: DISCONTINUED | OUTPATIENT
Start: 2024-09-26 | End: 2024-09-26 | Stop reason: HOSPADM

## 2024-09-26 RX ORDER — DIPHENHYDRAMINE HCL 25 MG
50 CAPSULE ORAL
Status: DISCONTINUED | OUTPATIENT
Start: 2024-09-26 | End: 2024-09-26

## 2024-09-26 RX ORDER — MIDAZOLAM HYDROCHLORIDE 1 MG/ML
INJECTION INTRAMUSCULAR; INTRAVENOUS
Status: DISCONTINUED | OUTPATIENT
Start: 2024-09-26 | End: 2024-09-26 | Stop reason: HOSPADM

## 2024-09-26 RX ORDER — SODIUM CHLORIDE 9 MG/ML
INJECTION, SOLUTION INTRAVENOUS ONCE
Status: COMPLETED | OUTPATIENT
Start: 2024-09-26 | End: 2024-09-26

## 2024-09-26 RX ORDER — HYDROCODONE BITARTRATE AND ACETAMINOPHEN 5; 325 MG/1; MG/1
1 TABLET ORAL EVERY 6 HOURS PRN
Status: DISCONTINUED | OUTPATIENT
Start: 2024-09-26 | End: 2024-09-26 | Stop reason: HOSPADM

## 2024-09-26 RX ORDER — LIDOCAINE HYDROCHLORIDE 10 MG/ML
INJECTION, SOLUTION INFILTRATION; PERINEURAL
Status: DISCONTINUED | OUTPATIENT
Start: 2024-09-26 | End: 2024-09-26 | Stop reason: HOSPADM

## 2024-09-26 RX ORDER — ASPIRIN 81 MG/1
81 TABLET ORAL DAILY
COMMUNITY

## 2024-09-26 RX ORDER — HYDROCODONE BITARTRATE AND ACETAMINOPHEN 5; 325 MG/1; MG/1
1 TABLET ORAL ONCE
Status: DISCONTINUED | OUTPATIENT
Start: 2024-09-26 | End: 2024-09-26 | Stop reason: HOSPADM

## 2024-09-26 RX ADMIN — HYDROCODONE BITARTRATE AND ACETAMINOPHEN 1 TABLET: 5; 325 TABLET ORAL at 01:09

## 2024-09-26 RX ADMIN — DIAZEPAM 10 MG: 5 TABLET ORAL at 09:09

## 2024-09-26 RX ADMIN — HYDROCODONE BITARTRATE AND ACETAMINOPHEN 1 TABLET: 5; 325 TABLET ORAL at 12:09

## 2024-09-26 RX ADMIN — SODIUM CHLORIDE: 9 INJECTION, SOLUTION INTRAVENOUS at 09:09

## 2024-09-26 NOTE — NURSING
PT C/O 6/10 LOW BACK PAIN. NO SIGNS OF HEMATOMA PRESENT ON PHYSICAL EXAMINATION. PT STATES SHE CAN TAKE NORCO DESPITE TRAMADOL ALLERGY. DR. CARRILLO ROUNDS ON PT SHORTLY AFTER AND NURSE INFORMS HIM OF COMPLAINT OF PAIN AND TOLERANCE OF NORCO. VERBAL ORDERS RECEIVED FOR NORCO 5-325 PRN PAIN. READ-BACK PERFORMED

## 2024-09-26 NOTE — DISCHARGE SUMMARY
[facility] - Cath Lab Services  Discharge Note    Short Stay    Procedure(s) (LRB):  Left heart cath (Left)      OUTCOME: Patient tolerated treatment/procedure well without complication and is now ready for discharge.    DISPOSITION: Home or Self Care    FINAL DIAGNOSIS:  non obstructive CAD    FOLLOWUP: In clinic    DISCHARGE INSTRUCTIONS:  No discharge procedures on file.     TIME SPENT ON DISCHARGE: 31 minutes

## 2024-09-26 NOTE — Clinical Note
The catheter was inserted into the and was inserted over the wire into the left ventricle. Hemodynamics were performed.  and Pullback was recorded.   65%. EDP 15

## 2024-09-30 VITALS
DIASTOLIC BLOOD PRESSURE: 70 MMHG | TEMPERATURE: 98 F | HEIGHT: 60 IN | WEIGHT: 168.88 LBS | OXYGEN SATURATION: 96 % | RESPIRATION RATE: 14 BRPM | BODY MASS INDEX: 33.15 KG/M2 | SYSTOLIC BLOOD PRESSURE: 99 MMHG | HEART RATE: 67 BPM

## 2024-10-03 ENCOUNTER — LAB VISIT (OUTPATIENT)
Dept: LAB | Facility: HOSPITAL | Age: 54
End: 2024-10-03
Attending: INTERNAL MEDICINE
Payer: COMMERCIAL

## 2024-10-03 DIAGNOSIS — E11.9 DIABETES MELLITUS, TYPE II: Primary | ICD-10-CM

## 2024-10-03 DIAGNOSIS — R06.02 SOB (SHORTNESS OF BREATH): ICD-10-CM

## 2024-10-03 LAB
CHOLEST SERPL-MCNC: 236 MG/DL
CHOLEST/HDLC SERPL: 5 {RATIO} (ref 0–5)
HDLC SERPL-MCNC: 47 MG/DL (ref 35–60)
LDLC SERPL CALC-MCNC: 171 MG/DL (ref 50–140)
TRIGL SERPL-MCNC: 89 MG/DL (ref 37–140)
VLDLC SERPL CALC-MCNC: 18 MG/DL

## 2024-10-03 PROCEDURE — 80061 LIPID PANEL: CPT

## 2024-10-03 PROCEDURE — 82172 ASSAY OF APOLIPOPROTEIN: CPT

## 2024-10-03 PROCEDURE — 36415 COLL VENOUS BLD VENIPUNCTURE: CPT

## 2024-10-05 LAB — APO B SERPL-MCNC: 129 MG/DL

## 2024-10-23 ENCOUNTER — INFUSION (OUTPATIENT)
Dept: INFUSION THERAPY | Facility: HOSPITAL | Age: 54
End: 2024-10-23
Attending: FAMILY MEDICINE
Payer: COMMERCIAL

## 2024-10-23 VITALS
OXYGEN SATURATION: 99 % | BODY MASS INDEX: 33.77 KG/M2 | SYSTOLIC BLOOD PRESSURE: 135 MMHG | HEART RATE: 58 BPM | HEIGHT: 60 IN | WEIGHT: 172 LBS | DIASTOLIC BLOOD PRESSURE: 89 MMHG | TEMPERATURE: 98 F | RESPIRATION RATE: 18 BRPM

## 2024-10-23 DIAGNOSIS — Z17.0 MALIGNANT NEOPLASM OF LOWER-INNER QUADRANT OF LEFT BREAST IN FEMALE, ESTROGEN RECEPTOR POSITIVE: Primary | ICD-10-CM

## 2024-10-23 DIAGNOSIS — M85.851 OSTEOPENIA OF NECKS OF BOTH FEMURS: ICD-10-CM

## 2024-10-23 DIAGNOSIS — C50.312 MALIGNANT NEOPLASM OF LOWER-INNER QUADRANT OF LEFT BREAST IN FEMALE, ESTROGEN RECEPTOR POSITIVE: Primary | ICD-10-CM

## 2024-10-23 DIAGNOSIS — M85.852 OSTEOPENIA OF NECKS OF BOTH FEMURS: ICD-10-CM

## 2024-10-23 PROCEDURE — 63600175 PHARM REV CODE 636 W HCPCS: Mod: JZ,JG | Performed by: NURSE PRACTITIONER

## 2024-10-23 PROCEDURE — A4216 STERILE WATER/SALINE, 10 ML: HCPCS | Performed by: NURSE PRACTITIONER

## 2024-10-23 PROCEDURE — 96523 IRRIG DRUG DELIVERY DEVICE: CPT

## 2024-10-23 PROCEDURE — 25000003 PHARM REV CODE 250: Performed by: NURSE PRACTITIONER

## 2024-10-23 PROCEDURE — 96372 THER/PROPH/DIAG INJ SC/IM: CPT

## 2024-10-23 RX ORDER — SODIUM CHLORIDE 0.9 % (FLUSH) 0.9 %
10 SYRINGE (ML) INJECTION
OUTPATIENT
Start: 2025-01-01

## 2024-10-23 RX ORDER — HEPARIN 100 UNIT/ML
500 SYRINGE INTRAVENOUS
OUTPATIENT
Start: 2025-01-01

## 2024-10-23 RX ORDER — SODIUM CHLORIDE 0.9 % (FLUSH) 0.9 %
10 SYRINGE (ML) INJECTION
Status: DISCONTINUED | OUTPATIENT
Start: 2024-10-23 | End: 2024-10-23 | Stop reason: HOSPADM

## 2024-10-23 RX ADMIN — SODIUM CHLORIDE, PRESERVATIVE FREE 10 ML: 5 INJECTION INTRAVENOUS at 11:10

## 2024-10-23 RX ADMIN — DENOSUMAB 60 MG: 60 INJECTION SUBCUTANEOUS at 11:10

## 2024-10-23 NOTE — PLAN OF CARE
MPF performed (NS only) (Q3M) + Prolia given (Q6M); tolerated well; next appointments discussed with patient; discharged home in stable condition

## 2024-11-18 NOTE — PROGRESS NOTES
Subjective:       Patient ID: Nita Dejesus is a 54 y.o. female.    Previous Oncologist: Dr. Katlyn Gonzalez at [facility] [place]  Surgeon: Dr. Kelly Santos    Left Breast Cancer Stage IA(T2N0M0) Triple Positive--22  Biopsy/Surgery/pathology:   1. Excisional biopsy left breast mass/left breast cyst done 22--invasive ductal carcinoma, Grade 3, measures 3.0cm, a 0.6cm region of cauterized carcinoma is present at the inked superior-deep margin, cyst with reactive changes, suggestive of previous lumpectomy site, fluid left breast cyst with rare atypical cells present, suspicious for carcinoma, ER 95%, ME 27%, Her2 3+ by IHC, positive, Ki67 51%.   2. Left SLN biopsies done 22--3 benign lymph nodes.   3. Re-excision lumpectomy done 3/8/23--no residual invasive malignancy identified.   4. S/p BSO on 24--Negative for malignancy.   Imagin. Bilateral diagnostic MMG done at [facility] [place] 22--left inferior breast large, minimally complex cystic lesion at 6:00 measures 4.4X3.5X4.2cm, corresponding to the palpable area, appears benign. Routine screening MMG recommended.   2. MRI breasts bilateral at [facility] 22--post-surgical seroma at 12:00 position of left breast s/p recent excisional biopsy, no definite suspicious enhancing masses or areas of nonmass enhancement of left breast, and no suspicious areas in right breast, BIRADS 6.   3. CT C/A/P w/ contrast 22 at [facility]--no thoracic metastatic disease, fatty infiltration of liver, small to moderate-sized hiatal hernia, no metastatic disease.   4. NM Bone scan whole body done 22 at [facility]--activity in cervical and lumbar spines likely degenerative, no anatomic correlate seen on CT scan in lumbar spines, suggest correlation with C-spine Xrays, increase tracer w/n both feet, likely degnerative change, physiologic activity of urinary tract.  5. CT A/P w/ contrast 22 at [facility]--no acute abdominopelvic pathology or change compared to recent CT from 22, fairly  large amount of stool in colon may reflect constipation, large hiatal hernia, diffuse hepatic steatosis, post-cholecystectomy, post hysterectomy, mild thoracic and lumbar degenerative disease.   6. Bilateral diagnostic MMG/Left breast US 2/28/23--No suspicious masses, microcalcifications, or other abnormalities are seen  within the right or left breast. Postsurgical changes are noted at the  6:00 position of the left breast at site of prior excisional biopsy with positive margins.   7. NM parathyroid scan on 7/27/23--Overall asymmetric increased activity at the right thyroid lobe with limited washout on the delayed 2 hour exam.  A underlying hyperfunctioning parathyroid adenoma cannot be entirely excluded.  7. CT C/A/P on 8/4/23--Mucosal prominence at a small to moderate size hiatus hernia. Atelectasis and/or scarring anterior left upper lobe and posterior right lower lung. Postsurgical changes left axilla and left breast with small residual fluid density/edema/stranding. Right central line/MediPort. Thoracolumbar spondylosis.   8. CT A/P w/o on 8/8/24--Postop bilateral salpingo-oophorectomies. Moderate to large sized hiatal hernia with intraluminal punctate density measuring 4 mm. Scattered diverticulosis without evidence of inflammation. Moderate stool burden. Normal appendix. Periumbilical skin thickening. Trace right lateral abdominal wall edema. Scattered degenerative changes without focal or acute osseous abnormality. Stable pelvic benign bone islands.     DEXA:  5/11/23--AP spine 0.1, left femoral neck -1.5, left total hip -0.4, right femoral neck -1.2, right total hip 0.0, c/w osteopenia bilateral femoral necks.     ECHO:  08/18/22--EF 55-65%.  11/17/22--EF 60%.  03/23/23--EF 55-60%.  06/20/23--EF 60%.  09/05/23--EF 55-60%  12/05/23--EF 55-60%.    Genetic testing: [employer] done 7/21/22 negative for any pathogenic variants.     Treatment history:  Left breast excisional biopsy done 7/13/22  Left SLN biopsies  done 8/8/22.  2 units PRBC 12/19/22.  TCHP X 6 cycles completed only 8/23/22--1/25/23 (multiple treatment delays for thrombocytopenia, despite dose reductions, multiple side effects).  Re-excision lumpectomy done 3/8/23--no residual disease.   Adjuvant radiation therapy completed 4/12/23--5/9/23.  Maintenance HP x 11 cycles. 3/29/23 - 10/25/23.   Zometa given X 1 dose 4/2024 had severe N/V, fever, joint/bone pain, flu-like symptoms all X 3 days so was stopped.   Zoladex monthly. 3/29/23 - 6/19/24. S/p BSO/DAISY.     Current treatment plan:  Adjuvant Femara started 3/29/23. Stopped in September 2024 due to side effects.   Plan to change to Arimidex on 12/1/24.   2.   Prolia started in October 2024. Next dose due April 2025.     Chief Complaint: Breast Cancer (Stopped the Femara due to numerous side effects.)    HPI  Patient presents for follow-up of breast cancer. She is doing okay today. Mentions she stopped the Femara around September due to numerous side effects some of which include weight gain, tachycardia, hot flashes, nausea, diarrhea, high cholesterol, insomnia, and dry eyes. States her symptoms have since resolved. She is followed by Dr. Perez who is also monitoring her. She did agree to try Arimidex in a few weeks. She She had 2 different opinions about reconstruction with Dr. Kirkpatrick and Dr. Alvarado and has decided to go with Dr. Alvarado. She is waiting on cardiac clearance before proceeding. Scheduled for bilateral MMG in March 2025 and MRI in August 2025. No other problems reported.     Past Medical History:   Diagnosis Date    Allergy     Anemia     Anxiety     Back pain     Breast cancer     GERD (gastroesophageal reflux disease)     Hiatal hernia     Hip pain     Hx of Clotting disorder     d/t medication    Hyperparathyroidism     Insulin resistance     Migraine     Neuromuscular disorder     Neuropathy     Nontoxic single thyroid nodule     Osteopenia     Prediabetes       Past Surgical History:    Procedure Laterality Date    BREAST LUMPECTOMY Left     c lymph nodes    BREAST SURGERY      x2    CHOLECYSTECTOMY      COLONOSCOPY      ESOPHAGOGASTRODUODENOSCOPY      EYE SURGERY  1995    HERNIA REPAIR  Aug. 2017    HYSTERECTOMY  Aug, 2012    LAPAROSCOPIC NISSEN FUNDOPLICATION N/A 09/26/2023    Procedure: FUNDOPLICATION, NISSEN, LAPAROSCOPIC;  Surgeon: Maximiliano Denton Jr., MD;  Location: [facility] OR;  Service: General;  Laterality: N/A;  WITH GASTROPEXY    LEFT HEART CATHETERIZATION Left 9/26/2024    Procedure: Left heart cath;  Surgeon: Holden Peters MD;  Location: [facility] CATH LAB;  Service: Cardiology;  Laterality: Left;    NISSEN FUNDOPLICATION      PARATHYROID GLAND SURGERY  12/07/2023    PARTIAL HYSTERECTOMY      PLACEMENT, MEDIPORT      REDUCTION OF BOTH BREASTS  2018    TUBAL LIGATION Bilateral 1993     Family History   Problem Relation Name Age of Onset    Hypertension Mother Judy Sheridan     Lupus Mother Judy Sheridan     Hypoparathyroidism Mother Judy Sheridan     Diabetes Father Mirza  Desandro     Heart disease Father Mirza  Desandro     Prostate cancer Father Mirza  Desandro     Kidney disease Father Mirza  Desandro     Cancer Father Mirza  Desandro     COPD Father Mirza  Desandro     Hearing loss Father Mirza  Desandro     Hodgkin's lymphoma Brother      Pancreatitis Brother       Social History     Socioeconomic History    Marital status:    Tobacco Use    Smoking status: Some Days     Current packs/day: 0.25     Average packs/day: 0.3 packs/day for 15.0 years (3.8 ttl pk-yrs)     Types: Cigarettes    Smokeless tobacco: Never   Substance and Sexual Activity    Alcohol use: Not Currently    Drug use: Never    Sexual activity: Not Currently     Partners: Male     Birth control/protection: See Surgical Hx, None       Review of patient's allergies indicates:   Allergen Reactions    Amitriptyline Anxiety and Other (See Comments)     Severe    Other reaction(s): Unknown    Corticosteroids  "(glucocorticoids) Hives, Other (See Comments) and Swelling     Patient states "Severe Inflammation"      Crestor [rosuvastatin] Shortness Of Breath and Palpitations    Egg Anaphylaxis     Vaccines that have egg protein base cause anaphylaxix, can eat eggs    Heparin analogues Palpitations and Other (See Comments)     "All anticoagulation Meds cause dangerously low BP"    Ketorolac Diarrhea, Nausea And Vomiting, Other (See Comments) and Swelling     Migraines      Mushroom Anaphylaxis     ANY MUSHROOM CAUSES ANAPHYLAXIS    Peanut Anaphylaxis    Penicillins Anaphylaxis, Diarrhea, Hives, Nausea And Vomiting and Swelling    Tramadol Diarrhea, Itching, Nausea And Vomiting, Rash and Swelling    Latex Other (See Comments)     Skin blisters      Macrolide antibiotics Hives and Rash    Adhesive Other (See Comments)     Blisters, skin tears; CANNOT USE PAPER TAPE    Anticoag citrate phos dextrose      STATES ANY BLOOD THINNERS CAUSES EXTREMELY LOW BLOOD PRESSURE    Low b/p    Aspartame Other (See Comments)     Migraines and nausea    Benadryl [diphenhydramine hcl]      Involuntary movement of the legs, flares up restless leg    Erythromycin      Other reaction(s): Unknown    Estrogens Other (See Comments)     Drives cancer    Other omega-3s      Anticoagulant   Patient states "low blood pressure"    Progesterone Other (See Comments)     Swelling of lymph nodes, breast engorgemnt    Sucralose Other (See Comments)     Causes migraines    Aspirin Other (See Comments)     Avoid if possible due to blood thinning    Eszopiclone Itching and Anxiety     AKA LUNESTA    Pork derived (porcine) Diarrhea    Topiramate Anxiety, Other (See Comments) and Palpitations     Shaking    Other Reaction(s): Unknown    Zinc Nausea Only    Zoledronic acid Nausea And Vomiting     Pain in eyes during the infusion.    Joint pain, fever      Current Outpatient Medications on File Prior to Visit   Medication Sig Dispense Refill    apremilast (OTEZLA) 30 " mg Tab Take 30 mg by mouth every evening.      aspirin (ECOTRIN) 81 MG EC tablet Take 81 mg by mouth once daily. Takes it in the evening      butalbital-acetaminophen-caffeine -40 mg (FIORICET, ESGIC) -40 mg per tablet Take 1 tablet by mouth daily as needed for Headaches.      cholecalciferol, vitamin D3, 1,250 mcg (50,000 unit) capsule SMARTSI Capsule(s) By Mouth Every 10 Days      ergocalciferol (ERGOCALCIFEROL) 50,000 unit Cap Take by mouth.      gabapentin (NEURONTIN) 300 MG capsule Take 300 mg by mouth 3 (three) times daily. Takes with 600 mg to total 900mg TID. Takes 1200mg @ night      gabapentin (NEURONTIN) 600 MG tablet Take 600 mg by mouth 3 (three) times daily.      LANTUS U-100 INSULIN 100 unit/mL injection SMARTSI Unit(s) SUB-Q Daily      loperamide (IMODIUM) 2 mg capsule Take 2 mg by mouth once as needed for Diarrhea.      metoclopramide HCl (REGLAN) 10 MG tablet 1 Tablet Orally Once a day as needed for nausea for 30 days      omeprazole (PRILOSEC) 40 MG capsule Take 1 capsule (40 mg total) by mouth once daily. 30 capsule 6    onabotulinumtoxina (BOTOX) 200 unit SolR 200 Units every 3 (three) months. NO BOTTLE TO VERIFY AM OF PROCEDURE      ondansetron (ZOFRAN) 4 MG tablet 1 tablet Orally prn for 30 days      promethazine (PHENERGAN) 25 MG tablet take 1 tablet by mouth every 6 hours AS NEEDED FOR nausea 30 tablet 3    tiZANidine (ZANAFLEX) 4 MG tablet Take 4 mg by mouth 3 (three) times daily. 1 tab in am and 1 TAB  IN afternoon and 2 tabs at night      vitamin E 400 UNIT capsule Take 800 Units by mouth once daily. STOPPED FOR PROCEDURE NO BOTTLE TO VERIFY AM OF PROCEDURE      [DISCONTINUED] letrozole (FEMARA) 2.5 mg Tab Take 1 tablet (2.5 mg total) by mouth once daily. (Patient not taking: Reported on 2024.) 90 tablet 3     No current facility-administered medications on file prior to visit.      Review of Systems   Constitutional:  Positive for fatigue. Negative for appetite  change and unexpected weight change.   HENT:  Negative for mouth sores.    Eyes:  Positive for eye dryness.   Gastrointestinal:  Negative for abdominal pain.        +hiatal hernia   Genitourinary:  Positive for hot flashes. Negative for frequency.   Musculoskeletal:  Positive for arthralgias. Negative for back pain.   Integumentary:  Negative for rash.        Bilateral breast pain, chronic   Neurological:  Negative for headaches.   Hematological:  Negative for adenopathy.   Psychiatric/Behavioral:  The patient is not nervous/anxious.         Vitals:    11/21/24 1302   BP: (!) 166/107   Pulse: 84   Resp: 18   Temp: 98.4 °F (36.9 °C)   TempSrc: Oral   SpO2: 98%   Weight: 80.6 kg (177 lb 12.8 oz)   Height: 5' (1.524 m)     Physical Exam  Constitutional:       Appearance: Normal appearance. She is overweight.   HENT:      Head: Normocephalic.      Nose: Nose normal.      Mouth/Throat:      Mouth: Mucous membranes are moist.   Eyes:      General: Lids are normal.      Extraocular Movements: Extraocular movements intact.      Conjunctiva/sclera: Conjunctivae normal.   Neck:      Comments: Midline neck incision intact  Cardiovascular:      Rate and Rhythm: Normal rate and regular rhythm.      Heart sounds: Normal heart sounds.   Pulmonary:      Effort: Pulmonary effort is normal.      Breath sounds: Normal breath sounds and air entry.   Chest:      Comments: Right chest wall mediport in place. Left breast with inferior lumpectomy scar, well healed. Left axillary incision from LN biopsy also healed. No masses palpable.   Abdominal:      General: Abdomen is protuberant. Bowel sounds are normal. There is no distension.      Palpations: Abdomen is soft.      Tenderness: There is no abdominal tenderness.      Comments: Scattered laparoscopic incisions all healed well.   Musculoskeletal:         General: Normal range of motion.      Cervical back: Normal range of motion and neck supple.      Right lower leg: No edema.      Left  lower leg: No edema.   Skin:     General: Skin is warm and moist.   Neurological:      General: No focal deficit present.      Mental Status: She is alert and oriented to person, place, and time.   Psychiatric:         Mood and Affect: Mood normal.         Behavior: Behavior is cooperative.         Judgment: Judgment normal.       Lab Visit on 11/21/2024   Component Date Value    WBC 11/21/2024 6.56     RBC 11/21/2024 4.26     Hgb 11/21/2024 13.5     Hct 11/21/2024 40.9     MCV 11/21/2024 96.0 (H)     MCH 11/21/2024 31.7 (H)     MCHC 11/21/2024 33.0     RDW 11/21/2024 12.6     Platelet 11/21/2024 201     MPV 11/21/2024 8.4     Neut % 11/21/2024 57.3     Lymph % 11/21/2024 29.1     Mono % 11/21/2024 6.1     Eos % 11/21/2024 6.7     Basophil % 11/21/2024 0.5     Lymph # 11/21/2024 1.91     Neut # 11/21/2024 3.76     Mono # 11/21/2024 0.40     Eos # 11/21/2024 0.44     Baso # 11/21/2024 0.03     IG# 11/21/2024 0.02     IG% 11/21/2024 0.3           Assessment:       1. Malignant neoplasm of lower-inner quadrant of left breast in female, estrogen receptor positive    2. HER2-positive carcinoma of left breast    3. Osteopenia of necks of both femurs      Plan:       Patient with Stage IA Left breast cancer triple positive s/p excisional biopsy of a cystic lesion that found cancer incidentally diagnosed 7/13/22. Tumor measured 3.0cm and SLN biopsies negative, Grade 3, strongly ER and PA positive and Her2 positive with high Ki67. There was a positive margin on the excisional biopsy.  Treatment with chemotherapy neoadjuvant started at [facility] with Dr. Gonzalez.    Patient received 2 cycles at [facility] then changed care to [facility]. She was having multiple side effects.   She does not have any steroids due to h/o allergy causing inflammation.   Patient had multiple delays due to multiple side effects, thrombocytopenia, requiring dose reduction, but finally completed 6 cycles on 1/25/23.  Surgery was delayed and finally done on 3/8/23.  Patient had re-excision lumpectomy and there was no residual invasive malignancy.     Treatment resumed with maintenance HP x 11 cycles 3/29/23.     Recommended also OS + AI X 5 years followed by completion of AI to complete 10 years.   She had DAISY in 2012 for endometriosis but still had ovaries remaining functional prior to starting chemotherapy.   8/4/22 estradiol level 11, FSH 44, LH 38. Repeat Estradiol level done on Zoladex per patient request in 3/2024 <24.  Zoladex/Femara started on 3/29/23. Plan for endocrine therapy X 10 years.  Patient completed radiation on 5/9/23.    Completed 11 cycles of maintenance Herceptin + Perjeta on 10/25/23.   She was found to have primary hyperparathyroidism and was seen by Dr. Hamilton endocrinologist. Referred to a surgeon in [place] and had surgery on 12/7/23 to remove 1 overactive parathyroid gland.     Currently patient is doing well without any signs or symptoms to suggest disease recurrence. S/p BSO/DAISY by Dr. Ramirez on 7/18/24 with pathology benign.   CBC today is good. CMP pending.   She stopped the Femara in September 2024 after numerous side effects that have all since resolved. We did have a discussion today about the importance of AI and she did agree to try Arimidex daily. She will start in a few weeks.   Continue vitamin D.   Diagnostic bilateral MMG 3/26/24 benign. Routine screening MMG recommended in 3/2025.   Also suggest alternating MRI with MMG. MRI scheduled for August 2025.   Continue routine surveillance visits.  F/u in 3 months with repeat labs and visit. If doing well, can change appointments to every 6 months.   Patient would like her mediport removed. Dr. Santos has retired so I will send referral to Dr. Burnette.     DEXA from 5/11/23 showed mild osteopenia in bilateral femoral necks. Given Zometa in 4/2024, but had severe side effects, so recommended to stop. She does not want to try Fosamax due to GI issues. Started Prolia in October 2024 and  tolerated well. Next dose due in April 2025.      All questions answered at this time.        Eliseo De Los Santos [facility]      Addendum:   Patient known to Dr. Denton. Will send referral for mediport removal to his office.

## 2024-11-21 ENCOUNTER — LAB VISIT (OUTPATIENT)
Dept: LAB | Facility: HOSPITAL | Age: 54
End: 2024-11-21
Attending: INTERNAL MEDICINE
Payer: COMMERCIAL

## 2024-11-21 ENCOUNTER — OFFICE VISIT (OUTPATIENT)
Dept: HEMATOLOGY/ONCOLOGY | Facility: CLINIC | Age: 54
End: 2024-11-21
Payer: COMMERCIAL

## 2024-11-21 VITALS
WEIGHT: 177.81 LBS | RESPIRATION RATE: 18 BRPM | DIASTOLIC BLOOD PRESSURE: 107 MMHG | BODY MASS INDEX: 34.91 KG/M2 | HEART RATE: 84 BPM | HEIGHT: 60 IN | OXYGEN SATURATION: 98 % | SYSTOLIC BLOOD PRESSURE: 166 MMHG | TEMPERATURE: 98 F

## 2024-11-21 DIAGNOSIS — M85.851 OSTEOPENIA OF NECKS OF BOTH FEMURS: ICD-10-CM

## 2024-11-21 DIAGNOSIS — Z17.31 HER2-POSITIVE CARCINOMA OF LEFT BREAST: ICD-10-CM

## 2024-11-21 DIAGNOSIS — M85.852 OSTEOPENIA OF NECKS OF BOTH FEMURS: ICD-10-CM

## 2024-11-21 DIAGNOSIS — H53.8 BLURRY VISION: ICD-10-CM

## 2024-11-21 DIAGNOSIS — Z17.0 MALIGNANT NEOPLASM OF LOWER-INNER QUADRANT OF LEFT BREAST IN FEMALE, ESTROGEN RECEPTOR POSITIVE: ICD-10-CM

## 2024-11-21 DIAGNOSIS — C50.912 HER2-POSITIVE CARCINOMA OF LEFT BREAST: ICD-10-CM

## 2024-11-21 DIAGNOSIS — C50.312 MALIGNANT NEOPLASM OF LOWER-INNER QUADRANT OF LEFT BREAST IN FEMALE, ESTROGEN RECEPTOR POSITIVE: ICD-10-CM

## 2024-11-21 DIAGNOSIS — Z17.0 MALIGNANT NEOPLASM OF LOWER-INNER QUADRANT OF LEFT BREAST IN FEMALE, ESTROGEN RECEPTOR POSITIVE: Primary | ICD-10-CM

## 2024-11-21 DIAGNOSIS — C50.312 MALIGNANT NEOPLASM OF LOWER-INNER QUADRANT OF LEFT BREAST IN FEMALE, ESTROGEN RECEPTOR POSITIVE: Primary | ICD-10-CM

## 2024-11-21 DIAGNOSIS — Z90.79 STATUS POST BILATERAL SALPINGECTOMY: ICD-10-CM

## 2024-11-21 LAB
ALBUMIN SERPL-MCNC: 3.8 G/DL (ref 3.5–5)
ALBUMIN/GLOB SERPL: 1.3 RATIO (ref 1.1–2)
ALP SERPL-CCNC: 80 UNIT/L (ref 40–150)
ALT SERPL-CCNC: 14 UNIT/L (ref 0–55)
ANION GAP SERPL CALC-SCNC: 7 MEQ/L
AST SERPL-CCNC: 20 UNIT/L (ref 5–34)
BASOPHILS # BLD AUTO: 0.03 X10(3)/MCL
BASOPHILS NFR BLD AUTO: 0.5 %
BILIRUB SERPL-MCNC: 0.5 MG/DL
BUN SERPL-MCNC: 11.5 MG/DL (ref 9.8–20.1)
CALCIUM SERPL-MCNC: 9.4 MG/DL (ref 8.4–10.2)
CHLORIDE SERPL-SCNC: 107 MMOL/L (ref 98–107)
CO2 SERPL-SCNC: 27 MMOL/L (ref 22–29)
CREAT SERPL-MCNC: 0.8 MG/DL (ref 0.55–1.02)
CREAT/UREA NIT SERPL: 14
EOSINOPHIL # BLD AUTO: 0.44 X10(3)/MCL (ref 0–0.9)
EOSINOPHIL NFR BLD AUTO: 6.7 %
ERYTHROCYTE [DISTWIDTH] IN BLOOD BY AUTOMATED COUNT: 12.6 % (ref 11.5–17)
GFR SERPLBLD CREATININE-BSD FMLA CKD-EPI: >60 ML/MIN/1.73/M2
GLOBULIN SER-MCNC: 3 GM/DL (ref 2.4–3.5)
GLUCOSE SERPL-MCNC: 99 MG/DL (ref 74–100)
HCT VFR BLD AUTO: 40.9 % (ref 37–47)
HGB BLD-MCNC: 13.5 G/DL (ref 12–16)
IMM GRANULOCYTES # BLD AUTO: 0.02 X10(3)/MCL (ref 0–0.04)
IMM GRANULOCYTES NFR BLD AUTO: 0.3 %
LYMPHOCYTES # BLD AUTO: 1.91 X10(3)/MCL (ref 0.6–4.6)
LYMPHOCYTES NFR BLD AUTO: 29.1 %
MCH RBC QN AUTO: 31.7 PG (ref 27–31)
MCHC RBC AUTO-ENTMCNC: 33 G/DL (ref 33–36)
MCV RBC AUTO: 96 FL (ref 80–94)
MONOCYTES # BLD AUTO: 0.4 X10(3)/MCL (ref 0.1–1.3)
MONOCYTES NFR BLD AUTO: 6.1 %
NEUTROPHILS # BLD AUTO: 3.76 X10(3)/MCL (ref 2.1–9.2)
NEUTROPHILS NFR BLD AUTO: 57.3 %
PLATELET # BLD AUTO: 201 X10(3)/MCL (ref 130–400)
PMV BLD AUTO: 8.4 FL (ref 7.4–10.4)
POTASSIUM SERPL-SCNC: 4.6 MMOL/L (ref 3.5–5.1)
PROT SERPL-MCNC: 6.8 GM/DL (ref 6.4–8.3)
RBC # BLD AUTO: 4.26 X10(6)/MCL (ref 4.2–5.4)
SODIUM SERPL-SCNC: 141 MMOL/L (ref 136–145)
WBC # BLD AUTO: 6.56 X10(3)/MCL (ref 4.5–11.5)

## 2024-11-21 PROCEDURE — 3077F SYST BP >= 140 MM HG: CPT | Mod: CPTII,S$GLB,, | Performed by: NURSE PRACTITIONER

## 2024-11-21 PROCEDURE — 36415 COLL VENOUS BLD VENIPUNCTURE: CPT

## 2024-11-21 PROCEDURE — 3008F BODY MASS INDEX DOCD: CPT | Mod: CPTII,S$GLB,, | Performed by: NURSE PRACTITIONER

## 2024-11-21 PROCEDURE — 3080F DIAST BP >= 90 MM HG: CPT | Mod: CPTII,S$GLB,, | Performed by: NURSE PRACTITIONER

## 2024-11-21 PROCEDURE — 85025 COMPLETE CBC W/AUTO DIFF WBC: CPT

## 2024-11-21 PROCEDURE — 1159F MED LIST DOCD IN RCRD: CPT | Mod: CPTII,S$GLB,, | Performed by: NURSE PRACTITIONER

## 2024-11-21 PROCEDURE — 1160F RVW MEDS BY RX/DR IN RCRD: CPT | Mod: CPTII,S$GLB,, | Performed by: NURSE PRACTITIONER

## 2024-11-21 PROCEDURE — 99214 OFFICE O/P EST MOD 30 MIN: CPT | Mod: S$GLB,,, | Performed by: NURSE PRACTITIONER

## 2024-11-21 PROCEDURE — 80053 COMPREHEN METABOLIC PANEL: CPT

## 2024-11-21 PROCEDURE — 99999 PR PBB SHADOW E&M-EST. PATIENT-LVL IV: CPT | Mod: PBBFAC,,, | Performed by: NURSE PRACTITIONER

## 2024-11-21 RX ORDER — ANASTROZOLE 1 MG/1
1 TABLET ORAL DAILY
Qty: 30 TABLET | Refills: 3 | Status: SHIPPED | OUTPATIENT
Start: 2024-11-21 | End: 2025-11-21

## 2024-11-22 ENCOUNTER — TELEPHONE (OUTPATIENT)
Dept: SURGERY | Facility: CLINIC | Age: 54
End: 2024-11-22
Payer: COMMERCIAL

## 2024-11-25 NOTE — H&P (VIEW-ONLY)
HISTORY & PHYSICAL  General Surgery    Patient Name: Nita Dejesus  YOB: 1970    Date: 11/26/2024                   SUBJECTIVE:     Chief Complaint/Reason for Admission:   Chief Complaint   Patient presents with    Consult     Mediport removal, eval  Hiatal hernia      Mediport removal    History of Present Illness:  Ms. Nita Dejesus is a 54 y.o. female who has been referred for Mediport removal. She has history of Malignant neoplasm of left breast.  Denies CP / SOB. Denies fever / chills.     Review of Systems:  12 point ROS negative except as stated in HPI    PAST HISTORY:     Past Medical History:   Diagnosis Date    Allergy     Anemia     Anxiety     Back pain     Breast cancer     GERD (gastroesophageal reflux disease)     Hiatal hernia     Hip pain     Hx of Clotting disorder     d/t medication    Hyperparathyroidism     Insulin resistance     Migraine     Neuromuscular disorder     Neuropathy     Nontoxic single thyroid nodule     Osteopenia     Prediabetes      Past Surgical History:   Procedure Laterality Date    BREAST LUMPECTOMY Left     c lymph nodes    BREAST SURGERY      x2    CHOLECYSTECTOMY      COLONOSCOPY      ESOPHAGOGASTRODUODENOSCOPY      EYE SURGERY  1995    HERNIA REPAIR  Aug. 2017    HYSTERECTOMY  Aug, 2012    LAPAROSCOPIC NISSEN FUNDOPLICATION N/A 09/26/2023    Procedure: FUNDOPLICATION, NISSEN, LAPAROSCOPIC;  Surgeon: Maximiliano Denton Jr., MD;  Location: [facility] OR;  Service: General;  Laterality: N/A;  WITH GASTROPEXY    LEFT HEART CATHETERIZATION Left 9/26/2024    Procedure: Left heart cath;  Surgeon: Holden Peters MD;  Location: [facility] CATH LAB;  Service: Cardiology;  Laterality: Left;    NISSEN FUNDOPLICATION      PARATHYROID GLAND SURGERY  12/07/2023    PARTIAL HYSTERECTOMY      PLACEMENT, MEDIPORT      REDUCTION OF BOTH BREASTS  2018    TUBAL LIGATION Bilateral 1993     Family History   Problem Relation Name Age of Onset    Hypertension Mother Judy Sheridan      Lupus Mother Judy Sheridan     Hypoparathyroidism Mother Judy Sheridan     Diabetes Father Mirza  Desandro     Heart disease Father Mirza  Desandro     Prostate cancer Father Mirza  Desandro     Kidney disease Father Mirza  Desandro     Cancer Father Mirza  Desandro     COPD Father Mirza  Desandro     Hearing loss Father Mirza  Desandro     Hodgkin's lymphoma Brother      Pancreatitis Brother       Social History     Socioeconomic History    Marital status:    Tobacco Use    Smoking status: Some Days     Current packs/day: 0.25     Average packs/day: 0.3 packs/day for 15.0 years (3.8 ttl pk-yrs)     Types: Cigarettes    Smokeless tobacco: Never   Substance and Sexual Activity    Alcohol use: Not Currently    Drug use: Never    Sexual activity: Not Currently     Partners: Male     Birth control/protection: See Surgical Hx, None       MEDICATIONS & ALLERGIES:     Current Outpatient Medications on File Prior to Visit   Medication Sig    anastrozole (ARIMIDEX) 1 mg Tab Take 1 tablet (1 mg total) by mouth once daily.    apremilast (OTEZLA) 30 mg Tab Take 30 mg by mouth every evening.    aspirin (ECOTRIN) 81 MG EC tablet Take 81 mg by mouth once daily. Takes it in the evening    atorvastatin (LIPITOR) 10 MG tablet Take 10 mg by mouth.    butalbital-acetaminophen-caffeine -40 mg (FIORICET, ESGIC) -40 mg per tablet Take 1 tablet by mouth daily as needed for Headaches.    cholecalciferol, vitamin D3, 1,250 mcg (50,000 unit) capsule SMARTSI Capsule(s) By Mouth Every 10 Days    ergocalciferol (ERGOCALCIFEROL) 50,000 unit Cap Take by mouth.    gabapentin (NEURONTIN) 300 MG capsule Take 300 mg by mouth 3 (three) times daily. Takes with 600 mg to total 900mg TID. Takes 1200mg @ night    gabapentin (NEURONTIN) 600 MG tablet Take 600 mg by mouth 3 (three) times daily.    LANTUS U-100 INSULIN 100 unit/mL injection SMARTSI Unit(s) SUB-Q Daily    loperamide (IMODIUM) 2 mg capsule Take 2 mg by mouth once as  "needed for Diarrhea.    metoclopramide HCl (REGLAN) 10 MG tablet 1 Tablet Orally Once a day as needed for nausea for 30 days    MOUNJARO 2.5 mg/0.5 mL PnIj Inject into the skin.    omeprazole (PRILOSEC) 40 MG capsule Take 1 capsule (40 mg total) by mouth once daily.    onabotulinumtoxina (BOTOX) 200 unit SolR 200 Units every 3 (three) months. NO BOTTLE TO VERIFY AM OF PROCEDURE    ondansetron (ZOFRAN) 4 MG tablet 1 tablet Orally prn for 30 days    promethazine (PHENERGAN) 25 MG tablet take 1 tablet by mouth every 6 hours AS NEEDED FOR nausea    propranoloL (INDERAL) 20 MG tablet Take 20 mg by mouth 3 (three) times daily.    tiZANidine (ZANAFLEX) 4 MG tablet Take 4 mg by mouth 3 (three) times daily. 1 tab in am and 1 TAB  IN afternoon and 2 tabs at night    vitamin E 400 UNIT capsule Take 800 Units by mouth once daily. STOPPED FOR PROCEDURE NO BOTTLE TO VERIFY AM OF PROCEDURE     No current facility-administered medications on file prior to visit.     Review of patient's allergies indicates:   Allergen Reactions    Amitriptyline Anxiety and Other (See Comments)     Severe    Other reaction(s): Unknown    Corticosteroids (glucocorticoids) Hives, Other (See Comments) and Swelling     Patient states "Severe Inflammation"      Crestor [rosuvastatin] Shortness Of Breath and Palpitations    Egg Anaphylaxis     Vaccines that have egg protein base cause anaphylaxix, can eat eggs    Heparin analogues Palpitations and Other (See Comments)     "All anticoagulation Meds cause dangerously low BP"    Ketorolac Diarrhea, Nausea And Vomiting, Other (See Comments) and Swelling     Migraines      Mushroom Anaphylaxis     ANY MUSHROOM CAUSES ANAPHYLAXIS    Peanut Anaphylaxis    Penicillins Anaphylaxis, Diarrhea, Hives, Nausea And Vomiting and Swelling    Tramadol Diarrhea, Itching, Nausea And Vomiting, Rash and Swelling    Latex Other (See Comments)     Skin blisters      Macrolide antibiotics Hives and Rash    Adhesive Other (See " "Comments)     Blisters, skin tears; CANNOT USE PAPER TAPE    Anticoag citrate phos dextrose      STATES ANY BLOOD THINNERS CAUSES EXTREMELY LOW BLOOD PRESSURE    Low b/p    Aspartame Other (See Comments)     Migraines and nausea    Benadryl [diphenhydramine hcl]      Involuntary movement of the legs, flares up restless leg    Erythromycin      Other reaction(s): Unknown    Estrogens Other (See Comments)     Drives cancer    Other omega-3s      Anticoagulant   Patient states "low blood pressure"    Progesterone Other (See Comments)     Swelling of lymph nodes, breast engorgemnt    Sucralose Other (See Comments)     Causes migraines    Xiidra [lifitegrast]     Aspirin Other (See Comments)     Avoid if possible due to blood thinning    Eszopiclone Itching and Anxiety     AKA LUNESTA    Pork derived (porcine) Diarrhea    Topiramate Anxiety, Other (See Comments) and Palpitations     Shaking    Other Reaction(s): Unknown    Zinc Nausea Only    Zoledronic acid Nausea And Vomiting     Pain in eyes during the infusion.    Joint pain, fever       OBJECTIVE:     Vitals:    11/26/24 1333   BP: 124/89   Pulse: 96   Weight: 77.8 kg (171 lb 9.6 oz)   Height: 5' (1.524 m)     Body mass index is 33.51 kg/m².    Physical Exam:  General:  Well developed, well nourished, no acute distress  HEENT:  Normocephalic, atraumatic, PERRL, EOMI, clear sclera, ears normal, neck supple, throat clear without erythema or exudates  CVS:  RRR, S1 and S2 normal, no murmurs, rubs, gallops  Resp:  Lungs clear to auscultation, no wheezes, rales, rhonchi, cough  GI:  Abdomen soft, non-tender, non-distended, normoactive bowel sounds, no masses  :  Deferred  MSK:  No muscle atrophy, cyanosis, peripheral edema, full range of motion  Skin:  No rashes, ulcers, erythema  Neuro:  CNII-XII grossly intact  Psych:  Alert and oriented to person, place, and time    Results:  I have independently reviewed all pertinent lab and radiologic studies relevant to " general/bariatric surgery.      VISIT DIAGNOSES:       ICD-10-CM ICD-9-CM   1. Preop examination  Z01.818 V72.84   2. Malignant neoplasm of lower-inner quadrant of left breast in female, estrogen receptor positive  C50.312 174.3    Z17.0 V86.0   3. HER2-positive carcinoma of left breast  C50.912 174.9    Z17.31        ASSESSMENT/PLAN:     Ms. Nita Dejesus is a 54 y.o. female with difficult venous access     Plan:  - Mediport Removal  - Pre-operative workup

## 2024-11-25 NOTE — PROGRESS NOTES
HISTORY & PHYSICAL  General Surgery    Patient Name: Nita Dejesus  YOB: 1970    Date: 11/26/2024                   SUBJECTIVE:     Chief Complaint/Reason for Admission:   Chief Complaint   Patient presents with    Consult     Mediport removal, eval  Hiatal hernia      Mediport removal    History of Present Illness:  Ms. Nita Dejesus is a 54 y.o. female who has been referred for Mediport removal. She has history of Malignant neoplasm of left breast.  Denies CP / SOB. Denies fever / chills.     Review of Systems:  12 point ROS negative except as stated in HPI    PAST HISTORY:     Past Medical History:   Diagnosis Date    Allergy     Anemia     Anxiety     Back pain     Breast cancer     GERD (gastroesophageal reflux disease)     Hiatal hernia     Hip pain     Hx of Clotting disorder     d/t medication    Hyperparathyroidism     Insulin resistance     Migraine     Neuromuscular disorder     Neuropathy     Nontoxic single thyroid nodule     Osteopenia     Prediabetes      Past Surgical History:   Procedure Laterality Date    BREAST LUMPECTOMY Left     c lymph nodes    BREAST SURGERY      x2    CHOLECYSTECTOMY      COLONOSCOPY      ESOPHAGOGASTRODUODENOSCOPY      EYE SURGERY  1995    HERNIA REPAIR  Aug. 2017    HYSTERECTOMY  Aug, 2012    LAPAROSCOPIC NISSEN FUNDOPLICATION N/A 09/26/2023    Procedure: FUNDOPLICATION, NISSEN, LAPAROSCOPIC;  Surgeon: Maximiliano Denton Jr., MD;  Location: [facility] OR;  Service: General;  Laterality: N/A;  WITH GASTROPEXY    LEFT HEART CATHETERIZATION Left 9/26/2024    Procedure: Left heart cath;  Surgeon: Holden Peters MD;  Location: [facility] CATH LAB;  Service: Cardiology;  Laterality: Left;    NISSEN FUNDOPLICATION      PARATHYROID GLAND SURGERY  12/07/2023    PARTIAL HYSTERECTOMY      PLACEMENT, MEDIPORT      REDUCTION OF BOTH BREASTS  2018    TUBAL LIGATION Bilateral 1993     Family History   Problem Relation Name Age of Onset    Hypertension Mother Judy Sheridan      Lupus Mother Judy Sheridan     Hypoparathyroidism Mother Judy Sheridan     Diabetes Father Mirza  Desandro     Heart disease Father Mirza  Desandro     Prostate cancer Father Mirza  Desandro     Kidney disease Father Mirza  Desandro     Cancer Father Mirza  Desandro     COPD Father Mirza  Desandro     Hearing loss Father Mirza  Desandro     Hodgkin's lymphoma Brother      Pancreatitis Brother       Social History     Socioeconomic History    Marital status:    Tobacco Use    Smoking status: Some Days     Current packs/day: 0.25     Average packs/day: 0.3 packs/day for 15.0 years (3.8 ttl pk-yrs)     Types: Cigarettes    Smokeless tobacco: Never   Substance and Sexual Activity    Alcohol use: Not Currently    Drug use: Never    Sexual activity: Not Currently     Partners: Male     Birth control/protection: See Surgical Hx, None       MEDICATIONS & ALLERGIES:     Current Outpatient Medications on File Prior to Visit   Medication Sig    anastrozole (ARIMIDEX) 1 mg Tab Take 1 tablet (1 mg total) by mouth once daily.    apremilast (OTEZLA) 30 mg Tab Take 30 mg by mouth every evening.    aspirin (ECOTRIN) 81 MG EC tablet Take 81 mg by mouth once daily. Takes it in the evening    atorvastatin (LIPITOR) 10 MG tablet Take 10 mg by mouth.    butalbital-acetaminophen-caffeine -40 mg (FIORICET, ESGIC) -40 mg per tablet Take 1 tablet by mouth daily as needed for Headaches.    cholecalciferol, vitamin D3, 1,250 mcg (50,000 unit) capsule SMARTSI Capsule(s) By Mouth Every 10 Days    ergocalciferol (ERGOCALCIFEROL) 50,000 unit Cap Take by mouth.    gabapentin (NEURONTIN) 300 MG capsule Take 300 mg by mouth 3 (three) times daily. Takes with 600 mg to total 900mg TID. Takes 1200mg @ night    gabapentin (NEURONTIN) 600 MG tablet Take 600 mg by mouth 3 (three) times daily.    LANTUS U-100 INSULIN 100 unit/mL injection SMARTSI Unit(s) SUB-Q Daily    loperamide (IMODIUM) 2 mg capsule Take 2 mg by mouth once as  "needed for Diarrhea.    metoclopramide HCl (REGLAN) 10 MG tablet 1 Tablet Orally Once a day as needed for nausea for 30 days    MOUNJARO 2.5 mg/0.5 mL PnIj Inject into the skin.    omeprazole (PRILOSEC) 40 MG capsule Take 1 capsule (40 mg total) by mouth once daily.    onabotulinumtoxina (BOTOX) 200 unit SolR 200 Units every 3 (three) months. NO BOTTLE TO VERIFY AM OF PROCEDURE    ondansetron (ZOFRAN) 4 MG tablet 1 tablet Orally prn for 30 days    promethazine (PHENERGAN) 25 MG tablet take 1 tablet by mouth every 6 hours AS NEEDED FOR nausea    propranoloL (INDERAL) 20 MG tablet Take 20 mg by mouth 3 (three) times daily.    tiZANidine (ZANAFLEX) 4 MG tablet Take 4 mg by mouth 3 (three) times daily. 1 tab in am and 1 TAB  IN afternoon and 2 tabs at night    vitamin E 400 UNIT capsule Take 800 Units by mouth once daily. STOPPED FOR PROCEDURE NO BOTTLE TO VERIFY AM OF PROCEDURE     No current facility-administered medications on file prior to visit.     Review of patient's allergies indicates:   Allergen Reactions    Amitriptyline Anxiety and Other (See Comments)     Severe    Other reaction(s): Unknown    Corticosteroids (glucocorticoids) Hives, Other (See Comments) and Swelling     Patient states "Severe Inflammation"      Crestor [rosuvastatin] Shortness Of Breath and Palpitations    Egg Anaphylaxis     Vaccines that have egg protein base cause anaphylaxix, can eat eggs    Heparin analogues Palpitations and Other (See Comments)     "All anticoagulation Meds cause dangerously low BP"    Ketorolac Diarrhea, Nausea And Vomiting, Other (See Comments) and Swelling     Migraines      Mushroom Anaphylaxis     ANY MUSHROOM CAUSES ANAPHYLAXIS    Peanut Anaphylaxis    Penicillins Anaphylaxis, Diarrhea, Hives, Nausea And Vomiting and Swelling    Tramadol Diarrhea, Itching, Nausea And Vomiting, Rash and Swelling    Latex Other (See Comments)     Skin blisters      Macrolide antibiotics Hives and Rash    Adhesive Other (See " "Comments)     Blisters, skin tears; CANNOT USE PAPER TAPE    Anticoag citrate phos dextrose      STATES ANY BLOOD THINNERS CAUSES EXTREMELY LOW BLOOD PRESSURE    Low b/p    Aspartame Other (See Comments)     Migraines and nausea    Benadryl [diphenhydramine hcl]      Involuntary movement of the legs, flares up restless leg    Erythromycin      Other reaction(s): Unknown    Estrogens Other (See Comments)     Drives cancer    Other omega-3s      Anticoagulant   Patient states "low blood pressure"    Progesterone Other (See Comments)     Swelling of lymph nodes, breast engorgemnt    Sucralose Other (See Comments)     Causes migraines    Xiidra [lifitegrast]     Aspirin Other (See Comments)     Avoid if possible due to blood thinning    Eszopiclone Itching and Anxiety     AKA LUNESTA    Pork derived (porcine) Diarrhea    Topiramate Anxiety, Other (See Comments) and Palpitations     Shaking    Other Reaction(s): Unknown    Zinc Nausea Only    Zoledronic acid Nausea And Vomiting     Pain in eyes during the infusion.    Joint pain, fever       OBJECTIVE:     Vitals:    11/26/24 1333   BP: 124/89   Pulse: 96   Weight: 77.8 kg (171 lb 9.6 oz)   Height: 5' (1.524 m)     Body mass index is 33.51 kg/m².    Physical Exam:  General:  Well developed, well nourished, no acute distress  HEENT:  Normocephalic, atraumatic, PERRL, EOMI, clear sclera, ears normal, neck supple, throat clear without erythema or exudates  CVS:  RRR, S1 and S2 normal, no murmurs, rubs, gallops  Resp:  Lungs clear to auscultation, no wheezes, rales, rhonchi, cough  GI:  Abdomen soft, non-tender, non-distended, normoactive bowel sounds, no masses  :  Deferred  MSK:  No muscle atrophy, cyanosis, peripheral edema, full range of motion  Skin:  No rashes, ulcers, erythema  Neuro:  CNII-XII grossly intact  Psych:  Alert and oriented to person, place, and time    Results:  I have independently reviewed all pertinent lab and radiologic studies relevant to " general/bariatric surgery.      VISIT DIAGNOSES:       ICD-10-CM ICD-9-CM   1. Preop examination  Z01.818 V72.84   2. Malignant neoplasm of lower-inner quadrant of left breast in female, estrogen receptor positive  C50.312 174.3    Z17.0 V86.0   3. HER2-positive carcinoma of left breast  C50.912 174.9    Z17.31        ASSESSMENT/PLAN:     Ms. Nita Dejesus is a 54 y.o. female with difficult venous access     Plan:  - Mediport Removal  - Pre-operative workup

## 2024-11-26 ENCOUNTER — OFFICE VISIT (OUTPATIENT)
Dept: SURGERY | Facility: CLINIC | Age: 54
End: 2024-11-26
Payer: COMMERCIAL

## 2024-11-26 ENCOUNTER — TELEPHONE (OUTPATIENT)
Dept: SURGERY | Facility: CLINIC | Age: 54
End: 2024-11-26

## 2024-11-26 ENCOUNTER — HOSPITAL ENCOUNTER (OUTPATIENT)
Dept: RADIOLOGY | Facility: HOSPITAL | Age: 54
Discharge: HOME | End: 2024-11-26
Attending: SURGERY
Payer: COMMERCIAL

## 2024-11-26 VITALS
SYSTOLIC BLOOD PRESSURE: 124 MMHG | HEART RATE: 96 BPM | BODY MASS INDEX: 33.7 KG/M2 | DIASTOLIC BLOOD PRESSURE: 89 MMHG | WEIGHT: 171.63 LBS | HEIGHT: 60 IN

## 2024-11-26 DIAGNOSIS — Z01.818 PREOP EXAMINATION: Primary | ICD-10-CM

## 2024-11-26 DIAGNOSIS — Z17.0 MALIGNANT NEOPLASM OF LOWER-INNER QUADRANT OF LEFT BREAST IN FEMALE, ESTROGEN RECEPTOR POSITIVE: ICD-10-CM

## 2024-11-26 DIAGNOSIS — C50.912 HER2-POSITIVE CARCINOMA OF LEFT BREAST: ICD-10-CM

## 2024-11-26 DIAGNOSIS — Z01.818 PREOP EXAMINATION: ICD-10-CM

## 2024-11-26 DIAGNOSIS — C50.312 MALIGNANT NEOPLASM OF LOWER-INNER QUADRANT OF LEFT BREAST IN FEMALE, ESTROGEN RECEPTOR POSITIVE: ICD-10-CM

## 2024-11-26 DIAGNOSIS — Z17.31 HER2-POSITIVE CARCINOMA OF LEFT BREAST: ICD-10-CM

## 2024-11-26 PROCEDURE — 3074F SYST BP LT 130 MM HG: CPT | Mod: CPTII,,, | Performed by: SURGERY

## 2024-11-26 PROCEDURE — 1160F RVW MEDS BY RX/DR IN RCRD: CPT | Mod: CPTII,,, | Performed by: SURGERY

## 2024-11-26 PROCEDURE — 3079F DIAST BP 80-89 MM HG: CPT | Mod: CPTII,,, | Performed by: SURGERY

## 2024-11-26 PROCEDURE — 3008F BODY MASS INDEX DOCD: CPT | Mod: CPTII,,, | Performed by: SURGERY

## 2024-11-26 PROCEDURE — 99213 OFFICE O/P EST LOW 20 MIN: CPT | Mod: ,,, | Performed by: SURGERY

## 2024-11-26 PROCEDURE — 1159F MED LIST DOCD IN RCRD: CPT | Mod: CPTII,,, | Performed by: SURGERY

## 2024-11-26 PROCEDURE — 71045 X-RAY EXAM CHEST 1 VIEW: CPT | Mod: TC

## 2024-11-26 RX ORDER — TIRZEPATIDE 2.5 MG/.5ML
INJECTION, SOLUTION SUBCUTANEOUS
COMMUNITY
Start: 2024-11-25

## 2024-11-26 RX ORDER — PROPRANOLOL HYDROCHLORIDE 20 MG/1
20 TABLET ORAL 3 TIMES DAILY
COMMUNITY

## 2024-11-26 RX ORDER — ATORVASTATIN CALCIUM 10 MG/1
10 TABLET, FILM COATED ORAL
COMMUNITY
Start: 2024-11-22

## 2024-11-26 NOTE — TELEPHONE ENCOUNTER
Spoke to patient during clinic visit regarding holding Aspirin in anticipation of upcoming surgery, pending cardiac clearance.  Verbalized understanding.

## 2024-11-27 DIAGNOSIS — C50.312 MALIGNANT NEOPLASM OF LOWER-INNER QUADRANT OF LEFT BREAST IN FEMALE, ESTROGEN RECEPTOR POSITIVE: Primary | ICD-10-CM

## 2024-11-27 DIAGNOSIS — Z17.0 MALIGNANT NEOPLASM OF LOWER-INNER QUADRANT OF LEFT BREAST IN FEMALE, ESTROGEN RECEPTOR POSITIVE: Primary | ICD-10-CM

## 2024-12-02 DIAGNOSIS — C50.312 MALIGNANT NEOPLASM OF LOWER-INNER QUADRANT OF LEFT BREAST IN FEMALE, ESTROGEN RECEPTOR POSITIVE: Primary | ICD-10-CM

## 2024-12-02 DIAGNOSIS — Z17.0 MALIGNANT NEOPLASM OF LOWER-INNER QUADRANT OF LEFT BREAST IN FEMALE, ESTROGEN RECEPTOR POSITIVE: Primary | ICD-10-CM

## 2024-12-02 RX ORDER — TAMOXIFEN CITRATE 20 MG/1
20 TABLET ORAL DAILY
Qty: 30 TABLET | Refills: 2 | Status: SHIPPED | OUTPATIENT
Start: 2024-12-02 | End: 2025-03-02

## 2024-12-03 ENCOUNTER — TELEPHONE (OUTPATIENT)
Dept: HEMATOLOGY/ONCOLOGY | Facility: CLINIC | Age: 54
End: 2024-12-03
Payer: COMMERCIAL

## 2024-12-03 NOTE — TELEPHONE ENCOUNTER
12/02/24 - Patient called in complaining that both the femara and the arimidex can cause high cholesterol and high blood pressure. States she is on medications for these conditions already. States she had this conversation with MATIAS Gomes and she knows more than Eliseo about the medications. She requested that the hormone blocker be changed to a different medication with less side effects and also would like something for the hot flashes.   Eliseo offered to send in tamoxifen and advised that patient take either vitamin e or effexor for the hot flashes. Patient is willing to try the tamoxifen, but stated that the vitamin e doesn't work and that she cannot take any anti-anxiety medications due to adverse effects. She is requesting Veozah.    12/03/24 - Spoke with patient again this morning and per Dr. Vargas, confirmed that she does not have any hx of clots or stroke. Informed patient that the Tamoxifen was sent into pharmacy. I did let her know that this hormone blocker is not as effective at preventing breast cancer recurrence as the 2 aromatase inhibitors that she was on before. I also advised that she stop smoking due to the increased incidence of blood clots with the Tamoxifen. Dr. Vargas does not feel comfortable sending in the Veozah due to lack of sufficient research in breast cancer patients. I advised that she contact us before her office visit  here in February, if needed. She voiced understanding.

## 2024-12-04 ENCOUNTER — TELEPHONE (OUTPATIENT)
Dept: SURGERY | Facility: CLINIC | Age: 54
End: 2024-12-04
Payer: COMMERCIAL

## 2024-12-09 ENCOUNTER — TELEPHONE (OUTPATIENT)
Dept: SURGERY | Facility: CLINIC | Age: 54
End: 2024-12-09
Payer: COMMERCIAL

## 2024-12-12 ENCOUNTER — HOSPITAL ENCOUNTER (OUTPATIENT)
Facility: HOSPITAL | Age: 54
Discharge: HOME | End: 2024-12-12
Attending: SURGERY | Admitting: SURGERY
Payer: COMMERCIAL

## 2024-12-12 ENCOUNTER — ANESTHESIA EVENT (OUTPATIENT)
Dept: SURGERY | Facility: HOSPITAL | Age: 54
End: 2024-12-12
Payer: COMMERCIAL

## 2024-12-12 ENCOUNTER — ANESTHESIA (OUTPATIENT)
Dept: SURGERY | Facility: HOSPITAL | Age: 54
End: 2024-12-12
Payer: COMMERCIAL

## 2024-12-12 VITALS
BODY MASS INDEX: 34.71 KG/M2 | TEMPERATURE: 98 F | HEART RATE: 53 BPM | SYSTOLIC BLOOD PRESSURE: 120 MMHG | WEIGHT: 176.81 LBS | HEIGHT: 60 IN | DIASTOLIC BLOOD PRESSURE: 78 MMHG | RESPIRATION RATE: 18 BRPM | OXYGEN SATURATION: 100 %

## 2024-12-12 DIAGNOSIS — Z17.0 MALIGNANT NEOPLASM OF LOWER-INNER QUADRANT OF LEFT BREAST IN FEMALE, ESTROGEN RECEPTOR POSITIVE: Primary | ICD-10-CM

## 2024-12-12 DIAGNOSIS — C50.312 MALIGNANT NEOPLASM OF LOWER-INNER QUADRANT OF LEFT BREAST IN FEMALE, ESTROGEN RECEPTOR POSITIVE: Primary | ICD-10-CM

## 2024-12-12 LAB
POCT GLUCOSE: 111 MG/DL (ref 70–110)
POCT GLUCOSE: 115 MG/DL (ref 70–110)

## 2024-12-12 PROCEDURE — 25000003 PHARM REV CODE 250: Performed by: NURSE ANESTHETIST, CERTIFIED REGISTERED

## 2024-12-12 PROCEDURE — 36000707: Performed by: SURGERY

## 2024-12-12 PROCEDURE — 71000016 HC POSTOP RECOV ADDL HR: Performed by: SURGERY

## 2024-12-12 PROCEDURE — 37000009 HC ANESTHESIA EA ADD 15 MINS: Performed by: SURGERY

## 2024-12-12 PROCEDURE — 63600175 PHARM REV CODE 636 W HCPCS: Performed by: NURSE ANESTHETIST, CERTIFIED REGISTERED

## 2024-12-12 PROCEDURE — 82962 GLUCOSE BLOOD TEST: CPT | Performed by: SURGERY

## 2024-12-12 PROCEDURE — 63600175 PHARM REV CODE 636 W HCPCS: Performed by: ANESTHESIOLOGY

## 2024-12-12 PROCEDURE — 37000008 HC ANESTHESIA 1ST 15 MINUTES: Performed by: SURGERY

## 2024-12-12 PROCEDURE — 25000003 PHARM REV CODE 250: Performed by: ANESTHESIOLOGY

## 2024-12-12 PROCEDURE — 36590 REMOVAL TUNNELED CV CATH: CPT | Mod: ,,, | Performed by: SURGERY

## 2024-12-12 PROCEDURE — 36590 REMOVAL TUNNELED CV CATH: CPT | Mod: AS,,,

## 2024-12-12 PROCEDURE — 36000706: Performed by: SURGERY

## 2024-12-12 PROCEDURE — 25000003 PHARM REV CODE 250

## 2024-12-12 PROCEDURE — 71000015 HC POSTOP RECOV 1ST HR: Performed by: SURGERY

## 2024-12-12 PROCEDURE — 63600175 PHARM REV CODE 636 W HCPCS: Mod: JZ,JG | Performed by: SURGERY

## 2024-12-12 PROCEDURE — 71000033 HC RECOVERY, INTIAL HOUR: Performed by: SURGERY

## 2024-12-12 PROCEDURE — 63600175 PHARM REV CODE 636 W HCPCS

## 2024-12-12 RX ORDER — PHENYLEPHRINE HYDROCHLORIDE 10 MG/ML
INJECTION INTRAVENOUS
Status: DISCONTINUED | OUTPATIENT
Start: 2024-12-12 | End: 2024-12-12

## 2024-12-12 RX ORDER — PROPOFOL 10 MG/ML
VIAL (ML) INTRAVENOUS
Status: DISCONTINUED | OUTPATIENT
Start: 2024-12-12 | End: 2024-12-12

## 2024-12-12 RX ORDER — LIDOCAINE HYDROCHLORIDE 10 MG/ML
INJECTION, SOLUTION EPIDURAL; INFILTRATION; INTRACAUDAL; PERINEURAL
Status: DISCONTINUED | OUTPATIENT
Start: 2024-12-12 | End: 2024-12-12

## 2024-12-12 RX ORDER — LIDOCAINE HYDROCHLORIDE 10 MG/ML
1 INJECTION, SOLUTION EPIDURAL; INFILTRATION; INTRACAUDAL; PERINEURAL ONCE
Status: DISCONTINUED | OUTPATIENT
Start: 2024-12-12 | End: 2024-12-12 | Stop reason: HOSPADM

## 2024-12-12 RX ORDER — ONDANSETRON HYDROCHLORIDE 2 MG/ML
4 INJECTION, SOLUTION INTRAVENOUS DAILY PRN
Status: DISCONTINUED | OUTPATIENT
Start: 2024-12-12 | End: 2024-12-12 | Stop reason: HOSPADM

## 2024-12-12 RX ORDER — IPRATROPIUM BROMIDE AND ALBUTEROL SULFATE 2.5; .5 MG/3ML; MG/3ML
3 SOLUTION RESPIRATORY (INHALATION)
Status: DISCONTINUED | OUTPATIENT
Start: 2024-12-12 | End: 2024-12-12 | Stop reason: HOSPADM

## 2024-12-12 RX ORDER — BUPIVACAINE HYDROCHLORIDE 2.5 MG/ML
INJECTION, SOLUTION EPIDURAL; INFILTRATION; INTRACAUDAL
Status: DISCONTINUED | OUTPATIENT
Start: 2024-12-12 | End: 2024-12-12 | Stop reason: HOSPADM

## 2024-12-12 RX ORDER — SCOLOPAMINE TRANSDERMAL SYSTEM 1 MG/1
1 PATCH, EXTENDED RELEASE TRANSDERMAL ONCE
Status: DISCONTINUED | OUTPATIENT
Start: 2024-12-12 | End: 2024-12-12 | Stop reason: HOSPADM

## 2024-12-12 RX ORDER — HYDROCODONE BITARTRATE AND ACETAMINOPHEN 7.5; 325 MG/1; MG/1
1 TABLET ORAL EVERY 6 HOURS PRN
Status: DISCONTINUED | OUTPATIENT
Start: 2024-12-12 | End: 2024-12-12 | Stop reason: HOSPADM

## 2024-12-12 RX ORDER — HYDROMORPHONE HYDROCHLORIDE 2 MG/ML
0.4 INJECTION, SOLUTION INTRAMUSCULAR; INTRAVENOUS; SUBCUTANEOUS EVERY 5 MIN PRN
Status: DISCONTINUED | OUTPATIENT
Start: 2024-12-12 | End: 2024-12-12 | Stop reason: HOSPADM

## 2024-12-12 RX ORDER — GLUCAGON 1 MG
1 KIT INJECTION
Status: DISCONTINUED | OUTPATIENT
Start: 2024-12-12 | End: 2024-12-12 | Stop reason: HOSPADM

## 2024-12-12 RX ORDER — MIDAZOLAM HYDROCHLORIDE 2 MG/2ML
2 INJECTION, SOLUTION INTRAMUSCULAR; INTRAVENOUS ONCE AS NEEDED
Status: COMPLETED | OUTPATIENT
Start: 2024-12-12 | End: 2024-12-12

## 2024-12-12 RX ORDER — SODIUM CHLORIDE, SODIUM GLUCONATE, SODIUM ACETATE, POTASSIUM CHLORIDE AND MAGNESIUM CHLORIDE 30; 37; 368; 526; 502 MG/100ML; MG/100ML; MG/100ML; MG/100ML; MG/100ML
INJECTION, SOLUTION INTRAVENOUS CONTINUOUS
Status: DISCONTINUED | OUTPATIENT
Start: 2024-12-12 | End: 2024-12-12 | Stop reason: HOSPADM

## 2024-12-12 RX ORDER — MEPERIDINE HYDROCHLORIDE 25 MG/ML
50 INJECTION INTRAMUSCULAR; INTRAVENOUS; SUBCUTANEOUS
Status: DISCONTINUED | OUTPATIENT
Start: 2024-12-12 | End: 2024-12-12 | Stop reason: HOSPADM

## 2024-12-12 RX ORDER — HYDROCODONE BITARTRATE AND ACETAMINOPHEN 5; 325 MG/1; MG/1
1 TABLET ORAL EVERY 6 HOURS PRN
Qty: 12 TABLET | Refills: 0 | Status: SHIPPED | OUTPATIENT
Start: 2024-12-12

## 2024-12-12 RX ORDER — ONDANSETRON HYDROCHLORIDE 2 MG/ML
4 INJECTION, SOLUTION INTRAVENOUS EVERY 4 HOURS PRN
Status: DISCONTINUED | OUTPATIENT
Start: 2024-12-12 | End: 2024-12-12 | Stop reason: HOSPADM

## 2024-12-12 RX ORDER — CELECOXIB 200 MG/1
200 CAPSULE ORAL ONCE
Status: COMPLETED | OUTPATIENT
Start: 2024-12-12 | End: 2024-12-12

## 2024-12-12 RX ORDER — PROCHLORPERAZINE EDISYLATE 5 MG/ML
5 INJECTION INTRAMUSCULAR; INTRAVENOUS EVERY 30 MIN PRN
Status: DISCONTINUED | OUTPATIENT
Start: 2024-12-12 | End: 2024-12-12 | Stop reason: HOSPADM

## 2024-12-12 RX ORDER — HYDROMORPHONE HYDROCHLORIDE 2 MG/ML
0.2 INJECTION, SOLUTION INTRAMUSCULAR; INTRAVENOUS; SUBCUTANEOUS EVERY 5 MIN PRN
Status: DISCONTINUED | OUTPATIENT
Start: 2024-12-12 | End: 2024-12-12 | Stop reason: HOSPADM

## 2024-12-12 RX ORDER — ACETAMINOPHEN 10 MG/ML
1000 INJECTION, SOLUTION INTRAVENOUS ONCE
Status: COMPLETED | OUTPATIENT
Start: 2024-12-12 | End: 2024-12-12

## 2024-12-12 RX ORDER — FENTANYL CITRATE 50 UG/ML
INJECTION, SOLUTION INTRAMUSCULAR; INTRAVENOUS
Status: DISCONTINUED | OUTPATIENT
Start: 2024-12-12 | End: 2024-12-12

## 2024-12-12 RX ORDER — SODIUM CHLORIDE, SODIUM LACTATE, POTASSIUM CHLORIDE, CALCIUM CHLORIDE 600; 310; 30; 20 MG/100ML; MG/100ML; MG/100ML; MG/100ML
INJECTION, SOLUTION INTRAVENOUS CONTINUOUS
Status: DISCONTINUED | OUTPATIENT
Start: 2024-12-12 | End: 2024-12-12 | Stop reason: HOSPADM

## 2024-12-12 RX ORDER — BUPIVACAINE HYDROCHLORIDE 2.5 MG/ML
INJECTION, SOLUTION EPIDURAL; INFILTRATION; INTRACAUDAL
Status: DISCONTINUED
Start: 2024-12-12 | End: 2024-12-12 | Stop reason: HOSPADM

## 2024-12-12 RX ORDER — MEPERIDINE HYDROCHLORIDE 25 MG/ML
12.5 INJECTION INTRAMUSCULAR; INTRAVENOUS; SUBCUTANEOUS EVERY 10 MIN PRN
Status: DISCONTINUED | OUTPATIENT
Start: 2024-12-12 | End: 2024-12-12 | Stop reason: HOSPADM

## 2024-12-12 RX ORDER — ONDANSETRON 4 MG/1
8 TABLET, ORALLY DISINTEGRATING ORAL EVERY 6 HOURS PRN
Status: DISCONTINUED | OUTPATIENT
Start: 2024-12-12 | End: 2024-12-12 | Stop reason: HOSPADM

## 2024-12-12 RX ORDER — MIDAZOLAM HYDROCHLORIDE 2 MG/2ML
INJECTION, SOLUTION INTRAMUSCULAR; INTRAVENOUS
Status: COMPLETED
Start: 2024-12-12 | End: 2024-12-12

## 2024-12-12 RX ADMIN — HYDROCODONE BITARTRATE AND ACETAMINOPHEN 1 TABLET: 7.5; 325 TABLET ORAL at 12:12

## 2024-12-12 RX ADMIN — MIDAZOLAM HYDROCHLORIDE 2 MG: 1 INJECTION, SOLUTION INTRAMUSCULAR; INTRAVENOUS at 11:12

## 2024-12-12 RX ADMIN — SODIUM CHLORIDE: 9 INJECTION, SOLUTION INTRAVENOUS at 11:12

## 2024-12-12 RX ADMIN — ACETAMINOPHEN 1000 MG: 10 INJECTION, SOLUTION INTRAVENOUS at 10:12

## 2024-12-12 RX ADMIN — FENTANYL CITRATE 50 MCG: 50 INJECTION, SOLUTION INTRAMUSCULAR; INTRAVENOUS at 11:12

## 2024-12-12 RX ADMIN — PHENYLEPHRINE HYDROCHLORIDE 100 MCG: 10 INJECTION INTRAVENOUS at 11:12

## 2024-12-12 RX ADMIN — SCOPOLAMINE 1 PATCH: 1 PATCH TRANSDERMAL at 10:12

## 2024-12-12 RX ADMIN — MIDAZOLAM HYDROCHLORIDE 2 MG: 2 INJECTION, SOLUTION INTRAMUSCULAR; INTRAVENOUS at 11:12

## 2024-12-12 RX ADMIN — LIDOCAINE HYDROCHLORIDE 20 MG: 10 INJECTION, SOLUTION EPIDURAL; INFILTRATION; INTRACAUDAL; PERINEURAL at 11:12

## 2024-12-12 RX ADMIN — PROPOFOL 150 MG: 10 INJECTION, EMULSION INTRAVENOUS at 11:12

## 2024-12-12 RX ADMIN — CELECOXIB 200 MG: 200 CAPSULE ORAL at 10:12

## 2024-12-12 NOTE — ANESTHESIA PREPROCEDURE EVALUATION
12/12/2024  Nita Dejesus is a 54 y.o., female presents as an outpatient for MediPort removal.    Left heart catheterization September 2024   Nonobstructive CAD with moderate irregularities   EF 65%   LVEDP 10-15    Transthoracic echo December 2023   EF 55% with normal diastolic function   Trace valvular heart disease    Last 3 sets of Vitals        11/26/2024     1:33 PM 12/10/2024    11:28 AM 12/12/2024     9:23 AM   Vitals - 1 value per visit   SYSTOLIC 124  122   DIASTOLIC 89  76   Pulse 96  67   Temp   36.7 °C (98.1 °F)   SPO2   95 %   Weight (lb) 171.6 171    Weight (kg) 77.837 77.565    Height 5' (1.524 m) 5' (1.524 m)    BMI (Calculated) 33.5 33.4          Lab Results   Component Value Date    WBC 6.56 11/21/2024    HGB 13.5 11/21/2024    HCT 40.9 11/21/2024    MCV 96.0 (H) 11/21/2024     11/21/2024            Chemistry        Component Value Date/Time     11/21/2024 1224     01/17/2023 1014    K 4.6 11/21/2024 1224    K 4.0 01/17/2023 1014     11/21/2024 1224     01/17/2023 1014    CO2 27 11/21/2024 1224    CO2 27 01/17/2023 1014    BUN 11.5 11/21/2024 1224    BUN 20 01/17/2023 1014    CREATININE 0.80 11/21/2024 1224    CREATININE 0.6 01/17/2023 1014     (H) 01/17/2023 1014        Component Value Date/Time    CALCIUM 9.4 11/21/2024 1224    CALCIUM 9.8 01/17/2023 1014    ALKPHOS 80 11/21/2024 1224    ALKPHOS 99 01/17/2023 1014    AST 20 11/21/2024 1224    AST 19 01/17/2023 1014    ALT 14 11/21/2024 1224    ALT 24 01/17/2023 1014    BILITOT 0.5 11/21/2024 1224    BILITOT 0.3 01/17/2023 1014    ESTGFRAFRICA 99 07/11/2022 1418    EGFRNONAA >60 01/31/2022 1138          Pre-op Assessment    I have reviewed the Patient Summary Reports.    I have reviewed the NPO Status.   I have reviewed the Medications.     Review of Systems  Anesthesia Hx:               Denies  Personal Hx of Anesthesia complications.                    Social:  Non-Smoker       Cardiovascular:   Functional Capacity good / => 4 METS                         Hepatic/GI:    Hiatal Hernia, GERD, well controlled                Endocrine:  Diabetes, type 2         Obesity / BMI > 30      Physical Exam  General: Well nourished, Cooperative, Alert and Oriented    Airway:  Mallampati: II   Mouth Opening: Normal  TM Distance: Normal  Tongue: Normal  Neck ROM: Normal ROM    Dental:  Intact    Chest/Lungs:  Clear to auscultation, Normal Respiratory Rate    Heart:  Rate: Normal  Rhythm: Regular Rhythm        Anesthesia Plan  Type of Anesthesia, risks & benefits discussed:    Anesthesia Type: Gen Supraglottic Airway  Intra-op Monitoring Plan: Standard ASA Monitors  Post Op Pain Control Plan: multimodal analgesia and IV/PO Opioids PRN  Induction:  IV  Airway Plan: Direct  Informed Consent: Informed consent signed with the Patient and all parties understand the risks and agree with anesthesia plan.  All questions answered.   ASA Score: 2  Day of Surgery Review of History & Physical: H&P Update referred to the surgeon/provider.    Ready For Surgery From Anesthesia Perspective.     .

## 2024-12-12 NOTE — TRANSFER OF CARE
Anesthesia Transfer of Care Note    Patient: Nita Dejesus    Procedure(s) Performed: Procedure(s) (LRB):  REMOVAL, VASCULAR ACCESS PORT (N/A)    Patient location: PACU    Anesthesia Type: general    Transport from OR: Transported from OR on room air with adequate spontaneous ventilation    Post pain: adequate analgesia    Post assessment: no apparent anesthetic complications    Post vital signs: stable    Level of consciousness: responds to stimulation    Nausea/Vomiting: no nausea/vomiting    Complications: none    Transfer of care protocol was followed      Last vitals: Visit Vitals  /76   Pulse 67   Temp 36.7 °C (98.1 °F) (Oral)   Ht 5' (1.524 m)   Wt 80.2 kg (176 lb 12.9 oz)   LMP  (LMP Unknown)   SpO2 95%   Breastfeeding No   BMI 34.53 kg/m²

## 2024-12-12 NOTE — ANESTHESIA POSTPROCEDURE EVALUATION
Anesthesia Post Evaluation    Patient: Nita Dejesus    Procedure(s) Performed: Procedure(s) (LRB):  REMOVAL, VASCULAR ACCESS PORT (N/A)    Final Anesthesia Type: general      Patient location during evaluation: floor  Patient participation: Yes- Able to Participate  Level of consciousness: awake and alert  Post-procedure vital signs: reviewed and stable  Pain management: adequate  Airway patency: patent    PONV status at discharge: No PONV  Anesthetic complications: no      Cardiovascular status: blood pressure returned to baseline and bradycardic  Respiratory status: spontaneous ventilation and room air  Hydration status: euvolemic  Follow-up not needed.              Vitals Value Taken Time   /78 12/12/24 1220   Temp 36.5 °C (97.7 °F) 12/12/24 1202   Pulse 53 12/12/24 1229   Resp 18 12/12/24 1239   SpO2 100 % 12/12/24 1227   Vitals shown include unfiled device data.      Event Time   Out of Recovery 12:29:00         Pain/Gi Score: Pain Rating Prior to Med Admin: 6 (12/12/2024 12:39 PM)  Pain Rating Post Med Admin: 4 (12/12/2024  1:00 PM)  Gi Score: 10 (12/12/2024 12:30 PM)

## 2024-12-12 NOTE — OP NOTE
PATIENT:  Nita Dejesus      : 1970       DATE OF SURGERY:   2024          SURGEON:  Maximiliano Denton MD       ASSISTANT:  Danya Ferrera NP (First Assistant)      PREOPERATIVE DIAGNOSIS:  Breast Cancer           POSTOPERATIVE DIAGNOSIS:  Same.           OPERATIONS:  Mediport Removal (removal of implantable device)           Anesthesia:  General endotracheal anesthesia.      Estimated blood loss:  Less than 50 cc.      Blood administered:  None.      Lap and instrument counts correct x 2 at the end of the case.     Specimen: Mediport           INDICATIONS/SIGNIFICANT HISTORY:  The patient is a 54 y.o. year old female who was diagnosed with Breast Cancer now in remission and referred for mediport removal.  Risks and Benefits of surgery was discussed with the patient, who voiced understanding of risks and benefits and elected to proceed with surgery.           PROCEDURE IN DETAIL:  Once informed consents were obtained, the patient was taken to the operating room and placed supine on the operating table.  After general IV anesthesia was induced, the chest was prepped and draped in a standard surgical fashion.  1% lidocaine with epinephrine was used for local anesthesia in the subcutaneous tissue at the site of the port.  An incision was made near the port and it was dissected and freed from surrounding adhesions.  The port and attached catheter was removed in its entirety and the track was closed with a 3-0 vicryl suture and passed off the table as a specimen.  The wound was irrigated and dried with no active bleeding noted.   Incisions were closed with a 3-0 vicryl followed by a 4-0 vicryl stitch.  The wounds were cleaned and dried and steri-strips were applied. Danya Ferrera was present during the entirety of the case and was critical in retraction for proper dissection.  There was no qualified resident available for assisting during this procedure.   The patient tolerated the procedure well and  was transported to recovery room in good condition.

## 2024-12-12 NOTE — ANESTHESIA PROCEDURE NOTES
Intubation    Date/Time: 12/12/2024 11:27 AM    Performed by: Sahil Thomas CRNA  Authorized by: Edgardo Johnson Jr., MD    Intubation:     Induction:  Intravenous    Intubated:  Postinduction    Mask Ventilation:  Easy mask    Attempts:  1    Attempted By:  CRNA    Difficult Airway Encountered?: No      Complications:  None    Airway Device:  Supraglottic airway/LMA    Airway Device Size:  3.0    Style/Cuff Inflation:  Cuffed (inflated to minimal occlusive pressure)    Secured at:  The lips    Placement Verified By:  Capnometry    Findings Post-Intubation:  Atraumatic/condition of teeth unchanged

## 2024-12-12 NOTE — DISCHARGE SUMMARY
[facility] Surgical - Periop Services  Discharge Note  Short Stay    Procedure(s) (LRB):  REMOVAL, VASCULAR ACCESS PORT (N/A)      OUTCOME: Patient tolerated treatment/procedure well without complication and is now ready for discharge.    DISPOSITION: Home or Self Care    FINAL DIAGNOSIS:  Malignant neoplasm of lower-inner quadrant of left breast in female, estrogen receptor positive    FOLLOWUP: In clinic    DISCHARGE INSTRUCTIONS:  No discharge procedures on file.     TIME SPENT ON DISCHARGE:      minutes

## 2024-12-12 NOTE — PLAN OF CARE
Pt. Resting comfortably, VSS, no s/s distress, Gi at acceptable level for transfer to phase II, Criteria met for anesthesia release per Dr. Johnson.

## 2024-12-12 NOTE — DISCHARGE INSTRUCTIONS
Patient Education       Portacath Removal     What care is needed after the procedure at home:   Do not do strenuous arm movement for 24 to 48 hours after having a port removed.  Place an ice pack wrapped in a towel over the painful part. Never put ice right on the skin. Do not leave the ice on more than 10 to 15 minutes at a time.  Keep the dressing clean and dry for 48 hours or as instructed by your doctor. Wash your hands before and after removing the dressing or touching your incision. After at least 48 hours you may take off the dressing. Wash the site with soap and water.   Do not soak in the tub, hot tub, or swim until your incision is healed.  Check for signs and symptoms of wound infection every day: warmth, redness, swelling, drainage/pus, foul odor, and/or incision opening.     What problems could happen?   Redness, swelling, pain, or drainage from the port site. These are possible signs of infection.  The lab tests may find infection in the port. If this happens, you might need a new drug.  You might need to get another port for your treatments.    When do I need to call the doctor?   Fever of 100.4°F (38°C) or higher  Port site starts bleeding and does not stop with gentle pressure  Port site becomes more red  Drainage or pus from the port site increases  You have trouble breathing  Swelling or bruising where the port catheter went into the vein.

## 2024-12-27 NOTE — PROGRESS NOTES
Post Operative Progress Note  General Surgery    Patient Name: Nita Dejesus  YOB: 1970    Date: 12/30/2024                   SUBJECTIVE:     Chief Complaint/Reason for Admission:   Chief Complaint   Patient presents with    Post-op Evaluation     12/12/24 Removal tunneled venous access port (mediport)        History of Present Illness:  Ms. Nita Dejesus is a 54 y.o. female who is 2 weeks post op Mediport removal.  The patient is currently without any complaints. Denies fever / chills.     Review of Systems:  12 point ROS negative except as stated in HPI    PAST HISTORY:     Past Medical History:   Diagnosis Date    Allergy     Anemia     Anxiety     Back pain     Breast cancer     left    Diabetes mellitus     type 2    Fibromyalgia     GERD (gastroesophageal reflux disease)     Hiatal hernia     Hip pain     Hx of Clotting disorder     d/t medication    Hyperparathyroidism     Insulin resistance     Migraine     Neuromuscular disorder     Neuropathy     Nontoxic single thyroid nodule     Osteopenia     Prediabetes      Past Surgical History:   Procedure Laterality Date    bilateral foot      tarsal tunnel    BREAST LUMPECTOMY Left 2022    c lymph nodes    BREAST SURGERY Left     x2 on left. nodes removed.    CHOLECYSTECTOMY      COLONOSCOPY      ESOPHAGOGASTRODUODENOSCOPY      EYE SURGERY  1995    HERNIA REPAIR  Aug. 2017    HYSTERECTOMY  Aug, 2012    LAPAROSCOPIC NISSEN FUNDOPLICATION N/A 09/26/2023    Procedure: FUNDOPLICATION, NISSEN, LAPAROSCOPIC;  Surgeon: Maximiliano Denton Jr., MD;  Location: [facility] OR;  Service: General;  Laterality: N/A;  WITH GASTROPEXY    LEFT HEART CATHETERIZATION Left 09/26/2024    Procedure: Left heart cath;  Surgeon: Holden Peters MD;  Location: [facility] CATH LAB;  Service: Cardiology;  Laterality: Left;    NISSEN FUNDOPLICATION      PARATHYROID GLAND SURGERY  12/07/2023    PLACEMENT, MEDIPORT      REDUCTION OF BOTH BREASTS  2018    REMOVAL OF VASCULAR ACCESS PORT  N/A 2024    Procedure: REMOVAL, VASCULAR ACCESS PORT;  Surgeon: Maximiliano Denton Jr., MD;  Location: [facility] OR;  Service: General;  Laterality: N/A;  Removal tunneled venous access port (mediport)    TUBAL LIGATION Bilateral      Family History   Problem Relation Name Age of Onset    Hypertension Mother Judy Sheridan     Lupus Mother Judy Sheridan     Hypoparathyroidism Mother Judy Sheridan     Diabetes Father Mirza  Desandro     Heart disease Father Mirza  Desandro     Prostate cancer Father Mirza  Desandro     Kidney disease Father Mirza  Desandro     Cancer Father Mirza  Desandro     COPD Father Mirza  Desandro     Hearing loss Father Mirza  Desandro     Hodgkin's lymphoma Brother      Pancreatitis Brother       Social History     Socioeconomic History    Marital status:    Tobacco Use    Smoking status: Some Days     Current packs/day: 0.25     Average packs/day: 0.3 packs/day for 15.0 years (3.8 ttl pk-yrs)     Types: Cigarettes    Smokeless tobacco: Never   Substance and Sexual Activity    Alcohol use: Not Currently    Drug use: Never    Sexual activity: Not Currently     Partners: Male     Birth control/protection: See Surgical Hx, None       MEDICATIONS & ALLERGIES:     Current Outpatient Medications on File Prior to Visit   Medication Sig    anastrozole (ARIMIDEX) 1 mg Tab Take 1 tablet (1 mg total) by mouth once daily.    apremilast (OTEZLA) 30 mg Tab Take 30 mg by mouth every evening.    aspirin (ECOTRIN) 81 MG EC tablet Take 81 mg by mouth once daily. Takes it in the evening    atorvastatin (LIPITOR) 10 MG tablet Take 10 mg by mouth.    butalbital-acetaminophen-caffeine -40 mg (FIORICET, ESGIC) -40 mg per tablet Take 1 tablet by mouth daily as needed for Headaches.    cholecalciferol, vitamin D3, 1,250 mcg (50,000 unit) capsule SMARTSI Capsule(s) By Mouth Every 10 Days    ergocalciferol (ERGOCALCIFEROL) 50,000 unit Cap Take by mouth.    gabapentin (NEURONTIN) 300 MG  "capsule Take 300 mg by mouth 3 (three) times daily. Takes with 600 mg to total 900mg TID. Takes 1200mg @ night    gabapentin (NEURONTIN) 600 MG tablet Take 600 mg by mouth 3 (three) times daily.    HYDROcodone-acetaminophen (NORCO) 5-325 mg per tablet Take 1 tablet by mouth every 6 (six) hours as needed for Pain.    LANTUS U-100 INSULIN 100 unit/mL injection SMARTSI Unit(s) SUB-Q Daily    loperamide (IMODIUM) 2 mg capsule Take 2 mg by mouth once as needed for Diarrhea.    metoclopramide HCl (REGLAN) 10 MG tablet 1 Tablet Orally Once a day as needed for nausea for 30 days    MOUNJARO 2.5 mg/0.5 mL PnIj Inject into the skin.    omeprazole (PRILOSEC) 40 MG capsule Take 1 capsule (40 mg total) by mouth once daily.    onabotulinumtoxina (BOTOX) 200 unit SolR 200 Units every 3 (three) months. NO BOTTLE TO VERIFY AM OF PROCEDURE    ondansetron (ZOFRAN) 4 MG tablet 1 tablet Orally prn for 30 days    promethazine (PHENERGAN) 25 MG tablet take 1 tablet by mouth every 6 hours AS NEEDED FOR nausea    propranoloL (INDERAL) 20 MG tablet Take 20 mg by mouth 3 (three) times daily.    tamoxifen (NOLVADEX) 20 MG Tab Take 1 tablet (20 mg total) by mouth once daily.    tiZANidine (ZANAFLEX) 4 MG tablet Take 4 mg by mouth 3 (three) times daily. 1 tab in am and 1 TAB  IN afternoon and 2 tabs at night    vitamin E 400 UNIT capsule Take 800 Units by mouth once daily. STOPPED FOR PROCEDURE NO BOTTLE TO VERIFY AM OF PROCEDURE     No current facility-administered medications on file prior to visit.     Review of patient's allergies indicates:   Allergen Reactions    Amitriptyline Anxiety and Other (See Comments)     Severe    Other reaction(s): Unknown    Corticosteroids (glucocorticoids) Hives, Other (See Comments) and Swelling     Patient states "Severe Inflammation"      Crestor [rosuvastatin] Shortness Of Breath and Palpitations     Yeast infection    Heparin analogues Palpitations and Other (See Comments)     "All anticoagulation " "Meds cause dangerously low BP"    Ketorolac Diarrhea, Nausea And Vomiting, Other (See Comments) and Swelling     Migraines      Mushroom Anaphylaxis     ANY MUSHROOM CAUSES ANAPHYLAXIS    Peanut Anaphylaxis    Penicillins Anaphylaxis, Diarrhea, Hives, Nausea And Vomiting and Swelling    Tramadol Diarrhea, Itching, Nausea And Vomiting, Rash and Swelling    Latex Other (See Comments)     Skin blisters      Macrolide antibiotics Hives and Rash    Adhesive Other (See Comments)     Blisters, skin tears; CANNOT USE PAPER TAPE    Anticoag citrate phos dextrose      STATES ANY BLOOD THINNERS CAUSES EXTREMELY LOW BLOOD PRESSURE    Low b/p    Aspartame Other (See Comments)     Migraines and nausea    Benadryl [diphenhydramine hcl]      Involuntary movement of the legs, flares up restless leg    Egg Diarrhea     Vaccines that have egg protein base cause anaphylaxix, can eat eggs    Erythromycin      Other reaction(s): Unknown    Estrogens Other (See Comments)     Drives cancer    Other omega-3s      Anticoagulant   Patient states "low blood pressure"    Progesterone Other (See Comments)     Swelling of lymph nodes, breast engorgemnt    Sucralose Other (See Comments)     Causes migraines    Xiidra [lifitegrast]      Sinus infections and yeast infection    Aspirin Other (See Comments)     Avoid if possible due to blood thinning    Eszopiclone Itching and Anxiety     AKA LUNESTA    Pork derived (porcine) Diarrhea    Topiramate Anxiety, Other (See Comments) and Palpitations     Shaking    Other Reaction(s): Unknown    Zinc Nausea Only and Rash    Zoledronic acid Nausea And Vomiting     Pain in eyes during the infusion.    Joint pain, fever       OBJECTIVE:   Visit Vitals  /74   Pulse 60   Ht 5' (1.524 m)   Wt 80.2 kg (176 lb 12.9 oz)   LMP  (LMP Unknown)   BMI 34.53 kg/m²       Physical Exam:  General:  Well developed, well nourished, no acute distress  GI:  Abdomen soft, non-tender, non-distended, normoactive bowel sounds, " no masses  Skin:  Incision Clean/Dry/Intact    VISIT DIAGNOSES:       ICD-10-CM ICD-9-CM   1. Post-operative state  Z98.890 V45.89       ASSESSMENT/PLAN:     54 y.o. female who is s/p Mediport removal    -  Pt doing well  -  Wounds healing well  -  No lifting > 20 lbs x 2 additional weeks      RTC PRN

## 2024-12-30 ENCOUNTER — OFFICE VISIT (OUTPATIENT)
Dept: SURGERY | Facility: CLINIC | Age: 54
End: 2024-12-30
Payer: COMMERCIAL

## 2024-12-30 VITALS
HEIGHT: 60 IN | SYSTOLIC BLOOD PRESSURE: 112 MMHG | BODY MASS INDEX: 34.71 KG/M2 | DIASTOLIC BLOOD PRESSURE: 74 MMHG | HEART RATE: 60 BPM | WEIGHT: 176.81 LBS

## 2024-12-30 DIAGNOSIS — Z98.890 POST-OPERATIVE STATE: Primary | ICD-10-CM

## 2025-01-08 ENCOUNTER — LAB VISIT (OUTPATIENT)
Dept: LAB | Facility: HOSPITAL | Age: 55
End: 2025-01-08
Attending: INTERNAL MEDICINE
Payer: COMMERCIAL

## 2025-01-08 DIAGNOSIS — E11.9 DIABETES MELLITUS WITHOUT COMPLICATION: Primary | ICD-10-CM

## 2025-01-08 DIAGNOSIS — R06.02 SOB (SHORTNESS OF BREATH): ICD-10-CM

## 2025-01-08 LAB
ALBUMIN SERPL-MCNC: 3.4 G/DL (ref 3.5–5)
ALBUMIN/GLOB SERPL: 1.2 RATIO (ref 1.1–2)
ALP SERPL-CCNC: 58 UNIT/L (ref 40–150)
ALT SERPL-CCNC: 13 UNIT/L (ref 0–55)
ANION GAP SERPL CALC-SCNC: 6 MEQ/L
AST SERPL-CCNC: 15 UNIT/L (ref 5–34)
BILIRUB SERPL-MCNC: 0.5 MG/DL
BUN SERPL-MCNC: 14 MG/DL (ref 9.8–20.1)
CALCIUM SERPL-MCNC: 8.9 MG/DL (ref 8.4–10.2)
CHLORIDE SERPL-SCNC: 108 MMOL/L (ref 98–107)
CHOLEST SERPL-MCNC: 151 MG/DL
CHOLEST/HDLC SERPL: 4 {RATIO} (ref 0–5)
CO2 SERPL-SCNC: 28 MMOL/L (ref 22–29)
CREAT SERPL-MCNC: 0.75 MG/DL (ref 0.55–1.02)
CREAT/UREA NIT SERPL: 19
GFR SERPLBLD CREATININE-BSD FMLA CKD-EPI: >60 ML/MIN/1.73/M2
GLOBULIN SER-MCNC: 2.8 GM/DL (ref 2.4–3.5)
GLUCOSE SERPL-MCNC: 108 MG/DL (ref 74–100)
HDLC SERPL-MCNC: 38 MG/DL (ref 35–60)
LDLC SERPL CALC-MCNC: 96 MG/DL (ref 50–140)
POTASSIUM SERPL-SCNC: 4.2 MMOL/L (ref 3.5–5.1)
PROT SERPL-MCNC: 6.2 GM/DL (ref 6.4–8.3)
SODIUM SERPL-SCNC: 142 MMOL/L (ref 136–145)
TRIGL SERPL-MCNC: 84 MG/DL (ref 37–140)
VLDLC SERPL CALC-MCNC: 17 MG/DL

## 2025-01-08 PROCEDURE — 82172 ASSAY OF APOLIPOPROTEIN: CPT

## 2025-01-08 PROCEDURE — 80061 LIPID PANEL: CPT

## 2025-01-08 PROCEDURE — 80053 COMPREHEN METABOLIC PANEL: CPT

## 2025-01-08 PROCEDURE — 36415 COLL VENOUS BLD VENIPUNCTURE: CPT

## 2025-01-10 LAB — APO B SERPL-MCNC: 82 MG/DL

## 2025-01-17 ENCOUNTER — TELEPHONE (OUTPATIENT)
Dept: SURGERY | Facility: CLINIC | Age: 55
End: 2025-01-17
Payer: COMMERCIAL

## 2025-01-17 NOTE — TELEPHONE ENCOUNTER
----- Message from Med Assistant Michaud sent at 1/16/2025  4:10 PM CST -----  Regarding: Voicemail  Patient called and left a message stating that she has a stitch that is coming from her port site she mentioned it before but its still there and its really bothering her she thought it would go away     Sx 12/12/24 port removal

## 2025-02-26 ENCOUNTER — LAB VISIT (OUTPATIENT)
Dept: LAB | Facility: HOSPITAL | Age: 55
End: 2025-02-26
Attending: NURSE PRACTITIONER
Payer: COMMERCIAL

## 2025-02-26 DIAGNOSIS — C50.912 HER2-POSITIVE CARCINOMA OF LEFT BREAST: ICD-10-CM

## 2025-02-26 DIAGNOSIS — Z17.0 MALIGNANT NEOPLASM OF LOWER-INNER QUADRANT OF LEFT BREAST IN FEMALE, ESTROGEN RECEPTOR POSITIVE: ICD-10-CM

## 2025-02-26 DIAGNOSIS — C50.312 MALIGNANT NEOPLASM OF LOWER-INNER QUADRANT OF LEFT BREAST IN FEMALE, ESTROGEN RECEPTOR POSITIVE: ICD-10-CM

## 2025-02-26 DIAGNOSIS — M85.852 OSTEOPENIA OF NECKS OF BOTH FEMURS: ICD-10-CM

## 2025-02-26 DIAGNOSIS — M85.851 OSTEOPENIA OF NECKS OF BOTH FEMURS: ICD-10-CM

## 2025-02-26 DIAGNOSIS — Z17.31 HER2-POSITIVE CARCINOMA OF LEFT BREAST: ICD-10-CM

## 2025-02-26 LAB
ALBUMIN SERPL-MCNC: 3.5 G/DL (ref 3.5–5)
ALBUMIN/GLOB SERPL: 1.1 RATIO (ref 1.1–2)
ALP SERPL-CCNC: 56 UNIT/L (ref 40–150)
ALT SERPL-CCNC: 16 UNIT/L (ref 0–55)
ANION GAP SERPL CALC-SCNC: 7 MEQ/L
AST SERPL-CCNC: 15 UNIT/L (ref 5–34)
BASOPHILS # BLD AUTO: 0.03 X10(3)/MCL
BASOPHILS NFR BLD AUTO: 0.5 %
BILIRUB SERPL-MCNC: 0.3 MG/DL
BUN SERPL-MCNC: 12 MG/DL (ref 9.8–20.1)
CALCIUM SERPL-MCNC: 9.1 MG/DL (ref 8.4–10.2)
CHLORIDE SERPL-SCNC: 109 MMOL/L (ref 98–107)
CO2 SERPL-SCNC: 27 MMOL/L (ref 22–29)
CREAT SERPL-MCNC: 0.74 MG/DL (ref 0.55–1.02)
CREAT/UREA NIT SERPL: 16
EOSINOPHIL # BLD AUTO: 0.29 X10(3)/MCL (ref 0–0.9)
EOSINOPHIL NFR BLD AUTO: 4.7 %
ERYTHROCYTE [DISTWIDTH] IN BLOOD BY AUTOMATED COUNT: 13.2 % (ref 11.5–17)
GFR SERPLBLD CREATININE-BSD FMLA CKD-EPI: >60 ML/MIN/1.73/M2
GLOBULIN SER-MCNC: 3.1 GM/DL (ref 2.4–3.5)
GLUCOSE SERPL-MCNC: 96 MG/DL (ref 74–100)
HCT VFR BLD AUTO: 38.8 % (ref 37–47)
HGB BLD-MCNC: 13 G/DL (ref 12–16)
IMM GRANULOCYTES # BLD AUTO: 0.01 X10(3)/MCL (ref 0–0.04)
IMM GRANULOCYTES NFR BLD AUTO: 0.2 %
LYMPHOCYTES # BLD AUTO: 2.62 X10(3)/MCL (ref 0.6–4.6)
LYMPHOCYTES NFR BLD AUTO: 42.9 %
MCH RBC QN AUTO: 31.6 PG (ref 27–31)
MCHC RBC AUTO-ENTMCNC: 33.5 G/DL (ref 33–36)
MCV RBC AUTO: 94.4 FL (ref 80–94)
MONOCYTES # BLD AUTO: 0.42 X10(3)/MCL (ref 0.1–1.3)
MONOCYTES NFR BLD AUTO: 6.9 %
NEUTROPHILS # BLD AUTO: 2.74 X10(3)/MCL (ref 2.1–9.2)
NEUTROPHILS NFR BLD AUTO: 44.8 %
NRBC BLD AUTO-RTO: 0 %
PLATELET # BLD AUTO: 195 X10(3)/MCL (ref 130–400)
PMV BLD AUTO: 9.1 FL (ref 7.4–10.4)
POTASSIUM SERPL-SCNC: 3.9 MMOL/L (ref 3.5–5.1)
PROT SERPL-MCNC: 6.6 GM/DL (ref 6.4–8.3)
RBC # BLD AUTO: 4.11 X10(6)/MCL (ref 4.2–5.4)
SODIUM SERPL-SCNC: 143 MMOL/L (ref 136–145)
WBC # BLD AUTO: 6.11 X10(3)/MCL (ref 4.5–11.5)

## 2025-02-26 PROCEDURE — 36415 COLL VENOUS BLD VENIPUNCTURE: CPT

## 2025-02-26 PROCEDURE — 80053 COMPREHEN METABOLIC PANEL: CPT

## 2025-02-26 PROCEDURE — 85025 COMPLETE CBC W/AUTO DIFF WBC: CPT

## 2025-02-27 ENCOUNTER — OFFICE VISIT (OUTPATIENT)
Dept: HEMATOLOGY/ONCOLOGY | Facility: CLINIC | Age: 55
End: 2025-02-27
Payer: COMMERCIAL

## 2025-02-27 VITALS
HEART RATE: 90 BPM | RESPIRATION RATE: 14 BRPM | DIASTOLIC BLOOD PRESSURE: 80 MMHG | OXYGEN SATURATION: 96 % | SYSTOLIC BLOOD PRESSURE: 120 MMHG | BODY MASS INDEX: 32.53 KG/M2 | WEIGHT: 165.69 LBS | HEIGHT: 60 IN | TEMPERATURE: 98 F

## 2025-02-27 DIAGNOSIS — Z78.0 MENOPAUSE: ICD-10-CM

## 2025-02-27 DIAGNOSIS — C50.312 MALIGNANT NEOPLASM OF LOWER-INNER QUADRANT OF LEFT BREAST IN FEMALE, ESTROGEN RECEPTOR POSITIVE: Primary | ICD-10-CM

## 2025-02-27 DIAGNOSIS — Z17.0 MALIGNANT NEOPLASM OF LOWER-INNER QUADRANT OF LEFT BREAST IN FEMALE, ESTROGEN RECEPTOR POSITIVE: Primary | ICD-10-CM

## 2025-02-27 PROCEDURE — G2211 COMPLEX E/M VISIT ADD ON: HCPCS | Mod: S$GLB,,, | Performed by: INTERNAL MEDICINE

## 2025-02-27 PROCEDURE — 3008F BODY MASS INDEX DOCD: CPT | Mod: CPTII,S$GLB,, | Performed by: INTERNAL MEDICINE

## 2025-02-27 PROCEDURE — 1160F RVW MEDS BY RX/DR IN RCRD: CPT | Mod: CPTII,S$GLB,, | Performed by: INTERNAL MEDICINE

## 2025-02-27 PROCEDURE — 99214 OFFICE O/P EST MOD 30 MIN: CPT | Mod: S$GLB,,, | Performed by: INTERNAL MEDICINE

## 2025-02-27 PROCEDURE — 3079F DIAST BP 80-89 MM HG: CPT | Mod: CPTII,S$GLB,, | Performed by: INTERNAL MEDICINE

## 2025-02-27 PROCEDURE — 3074F SYST BP LT 130 MM HG: CPT | Mod: CPTII,S$GLB,, | Performed by: INTERNAL MEDICINE

## 2025-02-27 PROCEDURE — 99999 PR PBB SHADOW E&M-EST. PATIENT-LVL V: CPT | Mod: PBBFAC,,, | Performed by: INTERNAL MEDICINE

## 2025-02-27 PROCEDURE — 1159F MED LIST DOCD IN RCRD: CPT | Mod: CPTII,S$GLB,, | Performed by: INTERNAL MEDICINE

## 2025-02-27 NOTE — PROGRESS NOTES
Subjective:       Patient ID: Nita Dejesus is a 54 y.o. female.    Previous Oncologist: Dr. Katlyn Gonzalez at [facility]  Surgeon: Dr. Kelly Santos    Left Breast Cancer Stage IA(T2N0M0) Triple Positive--22  Biopsy/Surgery/pathology:   1. Excisional biopsy left breast mass/left breast cyst done 22--invasive ductal carcinoma, Grade 3, measures 3.0cm, a 0.6cm region of cauterized carcinoma is present at the inked superior-deep margin, cyst with reactive changes, suggestive of previous lumpectomy site, fluid left breast cyst with rare atypical cells present, suspicious for carcinoma, ER 95%, CT 27%, Her2 3+ by IHC, positive, Ki67 51%.   2. Left SLN biopsies done 22--3 benign lymph nodes.   3. Re-excision lumpectomy done 3/8/23--no residual invasive malignancy identified.   4. S/p BSO on 24--Negative for malignancy.   Imagin. Bilateral diagnostic MMG done at [facility] 22--left inferior breast large, minimally complex cystic lesion at 6:00 measures 4.4X3.5X4.2cm, corresponding to the palpable area, appears benign. Routine screening MMG recommended.   2. MRI breasts bilateral at [facility] 22--post-surgical seroma at 12:00 position of left breast s/p recent excisional biopsy, no definite suspicious enhancing masses or areas of nonmass enhancement of left breast, and no suspicious areas in right breast, BIRADS 6.   3. CT C/A/P w/ contrast 22 at [facility]--no thoracic metastatic disease, fatty infiltration of liver, small to moderate-sized hiatal hernia, no metastatic disease.   4. NM Bone scan whole body done 22 at [facility]--activity in cervical and lumbar spines likely degenerative, no anatomic correlate seen on CT scan in lumbar spines, suggest correlation with C-spine Xrays, increase tracer w/n both feet, likely degnerative change, physiologic activity of urinary tract.  5. CT A/P w/ contrast 22 at [facility]--no acute abdominopelvic pathology or change compared to recent CT from 22, fairly  large amount of stool in colon may reflect constipation, large hiatal hernia, diffuse hepatic steatosis, post-cholecystectomy, post hysterectomy, mild thoracic and lumbar degenerative disease.   6. Bilateral diagnostic MMG/Left breast US 2/28/23--No suspicious masses, microcalcifications, or other abnormalities are seen  within the right or left breast. Postsurgical changes are noted at the  6:00 position of the left breast at site of prior excisional biopsy with positive margins.   7. NM parathyroid scan on 7/27/23--Overall asymmetric increased activity at the right thyroid lobe with limited washout on the delayed 2 hour exam.  A underlying hyperfunctioning parathyroid adenoma cannot be entirely excluded.  7. CT C/A/P on 8/4/23--Mucosal prominence at a small to moderate size hiatus hernia. Atelectasis and/or scarring anterior left upper lobe and posterior right lower lung. Postsurgical changes left axilla and left breast with small residual fluid density/edema/stranding. Right central line/MediPort. Thoracolumbar spondylosis.   8. CT A/P w/o on 8/8/24--Postop bilateral salpingo-oophorectomies. Moderate to large sized hiatal hernia with intraluminal punctate density measuring 4 mm. Scattered diverticulosis without evidence of inflammation. Moderate stool burden. Normal appendix. Periumbilical skin thickening. Trace right lateral abdominal wall edema. Scattered degenerative changes without focal or acute osseous abnormality. Stable pelvic benign bone islands.     DEXA:  5/11/23--AP spine 0.1, left femoral neck -1.5, left total hip -0.4, right femoral neck -1.2, right total hip 0.0, c/w osteopenia bilateral femoral necks.     ECHO:  08/18/22--EF 55-65%.  11/17/22--EF 60%.  03/23/23--EF 55-60%.  06/20/23--EF 60%.  09/05/23--EF 55-60%  12/05/23--EF 55-60%.    Genetic testing: [employer] done 7/21/22 negative for any pathogenic variants.     Treatment history:  Left breast excisional biopsy done 7/13/22  Left SLN biopsies  done 8/8/22.  2 units PRBC 12/19/22.  TCHP X 6 cycles completed only 8/23/22--1/25/23 (multiple treatment delays for thrombocytopenia, despite dose reductions, multiple side effects).  Re-excision lumpectomy done 3/8/23--no residual disease.   Adjuvant radiation therapy completed 4/12/23--5/9/23.  Maintenance HP x 11 cycles. 3/29/23 - 10/25/23.   Zometa given X 1 dose 4/2024 had severe N/V, fever, joint/bone pain, flu-like symptoms all X 3 days so was stopped.   Zoladex monthly. 3/29/23 - 6/19/24. S/p BSO/DAISY.   Prolia x 1 dose given 10/2024 (stopped when she changed to Tamoxifen).    Current treatment plan:  Adjuvant Femara started 3/29/23. Stopped in September 2024 due to side effects.   Changed to Arimidex on 12/1/24, changed to Tamoxifen 12/12/24 due to side effects.       Chief Complaint: Other Misc (Pt reports that she has concerns about her lab results. States that she is cold and tired.), Peripheral Neuropathy, and Fatigue    HPI  Patient presents for follow-up of breast cancer. She is c/o feeling tired all the time, but she does not sleep well. Also c/o being cold all the time. She changed to Tamoxifen in 12/2024, because she was concerned about the heart effects. She was having palpitations and found to have some blockage. I discussed the rarity of cardiac side effects from AI normally, but she is convinced this caused her problems. She was changed to Tamoxifen after a discussion regarding inferiority of Tamoxifen as compared to AI in post-menopausal women. It was also discussed the increased risk of blood clots and stroke and she was advised to quit smoking before starting. But she has not stopped smoking yet, though she is trying to quit. I have strongly advised again against smoking with Tamoxifen. I discussed referral to our smoking cessation program and she would like this to be done. I also discussed stopping Prolia since Tamoxifen can actually build bone and that I would want her her PCP treat her  osteopenia from now on. Upon hearing this discussion, she became visibly upset and started yelling at me about how I am not going to take care of her, and how I am not listening to her, and how I am dismissing her concerns. Of note, this is not the first time that patient has been verbally abusive. You can find documentation in the chart from a previous encounter where she was rude to my staff. I explained again to patient that I will not tolerate rude behavior and verbal abuse to myself or my staff in this clinic. Furthermore I informed her that if it happens again at any time, I will not see her any longer. I spent a great deal of time with her in the room, going over her blood work and trying to reassure her that there were no concerning findings in her blood work. She was c/o hip pain and I advised that she would have her PCP order some Xrays to check for arthritis if the hip pain continues. She is otherwise tolerating Tamoxifen much better than the AI and prefers to stay on it. She has less hot flashes and overall feels much better.     Past Medical History:   Diagnosis Date    Allergy     Anemia     Anxiety     Back pain     Breast cancer     left    Diabetes mellitus     type 2    Fibromyalgia     GERD (gastroesophageal reflux disease)     Hiatal hernia     Hip pain     Hx of Clotting disorder     d/t medication    Hyperparathyroidism     Insulin resistance     Migraine     Neuromuscular disorder     Neuropathy     Nontoxic single thyroid nodule     Osteopenia     Prediabetes       Past Surgical History:   Procedure Laterality Date    bilateral foot      tarsal tunnel    BREAST LUMPECTOMY Left 2022    c lymph nodes    BREAST SURGERY Left     x2 on left. nodes removed.    CHOLECYSTECTOMY      COLONOSCOPY      ESOPHAGOGASTRODUODENOSCOPY      EYE SURGERY  1995    HERNIA REPAIR  Aug. 2017    HYSTERECTOMY  Aug, 2012    LAPAROSCOPIC NISSEN FUNDOPLICATION N/A 09/26/2023    Procedure: FUNDOPLICATION, NISSEN,  "LAPAROSCOPIC;  Surgeon: Maximiliano Denton Jr., MD;  Location: [facility] OR;  Service: General;  Laterality: N/A;  WITH GASTROPEXY    LEFT HEART CATHETERIZATION Left 09/26/2024    Procedure: Left heart cath;  Surgeon: Holden Peters MD;  Location: [facility] CATH LAB;  Service: Cardiology;  Laterality: Left;    NISSEN FUNDOPLICATION      PARATHYROID GLAND SURGERY  12/07/2023    PLACEMENT, MEDIPORT      REDUCTION OF BOTH BREASTS  2018    REMOVAL OF VASCULAR ACCESS PORT N/A 12/12/2024    Procedure: REMOVAL, VASCULAR ACCESS PORT;  Surgeon: Maximiliano Denton Jr., MD;  Location: [facility] OR;  Service: General;  Laterality: N/A;  Removal tunneled venous access port (mediport)    TUBAL LIGATION Bilateral 1993     Family History   Problem Relation Name Age of Onset    Hypertension Mother Judyjanak Sheridan     Lupus Mother Judyjanak Sheridan     Hypoparathyroidism Mother Judyjanak Sheridan     Diabetes Father Mirza  Desandro     Heart disease Father Mirza  Desandro     Prostate cancer Father Mirza  Desandro     Kidney disease Father Mirza  Desandro     Cancer Father Mirza  Desandro     COPD Father Mirza  Desandro     Hearing loss Father Mirza  Desandro     Hodgkin's lymphoma Brother      Pancreatitis Brother       Social History     Socioeconomic History    Marital status:    Tobacco Use    Smoking status: Every Day     Current packs/day: 0.25     Average packs/day: 0.3 packs/day for 15.0 years (3.8 ttl pk-yrs)     Types: Cigarettes    Smokeless tobacco: Never   Substance and Sexual Activity    Alcohol use: Not Currently    Drug use: Never    Sexual activity: Not Currently     Partners: Male     Birth control/protection: See Surgical Hx, None       Review of patient's allergies indicates:   Allergen Reactions    Amitriptyline Anxiety and Other (See Comments)     Severe    Other reaction(s): Unknown    Corticosteroids (glucocorticoids) Hives, Other (See Comments) and Swelling     Patient states "Severe Inflammation"      Crestor " "[rosuvastatin] Shortness Of Breath and Palpitations     Yeast infection    Heparin analogues Palpitations and Other (See Comments)     "All anticoagulation Meds cause dangerously low BP"    Ketorolac Diarrhea, Nausea And Vomiting, Other (See Comments) and Swelling     Migraines      Mushroom Anaphylaxis     ANY MUSHROOM CAUSES ANAPHYLAXIS    Peanut Anaphylaxis    Penicillins Anaphylaxis, Diarrhea, Hives, Nausea And Vomiting and Swelling    Tramadol Diarrhea, Itching, Nausea And Vomiting, Rash and Swelling    Latex Other (See Comments)     Skin blisters      Macrolide antibiotics Hives and Rash    Adhesive Other (See Comments)     Blisters, skin tears; CANNOT USE PAPER TAPE    Anticoag citrate phos dextrose      STATES ANY BLOOD THINNERS CAUSES EXTREMELY LOW BLOOD PRESSURE    Low b/p    Aspartame Other (See Comments)     Migraines and nausea    Benadryl [diphenhydramine hcl]      Involuntary movement of the legs, flares up restless leg    Egg Diarrhea     Vaccines that have egg protein base cause anaphylaxix, can eat eggs    Erythromycin      Other reaction(s): Unknown    Estrogens Other (See Comments)     Drives cancer    Other omega-3s      Anticoagulant   Patient states "low blood pressure"    Progesterone Other (See Comments)     Swelling of lymph nodes, breast engorgemnt    Sucralose Other (See Comments)     Causes migraines    Xiidra [lifitegrast]      Sinus infections and yeast infection    Aspirin Other (See Comments)     Avoid if possible due to blood thinning    Eszopiclone Itching and Anxiety     AKA LUNESTA    Pork derived (porcine) Diarrhea    Topiramate Anxiety, Other (See Comments) and Palpitations     Shaking    Other Reaction(s): Unknown    Zinc Nausea Only and Rash    Zoledronic acid Nausea And Vomiting     Pain in eyes during the infusion.    Joint pain, fever      Current Outpatient Medications on File Prior to Visit   Medication Sig Dispense Refill    apremilast (OTEZLA) 30 mg Tab Take 30 mg by " mouth every evening.      aspirin (ECOTRIN) 81 MG EC tablet Take 81 mg by mouth once daily. Takes it in the evening      atorvastatin (LIPITOR) 10 MG tablet Take 10 mg by mouth.      butalbital-acetaminophen-caffeine -40 mg (FIORICET, ESGIC) -40 mg per tablet Take 1 tablet by mouth daily as needed for Headaches.      cholecalciferol, vitamin D3, 1,250 mcg (50,000 unit) capsule SMARTSI Capsule(s) By Mouth Every 10 Days      ergocalciferol (ERGOCALCIFEROL) 50,000 unit Cap Take by mouth.      gabapentin (NEURONTIN) 300 MG capsule Take 300 mg by mouth 3 (three) times daily. Takes with 600 mg to total 900mg TID. Takes 1200mg @ night      gabapentin (NEURONTIN) 600 MG tablet Take 600 mg by mouth 3 (three) times daily.      LANTUS U-100 INSULIN 100 unit/mL injection SMARTSI Unit(s) SUB-Q Daily      loperamide (IMODIUM) 2 mg capsule Take 2 mg by mouth once as needed for Diarrhea.      metoclopramide HCl (REGLAN) 10 MG tablet 1 Tablet Orally Once a day as needed for nausea for 30 days      MOUNJARO 2.5 mg/0.5 mL PnIj Inject into the skin.      omeprazole (PRILOSEC) 40 MG capsule Take 1 capsule (40 mg total) by mouth once daily. 30 capsule 6    onabotulinumtoxina (BOTOX) 200 unit SolR 200 Units every 3 (three) months. NO BOTTLE TO VERIFY AM OF PROCEDURE      ondansetron (ZOFRAN) 4 MG tablet 1 tablet Orally prn for 30 days      promethazine (PHENERGAN) 25 MG tablet take 1 tablet by mouth every 6 hours AS NEEDED FOR nausea 30 tablet 3    propranoloL (INDERAL) 20 MG tablet Take 20 mg by mouth 3 (three) times daily.      tamoxifen (NOLVADEX) 20 MG Tab Take 1 tablet (20 mg total) by mouth once daily. 30 tablet 2    tiZANidine (ZANAFLEX) 4 MG tablet Take 4 mg by mouth 3 (three) times daily. 1 tab in am and 1 TAB  IN afternoon and 2 tabs at night      anastrozole (ARIMIDEX) 1 mg Tab Take 1 tablet (1 mg total) by mouth once daily. (Patient not taking: Reported on 2025.) 30 tablet 3    HYDROcodone-acetaminophen  (NORCO) 5-325 mg per tablet Take 1 tablet by mouth every 6 (six) hours as needed for Pain. (Patient not taking: Reported on 2/27/2025) 12 tablet 0    vitamin E 400 UNIT capsule Take 800 Units by mouth once daily. STOPPED FOR PROCEDURE NO BOTTLE TO VERIFY AM OF PROCEDURE (Patient not taking: Reported on 2/27/2025)       No current facility-administered medications on file prior to visit.      Review of Systems   Constitutional:  Positive for fatigue. Negative for appetite change and unexpected weight change.   HENT:  Negative for mouth sores.    Eyes:  Positive for visual disturbance and eye dryness.   Respiratory:  Positive for shortness of breath. Negative for cough.    Cardiovascular:  Negative for chest pain.   Gastrointestinal:  Negative for abdominal pain and diarrhea.        +hiatal hernia   Genitourinary:  Positive for hot flashes. Negative for frequency.   Musculoskeletal:  Positive for arthralgias. Negative for back pain.   Integumentary:  Negative for rash.        Bilateral breast pain, chronic   Neurological:  Negative for headaches.   Hematological:  Negative for adenopathy.   Psychiatric/Behavioral:  The patient is not nervous/anxious.         Vitals:    02/27/25 0955   BP: 120/80   Pulse: 90   Resp: 14   Temp: 98.2 °F (36.8 °C)   TempSrc: Oral   SpO2: 96%   Weight: 75.2 kg (165 lb 11.2 oz)   Height: 5' (1.524 m)     Wt Readings from Last 3 Encounters:   02/27/25 0955 75.2 kg (165 lb 11.2 oz)   12/30/24 0945 80.2 kg (176 lb 12.9 oz)   12/12/24 0941 80.2 kg (176 lb 12.9 oz)   12/10/24 1128 77.6 kg (171 lb)     Physical Exam  Constitutional:       Appearance: Normal appearance. She is overweight.   HENT:      Head: Normocephalic.      Nose: Nose normal.      Mouth/Throat:      Mouth: Mucous membranes are moist.   Eyes:      General: Lids are normal.      Extraocular Movements: Extraocular movements intact.      Conjunctiva/sclera: Conjunctivae normal.   Neck:      Comments: Midline neck incision  intact  Cardiovascular:      Rate and Rhythm: Normal rate and regular rhythm.      Heart sounds: Normal heart sounds.   Pulmonary:      Effort: Pulmonary effort is normal.      Breath sounds: Normal breath sounds and air entry.   Chest:      Comments: Right chest wall scar from mediport removal. Left breast with inferior lumpectomy scar, well healed. Left axillary incision from LN biopsy also healed. Right breast with scars from previous reduction, no masses in either breast. No axillary adenopathy.   Abdominal:      General: Abdomen is protuberant. Bowel sounds are normal. There is no distension.      Palpations: Abdomen is soft.      Tenderness: There is no abdominal tenderness.      Comments: Scattered laparoscopic incisions all healed well.   Musculoskeletal:         General: Normal range of motion.      Cervical back: Normal range of motion and neck supple.      Right lower leg: No edema.      Left lower leg: No edema.   Skin:     General: Skin is warm and moist.   Neurological:      General: No focal deficit present.      Mental Status: She is alert and oriented to person, place, and time.   Psychiatric:         Mood and Affect: Mood normal.         Behavior: Behavior is cooperative.         Judgment: Judgment normal.       Lab Visit on 02/26/2025   Component Date Value    Sodium 02/26/2025 143     Potassium 02/26/2025 3.9     Chloride 02/26/2025 109 (H)     CO2 02/26/2025 27     Glucose 02/26/2025 96     Blood Urea Nitrogen 02/26/2025 12.0     Creatinine 02/26/2025 0.74     Calcium 02/26/2025 9.1     Protein Total 02/26/2025 6.6     Albumin 02/26/2025 3.5     Globulin 02/26/2025 3.1     Albumin/Globulin Ratio 02/26/2025 1.1     Bilirubin Total 02/26/2025 0.3     ALP 02/26/2025 56     ALT 02/26/2025 16     AST 02/26/2025 15     eGFR 02/26/2025 >60     Anion Gap 02/26/2025 7.0     BUN/Creatinine Ratio 02/26/2025 16     WBC 02/26/2025 6.11     RBC 02/26/2025 4.11 (L)     Hgb 02/26/2025 13.0     Hct 02/26/2025  38.8     MCV 02/26/2025 94.4 (H)     MCH 02/26/2025 31.6 (H)     MCHC 02/26/2025 33.5     RDW 02/26/2025 13.2     Platelet 02/26/2025 195     MPV 02/26/2025 9.1     Neut % 02/26/2025 44.8     Lymph % 02/26/2025 42.9     Mono % 02/26/2025 6.9     Eos % 02/26/2025 4.7     Basophil % 02/26/2025 0.5     Imm Grans % 02/26/2025 0.2     Neut # 02/26/2025 2.74     Lymph # 02/26/2025 2.62     Mono # 02/26/2025 0.42     Eos # 02/26/2025 0.29     Baso # 02/26/2025 0.03     Imm Gran # 02/26/2025 0.01     NRBC% 02/26/2025 0.0           Assessment:       1. Malignant neoplasm of lower-inner quadrant of left breast in female, estrogen receptor positive      Plan:       Patient with Stage IA Left breast cancer triple positive s/p excisional biopsy of a cystic lesion that found cancer incidentally diagnosed 7/13/22. Tumor measured 3.0cm and SLN biopsies negative, Grade 3, strongly ER and ND positive and Her2 positive with high Ki67. There was a positive margin on the excisional biopsy.  Treatment with chemotherapy neoadjuvant started at [facility] with Dr. Gonzalez.    Patient received 2 cycles at [facility] then changed care to [facility]. She was having multiple side effects.   She does not have any steroids due to h/o allergy causing inflammation.   Patient had multiple delays due to multiple side effects, thrombocytopenia, requiring dose reduction, but finally completed 6 cycles on 1/25/23.  Surgery was delayed and finally done on 3/8/23. Patient had re-excision lumpectomy and there was no residual invasive malignancy.     Treatment resumed with maintenance HP x 11 cycles 3/29/23.     Recommended also OS + AI X 5 years followed by completion of AI to complete 10 years.   She had DAISY in 2012 for endometriosis but still had ovaries remaining functional prior to starting chemotherapy.   8/4/22 estradiol level 11, FSH 44, LH 38. Repeat Estradiol level done on Zoladex per patient request in 3/2024 <24.  Zoladex/Femara started on 3/29/23. Plan for endocrine  therapy X 10 years.  Patient completed radiation on 5/9/23.  Completed 11 cycles of maintenance Herceptin + Perjeta on 10/25/23.   She was found to have primary hyperparathyroidism and was seen by Dr. Hamilton endocrinologist. Referred to a surgeon in [place] and had surgery on 12/7/23 to remove 1 overactive parathyroid gland.   S/p BSO/DAISY by Dr. Ramirez on 7/18/24 with pathology benign.     Currently patient is doing well without any signs or symptoms to suggest disease recurrence.   Recent labs all good.   She stopped the Femara in 9/2024. Tried to change to Arimidex, stopped due to concerns that it was causing heart issues and it did increase her cholesterol.  Tamoxifen started 12/12/24 and she is tolerating much better. She has been informed Tamoxifen is inferior to AI and she prefers to stay on it for QOL.     Smoking cessation strongly advised and will send referral to smoking cessation program.  I will not continue to prescribe Tamoxifen should she continue smoking due to risks.    Continue vitamin D.     Diagnostic bilateral MMG 3/26/24 benign. Routine screening MMG recommended in 3/2025 and is scheduled 3/28/25.   Also suggest alternating MRI with MMG. MRI scheduled for 8/5/2025.   Continue routine surveillance visits.    Patient is nearing >2 years out from surgery and starting endocrine therapy.  Will change visits to every 6 months.  F/u in 6 months with repeat labs and visit.    Advised she be evaluated for sleep study for fatigue, have thyroid check since she is cold all the time and have per PCP obtain Xrays if her hip pain continues.     Stop Prolia.     DEXA from 5/11/23 showed mild osteopenia in bilateral femoral necks. Given Zometa in 4/2024, but had severe side effects, so recommended to stop. She does not want to try Fosamax due to GI issues.   Started Prolia in October 2024 and tolerated well. Next dose due in April 2025.   However, since patient is now on Tamoxifen, her bone health will be  followed with her PCP.  I will schedule repeat DEXA 5/2025 and we can forward to her PCP for ongoing management.      All questions answered at this time.        Flory Vargas MD      Visit today included increased complexity associated with the care of the episodic problem breast cancer, addressing and managing the longitudinal care of the patient's breast cancer.

## 2025-02-28 ENCOUNTER — TELEPHONE (OUTPATIENT)
Dept: HEMATOLOGY/ONCOLOGY | Facility: CLINIC | Age: 55
End: 2025-02-28
Payer: COMMERCIAL

## 2025-02-28 NOTE — TELEPHONE ENCOUNTER
I received a referral from Dr. Vargas following an appointment. I reviewed the patient's chart and contacted the patient. I introduced myself and began to explain the purpose of my call and offer smoking cessation support. Miranda stated she only smokes 5-6 cigarettes a day, sometimes less and she has done that for 20 years. She does not believe smoking that little effects her health and is not interested in smoking cessation. She also believes smoking helps her restless leg syndrome and stated a doctor told her that it is good for her RLS. I suggested that was not accurate information and she stated she got the information from a doctor and she is going to believe the doctor over me. According to the NIH smoking is a risk factor for RLS and when I shared that information with her she stated she still believes a doctor over some paper I am reading off of. She stated her  smokes and it would be easier for her if he quit buying cigarettes and quit smoking but he will not do that, per Miranda. Multiple time she stated she does not believe the amount she smokes effects her health. I attempted to give her other forms of relaxation tools such as deep breathing exercises, meditation, yoga, being outside in nature, just to name a few. She stated she is limited in what she can do and cannot do the things I suggested. I am closing the referral and explained to her that I would make a note and inform Dr. Vargas but if she changed her mind she could reach out to me.

## 2025-03-03 DIAGNOSIS — Z17.0 MALIGNANT NEOPLASM OF LOWER-INNER QUADRANT OF LEFT BREAST IN FEMALE, ESTROGEN RECEPTOR POSITIVE: ICD-10-CM

## 2025-03-03 DIAGNOSIS — C50.312 MALIGNANT NEOPLASM OF LOWER-INNER QUADRANT OF LEFT BREAST IN FEMALE, ESTROGEN RECEPTOR POSITIVE: ICD-10-CM

## 2025-03-03 RX ORDER — TAMOXIFEN CITRATE 20 MG/1
20 TABLET ORAL
Qty: 30 TABLET | Refills: 5 | Status: SHIPPED | OUTPATIENT
Start: 2025-03-03

## 2025-03-24 DIAGNOSIS — M85.80 OSTEOPENIA: Primary | ICD-10-CM

## 2025-03-28 ENCOUNTER — HOSPITAL ENCOUNTER (OUTPATIENT)
Dept: RADIOLOGY | Facility: HOSPITAL | Age: 55
Discharge: HOME | End: 2025-03-28
Attending: NURSE PRACTITIONER
Payer: COMMERCIAL

## 2025-03-28 DIAGNOSIS — Z12.31 ENCOUNTER FOR SCREENING MAMMOGRAM FOR BREAST CANCER: ICD-10-CM

## 2025-03-28 PROCEDURE — 77063 BREAST TOMOSYNTHESIS BI: CPT | Mod: TC

## 2025-03-28 PROCEDURE — 77063 BREAST TOMOSYNTHESIS BI: CPT | Mod: 26,,, | Performed by: STUDENT IN AN ORGANIZED HEALTH CARE EDUCATION/TRAINING PROGRAM

## 2025-03-28 PROCEDURE — 77067 SCR MAMMO BI INCL CAD: CPT | Mod: 26,,, | Performed by: STUDENT IN AN ORGANIZED HEALTH CARE EDUCATION/TRAINING PROGRAM

## 2025-04-01 DIAGNOSIS — M85.80 SENILE OSTEOPENIA: Primary | ICD-10-CM

## 2025-04-24 ENCOUNTER — LAB VISIT (OUTPATIENT)
Dept: LAB | Facility: HOSPITAL | Age: 55
End: 2025-04-24
Attending: NURSE PRACTITIONER
Payer: COMMERCIAL

## 2025-04-24 DIAGNOSIS — Z87.39 PERSONAL HISTORY OF ARTHRITIS: Primary | ICD-10-CM

## 2025-04-24 DIAGNOSIS — R06.02 SHORTNESS OF BREATH: ICD-10-CM

## 2025-04-24 LAB
BILIRUB UR QL STRIP.AUTO: NEGATIVE
CK SERPL-CCNC: 68 U/L (ref 29–168)
CLARITY UR: CLEAR
COLOR UR AUTO: YELLOW
GLUCOSE UR QL STRIP: NEGATIVE
HGB UR QL STRIP: NEGATIVE
KETONES UR QL STRIP: NEGATIVE
LEUKOCYTE ESTERASE UR QL STRIP: NEGATIVE
NITRITE UR QL STRIP: NEGATIVE
PH UR STRIP: 5.5 [PH]
PROT UR QL STRIP: NEGATIVE
SP GR UR STRIP.AUTO: 1.02 (ref 1–1.03)
UROBILINOGEN UR STRIP-ACNC: 0.2

## 2025-04-24 PROCEDURE — 81003 URINALYSIS AUTO W/O SCOPE: CPT

## 2025-04-24 PROCEDURE — 36415 COLL VENOUS BLD VENIPUNCTURE: CPT

## 2025-04-24 PROCEDURE — 82550 ASSAY OF CK (CPK): CPT

## 2025-05-12 ENCOUNTER — HOSPITAL ENCOUNTER (OUTPATIENT)
Dept: RADIOLOGY | Facility: HOSPITAL | Age: 55
Discharge: HOME | End: 2025-05-12
Attending: FAMILY MEDICINE
Payer: COMMERCIAL

## 2025-05-12 DIAGNOSIS — M85.80 OSTEOPENIA: ICD-10-CM

## 2025-05-12 PROCEDURE — 77080 DXA BONE DENSITY AXIAL: CPT | Mod: TC

## 2025-08-05 ENCOUNTER — APPOINTMENT (OUTPATIENT)
Dept: RADIOLOGY | Facility: HOSPITAL | Age: 55
End: 2025-08-05
Attending: NURSE PRACTITIONER
Payer: MEDICARE

## 2025-08-05 DIAGNOSIS — M85.851 OSTEOPENIA OF NECKS OF BOTH FEMURS: ICD-10-CM

## 2025-08-05 DIAGNOSIS — C50.312 MALIGNANT NEOPLASM OF LOWER-INNER QUADRANT OF LEFT BREAST IN FEMALE, ESTROGEN RECEPTOR POSITIVE: ICD-10-CM

## 2025-08-05 DIAGNOSIS — Z17.31 HER2-POSITIVE CARCINOMA OF LEFT BREAST: ICD-10-CM

## 2025-08-05 DIAGNOSIS — M85.852 OSTEOPENIA OF NECKS OF BOTH FEMURS: ICD-10-CM

## 2025-08-05 DIAGNOSIS — C50.912 HER2-POSITIVE CARCINOMA OF LEFT BREAST: ICD-10-CM

## 2025-08-05 DIAGNOSIS — Z17.0 MALIGNANT NEOPLASM OF LOWER-INNER QUADRANT OF LEFT BREAST IN FEMALE, ESTROGEN RECEPTOR POSITIVE: ICD-10-CM

## 2025-08-05 DIAGNOSIS — Z90.79 STATUS POST BILATERAL SALPINGECTOMY: ICD-10-CM

## 2025-08-05 PROCEDURE — A9577 INJ MULTIHANCE: HCPCS | Performed by: NURSE PRACTITIONER

## 2025-08-05 PROCEDURE — C8937 CAD BREAST MRI: HCPCS | Mod: TC

## 2025-08-05 PROCEDURE — 77049 MRI BREAST C-+ W/CAD BI: CPT | Mod: 26,,, | Performed by: STUDENT IN AN ORGANIZED HEALTH CARE EDUCATION/TRAINING PROGRAM

## 2025-08-05 PROCEDURE — 25500020 PHARM REV CODE 255: Performed by: NURSE PRACTITIONER

## 2025-08-05 RX ADMIN — GADOBENATE DIMEGLUMINE 15 ML: 529 INJECTION, SOLUTION INTRAVENOUS at 09:08

## (undated) DEVICE — NDL HYPO 22GX1 1/2 SYR 10ML LL

## (undated) DEVICE — GLOVE PROTEXIS BLUE LATEX 7.5

## (undated) DEVICE — CLOSURE SKIN STERI STRIP 1/2X4

## (undated) DEVICE — TROCAR ENDOPATH XCEL 11MM 10CM

## (undated) DEVICE — APPLIER CLIP ENDO MED/LG 10MM

## (undated) DEVICE — KIT GEN LAPAROSCOPY LAFAYETTE

## (undated) DEVICE — CONTRAST ISOVUE 370 500ML MULT

## (undated) DEVICE — PENCIL SMOKE EVAC TELSCP 15FT

## (undated) DEVICE — CANNULA ENDOPATH XCEL 5X100MM

## (undated) DEVICE — COVER PROBE US 5.5X58L NON LTX

## (undated) DEVICE — SOL NACL IRR 1000ML BTL

## (undated) DEVICE — STRIP MEDI WND CLSR 1/2X4IN

## (undated) DEVICE — KIT MANIFOLD LOW PRESS TUBING

## (undated) DEVICE — SCISSOR CURVED ENDOPATH 5MM

## (undated) DEVICE — ELECTRODE PATIENT RETURN DISP

## (undated) DEVICE — NDL 27G X 1 1/4

## (undated) DEVICE — SHEARS HS LONG 5MM CURVED

## (undated) DEVICE — IRRIGATOR HYDRO-SURG PLUS 5MM

## (undated) DEVICE — SUT VICRYL PLUS 4-0 FS-2 27IN

## (undated) DEVICE — NDL SYR 10ML 18X1.5 LL BLUNT

## (undated) DEVICE — GLOVE PROTEXIS HYDROGEL SZ7.5

## (undated) DEVICE — SUPPORT ULNA NERVE PROTECTOR

## (undated) DEVICE — GUIDEWIRE INQWIRE SE 3MM JTIP

## (undated) DEVICE — COVER MAYO STAND REINFRCD 30

## (undated) DEVICE — PAD DEFIB CADENCE ADULT R2

## (undated) DEVICE — GLOVE SIGNATURE MICRO LTX 7.5

## (undated) DEVICE — CATH IMPULSE FL4 5FR 100CM

## (undated) DEVICE — KIT NAMIC HEART STD LEFT

## (undated) DEVICE — HOLDER STRIP-T SELF ADH 2X10IN

## (undated) DEVICE — PACK OR CLEAN UP COMBO SIZE 2

## (undated) DEVICE — KIT SURGICAL TURNOVER

## (undated) DEVICE — TROCAR ENDOPATH XCEL 11X100MM

## (undated) DEVICE — KIT GLIDESHEATH SLEND 6FR 10CM

## (undated) DEVICE — WARMER DRAPE STERILE LF

## (undated) DEVICE — WIRE GUIDE INQWIRE STR 180CM

## (undated) DEVICE — DRESSING TRANS 4X4 TEGADERM

## (undated) DEVICE — GLOVE 7.5 PROTEXIS PI MICRO

## (undated) DEVICE — SUT TK QUIK LOAD

## (undated) DEVICE — PENCIL ELECSURG ROCKER 15FT

## (undated) DEVICE — SOL IRRI STRL WATER 1000ML

## (undated) DEVICE — SOL NACL .9P 500ML

## (undated) DEVICE — CATH JR4 5FR

## (undated) DEVICE — CATH JACKY RADIAL 5FR 100CM

## (undated) DEVICE — KIT HAND CONTROL HIGH PRESSUR

## (undated) DEVICE — Device

## (undated) DEVICE — CANNULA DUAL CO2/O2 NASAL 7FT

## (undated) DEVICE — SUT VICRYL 3-0 27 SH

## (undated) DEVICE — TROCAR ENDOPATH XCEL 5X100MM

## (undated) DEVICE — GLOVE PROTEXIS LTX MICRO  7.5

## (undated) DEVICE — DEVICE TK TI-KNOT 5MM REPL DEV

## (undated) DEVICE — GLOVE SIGNATURE MICRO LTX 6.5

## (undated) DEVICE — SUT ETHIBOND #0 EN-3 112CM

## (undated) DEVICE — GLOVE PROTEXIS LTX MICRO 6.5

## (undated) DEVICE — ADHESIVE MASTISOL VIAL 48/BX